# Patient Record
Sex: FEMALE | Race: WHITE | Employment: OTHER
[De-identification: names, ages, dates, MRNs, and addresses within clinical notes are randomized per-mention and may not be internally consistent; named-entity substitution may affect disease eponyms.]

---

## 2017-01-19 ENCOUNTER — OFFICE VISIT (OUTPATIENT)
Dept: FAMILY MEDICINE CLINIC | Facility: CLINIC | Age: 72
End: 2017-01-19

## 2017-01-19 DIAGNOSIS — E78.2 MIXED HYPERLIPIDEMIA: Primary | ICD-10-CM

## 2017-01-19 DIAGNOSIS — F32.A ANXIETY AND DEPRESSION: ICD-10-CM

## 2017-01-19 DIAGNOSIS — Z79.899 LONG TERM USE OF DRUG: ICD-10-CM

## 2017-01-19 DIAGNOSIS — F41.9 ANXIETY AND DEPRESSION: ICD-10-CM

## 2017-01-19 PROCEDURE — 99213 OFFICE O/P EST LOW 20 MIN: CPT | Performed by: NURSE PRACTITIONER

## 2017-01-19 RX ORDER — AMITRIPTYLINE HYDROCHLORIDE 25 MG/1
TABLET, FILM COATED ORAL
Qty: 30 TABLET | Refills: 0 | Status: CANCELLED | OUTPATIENT
Start: 2017-01-19

## 2017-01-19 RX ORDER — AMITRIPTYLINE HYDROCHLORIDE 25 MG/1
TABLET, FILM COATED ORAL
Qty: 30 TABLET | Refills: 2 | Status: SHIPPED | OUTPATIENT
Start: 2017-01-19 | End: 2017-04-28 | Stop reason: SDUPTHER

## 2017-01-19 RX ORDER — MONTELUKAST SODIUM 10 MG/1
10 TABLET ORAL NIGHTLY
Qty: 30 TABLET | Refills: 5 | Status: SHIPPED | OUTPATIENT
Start: 2017-01-19 | End: 2018-02-19 | Stop reason: ALTCHOICE

## 2017-01-19 RX ORDER — SIMVASTATIN 40 MG
TABLET ORAL
Qty: 30 TABLET | Refills: 5 | Status: SHIPPED | OUTPATIENT
Start: 2017-01-19 | End: 2017-09-19 | Stop reason: SDUPTHER

## 2017-01-22 VITALS
TEMPERATURE: 97.6 F | HEART RATE: 82 BPM | WEIGHT: 169 LBS | SYSTOLIC BLOOD PRESSURE: 146 MMHG | RESPIRATION RATE: 20 BRPM | OXYGEN SATURATION: 98 % | BODY MASS INDEX: 29.01 KG/M2 | DIASTOLIC BLOOD PRESSURE: 90 MMHG

## 2017-01-22 ASSESSMENT — ENCOUNTER SYMPTOMS
COLOR CHANGE: 0
EYE PAIN: 0
COUGH: 0
NAUSEA: 0
SHORTNESS OF BREATH: 0
DIARRHEA: 0
EYE ITCHING: 0
SORE THROAT: 0
CONSTIPATION: 0
ABDOMINAL DISTENTION: 0
VISUAL CHANGE: 0
BLOOD IN STOOL: 0
ABDOMINAL PAIN: 0
WHEEZING: 0
SINUS PRESSURE: 0
CHEST TIGHTNESS: 0

## 2017-02-02 ENCOUNTER — NURSE ONLY (OUTPATIENT)
Dept: FAMILY MEDICINE CLINIC | Facility: CLINIC | Age: 72
End: 2017-02-02

## 2017-02-02 DIAGNOSIS — Z23 NEED FOR PNEUMOCOCCAL VACCINE: Primary | ICD-10-CM

## 2017-02-02 PROCEDURE — 90670 PCV13 VACCINE IM: CPT | Performed by: NURSE PRACTITIONER

## 2017-02-02 PROCEDURE — G0009 ADMIN PNEUMOCOCCAL VACCINE: HCPCS | Performed by: NURSE PRACTITIONER

## 2017-04-03 ENCOUNTER — HOSPITAL ENCOUNTER (OUTPATIENT)
Age: 72
Setting detail: SPECIMEN
Discharge: HOME | End: 2017-04-03
Payer: MEDICARE

## 2017-04-03 ENCOUNTER — OFFICE VISIT (OUTPATIENT)
Dept: INTERNAL MEDICINE CLINIC | Age: 72
End: 2017-04-03
Payer: MEDICARE

## 2017-04-03 VITALS
SYSTOLIC BLOOD PRESSURE: 118 MMHG | DIASTOLIC BLOOD PRESSURE: 86 MMHG | WEIGHT: 170 LBS | HEIGHT: 64 IN | BODY MASS INDEX: 29.02 KG/M2

## 2017-04-03 DIAGNOSIS — R39.15 URINARY URGENCY: ICD-10-CM

## 2017-04-03 DIAGNOSIS — J06.9 ACUTE URI: ICD-10-CM

## 2017-04-03 PROCEDURE — 99213 OFFICE O/P EST LOW 20 MIN: CPT | Performed by: NURSE PRACTITIONER

## 2017-04-03 RX ORDER — LORATADINE AND PSEUDOEPHEDRINE 10; 240 MG/1; MG/1
1 TABLET, EXTENDED RELEASE ORAL DAILY
Qty: 14 TABLET | Refills: 0 | Status: SHIPPED | OUTPATIENT
Start: 2017-04-03 | End: 2018-02-19 | Stop reason: ALTCHOICE

## 2017-04-03 RX ORDER — CIPROFLOXACIN 500 MG/1
500 TABLET, FILM COATED ORAL 2 TIMES DAILY
Qty: 20 TABLET | Refills: 0 | Status: SHIPPED | OUTPATIENT
Start: 2017-04-03 | End: 2017-04-13

## 2017-04-03 ASSESSMENT — ENCOUNTER SYMPTOMS
SINUS PRESSURE: 1
SHORTNESS OF BREATH: 0
WHEEZING: 0
RHINORRHEA: 1
COUGH: 1
SORE THROAT: 1

## 2017-04-04 LAB
BILIRUBIN URINE: NEGATIVE
COLOR: YELLOW
COMMENT UA: ABNORMAL
GLUCOSE URINE: NEGATIVE
KETONES, URINE: NEGATIVE
LEUKOCYTE ESTERASE, URINE: NEGATIVE
NITRITE, URINE: NEGATIVE
PH UA: 7.5 (ref 5–8)
PROTEIN UA: NEGATIVE
SPECIFIC GRAVITY UA: 1 (ref 1–1.03)
TURBIDITY: CLEAR
URINE HGB: NEGATIVE
UROBILINOGEN, URINE: NORMAL

## 2017-04-28 RX ORDER — AMITRIPTYLINE HYDROCHLORIDE 25 MG/1
TABLET, FILM COATED ORAL
Qty: 30 TABLET | Refills: 2 | Status: SHIPPED | OUTPATIENT
Start: 2017-04-28 | End: 2017-08-09 | Stop reason: SDUPTHER

## 2017-05-15 ENCOUNTER — OFFICE VISIT (OUTPATIENT)
Dept: GASTROENTEROLOGY | Age: 72
End: 2017-05-15
Payer: MEDICARE

## 2017-05-15 VITALS
HEART RATE: 68 BPM | OXYGEN SATURATION: 99 % | DIASTOLIC BLOOD PRESSURE: 86 MMHG | BODY MASS INDEX: 25.71 KG/M2 | RESPIRATION RATE: 14 BRPM | WEIGHT: 160 LBS | TEMPERATURE: 97.3 F | HEIGHT: 66 IN | SYSTOLIC BLOOD PRESSURE: 154 MMHG

## 2017-05-15 DIAGNOSIS — K76.0 FATTY LIVER: Primary | ICD-10-CM

## 2017-05-15 DIAGNOSIS — K57.30 DIVERTICULOSIS OF LARGE INTESTINE WITHOUT HEMORRHAGE: ICD-10-CM

## 2017-05-15 PROCEDURE — 99213 OFFICE O/P EST LOW 20 MIN: CPT | Performed by: INTERNAL MEDICINE

## 2017-05-15 ASSESSMENT — ENCOUNTER SYMPTOMS
BLOOD IN STOOL: 0
CONSTIPATION: 1
ABDOMINAL DISTENTION: 0
DIARRHEA: 0
ABDOMINAL PAIN: 0
ANAL BLEEDING: 0
NAUSEA: 0
VOMITING: 0
COUGH: 1
RECTAL PAIN: 0

## 2017-08-09 ENCOUNTER — OFFICE VISIT (OUTPATIENT)
Dept: FAMILY MEDICINE CLINIC | Age: 72
End: 2017-08-09
Payer: MEDICARE

## 2017-08-09 VITALS
OXYGEN SATURATION: 96 % | WEIGHT: 171.8 LBS | BODY MASS INDEX: 27.61 KG/M2 | HEART RATE: 72 BPM | DIASTOLIC BLOOD PRESSURE: 80 MMHG | SYSTOLIC BLOOD PRESSURE: 138 MMHG | HEIGHT: 66 IN

## 2017-08-09 DIAGNOSIS — J32.8 OTHER CHRONIC SINUSITIS: ICD-10-CM

## 2017-08-09 DIAGNOSIS — F32.A ANXIETY AND DEPRESSION: ICD-10-CM

## 2017-08-09 DIAGNOSIS — F41.9 ANXIETY AND DEPRESSION: ICD-10-CM

## 2017-08-09 DIAGNOSIS — N39.41 URGE INCONTINENCE: Primary | ICD-10-CM

## 2017-08-09 LAB
BILIRUBIN, POC: NORMAL
BLOOD URINE, POC: NORMAL
CLARITY, POC: CLEAR
COLOR, POC: YELLOW
GLUCOSE URINE, POC: NORMAL
KETONES, POC: NORMAL
LEUKOCYTE EST, POC: NORMAL
NITRITE, POC: NORMAL
PH, POC: 6.5
PROTEIN, POC: NORMAL
SPECIFIC GRAVITY, POC: 1.01
UROBILINOGEN, POC: 0.2

## 2017-08-09 PROCEDURE — 99214 OFFICE O/P EST MOD 30 MIN: CPT | Performed by: NURSE PRACTITIONER

## 2017-08-09 PROCEDURE — 81003 URINALYSIS AUTO W/O SCOPE: CPT | Performed by: NURSE PRACTITIONER

## 2017-08-09 RX ORDER — FLUTICASONE PROPIONATE 50 MCG
1 SPRAY, SUSPENSION (ML) NASAL DAILY
Qty: 1 BOTTLE | Refills: 3 | Status: SHIPPED | OUTPATIENT
Start: 2017-08-09 | End: 2018-01-11 | Stop reason: SDUPTHER

## 2017-08-09 RX ORDER — AMITRIPTYLINE HYDROCHLORIDE 25 MG/1
TABLET, FILM COATED ORAL
Qty: 30 TABLET | Refills: 5 | Status: SHIPPED | OUTPATIENT
Start: 2017-08-09 | End: 2018-01-11 | Stop reason: SDUPTHER

## 2017-08-09 ASSESSMENT — ENCOUNTER SYMPTOMS
COUGH: 0
CHEST TIGHTNESS: 0
WHEEZING: 0
EYE PAIN: 0
CONSTIPATION: 0
DIARRHEA: 0
BLOOD IN STOOL: 0
NAUSEA: 0
ABDOMINAL PAIN: 0
COLOR CHANGE: 0
SORE THROAT: 0
EYE ITCHING: 0
SHORTNESS OF BREATH: 0
ABDOMINAL DISTENTION: 0

## 2017-08-09 ASSESSMENT — PATIENT HEALTH QUESTIONNAIRE - PHQ9
SUM OF ALL RESPONSES TO PHQ QUESTIONS 1-9: 0
2. FEELING DOWN, DEPRESSED OR HOPELESS: 0
1. LITTLE INTEREST OR PLEASURE IN DOING THINGS: 0
SUM OF ALL RESPONSES TO PHQ9 QUESTIONS 1 & 2: 0

## 2017-08-13 ASSESSMENT — ENCOUNTER SYMPTOMS: SINUS PRESSURE: 1

## 2017-09-20 RX ORDER — SIMVASTATIN 40 MG
TABLET ORAL
Qty: 30 TABLET | Refills: 5 | Status: SHIPPED | OUTPATIENT
Start: 2017-09-20 | End: 2017-11-17 | Stop reason: SDUPTHER

## 2017-11-07 ENCOUNTER — TELEPHONE (OUTPATIENT)
Dept: FAMILY MEDICINE CLINIC | Age: 72
End: 2017-11-07

## 2017-11-07 NOTE — TELEPHONE ENCOUNTER
Patient called requesting a referral for an allergist for food allergies. Please advise patient who the office would recommend.

## 2017-11-17 ENCOUNTER — TELEPHONE (OUTPATIENT)
Dept: PHARMACY | Facility: CLINIC | Age: 72
End: 2017-11-17

## 2017-11-17 RX ORDER — SIMVASTATIN 40 MG
TABLET ORAL
Qty: 30 TABLET | Refills: 5
Start: 2017-11-17 | End: 2018-01-11 | Stop reason: SDUPTHER

## 2017-11-17 NOTE — TELEPHONE ENCOUNTER
Waqar Li, CNP, FYI of below:  - Pt reports she's taking simvastatin 20mg every other day, using the 40mg tabs. States you are aware. - If correct and appropriate, please consider updating rx, or if simvastatin 10mg daily would be an option?     Thank you,  Omar Ellis, PharmD, [address] [facility]  Direct: [phone]  Department, toll free: [phone], option 7

## 2017-11-20 NOTE — TELEPHONE ENCOUNTER
My apologies, I misinterpreted below. Agree that simvastatin 10mg daily doesn't make sense if taking 40mg alternating with 20mg daily. Noted simvastatin rx instructions updated - thank you!

## 2018-01-11 ENCOUNTER — OFFICE VISIT (OUTPATIENT)
Dept: FAMILY MEDICINE CLINIC | Age: 73
End: 2018-01-11
Payer: MEDICARE

## 2018-01-11 VITALS
DIASTOLIC BLOOD PRESSURE: 78 MMHG | SYSTOLIC BLOOD PRESSURE: 124 MMHG | OXYGEN SATURATION: 96 % | WEIGHT: 178 LBS | BODY MASS INDEX: 28.73 KG/M2 | HEART RATE: 78 BPM

## 2018-01-11 DIAGNOSIS — F33.42 RECURRENT MAJOR DEPRESSIVE EPISODES, IN FULL REMISSION (HCC): ICD-10-CM

## 2018-01-11 DIAGNOSIS — F32.A ANXIETY AND DEPRESSION: Primary | ICD-10-CM

## 2018-01-11 DIAGNOSIS — Z79.899 LONG TERM USE OF DRUG: ICD-10-CM

## 2018-01-11 DIAGNOSIS — E78.2 MIXED HYPERLIPIDEMIA: ICD-10-CM

## 2018-01-11 DIAGNOSIS — Z12.31 ENCOUNTER FOR SCREENING MAMMOGRAM FOR MALIGNANT NEOPLASM OF BREAST: ICD-10-CM

## 2018-01-11 DIAGNOSIS — R10.11 RUQ PAIN: ICD-10-CM

## 2018-01-11 DIAGNOSIS — F41.9 ANXIETY AND DEPRESSION: Primary | ICD-10-CM

## 2018-01-11 PROCEDURE — 99214 OFFICE O/P EST MOD 30 MIN: CPT | Performed by: NURSE PRACTITIONER

## 2018-01-11 PROCEDURE — 3017F COLORECTAL CA SCREEN DOC REV: CPT | Performed by: NURSE PRACTITIONER

## 2018-01-11 PROCEDURE — 3014F SCREEN MAMMO DOC REV: CPT | Performed by: NURSE PRACTITIONER

## 2018-01-11 PROCEDURE — 1090F PRES/ABSN URINE INCON ASSESS: CPT | Performed by: NURSE PRACTITIONER

## 2018-01-11 PROCEDURE — 4040F PNEUMOC VAC/ADMIN/RCVD: CPT | Performed by: NURSE PRACTITIONER

## 2018-01-11 PROCEDURE — 1123F ACP DISCUSS/DSCN MKR DOCD: CPT | Performed by: NURSE PRACTITIONER

## 2018-01-11 PROCEDURE — G8484 FLU IMMUNIZE NO ADMIN: HCPCS | Performed by: NURSE PRACTITIONER

## 2018-01-11 PROCEDURE — G8399 PT W/DXA RESULTS DOCUMENT: HCPCS | Performed by: NURSE PRACTITIONER

## 2018-01-11 PROCEDURE — G8419 CALC BMI OUT NRM PARAM NOF/U: HCPCS | Performed by: NURSE PRACTITIONER

## 2018-01-11 PROCEDURE — G8427 DOCREV CUR MEDS BY ELIG CLIN: HCPCS | Performed by: NURSE PRACTITIONER

## 2018-01-11 PROCEDURE — 1036F TOBACCO NON-USER: CPT | Performed by: NURSE PRACTITIONER

## 2018-01-11 RX ORDER — SIMVASTATIN 40 MG
TABLET ORAL
Qty: 30 TABLET | Refills: 5 | Status: SHIPPED | OUTPATIENT
Start: 2018-01-11 | End: 2018-12-17 | Stop reason: DRUGHIGH

## 2018-01-11 RX ORDER — FLUTICASONE PROPIONATE 50 MCG
1 SPRAY, SUSPENSION (ML) NASAL DAILY
Qty: 1 BOTTLE | Refills: 5 | Status: SHIPPED | OUTPATIENT
Start: 2018-01-11 | End: 2018-07-21 | Stop reason: SDUPTHER

## 2018-01-11 RX ORDER — AMITRIPTYLINE HYDROCHLORIDE 25 MG/1
TABLET, FILM COATED ORAL
Qty: 30 TABLET | Refills: 5 | Status: SHIPPED | OUTPATIENT
Start: 2018-01-11 | End: 2018-04-23 | Stop reason: ALTCHOICE

## 2018-01-21 ASSESSMENT — ENCOUNTER SYMPTOMS
COLOR CHANGE: 0
SORE THROAT: 0
NAUSEA: 0
EYE PAIN: 0
SINUS PRESSURE: 0
BLOOD IN STOOL: 0
SHORTNESS OF BREATH: 0
COUGH: 0
WHEEZING: 0
CONSTIPATION: 0
CHEST TIGHTNESS: 0
ABDOMINAL DISTENTION: 0
BELCHING: 1
ABDOMINAL PAIN: 1
EYE ITCHING: 0
DIARRHEA: 0

## 2018-01-22 NOTE — PROGRESS NOTES
constipation, diarrhea, dysuria, frequency, headaches, myalgias or nausea. The pain is aggravated by eating. The pain is relieved by nothing (Will finally go away on its own. Has tried antacids but it doesn't seem to do anything. ). She has tried antacids for the symptoms. The treatment provided no relief. Hyperlipidemia   This is a chronic problem. The current episode started more than 1 year ago. The problem is controlled. Recent lipid tests were reviewed and are normal. She has no history of diabetes, hypothyroidism or liver disease. There are no known factors aggravating her hyperlipidemia. Pertinent negatives include no chest pain, leg pain, myalgias or shortness of breath. Current antihyperlipidemic treatment includes statins. The current treatment provides moderate improvement of lipids. There are no compliance problems.   Risk factors for coronary artery disease include dyslipidemia and post-menopausal.       No results found for: LABA1C          ( goal A1C is < 7)   No results found for: LABMICR  LDL Cholesterol (mg/dL)   Date Value   01/04/2016 98   10/08/2014 93   08/22/2012 92       (goal LDL is <100)   AST (U/L)   Date Value   01/04/2016 17     ALT (U/L)   Date Value   01/04/2016 18     BUN (mg/dL)   Date Value   01/04/2016 10     BP Readings from Last 3 Encounters:   01/11/18 124/78   08/09/17 138/80   05/15/17 (!) 154/86          (goal 120/80)    Past Medical History:   Diagnosis Date    Diverticulosis     Fatty liver     Hyperlipidemia       Past Surgical History:   Procedure Laterality Date    APPENDECTOMY      BLADDER SUSPENSION      COLONOSCOPY  05/23/13    DIVERTICULOSIS     HYSTERECTOMY      SPINE SURGERY      TONSILLECTOMY      TYMPANOMASTOIDECTOMY Left 2000    UPPER GASTROINTESTINAL ENDOSCOPY  05/23/13       Family History   Problem Relation Age of Onset    Cancer Mother     Cancer Maternal Aunt        Social History   Substance Use Topics    Smoking status: Never Smoker   

## 2018-01-29 ENCOUNTER — HOSPITAL ENCOUNTER (OUTPATIENT)
Dept: NUCLEAR MEDICINE | Age: 73
Discharge: HOME | End: 2018-01-29
Payer: MEDICARE

## 2018-01-29 DIAGNOSIS — R10.11 RUQ PAIN: ICD-10-CM

## 2018-01-29 DIAGNOSIS — N39.3 STRESS INCONTINENCE: Primary | ICD-10-CM

## 2018-01-29 PROCEDURE — A9537 TC99M MEBROFENIN: HCPCS | Performed by: NURSE PRACTITIONER

## 2018-01-29 PROCEDURE — 2580000003 HC RX 258: Performed by: NURSE PRACTITIONER

## 2018-01-29 PROCEDURE — 3430000000 HC RX DIAGNOSTIC RADIOPHARMACEUTICAL: Performed by: NURSE PRACTITIONER

## 2018-01-29 PROCEDURE — 78226 HEPATOBILIARY SYSTEM IMAGING: CPT

## 2018-01-29 RX ORDER — SODIUM CHLORIDE 0.9 % (FLUSH) 0.9 %
10 SYRINGE (ML) INJECTION PRN
Status: DISCONTINUED | OUTPATIENT
Start: 2018-01-29 | End: 2018-02-01 | Stop reason: HOSPADM

## 2018-01-29 RX ADMIN — Medication 3.47 MILLICURIE: at 10:00

## 2018-01-29 RX ADMIN — Medication 10 ML: at 10:00

## 2018-02-05 ENCOUNTER — HOSPITAL ENCOUNTER (OUTPATIENT)
Age: 73
Discharge: HOME | End: 2018-02-05
Payer: MEDICARE

## 2018-02-05 ENCOUNTER — HOSPITAL ENCOUNTER (OUTPATIENT)
Dept: WOMENS IMAGING | Age: 73
Discharge: HOME | End: 2018-02-07
Payer: MEDICARE

## 2018-02-05 DIAGNOSIS — Z12.31 ENCOUNTER FOR SCREENING MAMMOGRAM FOR MALIGNANT NEOPLASM OF BREAST: ICD-10-CM

## 2018-02-05 DIAGNOSIS — E78.2 MIXED HYPERLIPIDEMIA: ICD-10-CM

## 2018-02-05 DIAGNOSIS — Z79.899 LONG TERM USE OF DRUG: ICD-10-CM

## 2018-02-05 LAB
ALBUMIN SERPL-MCNC: 4.1 G/DL (ref 3.5–5.2)
ALBUMIN/GLOBULIN RATIO: ABNORMAL (ref 1–2.5)
ALP BLD-CCNC: 84 U/L (ref 35–104)
ALT SERPL-CCNC: 19 U/L (ref 5–33)
ANION GAP SERPL CALCULATED.3IONS-SCNC: 13 MMOL/L (ref 9–17)
AST SERPL-CCNC: 17 U/L
BILIRUB SERPL-MCNC: 0.59 MG/DL (ref 0.3–1.2)
BUN BLDV-MCNC: 13 MG/DL (ref 8–23)
BUN/CREAT BLD: ABNORMAL (ref 9–20)
CALCIUM SERPL-MCNC: 9.3 MG/DL (ref 8.6–10.4)
CHLORIDE BLD-SCNC: 104 MMOL/L (ref 98–107)
CHOLESTEROL/HDL RATIO: 3.9
CHOLESTEROL: 203 MG/DL
CO2: 25 MMOL/L (ref 20–31)
CREAT SERPL-MCNC: 0.8 MG/DL (ref 0.5–0.9)
GFR AFRICAN AMERICAN: >60 ML/MIN
GFR NON-AFRICAN AMERICAN: >60 ML/MIN
GFR SERPL CREATININE-BSD FRML MDRD: ABNORMAL ML/MIN/{1.73_M2}
GFR SERPL CREATININE-BSD FRML MDRD: ABNORMAL ML/MIN/{1.73_M2}
GLUCOSE BLD-MCNC: 102 MG/DL (ref 70–99)
HDLC SERPL-MCNC: 52 MG/DL
LDL CHOLESTEROL: 102 MG/DL (ref 0–130)
POTASSIUM SERPL-SCNC: 4.2 MMOL/L (ref 3.7–5.3)
SODIUM BLD-SCNC: 142 MMOL/L (ref 135–144)
TOTAL PROTEIN: 7 G/DL (ref 6.4–8.3)
TRIGL SERPL-MCNC: 246 MG/DL
VLDLC SERPL CALC-MCNC: ABNORMAL MG/DL (ref 1–30)

## 2018-02-05 PROCEDURE — 80061 LIPID PANEL: CPT

## 2018-02-05 PROCEDURE — 77063 BREAST TOMOSYNTHESIS BI: CPT

## 2018-02-05 PROCEDURE — 36415 COLL VENOUS BLD VENIPUNCTURE: CPT

## 2018-02-05 PROCEDURE — 80053 COMPREHEN METABOLIC PANEL: CPT

## 2018-02-08 ENCOUNTER — OFFICE VISIT (OUTPATIENT)
Dept: FAMILY MEDICINE CLINIC | Age: 73
End: 2018-02-08
Payer: MEDICARE

## 2018-02-08 VITALS
WEIGHT: 186.2 LBS | TEMPERATURE: 98 F | DIASTOLIC BLOOD PRESSURE: 84 MMHG | OXYGEN SATURATION: 92 % | RESPIRATION RATE: 18 BRPM | BODY MASS INDEX: 29.92 KG/M2 | HEIGHT: 66 IN | HEART RATE: 94 BPM | SYSTOLIC BLOOD PRESSURE: 128 MMHG

## 2018-02-08 DIAGNOSIS — Z23 NEED FOR PNEUMOCOCCAL VACCINE: ICD-10-CM

## 2018-02-08 DIAGNOSIS — R10.11 RUQ PAIN: Primary | ICD-10-CM

## 2018-02-08 DIAGNOSIS — N39.0 URINARY TRACT INFECTION WITHOUT HEMATURIA, SITE UNSPECIFIED: ICD-10-CM

## 2018-02-08 LAB
BILIRUBIN, POC: NORMAL
BLOOD URINE, POC: NORMAL
CLARITY, POC: NORMAL
COLOR, POC: YELLOW
GLUCOSE URINE, POC: NORMAL
KETONES, POC: NORMAL
LEUKOCYTE EST, POC: NORMAL
NITRITE, POC: NORMAL
PH, POC: 7.5
PROTEIN, POC: NORMAL
SPECIFIC GRAVITY, POC: 1.02
UROBILINOGEN, POC: 0.2

## 2018-02-08 PROCEDURE — 99213 OFFICE O/P EST LOW 20 MIN: CPT | Performed by: NURSE PRACTITIONER

## 2018-02-08 PROCEDURE — 1123F ACP DISCUSS/DSCN MKR DOCD: CPT | Performed by: NURSE PRACTITIONER

## 2018-02-08 PROCEDURE — 81003 URINALYSIS AUTO W/O SCOPE: CPT | Performed by: NURSE PRACTITIONER

## 2018-02-08 PROCEDURE — 1036F TOBACCO NON-USER: CPT | Performed by: NURSE PRACTITIONER

## 2018-02-08 PROCEDURE — G8399 PT W/DXA RESULTS DOCUMENT: HCPCS | Performed by: NURSE PRACTITIONER

## 2018-02-08 PROCEDURE — G8419 CALC BMI OUT NRM PARAM NOF/U: HCPCS | Performed by: NURSE PRACTITIONER

## 2018-02-08 PROCEDURE — 3014F SCREEN MAMMO DOC REV: CPT | Performed by: NURSE PRACTITIONER

## 2018-02-08 PROCEDURE — 1090F PRES/ABSN URINE INCON ASSESS: CPT | Performed by: NURSE PRACTITIONER

## 2018-02-08 PROCEDURE — G8484 FLU IMMUNIZE NO ADMIN: HCPCS | Performed by: NURSE PRACTITIONER

## 2018-02-08 PROCEDURE — 90732 PPSV23 VACC 2 YRS+ SUBQ/IM: CPT | Performed by: NURSE PRACTITIONER

## 2018-02-08 PROCEDURE — G0009 ADMIN PNEUMOCOCCAL VACCINE: HCPCS | Performed by: NURSE PRACTITIONER

## 2018-02-08 PROCEDURE — G8427 DOCREV CUR MEDS BY ELIG CLIN: HCPCS | Performed by: NURSE PRACTITIONER

## 2018-02-08 PROCEDURE — 3017F COLORECTAL CA SCREEN DOC REV: CPT | Performed by: NURSE PRACTITIONER

## 2018-02-08 PROCEDURE — 4040F PNEUMOC VAC/ADMIN/RCVD: CPT | Performed by: NURSE PRACTITIONER

## 2018-02-08 ASSESSMENT — PATIENT HEALTH QUESTIONNAIRE - PHQ9
SUM OF ALL RESPONSES TO PHQ9 QUESTIONS 1 & 2: 0
SUM OF ALL RESPONSES TO PHQ QUESTIONS 1-9: 0
2. FEELING DOWN, DEPRESSED OR HOPELESS: 0
1. LITTLE INTEREST OR PLEASURE IN DOING THINGS: 0

## 2018-02-11 ASSESSMENT — ENCOUNTER SYMPTOMS
SHORTNESS OF BREATH: 0
WHEEZING: 0
COUGH: 0
EYE PAIN: 0
NAUSEA: 0
CHEST TIGHTNESS: 0
SINUS PRESSURE: 0
BELCHING: 1
ABDOMINAL PAIN: 1
ABDOMINAL DISTENTION: 0
VOMITING: 0
EYE ITCHING: 0
BLOOD IN STOOL: 0
COLOR CHANGE: 0
DIARRHEA: 0
CONSTIPATION: 0
SORE THROAT: 0

## 2018-02-11 NOTE — PROGRESS NOTES
HYSTERECTOMY      SPINE SURGERY      TONSILLECTOMY      TYMPANOMASTOIDECTOMY Left 2000    UPPER GASTROINTESTINAL ENDOSCOPY  05/23/13       Family History   Problem Relation Age of Onset    Cancer Mother     Cancer Maternal Aunt        Social History   Substance Use Topics    Smoking status: Never Smoker    Smokeless tobacco: Never Used    Alcohol use No      Current Outpatient Prescriptions   Medication Sig Dispense Refill    amitriptyline (ELAVIL) 25 MG tablet take 1 tablet by mouth once daily 30 tablet 5    fluticasone (FLONASE) 50 MCG/ACT nasal spray 1 spray by Nasal route daily 1 Bottle 5    simvastatin (ZOCOR) 40 MG tablet Take 1 tablet by mouth every other night alternating with 0.5 tablet the next day. 30 tablet 5    Methylcellulose, Laxative, (CITRUCEL PO) Take by mouth      loratadine-pseudoephedrine (CLARITIN-D 24 HOUR)  MG per extended release tablet Take 1 tablet by mouth daily 14 tablet 0    montelukast (SINGULAIR) 10 MG tablet Take 1 tablet by mouth nightly 30 tablet 5    ciclopirox (PENLAC) 8 % solution Apply topically nightly. 1 Bottle 3    Biotin 5000 MCG CAPS Take 10,000 mcg by mouth      CRANBERRY PO Take 8,400 mg by mouth      Ascorbic Acid (VITAMIN C) 250 MG tablet Take 250 mg by mouth daily      Fexofenadine-Pseudoephedrine (ALLEGRA-D 12 HOUR PO) Take 1 tablet by mouth daily       zinc gluconate 50 MG tablet Take 50 mg by mouth daily.  calcium carbonate (OSCAL) 500 MG TABS tablet Take 500 mg by mouth daily.  aspirin 81 MG tablet Take 81 mg by mouth daily.  Multiple Vitamins-Minerals (THERAPEUTIC MULTIVITAMIN-MINERALS) tablet Take 1 tablet by mouth daily.  Omega-3 Fatty Acids (OMEGA 3 PO) Take  by mouth.  Vitamin D (CHOLECALCIFEROL) 1000 UNITS CAPS capsule Take 5,000 Units by mouth daily. No current facility-administered medications for this visit.       Allergies   Allergen Reactions    Adhesive Tape Itching       Health Maintenance

## 2018-02-19 ENCOUNTER — OFFICE VISIT (OUTPATIENT)
Dept: UROLOGY | Age: 73
End: 2018-02-19
Payer: MEDICARE

## 2018-02-19 VITALS
BODY MASS INDEX: 29.25 KG/M2 | WEIGHT: 182 LBS | SYSTOLIC BLOOD PRESSURE: 144 MMHG | TEMPERATURE: 98.1 F | HEART RATE: 81 BPM | HEIGHT: 66 IN | DIASTOLIC BLOOD PRESSURE: 90 MMHG

## 2018-02-19 DIAGNOSIS — R39.15 URGENCY OF URINATION: ICD-10-CM

## 2018-02-19 DIAGNOSIS — R35.1 NOCTURIA: ICD-10-CM

## 2018-02-19 DIAGNOSIS — N39.41 URGE INCONTINENCE: ICD-10-CM

## 2018-02-19 DIAGNOSIS — R35.0 URINARY FREQUENCY: Primary | ICD-10-CM

## 2018-02-19 PROCEDURE — 4040F PNEUMOC VAC/ADMIN/RCVD: CPT | Performed by: UROLOGY

## 2018-02-19 PROCEDURE — G8419 CALC BMI OUT NRM PARAM NOF/U: HCPCS | Performed by: UROLOGY

## 2018-02-19 PROCEDURE — G8427 DOCREV CUR MEDS BY ELIG CLIN: HCPCS | Performed by: UROLOGY

## 2018-02-19 PROCEDURE — 99204 OFFICE O/P NEW MOD 45 MIN: CPT | Performed by: UROLOGY

## 2018-02-19 PROCEDURE — 1036F TOBACCO NON-USER: CPT | Performed by: UROLOGY

## 2018-02-19 PROCEDURE — 0509F URINE INCON PLAN DOCD: CPT | Performed by: UROLOGY

## 2018-02-19 PROCEDURE — 3017F COLORECTAL CA SCREEN DOC REV: CPT | Performed by: UROLOGY

## 2018-02-19 PROCEDURE — 1123F ACP DISCUSS/DSCN MKR DOCD: CPT | Performed by: UROLOGY

## 2018-02-19 PROCEDURE — G8399 PT W/DXA RESULTS DOCUMENT: HCPCS | Performed by: UROLOGY

## 2018-02-19 PROCEDURE — G8484 FLU IMMUNIZE NO ADMIN: HCPCS | Performed by: UROLOGY

## 2018-02-19 PROCEDURE — 3014F SCREEN MAMMO DOC REV: CPT | Performed by: UROLOGY

## 2018-02-19 PROCEDURE — 1090F PRES/ABSN URINE INCON ASSESS: CPT | Performed by: UROLOGY

## 2018-02-19 PROCEDURE — 51798 US URINE CAPACITY MEASURE: CPT | Performed by: UROLOGY

## 2018-02-19 RX ORDER — MIRABEGRON 50 MG/1
50 TABLET, FILM COATED, EXTENDED RELEASE ORAL DAILY
Qty: 30 TABLET | Refills: 11 | Status: SHIPPED | OUTPATIENT
Start: 2018-02-19 | End: 2018-03-26 | Stop reason: SINTOL

## 2018-02-19 ASSESSMENT — ENCOUNTER SYMPTOMS
NAUSEA: 0
COLOR CHANGE: 0
EYE PAIN: 0
ABDOMINAL PAIN: 0
SHORTNESS OF BREATH: 0
WHEEZING: 0
VOMITING: 0
BACK PAIN: 0
EYE REDNESS: 0
COUGH: 0

## 2018-02-19 NOTE — PROGRESS NOTES
[facility]  [facility] [place]  [address]  [address]  [address] [facility] [address]  Dept: [address] [facility] Urology Office Note - New Patient    Patient:  Carlos Arellano  YOB: 1945  Date: 2/19/2018    The patient is a 67 y.o. female who presents today for evaluation of the following problems:   Chief Complaint   Patient presents with    Incontinence     NP - pt ref by PCP for stress incontinence; pt c/o frequency and urgency - pt states bladder sling was performed about 10 years ago by Dr Dea Mann    referred by Brittany Pascual CNP. HPI  Urge Urinary Incontinence:  Patient is here today for urge urinary incontinence which started a few year(s) ago. Recently the urinary incontinence symptoms: are worsening  Current medical Rx for urge urinary incontinence: none  Previous treatments tried for urge urinary Incontinence: none  Urge Incontinence:  Severity = moderate. Stress incontinence: Severity = not present. Number of pads per day: 1  Frequency: patient typically voids every 1 hour  Urgency: Always  Nocturia x 1  Other LUTS: urgency, frequency and nocturia, 1 times per night        (Patient's old records have been requested, reviewed and summarized in today's note.)    Summary of old records: N/A    Additional History: N/A    Procedures Today: N/A    Urinalysis today:  No results found for this visit on 02/19/18. AUA Symptom Score (2/19/2018):   INCOMPLETE EMPTYING: How often have you had the sensation of not emptying your bladder?: More than Half the time  FREQUENCY: How often do you have to urinate less than every two hours?: About Half the time  INTERMITTENCY: How often have you found you stopped and started again several times when you urinated?: Less than 1 to 5 times  URGENCY: How often have you found it difficult to postpone urination?: More than Half the time  WEAK STREAM: How often have you had a weak urinary stream?: About Half the time  STRAINING: tablet by mouth daily.  Vitamin D (CHOLECALCIFEROL) 1000 UNITS CAPS capsule Take 5,000 Units by mouth daily. Adhesive tape  History   Smoking Status    Never Smoker   Smokeless Tobacco    Never Used      (If patient a smoker, smoking cessation counseling offered)   History   Alcohol Use No       REVIEW OF SYSTEMS:  Review of Systems   Constitutional: Negative for activity change, chills and fever. Eyes: Negative for pain, redness and visual disturbance. Respiratory: Negative for cough, shortness of breath and wheezing. Cardiovascular: Negative for chest pain and leg swelling. Gastrointestinal: Negative for abdominal pain, nausea and vomiting. Genitourinary: Positive for frequency and vaginal discharge. Negative for difficulty urinating, dysuria, hematuria, pelvic pain and vaginal bleeding. Musculoskeletal: Negative for back pain, joint swelling and myalgias. Skin: Negative for color change and rash. Neurological: Negative for dizziness, tremors and numbness. Hematological: Negative for adenopathy. Does not bruise/bleed easily. Physical Exam:    This a 67 y.o. female      Vitals:    02/19/18 1332   BP: (!) 144/90   Pulse: 81   Temp: 98.1 °F (36.7 °C)     Body mass index is 29.39 kg/m². Physical Exam  Constitutional: Patient in no acute distress, ggod grooming, appropriately dressed  Neuro: Alert and oriented to person, place and time. Psych: Mood normal, affect normal  Skin: No rash noted  HEENT: Head: Normocephalic and atraumatic,Conjunctivae and EOM are normal,Nose- normal, Right/Left External Ear: normal, Mouth: Mucosa Moist  Neck: Supple  Lungs: Respiratory effort is normal  Cardiovascular: strong and regular, no lower leg edema  Abdomen: Soft, non-tender, non-distended with no CVA,    Lymphatics: No cervical palpable lymphadenopathy. Bladder non-tender and not distended. Musculoskeletal: Normal gait and station        Assessment and Plan      1. Urinary frequency    2. Urgency of urination    3. Urge incontinence    4.  Nocturia            Plan:   myrbetriq  F/u 2 mo; if no improvement, will do uds/cysto (pt has had sling by gyn about 10 years ago for ranjan and has done well with this)       Prescriptions Ordered:  Orders Placed This Encounter   Medications    MYRBETRIQ 50 MG TB24     Sig: Take 50 mg by mouth daily     Dispense:  30 tablet     Refill:  11      Orders Placed:  Orders Placed This Encounter   Procedures   Waylon Nageotte, MD

## 2018-02-22 ENCOUNTER — TELEPHONE (OUTPATIENT)
Dept: UROLOGY | Age: 73
End: 2018-02-22

## 2018-02-22 DIAGNOSIS — N32.81 OAB (OVERACTIVE BLADDER): Primary | ICD-10-CM

## 2018-02-28 RX ORDER — OXYBUTYNIN CHLORIDE 5 MG/1
5 TABLET, EXTENDED RELEASE ORAL DAILY
Qty: 30 TABLET | Refills: 2 | Status: SHIPPED | OUTPATIENT
Start: 2018-02-28 | End: 2018-03-26 | Stop reason: ALTCHOICE

## 2018-03-26 ENCOUNTER — OFFICE VISIT (OUTPATIENT)
Dept: FAMILY MEDICINE CLINIC | Age: 73
End: 2018-03-26
Payer: MEDICARE

## 2018-03-26 VITALS
RESPIRATION RATE: 20 BRPM | DIASTOLIC BLOOD PRESSURE: 82 MMHG | HEART RATE: 87 BPM | OXYGEN SATURATION: 97 % | TEMPERATURE: 98.2 F | WEIGHT: 182.2 LBS | HEIGHT: 66 IN | SYSTOLIC BLOOD PRESSURE: 128 MMHG | BODY MASS INDEX: 29.28 KG/M2

## 2018-03-26 DIAGNOSIS — Z00.00 ENCOUNTER FOR MEDICARE ANNUAL WELLNESS EXAM: Primary | ICD-10-CM

## 2018-03-26 PROCEDURE — G8484 FLU IMMUNIZE NO ADMIN: HCPCS | Performed by: NURSE PRACTITIONER

## 2018-03-26 PROCEDURE — 4040F PNEUMOC VAC/ADMIN/RCVD: CPT | Performed by: NURSE PRACTITIONER

## 2018-03-26 PROCEDURE — G0438 PPPS, INITIAL VISIT: HCPCS | Performed by: NURSE PRACTITIONER

## 2018-03-26 ASSESSMENT — LIFESTYLE VARIABLES
HOW OFTEN DO YOU HAVE SIX OR MORE DRINKS ON ONE OCCASION: 1
HOW OFTEN DURING THE LAST YEAR HAVE YOU NEEDED AN ALCOHOLIC DRINK FIRST THING IN THE MORNING TO GET YOURSELF GOING AFTER A NIGHT OF HEAVY DRINKING: 0
HOW MANY STANDARD DRINKS CONTAINING ALCOHOL DO YOU HAVE ON A TYPICAL DAY: 1
HOW OFTEN DURING THE LAST YEAR HAVE YOU FAILED TO DO WHAT WAS NORMALLY EXPECTED FROM YOU BECAUSE OF DRINKING: 0
HOW OFTEN DO YOU HAVE A DRINK CONTAINING ALCOHOL: 3
HAS A RELATIVE, FRIEND, DOCTOR, OR ANOTHER HEALTH PROFESSIONAL EXPRESSED CONCERN ABOUT YOUR DRINKING OR SUGGESTED YOU CUT DOWN: 0
AUDIT TOTAL SCORE: 5
HOW OFTEN DURING THE LAST YEAR HAVE YOU BEEN UNABLE TO REMEMBER WHAT HAPPENED THE NIGHT BEFORE BECAUSE YOU HAD BEEN DRINKING: 0
HOW OFTEN DURING THE LAST YEAR HAVE YOU HAD A FEELING OF GUILT OR REMORSE AFTER DRINKING: 0
HAVE YOU OR SOMEONE ELSE BEEN INJURED AS A RESULT OF YOUR DRINKING: 0
AUDIT-C TOTAL SCORE: 5
HOW OFTEN DURING THE LAST YEAR HAVE YOU FOUND THAT YOU WERE NOT ABLE TO STOP DRINKING ONCE YOU HAD STARTED: 0

## 2018-03-26 ASSESSMENT — PATIENT HEALTH QUESTIONNAIRE - PHQ9: SUM OF ALL RESPONSES TO PHQ QUESTIONS 1-9: 0

## 2018-03-26 ASSESSMENT — ANXIETY QUESTIONNAIRES: GAD7 TOTAL SCORE: 4

## 2018-04-11 ENCOUNTER — OFFICE VISIT (OUTPATIENT)
Dept: PODIATRY | Age: 73
End: 2018-04-11
Payer: MEDICARE

## 2018-04-11 VITALS
HEIGHT: 66 IN | SYSTOLIC BLOOD PRESSURE: 173 MMHG | BODY MASS INDEX: 29.25 KG/M2 | DIASTOLIC BLOOD PRESSURE: 98 MMHG | HEART RATE: 96 BPM | WEIGHT: 182 LBS

## 2018-04-11 DIAGNOSIS — M79.675 PAIN OF TOES OF BOTH FEET: ICD-10-CM

## 2018-04-11 DIAGNOSIS — M79.674 PAIN OF TOES OF BOTH FEET: ICD-10-CM

## 2018-04-11 DIAGNOSIS — B35.1 ONYCHOMYCOSIS OF TOENAIL: Primary | ICD-10-CM

## 2018-04-11 PROCEDURE — G8419 CALC BMI OUT NRM PARAM NOF/U: HCPCS | Performed by: PODIATRIST

## 2018-04-11 PROCEDURE — 1090F PRES/ABSN URINE INCON ASSESS: CPT | Performed by: PODIATRIST

## 2018-04-11 PROCEDURE — 3017F COLORECTAL CA SCREEN DOC REV: CPT | Performed by: PODIATRIST

## 2018-04-11 PROCEDURE — G8399 PT W/DXA RESULTS DOCUMENT: HCPCS | Performed by: PODIATRIST

## 2018-04-11 PROCEDURE — 1036F TOBACCO NON-USER: CPT | Performed by: PODIATRIST

## 2018-04-11 PROCEDURE — 1123F ACP DISCUSS/DSCN MKR DOCD: CPT | Performed by: PODIATRIST

## 2018-04-11 PROCEDURE — 3014F SCREEN MAMMO DOC REV: CPT | Performed by: PODIATRIST

## 2018-04-11 PROCEDURE — G8427 DOCREV CUR MEDS BY ELIG CLIN: HCPCS | Performed by: PODIATRIST

## 2018-04-11 PROCEDURE — 4040F PNEUMOC VAC/ADMIN/RCVD: CPT | Performed by: PODIATRIST

## 2018-04-11 PROCEDURE — 99203 OFFICE O/P NEW LOW 30 MIN: CPT | Performed by: PODIATRIST

## 2018-04-11 ASSESSMENT — ENCOUNTER SYMPTOMS
NAUSEA: 0
SHORTNESS OF BREATH: 0
BACK PAIN: 0
COLOR CHANGE: 0
DIARRHEA: 0

## 2018-04-23 ENCOUNTER — TELEPHONE (OUTPATIENT)
Dept: UROLOGY | Age: 73
End: 2018-04-23

## 2018-04-23 ENCOUNTER — OFFICE VISIT (OUTPATIENT)
Dept: UROLOGY | Age: 73
End: 2018-04-23
Payer: MEDICARE

## 2018-04-23 VITALS
HEIGHT: 66 IN | SYSTOLIC BLOOD PRESSURE: 141 MMHG | HEART RATE: 82 BPM | WEIGHT: 182.98 LBS | BODY MASS INDEX: 29.41 KG/M2 | DIASTOLIC BLOOD PRESSURE: 77 MMHG

## 2018-04-23 DIAGNOSIS — N32.81 OAB (OVERACTIVE BLADDER): Primary | ICD-10-CM

## 2018-04-23 DIAGNOSIS — R35.1 NOCTURIA: ICD-10-CM

## 2018-04-23 DIAGNOSIS — N39.41 URGE INCONTINENCE: ICD-10-CM

## 2018-04-23 DIAGNOSIS — R35.0 URINARY FREQUENCY: ICD-10-CM

## 2018-04-23 DIAGNOSIS — R39.15 URGENCY OF URINATION: ICD-10-CM

## 2018-04-23 PROCEDURE — G8427 DOCREV CUR MEDS BY ELIG CLIN: HCPCS | Performed by: UROLOGY

## 2018-04-23 PROCEDURE — G8399 PT W/DXA RESULTS DOCUMENT: HCPCS | Performed by: UROLOGY

## 2018-04-23 PROCEDURE — 1036F TOBACCO NON-USER: CPT | Performed by: UROLOGY

## 2018-04-23 PROCEDURE — 1123F ACP DISCUSS/DSCN MKR DOCD: CPT | Performed by: UROLOGY

## 2018-04-23 PROCEDURE — 4040F PNEUMOC VAC/ADMIN/RCVD: CPT | Performed by: UROLOGY

## 2018-04-23 PROCEDURE — 99214 OFFICE O/P EST MOD 30 MIN: CPT | Performed by: UROLOGY

## 2018-04-23 PROCEDURE — 0509F URINE INCON PLAN DOCD: CPT | Performed by: UROLOGY

## 2018-04-23 PROCEDURE — G8419 CALC BMI OUT NRM PARAM NOF/U: HCPCS | Performed by: UROLOGY

## 2018-04-23 PROCEDURE — 3017F COLORECTAL CA SCREEN DOC REV: CPT | Performed by: UROLOGY

## 2018-04-23 PROCEDURE — 1090F PRES/ABSN URINE INCON ASSESS: CPT | Performed by: UROLOGY

## 2018-04-23 ASSESSMENT — ENCOUNTER SYMPTOMS
WHEEZING: 0
BACK PAIN: 0
ABDOMINAL PAIN: 0
COUGH: 0
COLOR CHANGE: 0
NAUSEA: 0
SHORTNESS OF BREATH: 0
EYE PAIN: 0
EYE REDNESS: 0
VOMITING: 0

## 2018-04-30 ENCOUNTER — OFFICE VISIT (OUTPATIENT)
Dept: INTERNAL MEDICINE CLINIC | Age: 73
End: 2018-04-30
Payer: MEDICARE

## 2018-04-30 VITALS
SYSTOLIC BLOOD PRESSURE: 154 MMHG | HEART RATE: 77 BPM | HEIGHT: 66 IN | WEIGHT: 179 LBS | BODY MASS INDEX: 28.77 KG/M2 | DIASTOLIC BLOOD PRESSURE: 90 MMHG

## 2018-04-30 DIAGNOSIS — F32.A ANXIETY AND DEPRESSION: ICD-10-CM

## 2018-04-30 DIAGNOSIS — F41.9 ANXIETY AND DEPRESSION: ICD-10-CM

## 2018-04-30 DIAGNOSIS — E78.2 MIXED HYPERLIPIDEMIA: ICD-10-CM

## 2018-04-30 DIAGNOSIS — I10 ESSENTIAL HYPERTENSION: Primary | ICD-10-CM

## 2018-04-30 DIAGNOSIS — H92.02 LEFT EAR PAIN: ICD-10-CM

## 2018-04-30 PROCEDURE — G8427 DOCREV CUR MEDS BY ELIG CLIN: HCPCS | Performed by: INTERNAL MEDICINE

## 2018-04-30 PROCEDURE — 1036F TOBACCO NON-USER: CPT | Performed by: INTERNAL MEDICINE

## 2018-04-30 PROCEDURE — 1090F PRES/ABSN URINE INCON ASSESS: CPT | Performed by: INTERNAL MEDICINE

## 2018-04-30 PROCEDURE — 1123F ACP DISCUSS/DSCN MKR DOCD: CPT | Performed by: INTERNAL MEDICINE

## 2018-04-30 PROCEDURE — 99214 OFFICE O/P EST MOD 30 MIN: CPT | Performed by: INTERNAL MEDICINE

## 2018-04-30 PROCEDURE — 3017F COLORECTAL CA SCREEN DOC REV: CPT | Performed by: INTERNAL MEDICINE

## 2018-04-30 PROCEDURE — 4040F PNEUMOC VAC/ADMIN/RCVD: CPT | Performed by: INTERNAL MEDICINE

## 2018-04-30 PROCEDURE — G8419 CALC BMI OUT NRM PARAM NOF/U: HCPCS | Performed by: INTERNAL MEDICINE

## 2018-04-30 PROCEDURE — G8399 PT W/DXA RESULTS DOCUMENT: HCPCS | Performed by: INTERNAL MEDICINE

## 2018-04-30 RX ORDER — LISINOPRIL 20 MG/1
20 TABLET ORAL DAILY
Qty: 30 TABLET | Refills: 3 | Status: SHIPPED | OUTPATIENT
Start: 2018-04-30 | End: 2018-12-06

## 2018-04-30 RX ORDER — CITALOPRAM 20 MG/1
20 TABLET ORAL DAILY
Qty: 30 TABLET | Refills: 3 | Status: SHIPPED | OUTPATIENT
Start: 2018-04-30 | End: 2018-05-11

## 2018-04-30 ASSESSMENT — ENCOUNTER SYMPTOMS
CONSTIPATION: 0
COLOR CHANGE: 0
BACK PAIN: 0
SHORTNESS OF BREATH: 0
BLOOD IN STOOL: 0
APNEA: 0
CHOKING: 0
COUGH: 0
EYE ITCHING: 0
EYE DISCHARGE: 0
EYE PAIN: 0
CHEST TIGHTNESS: 0
ABDOMINAL DISTENTION: 0
DIARRHEA: 0
ABDOMINAL PAIN: 0
EYE REDNESS: 0

## 2018-05-10 ENCOUNTER — OFFICE VISIT (OUTPATIENT)
Dept: PODIATRY | Age: 73
End: 2018-05-10
Payer: MEDICARE

## 2018-05-10 VITALS
BODY MASS INDEX: 29.25 KG/M2 | DIASTOLIC BLOOD PRESSURE: 64 MMHG | HEIGHT: 66 IN | HEART RATE: 78 BPM | SYSTOLIC BLOOD PRESSURE: 107 MMHG | WEIGHT: 182 LBS

## 2018-05-10 DIAGNOSIS — B35.1 ONYCHOMYCOSIS OF TOENAIL: Primary | ICD-10-CM

## 2018-05-10 DIAGNOSIS — M79.674 PAIN OF TOES OF BOTH FEET: ICD-10-CM

## 2018-05-10 DIAGNOSIS — M79.675 PAIN OF TOES OF BOTH FEET: ICD-10-CM

## 2018-05-10 PROCEDURE — 11721 DEBRIDE NAIL 6 OR MORE: CPT | Performed by: PODIATRIST

## 2018-05-10 ASSESSMENT — ENCOUNTER SYMPTOMS
NAUSEA: 0
BACK PAIN: 0
SHORTNESS OF BREATH: 0
COLOR CHANGE: 0
DIARRHEA: 0

## 2018-05-11 ENCOUNTER — OFFICE VISIT (OUTPATIENT)
Dept: FAMILY MEDICINE CLINIC | Age: 73
End: 2018-05-11
Payer: MEDICARE

## 2018-05-11 VITALS
HEART RATE: 64 BPM | OXYGEN SATURATION: 98 % | SYSTOLIC BLOOD PRESSURE: 130 MMHG | TEMPERATURE: 98.1 F | HEIGHT: 66 IN | WEIGHT: 179.2 LBS | BODY MASS INDEX: 28.8 KG/M2 | DIASTOLIC BLOOD PRESSURE: 70 MMHG

## 2018-05-11 DIAGNOSIS — I10 ESSENTIAL HYPERTENSION: Primary | ICD-10-CM

## 2018-05-11 DIAGNOSIS — F32.A ANXIETY AND DEPRESSION: ICD-10-CM

## 2018-05-11 DIAGNOSIS — F41.9 ANXIETY AND DEPRESSION: ICD-10-CM

## 2018-05-11 PROCEDURE — G8419 CALC BMI OUT NRM PARAM NOF/U: HCPCS | Performed by: NURSE PRACTITIONER

## 2018-05-11 PROCEDURE — 1036F TOBACCO NON-USER: CPT | Performed by: NURSE PRACTITIONER

## 2018-05-11 PROCEDURE — G8427 DOCREV CUR MEDS BY ELIG CLIN: HCPCS | Performed by: NURSE PRACTITIONER

## 2018-05-11 PROCEDURE — 1123F ACP DISCUSS/DSCN MKR DOCD: CPT | Performed by: NURSE PRACTITIONER

## 2018-05-11 PROCEDURE — 4040F PNEUMOC VAC/ADMIN/RCVD: CPT | Performed by: NURSE PRACTITIONER

## 2018-05-11 PROCEDURE — 1090F PRES/ABSN URINE INCON ASSESS: CPT | Performed by: NURSE PRACTITIONER

## 2018-05-11 PROCEDURE — 3017F COLORECTAL CA SCREEN DOC REV: CPT | Performed by: NURSE PRACTITIONER

## 2018-05-11 PROCEDURE — G8399 PT W/DXA RESULTS DOCUMENT: HCPCS | Performed by: NURSE PRACTITIONER

## 2018-05-11 PROCEDURE — 99213 OFFICE O/P EST LOW 20 MIN: CPT | Performed by: NURSE PRACTITIONER

## 2018-05-11 RX ORDER — AMITRIPTYLINE HYDROCHLORIDE 25 MG/1
TABLET, FILM COATED ORAL
Qty: 30 TABLET | Refills: 5
Start: 2018-05-11 | End: 2018-07-06 | Stop reason: SDUPTHER

## 2018-05-14 ASSESSMENT — ENCOUNTER SYMPTOMS
ABDOMINAL DISTENTION: 0
CONSTIPATION: 0
SORE THROAT: 0
EYE ITCHING: 0
DIARRHEA: 0
SINUS PRESSURE: 0
ABDOMINAL PAIN: 0
EYE PAIN: 0
SHORTNESS OF BREATH: 0
BLOOD IN STOOL: 0
COUGH: 0
NAUSEA: 0
COLOR CHANGE: 0
CHEST TIGHTNESS: 0
WHEEZING: 0

## 2018-05-29 ENCOUNTER — TELEPHONE (OUTPATIENT)
Dept: UROLOGY | Age: 73
End: 2018-05-29

## 2018-05-30 ENCOUNTER — OFFICE VISIT (OUTPATIENT)
Dept: FAMILY MEDICINE CLINIC | Age: 73
End: 2018-05-30
Payer: MEDICARE

## 2018-05-30 VITALS
HEART RATE: 80 BPM | RESPIRATION RATE: 18 BRPM | HEIGHT: 66 IN | OXYGEN SATURATION: 96 % | TEMPERATURE: 98.4 F | DIASTOLIC BLOOD PRESSURE: 78 MMHG | WEIGHT: 178.8 LBS | BODY MASS INDEX: 28.73 KG/M2 | SYSTOLIC BLOOD PRESSURE: 126 MMHG

## 2018-05-30 DIAGNOSIS — J30.2 ACUTE SEASONAL ALLERGIC RHINITIS, UNSPECIFIED TRIGGER: ICD-10-CM

## 2018-05-30 DIAGNOSIS — M25.551 PAIN OF BOTH HIP JOINTS: ICD-10-CM

## 2018-05-30 DIAGNOSIS — R82.90 FOUL SMELLING URINE: Primary | ICD-10-CM

## 2018-05-30 DIAGNOSIS — M25.552 PAIN OF BOTH HIP JOINTS: ICD-10-CM

## 2018-05-30 LAB
BILIRUBIN, POC: NORMAL
BLOOD URINE, POC: NORMAL
CLARITY, POC: NORMAL
COLOR, POC: YELLOW
GLUCOSE URINE, POC: NORMAL
KETONES, POC: NORMAL
LEUKOCYTE EST, POC: NORMAL
NITRITE, POC: NORMAL
PH, POC: 8.5
PROTEIN, POC: NORMAL
SPECIFIC GRAVITY, POC: 1.01
UROBILINOGEN, POC: 0.2

## 2018-05-30 PROCEDURE — 4040F PNEUMOC VAC/ADMIN/RCVD: CPT | Performed by: NURSE PRACTITIONER

## 2018-05-30 PROCEDURE — G8427 DOCREV CUR MEDS BY ELIG CLIN: HCPCS | Performed by: NURSE PRACTITIONER

## 2018-05-30 PROCEDURE — 81003 URINALYSIS AUTO W/O SCOPE: CPT | Performed by: NURSE PRACTITIONER

## 2018-05-30 PROCEDURE — 99214 OFFICE O/P EST MOD 30 MIN: CPT | Performed by: NURSE PRACTITIONER

## 2018-05-30 PROCEDURE — 3017F COLORECTAL CA SCREEN DOC REV: CPT | Performed by: NURSE PRACTITIONER

## 2018-05-30 PROCEDURE — G8399 PT W/DXA RESULTS DOCUMENT: HCPCS | Performed by: NURSE PRACTITIONER

## 2018-05-30 PROCEDURE — 1090F PRES/ABSN URINE INCON ASSESS: CPT | Performed by: NURSE PRACTITIONER

## 2018-05-30 PROCEDURE — G8419 CALC BMI OUT NRM PARAM NOF/U: HCPCS | Performed by: NURSE PRACTITIONER

## 2018-05-30 PROCEDURE — 1036F TOBACCO NON-USER: CPT | Performed by: NURSE PRACTITIONER

## 2018-05-30 PROCEDURE — 1123F ACP DISCUSS/DSCN MKR DOCD: CPT | Performed by: NURSE PRACTITIONER

## 2018-05-30 RX ORDER — MONTELUKAST SODIUM 10 MG/1
10 TABLET ORAL NIGHTLY
Qty: 30 TABLET | Refills: 5 | Status: SHIPPED | OUTPATIENT
Start: 2018-05-30 | End: 2018-12-17 | Stop reason: ALTCHOICE

## 2018-05-30 RX ORDER — BUDESONIDE AND FORMOTEROL FUMARATE DIHYDRATE 160; 4.5 UG/1; UG/1
2 AEROSOL RESPIRATORY (INHALATION) 2 TIMES DAILY
Qty: 1 INHALER | Refills: 3 | Status: SHIPPED | OUTPATIENT
Start: 2018-05-30 | End: 2018-12-17 | Stop reason: ALTCHOICE

## 2018-05-30 ASSESSMENT — PATIENT HEALTH QUESTIONNAIRE - PHQ9
SUM OF ALL RESPONSES TO PHQ9 QUESTIONS 1 & 2: 0
2. FEELING DOWN, DEPRESSED OR HOPELESS: 0
SUM OF ALL RESPONSES TO PHQ QUESTIONS 1-9: 0
1. LITTLE INTEREST OR PLEASURE IN DOING THINGS: 0

## 2018-06-10 ASSESSMENT — ENCOUNTER SYMPTOMS
WHEEZING: 0
EYE PAIN: 0
DIARRHEA: 0
COUGH: 1
ABDOMINAL PAIN: 0
COLOR CHANGE: 0
RHINORRHEA: 1
SORE THROAT: 0
CHEST TIGHTNESS: 0
SHORTNESS OF BREATH: 0
BLOOD IN STOOL: 0
EYE DISCHARGE: 1
NAUSEA: 0
EYE ITCHING: 1
EYE REDNESS: 1
ABDOMINAL DISTENTION: 0
SINUS PRESSURE: 0
CONSTIPATION: 0

## 2018-07-06 DIAGNOSIS — F32.A ANXIETY AND DEPRESSION: ICD-10-CM

## 2018-07-06 DIAGNOSIS — F41.9 ANXIETY AND DEPRESSION: ICD-10-CM

## 2018-07-06 RX ORDER — AMITRIPTYLINE HYDROCHLORIDE 25 MG/1
TABLET, FILM COATED ORAL
Qty: 90 TABLET | Refills: 1 | Status: SHIPPED | OUTPATIENT
Start: 2018-07-06 | End: 2018-12-06 | Stop reason: SDUPTHER

## 2018-07-09 ENCOUNTER — OFFICE VISIT (OUTPATIENT)
Dept: FAMILY MEDICINE CLINIC | Age: 73
End: 2018-07-09
Payer: MEDICARE

## 2018-07-09 VITALS
TEMPERATURE: 98.5 F | WEIGHT: 174.2 LBS | DIASTOLIC BLOOD PRESSURE: 78 MMHG | SYSTOLIC BLOOD PRESSURE: 126 MMHG | BODY MASS INDEX: 28 KG/M2 | RESPIRATION RATE: 18 BRPM | HEART RATE: 79 BPM | HEIGHT: 66 IN | OXYGEN SATURATION: 83 %

## 2018-07-09 DIAGNOSIS — R05.8 ACE-INHIBITOR COUGH: Primary | ICD-10-CM

## 2018-07-09 DIAGNOSIS — Z88.9 MULTIPLE ALLERGIES: ICD-10-CM

## 2018-07-09 DIAGNOSIS — T46.4X5A ACE-INHIBITOR COUGH: Primary | ICD-10-CM

## 2018-07-09 PROCEDURE — G8399 PT W/DXA RESULTS DOCUMENT: HCPCS | Performed by: NURSE PRACTITIONER

## 2018-07-09 PROCEDURE — G8419 CALC BMI OUT NRM PARAM NOF/U: HCPCS | Performed by: NURSE PRACTITIONER

## 2018-07-09 PROCEDURE — G8427 DOCREV CUR MEDS BY ELIG CLIN: HCPCS | Performed by: NURSE PRACTITIONER

## 2018-07-09 PROCEDURE — 99213 OFFICE O/P EST LOW 20 MIN: CPT | Performed by: NURSE PRACTITIONER

## 2018-07-09 PROCEDURE — 1090F PRES/ABSN URINE INCON ASSESS: CPT | Performed by: NURSE PRACTITIONER

## 2018-07-09 PROCEDURE — 4040F PNEUMOC VAC/ADMIN/RCVD: CPT | Performed by: NURSE PRACTITIONER

## 2018-07-09 PROCEDURE — 1036F TOBACCO NON-USER: CPT | Performed by: NURSE PRACTITIONER

## 2018-07-09 PROCEDURE — 1123F ACP DISCUSS/DSCN MKR DOCD: CPT | Performed by: NURSE PRACTITIONER

## 2018-07-09 PROCEDURE — 3017F COLORECTAL CA SCREEN DOC REV: CPT | Performed by: NURSE PRACTITIONER

## 2018-07-09 RX ORDER — LOSARTAN POTASSIUM 50 MG/1
50 TABLET ORAL DAILY
Qty: 30 TABLET | Refills: 3 | Status: SHIPPED | OUTPATIENT
Start: 2018-07-09 | End: 2018-10-17 | Stop reason: SDUPTHER

## 2018-07-09 ASSESSMENT — ENCOUNTER SYMPTOMS
EYE ITCHING: 0
EYE PAIN: 0
RHINORRHEA: 1
CHEST TIGHTNESS: 0
ABDOMINAL PAIN: 0
CONSTIPATION: 0
COLOR CHANGE: 0
NAUSEA: 0
BLOOD IN STOOL: 0
ABDOMINAL DISTENTION: 0
WHEEZING: 0
COUGH: 1
DIARRHEA: 0

## 2018-07-09 ASSESSMENT — PATIENT HEALTH QUESTIONNAIRE - PHQ9
2. FEELING DOWN, DEPRESSED OR HOPELESS: 0
1. LITTLE INTEREST OR PLEASURE IN DOING THINGS: 0
SUM OF ALL RESPONSES TO PHQ QUESTIONS 1-9: 0
SUM OF ALL RESPONSES TO PHQ9 QUESTIONS 1 & 2: 0

## 2018-07-09 NOTE — PROGRESS NOTES
Visit Information    Have you changed or started any medications since your last visit including any over-the-counter medicines, vitamins, or herbal medicines? no   Have you stopped taking any of your medications? Is so, why? -  yes - vit c   Are you having any side effects from any of your medications? - yes - cough     Have you seen any other physician or provider since your last visit?  no   Have you had any other diagnostic tests since your last visit?  no   Have you been seen in the emergency room and/or had an admission in a hospital since we last saw you?  no   Have you had your routine dental cleaning in the past 6 months?  yes      Do you have an active MyChart account? If no, what is the barrier?   Yes    Patient Care Team:  MIGUEL Alexander CNP as PCP - [address] [place] [address] [address], APRN - CNP as PCP - [facility] Attributed Provider  Aron Escamilla MD as Consulting Physician (Gastroenterology)        Health Maintenance   Topic Date Due    Shingles Vaccine (1 of 2 - 2 Dose Series) 06/26/2019 (Originally 12/15/1995)    DTaP/Tdap/Td vaccine (1 - Tdap) 08/09/2022 (Originally 12/15/1964)    Hepatitis C screen  08/09/2022 (Originally 1945)    Flu vaccine (1) 09/01/2018    Potassium monitoring  02/05/2019    Creatinine monitoring  02/05/2019    Breast cancer screen  02/05/2020    Lipid screen  02/05/2023    Colon cancer screen colonoscopy  05/23/2023    DEXA (modify frequency per FRAX score)  Completed    Pneumococcal low/med risk  Completed

## 2018-07-09 NOTE — PROGRESS NOTES
Constitutional: Negative for activity change and appetite change. HENT: Positive for rhinorrhea. Negative for hearing loss and tinnitus. Eyes: Negative for pain, itching and visual disturbance. Respiratory: Positive for cough. Negative for chest tightness and wheezing. Cardiovascular: Negative for chest pain, palpitations and leg swelling. Gastrointestinal: Negative for abdominal distention, abdominal pain, blood in stool, constipation, diarrhea and nausea. Endocrine: Negative for cold intolerance, heat intolerance, polydipsia, polyphagia and polyuria. Genitourinary: Negative for dysuria, flank pain, frequency and urgency. Musculoskeletal: Negative for arthralgias, gait problem and myalgias. Skin: Negative for color change, rash and wound. Allergic/Immunologic: Negative for environmental allergies and food allergies. Neurological: Negative for speech difficulty, weakness and light-headedness. Hematological: Does not bruise/bleed easily. Psychiatric/Behavioral: Negative for decreased concentration and sleep disturbance. The patient is not nervous/anxious. Objective:     /78 (Site: Right Arm, Position: Sitting, Cuff Size: Medium Adult)   Pulse 79   Temp 98.5 °F (36.9 °C) (Temporal)   Resp 18   Ht 5' 5.98\" (1.676 m)   Wt 174 lb 3.2 oz (79 kg)   SpO2 (!) 83%   BMI 28.13 kg/m²   Physical Exam   Constitutional: She is oriented to person, place, and time. She appears well-developed and well-nourished. HENT:   Head: Normocephalic. Right Ear: External ear normal.   Left Ear: External ear normal.   Nose: Nose normal.   Mouth/Throat: Oropharynx is clear and moist.   Eyes: Conjunctivae and EOM are normal.   Neck: Normal range of motion. Neck supple. No thyromegaly present. Cardiovascular: Normal rate, regular rhythm and normal heart sounds. Exam reveals no gallop and no friction rub. No murmur heard.   Pulmonary/Chest: Effort normal and breath sounds normal. No respiratory distress. She has no wheezes. She has no rales. She exhibits no tenderness. Abdominal: Soft. Bowel sounds are normal. She exhibits no distension. There is no splenomegaly or hepatomegaly. There is no tenderness. No hernia. Musculoskeletal: Normal range of motion. She exhibits no edema or tenderness. Lymphadenopathy:     She has no cervical adenopathy. Neurological: She is alert and oriented to person, place, and time. Coordination and gait normal.   Skin: Skin is warm and dry. No rash noted. No erythema. Psychiatric: She has a normal mood and affect. Her speech is normal and behavior is normal. Judgment and thought content normal. Cognition and memory are normal.   Nursing note and vitals reviewed. Assessment:      1. ACE-inhibitor cough    2. Multiple allergies                     Plan:     ACE-inhibitor cough:  Changed lisinopril 20mg to losartan 50mg daily. Advised that cough will not go away immediately. If cough not gone in 8 weeks, will recheck  Multiple allergies: Take Allegra 12 hr tablet twice a day. If this does not help, call office. No orders of the defined types were placed in this encounter. Orders Placed This Encounter   Medications    losartan (COZAAR) 50 MG tablet     Sig: Take 1 tablet by mouth daily     Dispense:  30 tablet     Refill:  3        Return if symptoms worsen or fail to improve. Patient given educational materials - see patient instructions. Discussed use, benefit, and side effects of prescribed medications. All patient questions answered. Pt voiced understanding. Reviewed health maintenance. Instructed to continue current medications, diet and exercise. Patient agreed with treatment plan. Follow up as directed.      Electronically signed by MIGUEL Charles CNP on 7/9/2018

## 2018-07-16 ENCOUNTER — OFFICE VISIT (OUTPATIENT)
Dept: PODIATRY | Age: 73
End: 2018-07-16
Payer: MEDICARE

## 2018-07-16 VITALS
SYSTOLIC BLOOD PRESSURE: 123 MMHG | DIASTOLIC BLOOD PRESSURE: 69 MMHG | BODY MASS INDEX: 29.25 KG/M2 | WEIGHT: 182 LBS | HEIGHT: 66 IN | HEART RATE: 85 BPM

## 2018-07-16 DIAGNOSIS — B35.1 ONYCHOMYCOSIS OF TOENAIL: Primary | ICD-10-CM

## 2018-07-16 DIAGNOSIS — M79.675 PAIN OF TOES OF BOTH FEET: ICD-10-CM

## 2018-07-16 DIAGNOSIS — M79.674 PAIN OF TOES OF BOTH FEET: ICD-10-CM

## 2018-07-16 PROCEDURE — 11721 DEBRIDE NAIL 6 OR MORE: CPT | Performed by: PODIATRIST

## 2018-07-16 PROCEDURE — 99999 PR OFFICE/OUTPT VISIT,PROCEDURE ONLY: CPT | Performed by: PODIATRIST

## 2018-07-20 ASSESSMENT — ENCOUNTER SYMPTOMS
NAUSEA: 0
DIARRHEA: 0
BACK PAIN: 0
COLOR CHANGE: 0
SHORTNESS OF BREATH: 0

## 2018-07-20 NOTE — PROGRESS NOTES
SUBJECTIVE: Kriss Gray is a 67 y.o. female who returns to the office with chief complaint of painful fungal toenails. Patient relates toe nails are thickened/difficult to trim as well as painful with ambulation and with shoe gear. Review of Systems   Constitutional: Negative for activity change, appetite change, chills, diaphoresis, fatigue and fever. Respiratory: Negative for shortness of breath. Cardiovascular: Negative for leg swelling. Gastrointestinal: Negative for diarrhea and nausea. Endocrine: Negative for cold intolerance, heat intolerance and polyuria. Musculoskeletal: Positive for arthralgias. Negative for back pain, gait problem, joint swelling and myalgias. Skin: Negative for color change, pallor, rash and wound. Allergic/Immunologic: Negative for environmental allergies and food allergies. Neurological: Negative for dizziness, weakness, light-headedness and numbness. Hematological: Does not bruise/bleed easily. Psychiatric/Behavioral: Negative for behavioral problems, confusion and self-injury. The patient is not nervous/anxious. OBJECTIVE: Clinical evaluation of patient reveals nails 1,2,3,4,5 of the right foot and nails 1,2,3,4,5, of the left foot to present with thickness, elongation, discoloration, brittleness, and subungual debris. There was pain with palpation and debridement of the toenails of the bilateral feet. No open lesions noted to either foot today. ASSESSMENT:    Diagnosis Orders   1. Onychomycosis of toenail  FL DEBRIDEMENT OF NAILS, 6 OR MORE   2. Pain of toes of both feet  FL DEBRIDEMENT OF NAILS, 6 OR MORE     PLAN: Toenails 1,2,3,4,5 of the right foot and 1,2,3,4,5 of the left foot were debrided in length and thickness using a nail nipper and a . Return in about 9 weeks (around 9/17/2018) for Painful fungal nails.    7/16/2018      Derek Weston DPM

## 2018-07-23 RX ORDER — FLUTICASONE PROPIONATE 50 MCG
SPRAY, SUSPENSION (ML) NASAL
Qty: 16 G | Refills: 5 | Status: SHIPPED | OUTPATIENT
Start: 2018-07-23 | End: 2018-12-06 | Stop reason: SDUPTHER

## 2018-09-24 ENCOUNTER — OFFICE VISIT (OUTPATIENT)
Dept: FAMILY MEDICINE CLINIC | Age: 73
End: 2018-09-24
Payer: MEDICARE

## 2018-09-24 VITALS
SYSTOLIC BLOOD PRESSURE: 134 MMHG | TEMPERATURE: 98.2 F | RESPIRATION RATE: 18 BRPM | OXYGEN SATURATION: 96 % | HEIGHT: 66 IN | HEART RATE: 101 BPM | BODY MASS INDEX: 29.18 KG/M2 | DIASTOLIC BLOOD PRESSURE: 82 MMHG | WEIGHT: 181.6 LBS

## 2018-09-24 DIAGNOSIS — J02.9 ACUTE PHARYNGITIS, UNSPECIFIED ETIOLOGY: Primary | ICD-10-CM

## 2018-09-24 DIAGNOSIS — R05.9 COUGH: ICD-10-CM

## 2018-09-24 PROCEDURE — 1101F PT FALLS ASSESS-DOCD LE1/YR: CPT | Performed by: NURSE PRACTITIONER

## 2018-09-24 PROCEDURE — 4040F PNEUMOC VAC/ADMIN/RCVD: CPT | Performed by: NURSE PRACTITIONER

## 2018-09-24 PROCEDURE — 99213 OFFICE O/P EST LOW 20 MIN: CPT | Performed by: NURSE PRACTITIONER

## 2018-09-24 PROCEDURE — G8399 PT W/DXA RESULTS DOCUMENT: HCPCS | Performed by: NURSE PRACTITIONER

## 2018-09-24 PROCEDURE — 1090F PRES/ABSN URINE INCON ASSESS: CPT | Performed by: NURSE PRACTITIONER

## 2018-09-24 PROCEDURE — 1123F ACP DISCUSS/DSCN MKR DOCD: CPT | Performed by: NURSE PRACTITIONER

## 2018-09-24 PROCEDURE — G8427 DOCREV CUR MEDS BY ELIG CLIN: HCPCS | Performed by: NURSE PRACTITIONER

## 2018-09-24 PROCEDURE — 1036F TOBACCO NON-USER: CPT | Performed by: NURSE PRACTITIONER

## 2018-09-24 PROCEDURE — 3017F COLORECTAL CA SCREEN DOC REV: CPT | Performed by: NURSE PRACTITIONER

## 2018-09-24 PROCEDURE — G8419 CALC BMI OUT NRM PARAM NOF/U: HCPCS | Performed by: NURSE PRACTITIONER

## 2018-09-24 RX ORDER — GUAIFENESIN AND CODEINE PHOSPHATE 100; 10 MG/5ML; MG/5ML
10 SOLUTION ORAL 2 TIMES DAILY PRN
Qty: 180 ML | Refills: 0 | Status: SHIPPED | OUTPATIENT
Start: 2018-09-24 | End: 2018-10-04

## 2018-09-24 RX ORDER — AMOXICILLIN 500 MG/1
500 CAPSULE ORAL 2 TIMES DAILY
Qty: 20 CAPSULE | Refills: 0 | Status: SHIPPED | OUTPATIENT
Start: 2018-09-24 | End: 2018-10-04

## 2018-09-24 ASSESSMENT — PATIENT HEALTH QUESTIONNAIRE - PHQ9
SUM OF ALL RESPONSES TO PHQ QUESTIONS 1-9: 0
SUM OF ALL RESPONSES TO PHQ QUESTIONS 1-9: 0
1. LITTLE INTEREST OR PLEASURE IN DOING THINGS: 0

## 2018-10-04 ASSESSMENT — ENCOUNTER SYMPTOMS
SHORTNESS OF BREATH: 0
EYE ITCHING: 0
CONSTIPATION: 0
SINUS PRESSURE: 0
CHEST TIGHTNESS: 0
SORE THROAT: 1
COLOR CHANGE: 0
ABDOMINAL PAIN: 0
EYE PAIN: 0
DIARRHEA: 0
RHINORRHEA: 1
NAUSEA: 0
ABDOMINAL DISTENTION: 0
BLOOD IN STOOL: 0
COUGH: 1
WHEEZING: 0

## 2018-10-17 DIAGNOSIS — T46.4X5A ACE-INHIBITOR COUGH: ICD-10-CM

## 2018-10-17 DIAGNOSIS — R05.8 ACE-INHIBITOR COUGH: ICD-10-CM

## 2018-10-18 RX ORDER — LOSARTAN POTASSIUM 50 MG/1
50 TABLET ORAL DAILY
Qty: 90 TABLET | Refills: 0 | Status: SHIPPED | OUTPATIENT
Start: 2018-10-18 | End: 2018-12-06 | Stop reason: SDUPTHER

## 2018-12-06 ENCOUNTER — OFFICE VISIT (OUTPATIENT)
Dept: FAMILY MEDICINE CLINIC | Age: 73
End: 2018-12-06
Payer: MEDICARE

## 2018-12-06 VITALS
TEMPERATURE: 97.5 F | HEIGHT: 66 IN | SYSTOLIC BLOOD PRESSURE: 138 MMHG | WEIGHT: 181.6 LBS | BODY MASS INDEX: 29.18 KG/M2 | OXYGEN SATURATION: 98 % | DIASTOLIC BLOOD PRESSURE: 80 MMHG | HEART RATE: 84 BPM

## 2018-12-06 DIAGNOSIS — F41.9 ANXIETY AND DEPRESSION: ICD-10-CM

## 2018-12-06 DIAGNOSIS — E55.9 VITAMIN D DEFICIENCY: ICD-10-CM

## 2018-12-06 DIAGNOSIS — R39.15 URINARY URGENCY: ICD-10-CM

## 2018-12-06 DIAGNOSIS — Z23 NEED FOR PROPHYLACTIC VACCINATION AND INOCULATION AGAINST INFLUENZA: ICD-10-CM

## 2018-12-06 DIAGNOSIS — E78.5 HYPERLIPIDEMIA, UNSPECIFIED HYPERLIPIDEMIA TYPE: ICD-10-CM

## 2018-12-06 DIAGNOSIS — F32.A ANXIETY AND DEPRESSION: ICD-10-CM

## 2018-12-06 DIAGNOSIS — I10 ESSENTIAL HYPERTENSION: Primary | ICD-10-CM

## 2018-12-06 PROCEDURE — G0008 ADMIN INFLUENZA VIRUS VAC: HCPCS | Performed by: FAMILY MEDICINE

## 2018-12-06 PROCEDURE — G8482 FLU IMMUNIZE ORDER/ADMIN: HCPCS | Performed by: FAMILY MEDICINE

## 2018-12-06 PROCEDURE — 1090F PRES/ABSN URINE INCON ASSESS: CPT | Performed by: FAMILY MEDICINE

## 2018-12-06 PROCEDURE — G8399 PT W/DXA RESULTS DOCUMENT: HCPCS | Performed by: FAMILY MEDICINE

## 2018-12-06 PROCEDURE — 1101F PT FALLS ASSESS-DOCD LE1/YR: CPT | Performed by: FAMILY MEDICINE

## 2018-12-06 PROCEDURE — G8427 DOCREV CUR MEDS BY ELIG CLIN: HCPCS | Performed by: FAMILY MEDICINE

## 2018-12-06 PROCEDURE — 1123F ACP DISCUSS/DSCN MKR DOCD: CPT | Performed by: FAMILY MEDICINE

## 2018-12-06 PROCEDURE — 99214 OFFICE O/P EST MOD 30 MIN: CPT | Performed by: FAMILY MEDICINE

## 2018-12-06 PROCEDURE — G8419 CALC BMI OUT NRM PARAM NOF/U: HCPCS | Performed by: FAMILY MEDICINE

## 2018-12-06 PROCEDURE — 3017F COLORECTAL CA SCREEN DOC REV: CPT | Performed by: FAMILY MEDICINE

## 2018-12-06 PROCEDURE — 90662 IIV NO PRSV INCREASED AG IM: CPT | Performed by: FAMILY MEDICINE

## 2018-12-06 PROCEDURE — 1036F TOBACCO NON-USER: CPT | Performed by: FAMILY MEDICINE

## 2018-12-06 PROCEDURE — 4040F PNEUMOC VAC/ADMIN/RCVD: CPT | Performed by: FAMILY MEDICINE

## 2018-12-06 RX ORDER — FLUTICASONE PROPIONATE 50 MCG
SPRAY, SUSPENSION (ML) NASAL
Qty: 16 G | Refills: 5 | Status: SHIPPED | OUTPATIENT
Start: 2018-12-06 | End: 2019-05-07 | Stop reason: ALTCHOICE

## 2018-12-06 RX ORDER — SIMVASTATIN 40 MG
TABLET ORAL
Qty: 30 TABLET | Refills: 5 | Status: CANCELLED | OUTPATIENT
Start: 2018-12-06

## 2018-12-06 RX ORDER — LOSARTAN POTASSIUM 50 MG/1
50 TABLET ORAL DAILY
Qty: 90 TABLET | Refills: 0 | Status: SHIPPED | OUTPATIENT
Start: 2018-12-06 | End: 2019-01-14 | Stop reason: SDUPTHER

## 2018-12-06 RX ORDER — AMITRIPTYLINE HYDROCHLORIDE 25 MG/1
TABLET, FILM COATED ORAL
Qty: 90 TABLET | Refills: 1 | Status: SHIPPED | OUTPATIENT
Start: 2018-12-06 | End: 2019-07-04 | Stop reason: SDUPTHER

## 2018-12-06 ASSESSMENT — ENCOUNTER SYMPTOMS
NAUSEA: 0
SHORTNESS OF BREATH: 0
SORE THROAT: 0
WHEEZING: 0
ABDOMINAL PAIN: 0

## 2018-12-06 NOTE — PROGRESS NOTES
Subjective:      Patient ID: Jamie Gerard is a 67 y.o. female. Here today for HTN follow up. HYPERTENSION visit     BP Readings from Last 3 Encounters:   09/24/18 134/82   07/16/18 123/69   07/09/18 126/78       LDL Cholesterol (mg/dL)   Date Value   02/05/2018 102     HDL (mg/dL)   Date Value   02/05/2018 52     BUN (mg/dL)   Date Value   02/05/2018 13     CREATININE (mg/dL)   Date Value   02/05/2018 0.80     Glucose (mg/dL)   Date Value   02/05/2018 102 (H)   01/27/2012 96              Have you changed or started any medications since your last visit including any over-the-counter medicines, vitamins, or herbal medicines? no   Have you stopped taking any of your medications? Is so, why? -  no  Are you having any side effects from any of your medications? - no  How often do you miss doses of your medication? no      Have you seen any other physician or provider since your last visit?  no   Have you had any other diagnostic tests since your last visit?  no   Have you been seen in the emergency room and/or had an admission in a hospital since we last saw you?  no   Have you had your routine dental cleaning in the past 6 months?  yes -      Do you have an active MyChart account? If no, what is the barrier?   Yes    Patient Care Team:  Alexis Vega MD as PCP - General (Family Medicine)  John Langford as PCP - S Attributed Provider  Carolina Willoughby MD as Consulting Physician (Gastroenterology)        Health Maintenance   Topic Date Due    Flu vaccine (1) 09/01/2018    Shingles Vaccine (1 of 2 - 2 Dose Series) 06/26/2019 (Originally 12/15/1995)    DTaP/Tdap/Td vaccine (1 - Tdap) 08/09/2022 (Originally 12/15/1964)    Hepatitis C screen  08/09/2022 (Originally 1945)    Potassium monitoring  02/05/2019    Creatinine monitoring  02/05/2019    Breast cancer screen  02/05/2020    Lipid screen  02/05/2023    Colon cancer screen colonoscopy  05/23/2023    DEXA (modify frequency per FRAX score)  Completed

## 2018-12-10 ENCOUNTER — HOSPITAL ENCOUNTER (OUTPATIENT)
Age: 73
Discharge: HOME | End: 2018-12-10
Payer: MEDICARE

## 2018-12-10 DIAGNOSIS — E55.9 VITAMIN D DEFICIENCY: ICD-10-CM

## 2018-12-10 DIAGNOSIS — I10 ESSENTIAL HYPERTENSION: ICD-10-CM

## 2018-12-10 LAB
ALBUMIN SERPL-MCNC: 4.3 G/DL (ref 3.5–5.2)
ALBUMIN/GLOBULIN RATIO: ABNORMAL (ref 1–2.5)
ALP BLD-CCNC: 72 U/L (ref 35–104)
ALT SERPL-CCNC: 21 U/L (ref 5–33)
ANION GAP SERPL CALCULATED.3IONS-SCNC: 10 MMOL/L (ref 9–17)
AST SERPL-CCNC: 17 U/L
BILIRUB SERPL-MCNC: 0.35 MG/DL (ref 0.3–1.2)
BUN BLDV-MCNC: 12 MG/DL (ref 8–23)
BUN/CREAT BLD: ABNORMAL (ref 9–20)
CALCIUM SERPL-MCNC: 9.4 MG/DL (ref 8.6–10.4)
CHLORIDE BLD-SCNC: 107 MMOL/L (ref 98–107)
CHOLESTEROL/HDL RATIO: 4
CHOLESTEROL: 167 MG/DL
CO2: 26 MMOL/L (ref 20–31)
CREAT SERPL-MCNC: 0.83 MG/DL (ref 0.5–0.9)
EKG ATRIAL RATE: 76 BPM
EKG P AXIS: 72 DEGREES
EKG P-R INTERVAL: 148 MS
EKG Q-T INTERVAL: 358 MS
EKG QRS DURATION: 84 MS
EKG QTC CALCULATION (BAZETT): 402 MS
EKG R AXIS: 76 DEGREES
EKG T AXIS: 73 DEGREES
EKG VENTRICULAR RATE: 76 BPM
GFR AFRICAN AMERICAN: >60 ML/MIN
GFR NON-AFRICAN AMERICAN: >60 ML/MIN
GFR SERPL CREATININE-BSD FRML MDRD: ABNORMAL ML/MIN/{1.73_M2}
GFR SERPL CREATININE-BSD FRML MDRD: ABNORMAL ML/MIN/{1.73_M2}
GLUCOSE BLD-MCNC: 121 MG/DL (ref 70–99)
HDLC SERPL-MCNC: 42 MG/DL
LDL CHOLESTEROL: 100 MG/DL (ref 0–130)
POTASSIUM SERPL-SCNC: 4.5 MMOL/L (ref 3.7–5.3)
SODIUM BLD-SCNC: 143 MMOL/L (ref 135–144)
TOTAL PROTEIN: 7.1 G/DL (ref 6.4–8.3)
TRIGL SERPL-MCNC: 124 MG/DL
VITAMIN D 25-HYDROXY: 40.2 NG/ML (ref 30–100)
VLDLC SERPL CALC-MCNC: NORMAL MG/DL (ref 1–30)

## 2018-12-10 PROCEDURE — 93005 ELECTROCARDIOGRAM TRACING: CPT

## 2018-12-10 PROCEDURE — 80061 LIPID PANEL: CPT

## 2018-12-10 PROCEDURE — 36415 COLL VENOUS BLD VENIPUNCTURE: CPT

## 2018-12-10 PROCEDURE — 80053 COMPREHEN METABOLIC PANEL: CPT

## 2018-12-10 PROCEDURE — 82306 VITAMIN D 25 HYDROXY: CPT

## 2018-12-17 ENCOUNTER — TELEPHONE (OUTPATIENT)
Dept: UROLOGY | Age: 73
End: 2018-12-17

## 2018-12-17 ENCOUNTER — OFFICE VISIT (OUTPATIENT)
Dept: UROLOGY | Age: 73
End: 2018-12-17
Payer: MEDICARE

## 2018-12-17 ENCOUNTER — OFFICE VISIT (OUTPATIENT)
Dept: FAMILY MEDICINE CLINIC | Age: 73
End: 2018-12-17
Payer: MEDICARE

## 2018-12-17 VITALS
WEIGHT: 180 LBS | DIASTOLIC BLOOD PRESSURE: 76 MMHG | BODY MASS INDEX: 28.93 KG/M2 | OXYGEN SATURATION: 99 % | HEIGHT: 66 IN | TEMPERATURE: 97.4 F | SYSTOLIC BLOOD PRESSURE: 124 MMHG | HEART RATE: 85 BPM

## 2018-12-17 VITALS
SYSTOLIC BLOOD PRESSURE: 135 MMHG | DIASTOLIC BLOOD PRESSURE: 85 MMHG | TEMPERATURE: 98 F | BODY MASS INDEX: 29.02 KG/M2 | WEIGHT: 180.6 LBS | HEIGHT: 66 IN | HEART RATE: 86 BPM

## 2018-12-17 DIAGNOSIS — N39.41 URGE INCONTINENCE: ICD-10-CM

## 2018-12-17 DIAGNOSIS — N32.81 OAB (OVERACTIVE BLADDER): Primary | ICD-10-CM

## 2018-12-17 DIAGNOSIS — R39.15 URGENCY OF URINATION: ICD-10-CM

## 2018-12-17 DIAGNOSIS — F32.A ANXIETY AND DEPRESSION: ICD-10-CM

## 2018-12-17 DIAGNOSIS — F41.9 ANXIETY AND DEPRESSION: ICD-10-CM

## 2018-12-17 DIAGNOSIS — R35.0 URINARY FREQUENCY: ICD-10-CM

## 2018-12-17 DIAGNOSIS — E78.5 HYPERLIPIDEMIA, UNSPECIFIED HYPERLIPIDEMIA TYPE: ICD-10-CM

## 2018-12-17 DIAGNOSIS — R35.1 NOCTURIA: ICD-10-CM

## 2018-12-17 DIAGNOSIS — I10 ESSENTIAL HYPERTENSION: Primary | ICD-10-CM

## 2018-12-17 PROBLEM — H92.02 LEFT EAR PAIN: Status: RESOLVED | Noted: 2018-04-30 | Resolved: 2018-12-17

## 2018-12-17 PROCEDURE — 0509F URINE INCON PLAN DOCD: CPT | Performed by: UROLOGY

## 2018-12-17 PROCEDURE — 1090F PRES/ABSN URINE INCON ASSESS: CPT | Performed by: FAMILY MEDICINE

## 2018-12-17 PROCEDURE — 1123F ACP DISCUSS/DSCN MKR DOCD: CPT | Performed by: FAMILY MEDICINE

## 2018-12-17 PROCEDURE — G8399 PT W/DXA RESULTS DOCUMENT: HCPCS | Performed by: UROLOGY

## 2018-12-17 PROCEDURE — G8427 DOCREV CUR MEDS BY ELIG CLIN: HCPCS | Performed by: FAMILY MEDICINE

## 2018-12-17 PROCEDURE — 4040F PNEUMOC VAC/ADMIN/RCVD: CPT | Performed by: UROLOGY

## 2018-12-17 PROCEDURE — 99214 OFFICE O/P EST MOD 30 MIN: CPT | Performed by: FAMILY MEDICINE

## 2018-12-17 PROCEDURE — G8482 FLU IMMUNIZE ORDER/ADMIN: HCPCS | Performed by: UROLOGY

## 2018-12-17 PROCEDURE — G8399 PT W/DXA RESULTS DOCUMENT: HCPCS | Performed by: FAMILY MEDICINE

## 2018-12-17 PROCEDURE — 1101F PT FALLS ASSESS-DOCD LE1/YR: CPT | Performed by: FAMILY MEDICINE

## 2018-12-17 PROCEDURE — G8427 DOCREV CUR MEDS BY ELIG CLIN: HCPCS | Performed by: UROLOGY

## 2018-12-17 PROCEDURE — 1036F TOBACCO NON-USER: CPT | Performed by: FAMILY MEDICINE

## 2018-12-17 PROCEDURE — 99214 OFFICE O/P EST MOD 30 MIN: CPT | Performed by: UROLOGY

## 2018-12-17 PROCEDURE — 1101F PT FALLS ASSESS-DOCD LE1/YR: CPT | Performed by: UROLOGY

## 2018-12-17 PROCEDURE — G8419 CALC BMI OUT NRM PARAM NOF/U: HCPCS | Performed by: FAMILY MEDICINE

## 2018-12-17 PROCEDURE — 4040F PNEUMOC VAC/ADMIN/RCVD: CPT | Performed by: FAMILY MEDICINE

## 2018-12-17 PROCEDURE — G8419 CALC BMI OUT NRM PARAM NOF/U: HCPCS | Performed by: UROLOGY

## 2018-12-17 PROCEDURE — G8482 FLU IMMUNIZE ORDER/ADMIN: HCPCS | Performed by: FAMILY MEDICINE

## 2018-12-17 PROCEDURE — 3017F COLORECTAL CA SCREEN DOC REV: CPT | Performed by: FAMILY MEDICINE

## 2018-12-17 PROCEDURE — 1036F TOBACCO NON-USER: CPT | Performed by: UROLOGY

## 2018-12-17 PROCEDURE — 1090F PRES/ABSN URINE INCON ASSESS: CPT | Performed by: UROLOGY

## 2018-12-17 PROCEDURE — 1123F ACP DISCUSS/DSCN MKR DOCD: CPT | Performed by: UROLOGY

## 2018-12-17 PROCEDURE — 3017F COLORECTAL CA SCREEN DOC REV: CPT | Performed by: UROLOGY

## 2018-12-17 RX ORDER — SIMVASTATIN 20 MG
20 TABLET ORAL NIGHTLY
COMMUNITY
End: 2019-01-14 | Stop reason: SDUPTHER

## 2018-12-17 ASSESSMENT — ENCOUNTER SYMPTOMS
BACK PAIN: 0
VOMITING: 0
EYE REDNESS: 0
WHEEZING: 0
EYE PAIN: 0
SORE THROAT: 0
ABDOMINAL PAIN: 0
DIARRHEA: 0
ABDOMINAL PAIN: 0
NAUSEA: 0
COUGH: 0
SHORTNESS OF BREATH: 0
CONSTIPATION: 0
SHORTNESS OF BREATH: 0
NAUSEA: 0

## 2018-12-17 NOTE — PROGRESS NOTES
[facility]  [facility] [place]  [address]  [address]  [address] [facility] [address]  Dept: [address] [facility] Urology Office Note - Established    Patient:  Sia Ricks  YOB: 1945  Date: 12/17/2018    The patient is a 68 y.o. female whopresents today for evaluation of the following problems:   Chief Complaint   Patient presents with    Urinary Frequency     Pt states she is here today to discuus a procudure that will help with here frequency- pt states she feels pressure and then can't get to the restoom soon enough. HPI  Overactive Bladder:  Patient is here today for an overactive bladder which started a few year(s) ago. Recently the OAB symptoms: are worsening  Current medical Rx for OAB: none  Frequency: 30 minutes  Nocturia x 3   Consumption of bladder irritants? no  Incontinence? Stress incontinence: Severity = not present. Urge Incontinence:  Severity = moderate. Number of pads per day: 4                  Summary of old records: N/A    Additional History: N/A    Procedures Today: N/A    Urinalysis today:  No results found for this visit on 12/17/18.     Imaging Reviewed during this Office Visit: none  (results were independently reviewed by physician and radiology report verified)    AUA Symptom Score (12/17/2018):                    @(0125)@          Last BUN and creatinine:  Lab Results   Component Value Date    BUN 12 12/10/2018     Lab Results   Component Value Date    CREATININE 0.83 12/10/2018       Additional Lab/Culture results: none    PAST MEDICAL, FAMILY AND SOCIAL HISTORY UPDATE:  Past Medical History:   Diagnosis Date    Diverticulosis     Fatty liver     Hyperlipidemia      Past Surgical History:   Procedure Laterality Date    APPENDECTOMY      BLADDER SUSPENSION      COLONOSCOPY  05/23/13    DIVERTICULOSIS     HYSTERECTOMY      SPINE SURGERY      TONSILLECTOMY      TYMPANOMASTOIDECTOMY Left 2000    UPPER GASTROINTESTINAL

## 2018-12-24 ENCOUNTER — TELEPHONE (OUTPATIENT)
Dept: UROLOGY | Age: 73
End: 2018-12-24

## 2019-01-14 RX ORDER — SIMVASTATIN 20 MG
20 TABLET ORAL NIGHTLY
Qty: 90 TABLET | Refills: 1 | Status: SHIPPED | OUTPATIENT
Start: 2019-01-14 | End: 2019-07-10 | Stop reason: SDUPTHER

## 2019-01-14 RX ORDER — LOSARTAN POTASSIUM 50 MG/1
50 TABLET ORAL DAILY
Qty: 90 TABLET | Refills: 0 | Status: SHIPPED | OUTPATIENT
Start: 2019-01-14 | End: 2019-10-30 | Stop reason: SDUPTHER

## 2019-02-11 ENCOUNTER — PROCEDURE VISIT (OUTPATIENT)
Dept: UROLOGY | Age: 74
End: 2019-02-11
Payer: MEDICARE

## 2019-02-11 VITALS
DIASTOLIC BLOOD PRESSURE: 76 MMHG | HEART RATE: 77 BPM | WEIGHT: 182 LBS | SYSTOLIC BLOOD PRESSURE: 137 MMHG | HEIGHT: 66 IN | TEMPERATURE: 99 F | BODY MASS INDEX: 29.25 KG/M2

## 2019-02-11 DIAGNOSIS — N39.41 URGE INCONTINENCE: Primary | ICD-10-CM

## 2019-02-11 PROCEDURE — 64561 IMPLANT NEUROELECTRODES: CPT | Performed by: UROLOGY

## 2019-02-11 PROCEDURE — 99999 PR OFFICE/OUTPT VISIT,PROCEDURE ONLY: CPT | Performed by: UROLOGY

## 2019-02-15 ENCOUNTER — PROCEDURE VISIT (OUTPATIENT)
Dept: UROLOGY | Age: 74
End: 2019-02-15

## 2019-02-15 DIAGNOSIS — N32.81 OAB (OVERACTIVE BLADDER): Primary | ICD-10-CM

## 2019-02-15 PROCEDURE — 99999 PR OFFICE/OUTPT VISIT,PROCEDURE ONLY: CPT | Performed by: UROLOGY

## 2019-02-25 ENCOUNTER — OFFICE VISIT (OUTPATIENT)
Dept: UROLOGY | Age: 74
End: 2019-02-25
Payer: MEDICARE

## 2019-02-25 VITALS
TEMPERATURE: 97.4 F | WEIGHT: 184 LBS | DIASTOLIC BLOOD PRESSURE: 78 MMHG | BODY MASS INDEX: 29.57 KG/M2 | HEART RATE: 76 BPM | SYSTOLIC BLOOD PRESSURE: 132 MMHG | HEIGHT: 66 IN

## 2019-02-25 DIAGNOSIS — R35.0 URINARY FREQUENCY: ICD-10-CM

## 2019-02-25 DIAGNOSIS — R35.1 NOCTURIA: ICD-10-CM

## 2019-02-25 DIAGNOSIS — N39.41 URGE INCONTINENCE: Primary | ICD-10-CM

## 2019-02-25 PROCEDURE — 1101F PT FALLS ASSESS-DOCD LE1/YR: CPT | Performed by: UROLOGY

## 2019-02-25 PROCEDURE — 3017F COLORECTAL CA SCREEN DOC REV: CPT | Performed by: UROLOGY

## 2019-02-25 PROCEDURE — 1090F PRES/ABSN URINE INCON ASSESS: CPT | Performed by: UROLOGY

## 2019-02-25 PROCEDURE — 99214 OFFICE O/P EST MOD 30 MIN: CPT | Performed by: UROLOGY

## 2019-02-25 PROCEDURE — G8419 CALC BMI OUT NRM PARAM NOF/U: HCPCS | Performed by: UROLOGY

## 2019-02-25 PROCEDURE — 1123F ACP DISCUSS/DSCN MKR DOCD: CPT | Performed by: UROLOGY

## 2019-02-25 PROCEDURE — G8427 DOCREV CUR MEDS BY ELIG CLIN: HCPCS | Performed by: UROLOGY

## 2019-02-25 PROCEDURE — 4040F PNEUMOC VAC/ADMIN/RCVD: CPT | Performed by: UROLOGY

## 2019-02-25 PROCEDURE — 0509F URINE INCON PLAN DOCD: CPT | Performed by: UROLOGY

## 2019-02-25 PROCEDURE — G8482 FLU IMMUNIZE ORDER/ADMIN: HCPCS | Performed by: UROLOGY

## 2019-02-25 PROCEDURE — 1036F TOBACCO NON-USER: CPT | Performed by: UROLOGY

## 2019-02-25 PROCEDURE — G8399 PT W/DXA RESULTS DOCUMENT: HCPCS | Performed by: UROLOGY

## 2019-02-25 ASSESSMENT — ENCOUNTER SYMPTOMS
DIARRHEA: 0
EYE REDNESS: 0
VOMITING: 0
EYE PAIN: 0
SHORTNESS OF BREATH: 0
BACK PAIN: 1
ABDOMINAL PAIN: 0
CONSTIPATION: 1
NAUSEA: 0
WHEEZING: 0
COUGH: 0

## 2019-02-26 ENCOUNTER — TELEPHONE (OUTPATIENT)
Dept: UROLOGY | Age: 74
End: 2019-02-26

## 2019-04-18 ENCOUNTER — HOSPITAL ENCOUNTER (OUTPATIENT)
Dept: WOMENS IMAGING | Age: 74
Discharge: HOME | End: 2019-04-20
Payer: MEDICARE

## 2019-04-18 DIAGNOSIS — Z12.39 SCREENING BREAST EXAMINATION: ICD-10-CM

## 2019-04-18 PROCEDURE — 77063 BREAST TOMOSYNTHESIS BI: CPT

## 2019-05-07 ENCOUNTER — OFFICE VISIT (OUTPATIENT)
Dept: FAMILY MEDICINE CLINIC | Age: 74
End: 2019-05-07
Payer: MEDICARE

## 2019-05-07 VITALS
TEMPERATURE: 98.7 F | DIASTOLIC BLOOD PRESSURE: 84 MMHG | HEIGHT: 66 IN | WEIGHT: 183 LBS | OXYGEN SATURATION: 96 % | BODY MASS INDEX: 29.41 KG/M2 | HEART RATE: 78 BPM | SYSTOLIC BLOOD PRESSURE: 120 MMHG

## 2019-05-07 DIAGNOSIS — I10 ESSENTIAL HYPERTENSION: Primary | ICD-10-CM

## 2019-05-07 DIAGNOSIS — F41.9 ANXIETY: ICD-10-CM

## 2019-05-07 DIAGNOSIS — E78.5 HYPERLIPIDEMIA, UNSPECIFIED HYPERLIPIDEMIA TYPE: ICD-10-CM

## 2019-05-07 DIAGNOSIS — F32.4 MAJOR DEPRESSIVE DISORDER WITH SINGLE EPISODE, IN PARTIAL REMISSION (HCC): ICD-10-CM

## 2019-05-07 PROCEDURE — G8419 CALC BMI OUT NRM PARAM NOF/U: HCPCS | Performed by: FAMILY MEDICINE

## 2019-05-07 PROCEDURE — 1090F PRES/ABSN URINE INCON ASSESS: CPT | Performed by: FAMILY MEDICINE

## 2019-05-07 PROCEDURE — 99213 OFFICE O/P EST LOW 20 MIN: CPT | Performed by: FAMILY MEDICINE

## 2019-05-07 PROCEDURE — 4040F PNEUMOC VAC/ADMIN/RCVD: CPT | Performed by: FAMILY MEDICINE

## 2019-05-07 PROCEDURE — 1036F TOBACCO NON-USER: CPT | Performed by: FAMILY MEDICINE

## 2019-05-07 PROCEDURE — G8399 PT W/DXA RESULTS DOCUMENT: HCPCS | Performed by: FAMILY MEDICINE

## 2019-05-07 PROCEDURE — 3017F COLORECTAL CA SCREEN DOC REV: CPT | Performed by: FAMILY MEDICINE

## 2019-05-07 PROCEDURE — G8427 DOCREV CUR MEDS BY ELIG CLIN: HCPCS | Performed by: FAMILY MEDICINE

## 2019-05-07 PROCEDURE — G8510 SCR DEP NEG, NO PLAN REQD: HCPCS | Performed by: FAMILY MEDICINE

## 2019-05-07 PROCEDURE — 1123F ACP DISCUSS/DSCN MKR DOCD: CPT | Performed by: FAMILY MEDICINE

## 2019-05-07 ASSESSMENT — PATIENT HEALTH QUESTIONNAIRE - PHQ9
SUM OF ALL RESPONSES TO PHQ QUESTIONS 1-9: 0
SUM OF ALL RESPONSES TO PHQ9 QUESTIONS 1 & 2: 0
1. LITTLE INTEREST OR PLEASURE IN DOING THINGS: 0
2. FEELING DOWN, DEPRESSED OR HOPELESS: 0
SUM OF ALL RESPONSES TO PHQ QUESTIONS 1-9: 0

## 2019-05-07 ASSESSMENT — ENCOUNTER SYMPTOMS
NAUSEA: 0
SORE THROAT: 0
ABDOMINAL PAIN: 0
SHORTNESS OF BREATH: 0

## 2019-05-07 NOTE — PROGRESS NOTES
Subjective:      Patient ID: Nathan Bruno is a 68 y.o. female. HYPERTENSION visit     BP Readings from Last 3 Encounters:   05/07/19 120/84   02/25/19 132/78   02/11/19 137/76       LDL Cholesterol (mg/dL)   Date Value   12/10/2018 100     HDL (mg/dL)   Date Value   12/10/2018 42     BUN (mg/dL)   Date Value   12/10/2018 12     CREATININE (mg/dL)   Date Value   12/10/2018 0.83     Glucose (mg/dL)   Date Value   12/10/2018 121 (H)   01/27/2012 96              Have you changed or started any medications since your last visit including any over-the-counter medicines, vitamins, or herbal medicines? no   Have you stopped taking any of your medications? Is so, why? -  no  Are you having any side effects from any of your medications? - no  How often do you miss doses of your medication? no      Have you seen any other physician or provider since your last visit?  no   Have you had any other diagnostic tests since your last visit?  no   Have you been seen in the emergency room and/or had an admission in a hospital since we last saw you?  no   Have you had your routine dental cleaning in the past 6 months?  yes -      Do you have an active MyChart account? If no, what is the barrier?   Yes    Patient Care Team:  Eugene Park MD as PCP - General (Family Medicine)  Eugene Park MD as PCP - S Attributed Provider  Brandee Pelaez MD as Consulting Physician (Gastroenterology)        Health Maintenance   Topic Date Due    Shingles Vaccine (1 of 2) 06/26/2019 (Originally 12/15/1995)    DTaP/Tdap/Td vaccine (1 - Tdap) 08/09/2022 (Originally 12/15/1964)    Hepatitis C screen  08/09/2022 (Originally 1945)    Potassium monitoring  12/10/2019    Creatinine monitoring  12/10/2019    Breast cancer screen  04/18/2021    Colon cancer screen colonoscopy  05/23/2023    Lipid screen  12/10/2023    DEXA (modify frequency per FRAX score)  Completed    Flu vaccine  Completed    Pneumococcal 65+ years Vaccine  Completed HPI  69-year-old female is seen in the office today for follow-up for her hypertension and hyperlipidemia blood pressure is controlled she is on Cozaar denies of any chest pain or shortness of breath. Has hyperlipidemia on Zocor and is watching her diet, patient has history of depression and anxiety is on Elavil  seems to have helped her  she has got some triggering in her right middle finger at times  Review of Systems   Constitutional: Negative for appetite change and fatigue. HENT: Negative for ear pain and sore throat. Eyes: Positive for visual disturbance. Wears glasses   Respiratory: Negative for shortness of breath. Cardiovascular: Negative for chest pain. Gastrointestinal: Negative for abdominal pain and nausea. Genitourinary: Negative for frequency, pelvic pain and vaginal discharge. Musculoskeletal: Negative for arthralgias. Neurological: Negative for dizziness and headaches. Psychiatric/Behavioral: Positive for dysphoric mood. The patient is nervous/anxious. Objective:   Physical Exam   Constitutional: She is oriented to person, place, and time. She appears well-developed and well-nourished. /84 (Site: Left Upper Arm, Position: Sitting, Cuff Size: Large Adult)   Pulse 78   Temp 98.7 °F (37.1 °C) (Oral)   Ht 5' 6\" (1.676 m)   Wt 183 lb (83 kg)   SpO2 96%   BMI 29.54 kg/m²    HENT:   Head: Normocephalic. Mouth/Throat: Oropharynx is clear and moist.        Eyes: Conjunctivae are normal.   Cardiovascular: Normal rate and regular rhythm. Pulmonary/Chest: Breath sounds normal. She has no rales. Abdominal: Soft. Bowel sounds are normal. There is no tenderness. Musculoskeletal: She exhibits no edema or tenderness. Lymphadenopathy:     She has no cervical adenopathy. Neurological: She is alert and oriented to person, place, and time. Nursing note and vitals reviewed. Assessment:       Diagnosis Orders   1.  Essential hypertension  Controlled 2. Hyperlipidemia, unspecified hyperlipidemia type     3. Major depressive disorder with single episode, in partial remission (HCC)  Stable    4. Anxiety  Stable            Plan:        No orders of the defined types were placed in this encounter. No orders of the defined types were placed in this encounter. Return in about 4 months (around 9/7/2019) for HTN.     Continue current medications reviewed from the chart            Glenys Holter, MA

## 2019-07-11 RX ORDER — SIMVASTATIN 20 MG
20 TABLET ORAL NIGHTLY
Qty: 90 TABLET | Refills: 1 | Status: SHIPPED | OUTPATIENT
Start: 2019-07-11 | End: 2020-01-13

## 2019-07-31 NOTE — TELEPHONE ENCOUNTER
Please Approve or Refuse.   Send to Pharmacy per Pt's Request:      Next Visit Date:  9/12/2019   Last Visit Date: 5/7/2019    No results found for: LABA1C          ( goal A1C is < 7)   BP Readings from Last 3 Encounters:   05/07/19 120/84   02/25/19 132/78   02/11/19 137/76          (goal 120/80)  BUN   Date Value Ref Range Status   12/10/2018 12 8 - 23 mg/dL Final     CREATININE   Date Value Ref Range Status   12/10/2018 0.83 0.50 - 0.90 mg/dL Final     Potassium   Date Value Ref Range Status   12/10/2018 4.5 3.7 - 5.3 mmol/L Final

## 2019-08-01 RX ORDER — LOSARTAN POTASSIUM 50 MG/1
50 TABLET ORAL DAILY
Qty: 90 TABLET | Refills: 0 | Status: SHIPPED | OUTPATIENT
Start: 2019-08-01 | End: 2019-09-12 | Stop reason: SDUPTHER

## 2019-08-15 RX ORDER — FLUTICASONE PROPIONATE 50 MCG
1 SPRAY, SUSPENSION (ML) NASAL DAILY
Qty: 1 BOTTLE | Refills: 1 | Status: SHIPPED | OUTPATIENT
Start: 2019-08-15 | End: 2019-11-12 | Stop reason: ALTCHOICE

## 2019-09-12 ENCOUNTER — OFFICE VISIT (OUTPATIENT)
Dept: FAMILY MEDICINE CLINIC | Age: 74
End: 2019-09-12
Payer: MEDICARE

## 2019-09-12 VITALS
WEIGHT: 182.4 LBS | TEMPERATURE: 97.9 F | SYSTOLIC BLOOD PRESSURE: 138 MMHG | HEART RATE: 80 BPM | DIASTOLIC BLOOD PRESSURE: 74 MMHG | HEIGHT: 66 IN | BODY MASS INDEX: 29.32 KG/M2 | OXYGEN SATURATION: 97 %

## 2019-09-12 DIAGNOSIS — Z23 NEED FOR INFLUENZA VACCINATION: Primary | ICD-10-CM

## 2019-09-12 DIAGNOSIS — Z00.00 ROUTINE GENERAL MEDICAL EXAMINATION AT A HEALTH CARE FACILITY: ICD-10-CM

## 2019-09-12 PROCEDURE — G0008 ADMIN INFLUENZA VIRUS VAC: HCPCS | Performed by: FAMILY MEDICINE

## 2019-09-12 PROCEDURE — 90653 IIV ADJUVANT VACCINE IM: CPT | Performed by: FAMILY MEDICINE

## 2019-09-12 PROCEDURE — 1123F ACP DISCUSS/DSCN MKR DOCD: CPT | Performed by: FAMILY MEDICINE

## 2019-09-12 PROCEDURE — 3017F COLORECTAL CA SCREEN DOC REV: CPT | Performed by: FAMILY MEDICINE

## 2019-09-12 PROCEDURE — 4040F PNEUMOC VAC/ADMIN/RCVD: CPT | Performed by: FAMILY MEDICINE

## 2019-09-12 PROCEDURE — G0439 PPPS, SUBSEQ VISIT: HCPCS | Performed by: FAMILY MEDICINE

## 2019-09-12 ASSESSMENT — LIFESTYLE VARIABLES
AUDIT-C TOTAL SCORE: 3
HOW OFTEN DO YOU HAVE A DRINK CONTAINING ALCOHOL: 3
HAVE YOU OR SOMEONE ELSE BEEN INJURED AS A RESULT OF YOUR DRINKING: 0
HOW OFTEN DURING THE LAST YEAR HAVE YOU NEEDED AN ALCOHOLIC DRINK FIRST THING IN THE MORNING TO GET YOURSELF GOING AFTER A NIGHT OF HEAVY DRINKING: 0
HOW OFTEN DO YOU HAVE SIX OR MORE DRINKS ON ONE OCCASION: 0
HOW OFTEN DURING THE LAST YEAR HAVE YOU HAD A FEELING OF GUILT OR REMORSE AFTER DRINKING: 0
AUDIT TOTAL SCORE: 3
HOW MANY STANDARD DRINKS CONTAINING ALCOHOL DO YOU HAVE ON A TYPICAL DAY: 0
HAS A RELATIVE, FRIEND, DOCTOR, OR ANOTHER HEALTH PROFESSIONAL EXPRESSED CONCERN ABOUT YOUR DRINKING OR SUGGESTED YOU CUT DOWN: 0
HOW OFTEN DURING THE LAST YEAR HAVE YOU FOUND THAT YOU WERE NOT ABLE TO STOP DRINKING ONCE YOU HAD STARTED: 0
HOW OFTEN DURING THE LAST YEAR HAVE YOU BEEN UNABLE TO REMEMBER WHAT HAPPENED THE NIGHT BEFORE BECAUSE YOU HAD BEEN DRINKING: 0
HOW OFTEN DURING THE LAST YEAR HAVE YOU FAILED TO DO WHAT WAS NORMALLY EXPECTED FROM YOU BECAUSE OF DRINKING: 0

## 2019-09-12 ASSESSMENT — PATIENT HEALTH QUESTIONNAIRE - PHQ9
SUM OF ALL RESPONSES TO PHQ QUESTIONS 1-9: 1
SUM OF ALL RESPONSES TO PHQ QUESTIONS 1-9: 1

## 2019-11-01 RX ORDER — LOSARTAN POTASSIUM 50 MG/1
50 TABLET ORAL DAILY
Qty: 90 TABLET | Refills: 0 | OUTPATIENT
Start: 2019-11-01 | End: 2020-01-24

## 2019-11-06 DIAGNOSIS — F32.A ANXIETY AND DEPRESSION: ICD-10-CM

## 2019-11-06 DIAGNOSIS — F41.9 ANXIETY AND DEPRESSION: ICD-10-CM

## 2019-11-07 RX ORDER — AMITRIPTYLINE HYDROCHLORIDE 25 MG/1
TABLET, FILM COATED ORAL
Qty: 90 TABLET | Refills: 0 | Status: SHIPPED | OUTPATIENT
Start: 2019-11-07 | End: 2019-12-30 | Stop reason: ALTCHOICE

## 2019-11-12 ENCOUNTER — OFFICE VISIT (OUTPATIENT)
Dept: PODIATRY | Age: 74
End: 2019-11-12
Payer: MEDICARE

## 2019-11-12 VITALS — BODY MASS INDEX: 28.93 KG/M2 | WEIGHT: 180 LBS | HEIGHT: 66 IN

## 2019-11-12 DIAGNOSIS — M79.674 PAIN IN TOES OF BOTH FEET: ICD-10-CM

## 2019-11-12 DIAGNOSIS — B35.1 ONYCHOMYCOSIS: Primary | ICD-10-CM

## 2019-11-12 DIAGNOSIS — M72.2 PLANTAR FASCIITIS, BILATERAL: ICD-10-CM

## 2019-11-12 DIAGNOSIS — M79.675 PAIN IN TOES OF BOTH FEET: ICD-10-CM

## 2019-11-12 PROCEDURE — G8482 FLU IMMUNIZE ORDER/ADMIN: HCPCS | Performed by: PODIATRIST

## 2019-11-12 PROCEDURE — 4040F PNEUMOC VAC/ADMIN/RCVD: CPT | Performed by: PODIATRIST

## 2019-11-12 PROCEDURE — 99213 OFFICE O/P EST LOW 20 MIN: CPT | Performed by: PODIATRIST

## 2019-11-12 PROCEDURE — 11721 DEBRIDE NAIL 6 OR MORE: CPT | Performed by: PODIATRIST

## 2019-11-12 PROCEDURE — G8427 DOCREV CUR MEDS BY ELIG CLIN: HCPCS | Performed by: PODIATRIST

## 2019-11-12 PROCEDURE — 1036F TOBACCO NON-USER: CPT | Performed by: PODIATRIST

## 2019-11-12 PROCEDURE — 1090F PRES/ABSN URINE INCON ASSESS: CPT | Performed by: PODIATRIST

## 2019-11-12 PROCEDURE — G8399 PT W/DXA RESULTS DOCUMENT: HCPCS | Performed by: PODIATRIST

## 2019-11-12 PROCEDURE — G8417 CALC BMI ABV UP PARAM F/U: HCPCS | Performed by: PODIATRIST

## 2019-11-12 PROCEDURE — 3017F COLORECTAL CA SCREEN DOC REV: CPT | Performed by: PODIATRIST

## 2019-11-12 PROCEDURE — 1123F ACP DISCUSS/DSCN MKR DOCD: CPT | Performed by: PODIATRIST

## 2019-11-12 RX ORDER — TERBINAFINE HYDROCHLORIDE 250 MG/1
250 TABLET ORAL DAILY
Qty: 90 TABLET | Refills: 0 | Status: SHIPPED | OUTPATIENT
Start: 2019-11-12 | End: 2019-12-12 | Stop reason: ALTCHOICE

## 2019-11-13 RX ORDER — TERBINAFINE HYDROCHLORIDE 250 MG/1
250 TABLET ORAL DAILY
Qty: 90 TABLET | Refills: 0 | Status: SHIPPED | OUTPATIENT
Start: 2019-11-13 | End: 2019-12-12 | Stop reason: ALTCHOICE

## 2019-11-13 ASSESSMENT — ENCOUNTER SYMPTOMS
VOMITING: 0
COLOR CHANGE: 0
CONSTIPATION: 0
DIARRHEA: 0
NAUSEA: 0

## 2019-12-12 ENCOUNTER — OFFICE VISIT (OUTPATIENT)
Dept: FAMILY MEDICINE CLINIC | Age: 74
End: 2019-12-12
Payer: MEDICARE

## 2019-12-12 VITALS
HEIGHT: 66 IN | SYSTOLIC BLOOD PRESSURE: 132 MMHG | BODY MASS INDEX: 29.57 KG/M2 | OXYGEN SATURATION: 98 % | HEART RATE: 72 BPM | WEIGHT: 184 LBS | DIASTOLIC BLOOD PRESSURE: 78 MMHG

## 2019-12-12 DIAGNOSIS — E78.5 HYPERLIPIDEMIA, UNSPECIFIED HYPERLIPIDEMIA TYPE: ICD-10-CM

## 2019-12-12 DIAGNOSIS — F41.9 ANXIETY AND DEPRESSION: ICD-10-CM

## 2019-12-12 DIAGNOSIS — I10 ESSENTIAL HYPERTENSION: Primary | ICD-10-CM

## 2019-12-12 DIAGNOSIS — F32.A ANXIETY AND DEPRESSION: ICD-10-CM

## 2019-12-12 PROCEDURE — 1090F PRES/ABSN URINE INCON ASSESS: CPT | Performed by: FAMILY MEDICINE

## 2019-12-12 PROCEDURE — 3017F COLORECTAL CA SCREEN DOC REV: CPT | Performed by: FAMILY MEDICINE

## 2019-12-12 PROCEDURE — G8399 PT W/DXA RESULTS DOCUMENT: HCPCS | Performed by: FAMILY MEDICINE

## 2019-12-12 PROCEDURE — 1123F ACP DISCUSS/DSCN MKR DOCD: CPT | Performed by: FAMILY MEDICINE

## 2019-12-12 PROCEDURE — 99214 OFFICE O/P EST MOD 30 MIN: CPT | Performed by: FAMILY MEDICINE

## 2019-12-12 PROCEDURE — G8417 CALC BMI ABV UP PARAM F/U: HCPCS | Performed by: FAMILY MEDICINE

## 2019-12-12 PROCEDURE — 1036F TOBACCO NON-USER: CPT | Performed by: FAMILY MEDICINE

## 2019-12-12 PROCEDURE — G8482 FLU IMMUNIZE ORDER/ADMIN: HCPCS | Performed by: FAMILY MEDICINE

## 2019-12-12 PROCEDURE — 4040F PNEUMOC VAC/ADMIN/RCVD: CPT | Performed by: FAMILY MEDICINE

## 2019-12-12 PROCEDURE — G8427 DOCREV CUR MEDS BY ELIG CLIN: HCPCS | Performed by: FAMILY MEDICINE

## 2019-12-12 RX ORDER — ESCITALOPRAM OXALATE 10 MG/1
10 TABLET ORAL DAILY
Qty: 30 TABLET | Refills: 3 | Status: SHIPPED | OUTPATIENT
Start: 2019-12-12 | End: 2020-04-07 | Stop reason: SDUPTHER

## 2019-12-12 ASSESSMENT — ENCOUNTER SYMPTOMS
NAUSEA: 0
SORE THROAT: 0
ABDOMINAL PAIN: 0
SHORTNESS OF BREATH: 0

## 2019-12-16 ENCOUNTER — OFFICE VISIT (OUTPATIENT)
Dept: FAMILY MEDICINE CLINIC | Age: 74
End: 2019-12-16
Payer: MEDICARE

## 2019-12-16 ENCOUNTER — TELEPHONE (OUTPATIENT)
Dept: FAMILY MEDICINE CLINIC | Age: 74
End: 2019-12-16

## 2019-12-16 VITALS
WEIGHT: 182.2 LBS | HEIGHT: 66 IN | OXYGEN SATURATION: 99 % | BODY MASS INDEX: 29.28 KG/M2 | HEART RATE: 84 BPM | SYSTOLIC BLOOD PRESSURE: 153 MMHG | DIASTOLIC BLOOD PRESSURE: 79 MMHG

## 2019-12-16 DIAGNOSIS — L28.2 PRURITIC RASH: Primary | ICD-10-CM

## 2019-12-16 PROCEDURE — 99213 OFFICE O/P EST LOW 20 MIN: CPT | Performed by: FAMILY MEDICINE

## 2019-12-16 RX ORDER — PREDNISONE 20 MG/1
TABLET ORAL
Qty: 11 TABLET | Refills: 0 | Status: SHIPPED | OUTPATIENT
Start: 2019-12-16 | End: 2019-12-30 | Stop reason: ALTCHOICE

## 2019-12-16 RX ORDER — TRIAMCINOLONE ACETONIDE 1 MG/G
CREAM TOPICAL
Qty: 1 TUBE | Refills: 0 | Status: SHIPPED | OUTPATIENT
Start: 2019-12-16 | End: 2019-12-30 | Stop reason: ALTCHOICE

## 2019-12-17 ASSESSMENT — ENCOUNTER SYMPTOMS
COUGH: 0
DIARRHEA: 0
RHINORRHEA: 0
COLOR CHANGE: 0
SORE THROAT: 0

## 2019-12-27 ENCOUNTER — HOSPITAL ENCOUNTER (OUTPATIENT)
Age: 74
Discharge: HOME | End: 2019-12-27
Payer: MEDICARE

## 2019-12-27 DIAGNOSIS — I10 ESSENTIAL HYPERTENSION: ICD-10-CM

## 2019-12-27 DIAGNOSIS — E78.5 HYPERLIPIDEMIA, UNSPECIFIED HYPERLIPIDEMIA TYPE: ICD-10-CM

## 2019-12-27 LAB
ALBUMIN SERPL-MCNC: 4.2 G/DL (ref 3.5–5.2)
ALBUMIN/GLOBULIN RATIO: ABNORMAL (ref 1–2.5)
ALP BLD-CCNC: 57 U/L (ref 35–104)
ALT SERPL-CCNC: 21 U/L (ref 5–33)
ANION GAP SERPL CALCULATED.3IONS-SCNC: 12 MMOL/L (ref 9–17)
AST SERPL-CCNC: 15 U/L
BILIRUB SERPL-MCNC: 0.52 MG/DL (ref 0.3–1.2)
BUN BLDV-MCNC: 13 MG/DL (ref 8–23)
BUN/CREAT BLD: ABNORMAL (ref 9–20)
CALCIUM SERPL-MCNC: 9.4 MG/DL (ref 8.6–10.4)
CHLORIDE BLD-SCNC: 104 MMOL/L (ref 98–107)
CHOLESTEROL/HDL RATIO: 3.7
CHOLESTEROL: 201 MG/DL
CO2: 25 MMOL/L (ref 20–31)
CREAT SERPL-MCNC: 0.79 MG/DL (ref 0.5–0.9)
GFR AFRICAN AMERICAN: >60 ML/MIN
GFR NON-AFRICAN AMERICAN: >60 ML/MIN
GFR SERPL CREATININE-BSD FRML MDRD: ABNORMAL ML/MIN/{1.73_M2}
GFR SERPL CREATININE-BSD FRML MDRD: ABNORMAL ML/MIN/{1.73_M2}
GLUCOSE BLD-MCNC: 106 MG/DL (ref 70–99)
HDLC SERPL-MCNC: 54 MG/DL
LDL CHOLESTEROL: 88 MG/DL (ref 0–130)
POTASSIUM SERPL-SCNC: 4.4 MMOL/L (ref 3.7–5.3)
SODIUM BLD-SCNC: 141 MMOL/L (ref 135–144)
TOTAL PROTEIN: 6.9 G/DL (ref 6.4–8.3)
TRIGL SERPL-MCNC: 297 MG/DL
VLDLC SERPL CALC-MCNC: ABNORMAL MG/DL (ref 1–30)

## 2019-12-27 PROCEDURE — 36415 COLL VENOUS BLD VENIPUNCTURE: CPT

## 2019-12-27 PROCEDURE — 80053 COMPREHEN METABOLIC PANEL: CPT

## 2019-12-27 PROCEDURE — 80061 LIPID PANEL: CPT

## 2019-12-30 ENCOUNTER — OFFICE VISIT (OUTPATIENT)
Dept: FAMILY MEDICINE CLINIC | Age: 74
End: 2019-12-30
Payer: MEDICARE

## 2019-12-30 VITALS
BODY MASS INDEX: 29.25 KG/M2 | OXYGEN SATURATION: 96 % | HEIGHT: 66 IN | DIASTOLIC BLOOD PRESSURE: 84 MMHG | HEART RATE: 64 BPM | WEIGHT: 182 LBS | SYSTOLIC BLOOD PRESSURE: 120 MMHG

## 2019-12-30 DIAGNOSIS — F41.9 ANXIETY AND DEPRESSION: Primary | ICD-10-CM

## 2019-12-30 DIAGNOSIS — I10 ESSENTIAL HYPERTENSION: ICD-10-CM

## 2019-12-30 DIAGNOSIS — F32.A ANXIETY AND DEPRESSION: Primary | ICD-10-CM

## 2019-12-30 PROCEDURE — 4040F PNEUMOC VAC/ADMIN/RCVD: CPT | Performed by: FAMILY MEDICINE

## 2019-12-30 PROCEDURE — 1090F PRES/ABSN URINE INCON ASSESS: CPT | Performed by: FAMILY MEDICINE

## 2019-12-30 PROCEDURE — G8427 DOCREV CUR MEDS BY ELIG CLIN: HCPCS | Performed by: FAMILY MEDICINE

## 2019-12-30 PROCEDURE — 1123F ACP DISCUSS/DSCN MKR DOCD: CPT | Performed by: FAMILY MEDICINE

## 2019-12-30 PROCEDURE — G8417 CALC BMI ABV UP PARAM F/U: HCPCS | Performed by: FAMILY MEDICINE

## 2019-12-30 PROCEDURE — 1036F TOBACCO NON-USER: CPT | Performed by: FAMILY MEDICINE

## 2019-12-30 PROCEDURE — G8482 FLU IMMUNIZE ORDER/ADMIN: HCPCS | Performed by: FAMILY MEDICINE

## 2019-12-30 PROCEDURE — 3017F COLORECTAL CA SCREEN DOC REV: CPT | Performed by: FAMILY MEDICINE

## 2019-12-30 PROCEDURE — 99213 OFFICE O/P EST LOW 20 MIN: CPT | Performed by: FAMILY MEDICINE

## 2019-12-30 PROCEDURE — G8399 PT W/DXA RESULTS DOCUMENT: HCPCS | Performed by: FAMILY MEDICINE

## 2019-12-30 RX ORDER — FEXOFENADINE HYDROCHLORIDE 60 MG/1
60 TABLET, FILM COATED ORAL 2 TIMES DAILY
COMMUNITY
End: 2020-06-10 | Stop reason: ALTCHOICE

## 2019-12-30 ASSESSMENT — ENCOUNTER SYMPTOMS
SHORTNESS OF BREATH: 0
SORE THROAT: 0
NAUSEA: 0
ABDOMINAL PAIN: 0

## 2020-01-13 RX ORDER — SIMVASTATIN 20 MG
TABLET ORAL
Qty: 90 TABLET | Refills: 1 | Status: SHIPPED | OUTPATIENT
Start: 2020-01-13 | End: 2020-04-20 | Stop reason: SDUPTHER

## 2020-01-24 RX ORDER — LOSARTAN POTASSIUM 50 MG/1
TABLET ORAL
Qty: 90 TABLET | Refills: 0 | Status: SHIPPED | OUTPATIENT
Start: 2020-01-24 | End: 2020-04-20 | Stop reason: SDUPTHER

## 2020-03-12 ENCOUNTER — TELEPHONE (OUTPATIENT)
Dept: FAMILY MEDICINE CLINIC | Age: 75
End: 2020-03-12

## 2020-03-12 ENCOUNTER — NURSE TRIAGE (OUTPATIENT)
Dept: OTHER | Facility: CLINIC | Age: 75
End: 2020-03-12

## 2020-03-12 ENCOUNTER — NURSE TRIAGE (OUTPATIENT)
Dept: ADMISSION | Age: 75
End: 2020-03-12

## 2020-03-12 NOTE — TELEPHONE ENCOUNTER
Reason for Disposition   Unable to complete triage due to phone connection issues    Protocols used: NO CONTACT OR DUPLICATE CONTACT CALL-ADULT-[place]    [facility] unable to transfer call to RN to be triaged.

## 2020-03-13 ENCOUNTER — TELEPHONE (OUTPATIENT)
Dept: FAMILY MEDICINE CLINIC | Age: 75
End: 2020-03-13

## 2020-04-07 RX ORDER — ESCITALOPRAM OXALATE 10 MG/1
10 TABLET ORAL DAILY
Qty: 30 TABLET | Refills: 3 | Status: SHIPPED | OUTPATIENT
Start: 2020-04-07 | End: 2020-08-12 | Stop reason: SDUPTHER

## 2020-04-20 RX ORDER — SIMVASTATIN 20 MG
TABLET ORAL
Qty: 90 TABLET | Refills: 1 | Status: SHIPPED | OUTPATIENT
Start: 2020-04-20 | End: 2021-01-29 | Stop reason: SDUPTHER

## 2020-04-20 RX ORDER — LOSARTAN POTASSIUM 50 MG/1
TABLET ORAL
Qty: 90 TABLET | Refills: 1 | Status: SHIPPED | OUTPATIENT
Start: 2020-04-20 | End: 2020-10-19 | Stop reason: SDUPTHER

## 2020-04-23 ENCOUNTER — TELEPHONE (OUTPATIENT)
Dept: FAMILY MEDICINE CLINIC | Age: 75
End: 2020-04-23

## 2020-04-23 NOTE — TELEPHONE ENCOUNTER
Spoke with pt and informed her that  has retired and she will need to reschedule her 6/22/20 appointment.  The patient will call back to reschedule

## 2020-04-30 ENCOUNTER — TELEPHONE (OUTPATIENT)
Dept: PRIMARY CARE CLINIC | Age: 75
End: 2020-04-30

## 2020-06-09 NOTE — PROGRESS NOTES
tracheal tenderness. Cardiovascular:      Rate and Rhythm: Normal rate and regular rhythm. Pulmonary:      Effort: Pulmonary effort is normal.      Breath sounds: Normal breath sounds. No stridor. Abdominal:      General: Bowel sounds are normal.      Palpations: Abdomen is soft. Musculoskeletal: Normal range of motion. Lymphadenopathy:      Cervical: No cervical adenopathy. Skin:     General: Skin is dry. Findings: Erythema and rash present. Rash is macular. Comments: Under both breast. With raised borders, moist   Neurological:      Mental Status: She is alert and oriented to person, place, and time. Psychiatric:         Mood and Affect: Mood is anxious. Behavior: Behavior normal.       Most recent labs reviewed with patient, and all questions answered.   Lab Results   Component Value Date    WBC 9.6 04/10/2013    HGB 14.0 01/04/2016    HCT 42.3 01/04/2016    MCV 93.8 04/10/2013     04/10/2013     Lab Results   Component Value Date     12/27/2019    K 4.4 12/27/2019     12/27/2019    CO2 25 12/27/2019    BUN 13 12/27/2019    CREATININE 0.79 12/27/2019    GLUCOSE 106 12/27/2019    GLUCOSE 96 01/27/2012    CALCIUM 9.4 12/27/2019      Lab Results   Component Value Date    ALT 21 12/27/2019    AST 15 12/27/2019    ALKPHOS 57 12/27/2019    BILITOT 0.52 12/27/2019     Lab Results   Component Value Date    TSH 2.05 05/28/2013     Lab Results   Component Value Date    CHOL 201 (H) 12/27/2019    CHOL 167 12/10/2018    CHOL 203 (H) 02/05/2018     Lab Results   Component Value Date    TRIG 297 (H) 12/27/2019    TRIG 124 12/10/2018    TRIG 246 (H) 02/05/2018     Lab Results   Component Value Date    HDL 54 12/27/2019    HDL 42 12/10/2018    HDL 52 02/05/2018     Lab Results   Component Value Date    LDLCHOLESTEROL 88 12/27/2019    LDLCHOLESTEROL 100 12/10/2018    LDLCHOLESTEROL 102 02/05/2018     Lab Results   Component Value Date    CHOLHDLRATIO 3.7 12/27/2019 CHOLHDLRATIO 4.0 12/10/2018    CHOLHDLRATIO 3.9 02/05/2018       Lab Results   Component Value Date    VITD25 40.2 12/10/2018     ASSESSMENT/PLAN:  1. Acute vaginitis  Evaluate for vaginitis  Treat as needed    - VAGINITIS DNA PROBE; Future    2. Candidiasis of breast  Unchanged  Use cream as discussed  Keep dry    - Clotrimazole 1 % OINT; Apply to area twice a day. Dispense: 1 Tube; Refill: 1    3. Essential hypertension  Stable  COntinue Metoprolol      4. Hyperlipidemia, unspecified hyperlipidemia type  Stable  COntinue Zocor    5. Adjustment disorder with mixed anxiety and depressed mood  Stable  COntinue Lexapro    6. Non-seasonal allergic rhinitis due to other allergic trigger  Stable  Continue Zyrtec  - Cetirizine HCl (ZYRTEC PO); Take by mouth    7. Need for varicella vaccine  Recommended by CDC. No current infection. Denies previous adverse reaction to vaccination. - zoster recombinant adjuvanted vaccine [facility]) 50 MCG/0.5ML SUSR injection; Inject 0.5 mLs into the muscle See Admin Instructions 1 dose now and repeat in 2-6 months  Dispense: 0.5 mL; Refill: 0     Orders Placed This Encounter   Procedures    VAGINITIS DNA PROBE     Standing Status:   Future     Number of Occurrences:   1     Standing Expiration Date:   6/10/2021     Orders Placed This Encounter   Medications    zoster recombinant adjuvanted vaccine (SHINGRIX) 50 MCG/0.5ML SUSR injection     Sig: Inject 0.5 mLs into the muscle See Admin Instructions 1 dose now and repeat in 2-6 months     Dispense:  0.5 mL     Refill:  0    Clotrimazole 1 % OINT     Sig: Apply to area twice a day. Dispense:  1 Tube     Refill:  1      Health Maintenance Due   Topic Date Due    Shingles Vaccine (1 of 2) 12/15/1995      Return in about 3 months (around 9/10/2020) for Chronic conditions.   Chanell Nunez received counseling on the following healthy behaviors: nutrition, exercise and medication adherence  Reviewed prior labs and health maintenance  Continue guarantees can be provided that every mistake has been identified and corrected by editing   An electronic signature was used to authenticate this note.   --MIGUEL Jack - CNP on 6/10/2020 at 9:14 PM

## 2020-06-10 ENCOUNTER — HOSPITAL ENCOUNTER (OUTPATIENT)
Age: 75
Setting detail: SPECIMEN
Discharge: HOME | End: 2020-06-10
Payer: MEDICARE

## 2020-06-10 ENCOUNTER — OFFICE VISIT (OUTPATIENT)
Dept: FAMILY MEDICINE CLINIC | Age: 75
End: 2020-06-10
Payer: MEDICARE

## 2020-06-10 VITALS
OXYGEN SATURATION: 98 % | SYSTOLIC BLOOD PRESSURE: 120 MMHG | HEART RATE: 72 BPM | HEIGHT: 66 IN | BODY MASS INDEX: 29.89 KG/M2 | WEIGHT: 186 LBS | DIASTOLIC BLOOD PRESSURE: 80 MMHG

## 2020-06-10 PROBLEM — J30.9 ALLERGIC RHINITIS DUE TO ALLERGEN: Status: ACTIVE | Noted: 2020-06-10

## 2020-06-10 PROBLEM — B37.89 CANDIDIASIS OF BREAST: Status: ACTIVE | Noted: 2020-06-10

## 2020-06-10 LAB
DIRECT EXAM: ABNORMAL
Lab: ABNORMAL
SPECIMEN DESCRIPTION: ABNORMAL

## 2020-06-10 PROCEDURE — 99214 OFFICE O/P EST MOD 30 MIN: CPT | Performed by: FAMILY MEDICINE

## 2020-06-10 PROCEDURE — G8427 DOCREV CUR MEDS BY ELIG CLIN: HCPCS | Performed by: FAMILY MEDICINE

## 2020-06-10 PROCEDURE — 1090F PRES/ABSN URINE INCON ASSESS: CPT | Performed by: FAMILY MEDICINE

## 2020-06-10 PROCEDURE — G0444 DEPRESSION SCREEN ANNUAL: HCPCS | Performed by: FAMILY MEDICINE

## 2020-06-10 PROCEDURE — G8399 PT W/DXA RESULTS DOCUMENT: HCPCS | Performed by: FAMILY MEDICINE

## 2020-06-10 PROCEDURE — 1123F ACP DISCUSS/DSCN MKR DOCD: CPT | Performed by: FAMILY MEDICINE

## 2020-06-10 PROCEDURE — 1036F TOBACCO NON-USER: CPT | Performed by: FAMILY MEDICINE

## 2020-06-10 PROCEDURE — 4040F PNEUMOC VAC/ADMIN/RCVD: CPT | Performed by: FAMILY MEDICINE

## 2020-06-10 PROCEDURE — G8417 CALC BMI ABV UP PARAM F/U: HCPCS | Performed by: FAMILY MEDICINE

## 2020-06-10 PROCEDURE — 3017F COLORECTAL CA SCREEN DOC REV: CPT | Performed by: FAMILY MEDICINE

## 2020-06-10 ASSESSMENT — PATIENT HEALTH QUESTIONNAIRE - PHQ9
10. IF YOU CHECKED OFF ANY PROBLEMS, HOW DIFFICULT HAVE THESE PROBLEMS MADE IT FOR YOU TO DO YOUR WORK, TAKE CARE OF THINGS AT HOME, OR GET ALONG WITH OTHER PEOPLE: 1
1. LITTLE INTEREST OR PLEASURE IN DOING THINGS: 0
5. POOR APPETITE OR OVEREATING: 0
7. TROUBLE CONCENTRATING ON THINGS, SUCH AS READING THE NEWSPAPER OR WATCHING TELEVISION: 1
8. MOVING OR SPEAKING SO SLOWLY THAT OTHER PEOPLE COULD HAVE NOTICED. OR THE OPPOSITE, BEING SO FIGETY OR RESTLESS THAT YOU HAVE BEEN MOVING AROUND A LOT MORE THAN USUAL: 0
SUM OF ALL RESPONSES TO PHQ9 QUESTIONS 1 & 2: 3
3. TROUBLE FALLING OR STAYING ASLEEP: 0
2. FEELING DOWN, DEPRESSED OR HOPELESS: 3
SUM OF ALL RESPONSES TO PHQ QUESTIONS 1-9: 6
9. THOUGHTS THAT YOU WOULD BE BETTER OFF DEAD, OR OF HURTING YOURSELF: 0
SUM OF ALL RESPONSES TO PHQ QUESTIONS 1-9: 6
6. FEELING BAD ABOUT YOURSELF - OR THAT YOU ARE A FAILURE OR HAVE LET YOURSELF OR YOUR FAMILY DOWN: 1
4. FEELING TIRED OR HAVING LITTLE ENERGY: 1

## 2020-06-10 ASSESSMENT — ENCOUNTER SYMPTOMS
COLOR CHANGE: 1
NAUSEA: 0
SHORTNESS OF BREATH: 0
ABDOMINAL PAIN: 0
SORE THROAT: 0

## 2020-06-10 NOTE — PATIENT INSTRUCTIONS
Patient Education        Live Zoster (Shingles) Vaccine: What You Need to Know  Why get vaccinated? Live zoster (shingles) vaccine can prevent shingles. Shingles (also called herpes zoster, or just zoster) is a painful skin rash, usually with blisters. In addition to the rash, shingles can cause fever, headache, chills, or upset stomach. More rarely, shingles can lead to pneumonia, hearing problems, blindness, brain inflammation (encephalitis), or death. The most common complication of shingles is long-term nerve pain called postherpetic neuralgia (PHN). PHN occurs in the areas where the shingles rash was, even after the rash clears up. It can last for months or years after the rash goes away. The pain from PHN can be severe and debilitating. About 10 to 18% of people who get shingles will experience PHN. The risk of PHN increases with age. An older adult with shingles is more likely to develop PHN and have longer lasting and more severe pain than a younger person with shingles. Shingles is caused by the varicella zoster virus, the same virus that causes chickenpox. After you have chickenpox, the virus stays in your body and can cause shingles later in life. Shingles cannot be passed from one person to another, but the virus that causes shingles can spread and cause chickenpox in someone who had never had chickenpox or received chickenpox vaccine. Live shingles vaccine  Live shingles vaccine can provide protection against shingles and PHN. Another type of shingles vaccine, recombinant shingles vaccine, is the preferred vaccine for the prevention of shingles. However, live shingles vaccine may be used in some circumstances (for example if a person is allergic to recombinant shingles vaccine or prefers live shingles vaccine, or if recombinant shingles vaccine is not available). Adults 60 years and older who get live shingles vaccine should receive 1 dose, administered by injection.   Shingles vaccine may be given at the same time as other vaccines. Talk with your health care provider  Tell your vaccine provider if the person getting the vaccine:  · Has had an allergic reaction after a previous dose of live shingles vaccine or varicella vaccine, or has any severe, life-threatening allergies. · Has a weakened immune system. · Is pregnant or thinks she might be pregnant. · Is currently experiencing an episode of shingles. In some cases, your health care provider may decide to postpone shingles vaccination to a future visit. People with minor illnesses, such as a cold, may be vaccinated. People who are moderately or severely ill should usually wait until they recover before getting live shingles vaccine. Your health care provider can give you more information. Risks of a vaccine reaction  · Redness, soreness, swelling, or itching at the site of the injection and headache can happen after live shingles vaccine. Rarely, live shingles vaccine can cause rash or shingles. People sometimes faint after medical procedures, including vaccination. Tell your provider if you feel dizzy or have vision changes or ringing in the ears. As with any medicine, there is a very remote chance of a vaccine causing a severe allergic reaction, other serious injury, or death. What if there is a serious problem? An allergic reaction could occur after the vaccinated person leaves the clinic. If you see signs of a severe allergic reaction (hives, swelling of the face and throat, difficulty breathing, a fast heartbeat, dizziness, or weakness), call 9-1-1 and get the person to the nearest hospital.  For other signs that concern you, call your health care provider. Adverse reactions should be reported to the Vaccine Adverse Event Reporting System (VAERS). Your health care provider will usually file this report, or you can do it yourself. Visit the VAERS website at www.vaers. hhs.gov or call 8-316.551.8908. VAERS is only for reporting

## 2020-06-11 ENCOUNTER — TELEPHONE (OUTPATIENT)
Dept: FAMILY MEDICINE CLINIC | Age: 75
End: 2020-06-11

## 2020-06-11 RX ORDER — METRONIDAZOLE 500 MG/1
500 TABLET ORAL 2 TIMES DAILY
Qty: 14 TABLET | Refills: 0 | Status: SHIPPED | OUTPATIENT
Start: 2020-06-11 | End: 2020-06-18

## 2020-06-11 RX ORDER — CLOTRIMAZOLE 1 G/ML
SOLUTION TOPICAL
Qty: 1 BOTTLE | Refills: 1 | Status: SHIPPED | OUTPATIENT
Start: 2020-06-11 | End: 2020-12-22 | Stop reason: ALTCHOICE

## 2020-06-11 NOTE — TELEPHONE ENCOUNTER
Patient went to pharmacy to  medications, the cream for yeast infection was not there when she went to pick it up. Pt was seen on 6/10. Please advise.

## 2020-08-06 ENCOUNTER — TELEPHONE (OUTPATIENT)
Dept: FAMILY MEDICINE CLINIC | Age: 75
End: 2020-08-06

## 2020-08-07 RX ORDER — FLUCONAZOLE 150 MG/1
150 TABLET ORAL WEEKLY
Qty: 4 TABLET | Refills: 0 | Status: ON HOLD | OUTPATIENT
Start: 2020-08-07 | End: 2020-09-17 | Stop reason: HOSPADM

## 2020-08-07 NOTE — TELEPHONE ENCOUNTER
I sent diflucan for her to take once a week X 4 weeks. We can still send her to derm if she wants to. Her choice. Thanks.

## 2020-08-12 RX ORDER — ESCITALOPRAM OXALATE 10 MG/1
10 TABLET ORAL DAILY
Qty: 30 TABLET | Refills: 3 | Status: SHIPPED | OUTPATIENT
Start: 2020-08-12 | End: 2021-01-18

## 2020-09-09 ENCOUNTER — TELEPHONE (OUTPATIENT)
Dept: FAMILY MEDICINE CLINIC | Age: 75
End: 2020-09-09

## 2020-09-10 ENCOUNTER — HOSPITAL ENCOUNTER (OUTPATIENT)
Dept: CT IMAGING | Age: 75
Discharge: HOME | DRG: 482 | End: 2020-09-12
Payer: MEDICARE

## 2020-09-10 LAB
BUN BLDV-MCNC: 13 MG/DL (ref 8–23)
CREAT SERPL-MCNC: 0.74 MG/DL (ref 0.5–0.9)
GFR AFRICAN AMERICAN: >60 ML/MIN
GFR NON-AFRICAN AMERICAN: >60 ML/MIN
GFR SERPL CREATININE-BSD FRML MDRD: NORMAL ML/MIN/{1.73_M2}
GFR SERPL CREATININE-BSD FRML MDRD: NORMAL ML/MIN/{1.73_M2}

## 2020-09-10 PROCEDURE — 84520 ASSAY OF UREA NITROGEN: CPT

## 2020-09-10 PROCEDURE — 82565 ASSAY OF CREATININE: CPT

## 2020-09-10 PROCEDURE — 36415 COLL VENOUS BLD VENIPUNCTURE: CPT

## 2020-09-10 PROCEDURE — 70491 CT SOFT TISSUE NECK W/DYE: CPT

## 2020-09-10 PROCEDURE — 6360000004 HC RX CONTRAST MEDICATION: Performed by: OTOLARYNGOLOGY

## 2020-09-10 PROCEDURE — 2580000003 HC RX 258: Performed by: OTOLARYNGOLOGY

## 2020-09-10 RX ORDER — 0.9 % SODIUM CHLORIDE 0.9 %
80 INTRAVENOUS SOLUTION INTRAVENOUS ONCE
Status: COMPLETED | OUTPATIENT
Start: 2020-09-10 | End: 2020-09-10

## 2020-09-10 RX ORDER — SODIUM CHLORIDE 0.9 % (FLUSH) 0.9 %
10 SYRINGE (ML) INJECTION PRN
Status: DISCONTINUED | OUTPATIENT
Start: 2020-09-10 | End: 2020-09-13 | Stop reason: HOSPADM

## 2020-09-10 RX ADMIN — IOVERSOL 75 ML: 741 INJECTION INTRA-ARTERIAL; INTRAVENOUS at 16:30

## 2020-09-10 RX ADMIN — SODIUM CHLORIDE 80 ML: 9 INJECTION, SOLUTION INTRAVENOUS at 16:30

## 2020-09-10 RX ADMIN — Medication 10 ML: at 16:31

## 2020-09-13 ENCOUNTER — APPOINTMENT (OUTPATIENT)
Dept: GENERAL RADIOLOGY | Age: 75
DRG: 482 | End: 2020-09-13
Payer: MEDICARE

## 2020-09-13 ENCOUNTER — HOSPITAL ENCOUNTER (INPATIENT)
Age: 75
LOS: 4 days | Discharge: NURSING, REHAB OR LONG-TERM CARE | DRG: 482 | End: 2020-09-17
Attending: EMERGENCY MEDICINE | Admitting: ORTHOPAEDIC SURGERY
Payer: MEDICARE

## 2020-09-13 PROBLEM — S72.8X1A OTHER FRACTURE OF RIGHT FEMUR, INITIAL ENCOUNTER FOR CLOSED FRACTURE (HCC): Status: ACTIVE | Noted: 2020-09-13

## 2020-09-13 LAB
ABSOLUTE EOS #: 0.2 K/UL (ref 0–0.4)
ABSOLUTE IMMATURE GRANULOCYTE: ABNORMAL K/UL (ref 0–0.3)
ABSOLUTE LYMPH #: 2.3 K/UL (ref 1–4.8)
ABSOLUTE MONO #: 0.7 K/UL (ref 0.1–1.3)
ANION GAP SERPL CALCULATED.3IONS-SCNC: 12 MMOL/L (ref 9–17)
BASOPHILS # BLD: 1 % (ref 0–2)
BASOPHILS ABSOLUTE: 0.1 K/UL (ref 0–0.2)
BUN BLDV-MCNC: 9 MG/DL (ref 8–23)
BUN/CREAT BLD: ABNORMAL (ref 9–20)
CALCIUM SERPL-MCNC: 10.1 MG/DL (ref 8.6–10.4)
CHLORIDE BLD-SCNC: 105 MMOL/L (ref 98–107)
CO2: 23 MMOL/L (ref 20–31)
CREAT SERPL-MCNC: 0.72 MG/DL (ref 0.5–0.9)
DIFFERENTIAL TYPE: ABNORMAL
EOSINOPHILS RELATIVE PERCENT: 3 % (ref 0–4)
GFR AFRICAN AMERICAN: >60 ML/MIN
GFR NON-AFRICAN AMERICAN: >60 ML/MIN
GFR SERPL CREATININE-BSD FRML MDRD: ABNORMAL ML/MIN/{1.73_M2}
GFR SERPL CREATININE-BSD FRML MDRD: ABNORMAL ML/MIN/{1.73_M2}
GLUCOSE BLD-MCNC: 128 MG/DL (ref 70–99)
HCT VFR BLD CALC: 45.1 % (ref 36–46)
HEMOGLOBIN: 14.9 G/DL (ref 12–16)
IMMATURE GRANULOCYTES: ABNORMAL %
INR BLD: 0.9
LYMPHOCYTES # BLD: 27 % (ref 24–44)
MCH RBC QN AUTO: 31.7 PG (ref 26–34)
MCHC RBC AUTO-ENTMCNC: 33.1 G/DL (ref 31–37)
MCV RBC AUTO: 95.7 FL (ref 80–100)
MONOCYTES # BLD: 8 % (ref 1–7)
NRBC AUTOMATED: ABNORMAL PER 100 WBC
PARTIAL THROMBOPLASTIN TIME: 25.3 SEC (ref 24–36)
PDW BLD-RTO: 14.3 % (ref 11.5–14.9)
PLATELET # BLD: 228 K/UL (ref 150–450)
PLATELET ESTIMATE: ABNORMAL
PMV BLD AUTO: 9.4 FL (ref 6–12)
POTASSIUM SERPL-SCNC: 4 MMOL/L (ref 3.7–5.3)
PROTHROMBIN TIME: 12.5 SEC (ref 11.8–14.6)
RBC # BLD: 4.71 M/UL (ref 4–5.2)
RBC # BLD: ABNORMAL 10*6/UL
SEG NEUTROPHILS: 61 % (ref 36–66)
SEGMENTED NEUTROPHILS ABSOLUTE COUNT: 5.5 K/UL (ref 1.3–9.1)
SODIUM BLD-SCNC: 140 MMOL/L (ref 135–144)
WBC # BLD: 8.8 K/UL (ref 3.5–11)
WBC # BLD: ABNORMAL 10*3/UL

## 2020-09-13 PROCEDURE — 96374 THER/PROPH/DIAG INJ IV PUSH: CPT

## 2020-09-13 PROCEDURE — 85025 COMPLETE CBC W/AUTO DIFF WBC: CPT

## 2020-09-13 PROCEDURE — 96376 TX/PRO/DX INJ SAME DRUG ADON: CPT

## 2020-09-13 PROCEDURE — 73552 X-RAY EXAM OF FEMUR 2/>: CPT

## 2020-09-13 PROCEDURE — 1200000000 HC SEMI PRIVATE

## 2020-09-13 PROCEDURE — 85730 THROMBOPLASTIN TIME PARTIAL: CPT

## 2020-09-13 PROCEDURE — 80048 BASIC METABOLIC PNL TOTAL CA: CPT

## 2020-09-13 PROCEDURE — 36415 COLL VENOUS BLD VENIPUNCTURE: CPT

## 2020-09-13 PROCEDURE — 71045 X-RAY EXAM CHEST 1 VIEW: CPT

## 2020-09-13 PROCEDURE — 6360000002 HC RX W HCPCS: Performed by: EMERGENCY MEDICINE

## 2020-09-13 PROCEDURE — 2580000003 HC RX 258: Performed by: EMERGENCY MEDICINE

## 2020-09-13 PROCEDURE — 85610 PROTHROMBIN TIME: CPT

## 2020-09-13 PROCEDURE — 96375 TX/PRO/DX INJ NEW DRUG ADDON: CPT

## 2020-09-13 PROCEDURE — 73502 X-RAY EXAM HIP UNI 2-3 VIEWS: CPT

## 2020-09-13 PROCEDURE — U0002 COVID-19 LAB TEST NON-CDC: HCPCS

## 2020-09-13 PROCEDURE — 99284 EMERGENCY DEPT VISIT MOD MDM: CPT

## 2020-09-13 RX ORDER — MORPHINE SULFATE 4 MG/ML
4 INJECTION, SOLUTION INTRAMUSCULAR; INTRAVENOUS ONCE
Status: COMPLETED | OUTPATIENT
Start: 2020-09-13 | End: 2020-09-13

## 2020-09-13 RX ORDER — SODIUM CHLORIDE, SODIUM LACTATE, POTASSIUM CHLORIDE, CALCIUM CHLORIDE 600; 310; 30; 20 MG/100ML; MG/100ML; MG/100ML; MG/100ML
INJECTION, SOLUTION INTRAVENOUS CONTINUOUS
Status: DISCONTINUED | OUTPATIENT
Start: 2020-09-13 | End: 2020-09-15

## 2020-09-13 RX ADMIN — MORPHINE SULFATE 4 MG: 4 INJECTION, SOLUTION INTRAMUSCULAR; INTRAVENOUS at 22:33

## 2020-09-13 RX ADMIN — MORPHINE SULFATE 4 MG: 4 INJECTION, SOLUTION INTRAMUSCULAR; INTRAVENOUS at 21:55

## 2020-09-13 RX ADMIN — HYDROMORPHONE HYDROCHLORIDE 0.5 MG: 1 INJECTION, SOLUTION INTRAMUSCULAR; INTRAVENOUS; SUBCUTANEOUS at 23:15

## 2020-09-13 RX ADMIN — SODIUM CHLORIDE, POTASSIUM CHLORIDE, SODIUM LACTATE AND CALCIUM CHLORIDE: 600; 310; 30; 20 INJECTION, SOLUTION INTRAVENOUS at 22:41

## 2020-09-13 ASSESSMENT — PAIN DESCRIPTION - PAIN TYPE: TYPE: ACUTE PAIN

## 2020-09-13 ASSESSMENT — PAIN DESCRIPTION - ORIENTATION: ORIENTATION: RIGHT

## 2020-09-13 ASSESSMENT — ENCOUNTER SYMPTOMS
COUGH: 0
ABDOMINAL PAIN: 0
DIARRHEA: 0
BACK PAIN: 0
VOMITING: 0
SHORTNESS OF BREATH: 0

## 2020-09-13 ASSESSMENT — PAIN DESCRIPTION - LOCATION: LOCATION: HIP

## 2020-09-13 ASSESSMENT — PAIN SCALES - GENERAL
PAINLEVEL_OUTOF10: 7
PAINLEVEL_OUTOF10: 10

## 2020-09-13 ASSESSMENT — PAIN DESCRIPTION - FREQUENCY: FREQUENCY: CONTINUOUS

## 2020-09-13 ASSESSMENT — PAIN DESCRIPTION - DESCRIPTORS: DESCRIPTORS: SHARP

## 2020-09-14 ENCOUNTER — APPOINTMENT (OUTPATIENT)
Dept: GENERAL RADIOLOGY | Age: 75
DRG: 482 | End: 2020-09-14
Payer: MEDICARE

## 2020-09-14 ENCOUNTER — ANESTHESIA (OUTPATIENT)
Dept: OPERATING ROOM | Age: 75
DRG: 482 | End: 2020-09-14
Payer: MEDICARE

## 2020-09-14 ENCOUNTER — ANESTHESIA EVENT (OUTPATIENT)
Dept: OPERATING ROOM | Age: 75
DRG: 482 | End: 2020-09-14
Payer: MEDICARE

## 2020-09-14 VITALS
OXYGEN SATURATION: 96 % | RESPIRATION RATE: 9 BRPM | TEMPERATURE: 98.2 F | SYSTOLIC BLOOD PRESSURE: 120 MMHG | DIASTOLIC BLOOD PRESSURE: 55 MMHG

## 2020-09-14 PROBLEM — K11.8 MASS OF LEFT PAROTID GLAND: Status: ACTIVE | Noted: 2020-09-14

## 2020-09-14 PROBLEM — R33.8 ACUTE URINARY RETENTION: Status: ACTIVE | Noted: 2020-09-14

## 2020-09-14 LAB
BILIRUBIN URINE: NEGATIVE
COLOR: YELLOW
COMMENT UA: NORMAL
GLUCOSE URINE: NEGATIVE
KETONES, URINE: NEGATIVE
LEUKOCYTE ESTERASE, URINE: NEGATIVE
NITRITE, URINE: NEGATIVE
PH UA: 7 (ref 5–8)
PROTEIN UA: NEGATIVE
SARS-COV-2, RAPID: NOT DETECTED
SARS-COV-2: NORMAL
SARS-COV-2: NORMAL
SOURCE: NORMAL
SPECIFIC GRAVITY UA: 1.01 (ref 1–1.03)
TURBIDITY: CLEAR
URINE HGB: NEGATIVE
UROBILINOGEN, URINE: NORMAL

## 2020-09-14 PROCEDURE — 51702 INSERT TEMP BLADDER CATH: CPT

## 2020-09-14 PROCEDURE — 3600000003 HC SURGERY LEVEL 3 BASE: Performed by: ORTHOPAEDIC SURGERY

## 2020-09-14 PROCEDURE — 1200000000 HC SEMI PRIVATE

## 2020-09-14 PROCEDURE — 2580000003 HC RX 258: Performed by: EMERGENCY MEDICINE

## 2020-09-14 PROCEDURE — 76000 FLUOROSCOPY <1 HR PHYS/QHP: CPT

## 2020-09-14 PROCEDURE — 2500000003 HC RX 250 WO HCPCS: Performed by: ANESTHESIOLOGY

## 2020-09-14 PROCEDURE — 0QS606Z REPOSITION RIGHT UPPER FEMUR WITH INTRAMEDULLARY INTERNAL FIXATION DEVICE, OPEN APPROACH: ICD-10-PCS | Performed by: ORTHOPAEDIC SURGERY

## 2020-09-14 PROCEDURE — 6360000002 HC RX W HCPCS: Performed by: ORTHOPAEDIC SURGERY

## 2020-09-14 PROCEDURE — 2580000003 HC RX 258: Performed by: ANESTHESIOLOGY

## 2020-09-14 PROCEDURE — 27245 TREAT THIGH FRACTURE: CPT | Performed by: ORTHOPAEDIC SURGERY

## 2020-09-14 PROCEDURE — 99222 1ST HOSP IP/OBS MODERATE 55: CPT | Performed by: INTERNAL MEDICINE

## 2020-09-14 PROCEDURE — C1769 GUIDE WIRE: HCPCS | Performed by: ORTHOPAEDIC SURGERY

## 2020-09-14 PROCEDURE — 81003 URINALYSIS AUTO W/O SCOPE: CPT

## 2020-09-14 PROCEDURE — 3700000000 HC ANESTHESIA ATTENDED CARE: Performed by: ORTHOPAEDIC SURGERY

## 2020-09-14 PROCEDURE — 3700000001 HC ADD 15 MINUTES (ANESTHESIA): Performed by: ORTHOPAEDIC SURGERY

## 2020-09-14 PROCEDURE — 7100000000 HC PACU RECOVERY - FIRST 15 MIN: Performed by: ORTHOPAEDIC SURGERY

## 2020-09-14 PROCEDURE — C1713 ANCHOR/SCREW BN/BN,TIS/BN: HCPCS | Performed by: ORTHOPAEDIC SURGERY

## 2020-09-14 PROCEDURE — 2580000003 HC RX 258: Performed by: ORTHOPAEDIC SURGERY

## 2020-09-14 PROCEDURE — 2720000010 HC SURG SUPPLY STERILE: Performed by: ORTHOPAEDIC SURGERY

## 2020-09-14 PROCEDURE — 99221 1ST HOSP IP/OBS SF/LOW 40: CPT | Performed by: ORTHOPAEDIC SURGERY

## 2020-09-14 PROCEDURE — 2709999900 HC NON-CHARGEABLE SUPPLY: Performed by: ORTHOPAEDIC SURGERY

## 2020-09-14 PROCEDURE — 3600000013 HC SURGERY LEVEL 3 ADDTL 15MIN: Performed by: ORTHOPAEDIC SURGERY

## 2020-09-14 PROCEDURE — 7100000001 HC PACU RECOVERY - ADDTL 15 MIN: Performed by: ORTHOPAEDIC SURGERY

## 2020-09-14 PROCEDURE — 73552 X-RAY EXAM OF FEMUR 2/>: CPT

## 2020-09-14 PROCEDURE — 6360000002 HC RX W HCPCS: Performed by: ANESTHESIOLOGY

## 2020-09-14 PROCEDURE — 51798 US URINE CAPACITY MEASURE: CPT

## 2020-09-14 DEVICE — LOCKING SCREW, FULLY THREADED: Type: IMPLANTABLE DEVICE | Site: HIP | Status: FUNCTIONAL

## 2020-09-14 RX ORDER — ONDANSETRON 2 MG/ML
4 INJECTION INTRAMUSCULAR; INTRAVENOUS
Status: DISCONTINUED | OUTPATIENT
Start: 2020-09-14 | End: 2020-09-14 | Stop reason: HOSPADM

## 2020-09-14 RX ORDER — MEPERIDINE HYDROCHLORIDE 25 MG/ML
12.5 INJECTION INTRAMUSCULAR; INTRAVENOUS; SUBCUTANEOUS EVERY 5 MIN PRN
Status: DISCONTINUED | OUTPATIENT
Start: 2020-09-14 | End: 2020-09-14 | Stop reason: HOSPADM

## 2020-09-14 RX ORDER — OXYCODONE HYDROCHLORIDE 5 MG/1
5 TABLET ORAL EVERY 4 HOURS PRN
Status: DISCONTINUED | OUTPATIENT
Start: 2020-09-14 | End: 2020-09-17 | Stop reason: HOSPADM

## 2020-09-14 RX ORDER — MORPHINE SULFATE 2 MG/ML
2 INJECTION, SOLUTION INTRAMUSCULAR; INTRAVENOUS EVERY 5 MIN PRN
Status: DISCONTINUED | OUTPATIENT
Start: 2020-09-14 | End: 2020-09-14 | Stop reason: HOSPADM

## 2020-09-14 RX ORDER — DIPHENHYDRAMINE HYDROCHLORIDE 50 MG/ML
12.5 INJECTION INTRAMUSCULAR; INTRAVENOUS
Status: DISCONTINUED | OUTPATIENT
Start: 2020-09-14 | End: 2020-09-14 | Stop reason: HOSPADM

## 2020-09-14 RX ORDER — LABETALOL HYDROCHLORIDE 5 MG/ML
5 INJECTION, SOLUTION INTRAVENOUS EVERY 10 MIN PRN
Status: DISCONTINUED | OUTPATIENT
Start: 2020-09-14 | End: 2020-09-14 | Stop reason: HOSPADM

## 2020-09-14 RX ORDER — EPHEDRINE SULFATE/0.9% NACL/PF 50 MG/5 ML
SYRINGE (ML) INTRAVENOUS PRN
Status: DISCONTINUED | OUTPATIENT
Start: 2020-09-14 | End: 2020-09-14 | Stop reason: SDUPTHER

## 2020-09-14 RX ORDER — HYDRALAZINE HYDROCHLORIDE 25 MG/1
25 TABLET, FILM COATED ORAL EVERY 6 HOURS PRN
Status: DISCONTINUED | OUTPATIENT
Start: 2020-09-14 | End: 2020-09-17 | Stop reason: HOSPADM

## 2020-09-14 RX ORDER — LOSARTAN POTASSIUM 50 MG/1
50 TABLET ORAL DAILY
Status: DISCONTINUED | OUTPATIENT
Start: 2020-09-14 | End: 2020-09-17 | Stop reason: HOSPADM

## 2020-09-14 RX ORDER — CEFAZOLIN SODIUM 1 G/3ML
INJECTION, POWDER, FOR SOLUTION INTRAMUSCULAR; INTRAVENOUS PRN
Status: DISCONTINUED | OUTPATIENT
Start: 2020-09-14 | End: 2020-09-14 | Stop reason: SDUPTHER

## 2020-09-14 RX ORDER — ONDANSETRON 2 MG/ML
INJECTION INTRAMUSCULAR; INTRAVENOUS PRN
Status: DISCONTINUED | OUTPATIENT
Start: 2020-09-14 | End: 2020-09-14 | Stop reason: SDUPTHER

## 2020-09-14 RX ORDER — SODIUM CHLORIDE 0.9 % (FLUSH) 0.9 %
10 SYRINGE (ML) INJECTION PRN
Status: DISCONTINUED | OUTPATIENT
Start: 2020-09-14 | End: 2020-09-17 | Stop reason: HOSPADM

## 2020-09-14 RX ORDER — ASPIRIN 81 MG/1
81 TABLET ORAL DAILY
Status: DISCONTINUED | OUTPATIENT
Start: 2020-09-14 | End: 2020-09-17 | Stop reason: HOSPADM

## 2020-09-14 RX ORDER — DEXAMETHASONE SODIUM PHOSPHATE 4 MG/ML
INJECTION, SOLUTION INTRA-ARTICULAR; INTRALESIONAL; INTRAMUSCULAR; INTRAVENOUS; SOFT TISSUE PRN
Status: DISCONTINUED | OUTPATIENT
Start: 2020-09-14 | End: 2020-09-14 | Stop reason: SDUPTHER

## 2020-09-14 RX ORDER — CALCIUM CARBONATE 500(1250)
500 TABLET ORAL DAILY
Status: DISCONTINUED | OUTPATIENT
Start: 2020-09-14 | End: 2020-09-17 | Stop reason: HOSPADM

## 2020-09-14 RX ORDER — PROPOFOL 10 MG/ML
INJECTION, EMULSION INTRAVENOUS PRN
Status: DISCONTINUED | OUTPATIENT
Start: 2020-09-14 | End: 2020-09-14 | Stop reason: SDUPTHER

## 2020-09-14 RX ORDER — MORPHINE SULFATE 2 MG/ML
2 INJECTION, SOLUTION INTRAMUSCULAR; INTRAVENOUS
Status: DISCONTINUED | OUTPATIENT
Start: 2020-09-14 | End: 2020-09-17 | Stop reason: HOSPADM

## 2020-09-14 RX ORDER — M-VIT,TX,IRON,MINS/CALC/FOLIC 27MG-0.4MG
1 TABLET ORAL DAILY
Status: DISCONTINUED | OUTPATIENT
Start: 2020-09-14 | End: 2020-09-17 | Stop reason: HOSPADM

## 2020-09-14 RX ORDER — SODIUM CHLORIDE 0.9 % (FLUSH) 0.9 %
10 SYRINGE (ML) INJECTION EVERY 12 HOURS SCHEDULED
Status: DISCONTINUED | OUTPATIENT
Start: 2020-09-14 | End: 2020-09-17 | Stop reason: HOSPADM

## 2020-09-14 RX ORDER — ESCITALOPRAM OXALATE 10 MG/1
10 TABLET ORAL DAILY
Status: DISCONTINUED | OUTPATIENT
Start: 2020-09-14 | End: 2020-09-17 | Stop reason: HOSPADM

## 2020-09-14 RX ORDER — SODIUM CHLORIDE, SODIUM LACTATE, POTASSIUM CHLORIDE, CALCIUM CHLORIDE 600; 310; 30; 20 MG/100ML; MG/100ML; MG/100ML; MG/100ML
INJECTION, SOLUTION INTRAVENOUS CONTINUOUS PRN
Status: DISCONTINUED | OUTPATIENT
Start: 2020-09-14 | End: 2020-09-14 | Stop reason: SDUPTHER

## 2020-09-14 RX ORDER — ATORVASTATIN CALCIUM 10 MG/1
10 TABLET, FILM COATED ORAL EVERY EVENING
Status: DISCONTINUED | OUTPATIENT
Start: 2020-09-14 | End: 2020-09-17 | Stop reason: HOSPADM

## 2020-09-14 RX ORDER — OXYCODONE HYDROCHLORIDE 10 MG/1
10 TABLET ORAL EVERY 4 HOURS PRN
Status: DISCONTINUED | OUTPATIENT
Start: 2020-09-14 | End: 2020-09-17 | Stop reason: HOSPADM

## 2020-09-14 RX ORDER — LIDOCAINE HYDROCHLORIDE 10 MG/ML
INJECTION, SOLUTION EPIDURAL; INFILTRATION; INTRACAUDAL; PERINEURAL PRN
Status: DISCONTINUED | OUTPATIENT
Start: 2020-09-14 | End: 2020-09-14 | Stop reason: SDUPTHER

## 2020-09-14 RX ORDER — FENTANYL CITRATE 50 UG/ML
INJECTION, SOLUTION INTRAMUSCULAR; INTRAVENOUS PRN
Status: DISCONTINUED | OUTPATIENT
Start: 2020-09-14 | End: 2020-09-14 | Stop reason: SDUPTHER

## 2020-09-14 RX ADMIN — MORPHINE SULFATE 2 MG: 2 INJECTION, SOLUTION INTRAMUSCULAR; INTRAVENOUS at 13:50

## 2020-09-14 RX ADMIN — MORPHINE SULFATE 2 MG: 2 INJECTION, SOLUTION INTRAMUSCULAR; INTRAVENOUS at 17:08

## 2020-09-14 RX ADMIN — LIDOCAINE HYDROCHLORIDE 5 ML: 10 INJECTION, SOLUTION EPIDURAL; INFILTRATION; INTRACAUDAL; PERINEURAL at 18:48

## 2020-09-14 RX ADMIN — DEXAMETHASONE SODIUM PHOSPHATE 4 MG: 4 INJECTION, SOLUTION INTRA-ARTICULAR; INTRALESIONAL; INTRAMUSCULAR; INTRAVENOUS; SOFT TISSUE at 21:08

## 2020-09-14 RX ADMIN — Medication 20 MG: at 19:23

## 2020-09-14 RX ADMIN — MORPHINE SULFATE 2 MG: 2 INJECTION, SOLUTION INTRAMUSCULAR; INTRAVENOUS at 08:24

## 2020-09-14 RX ADMIN — SODIUM CHLORIDE, POTASSIUM CHLORIDE, SODIUM LACTATE AND CALCIUM CHLORIDE: 600; 310; 30; 20 INJECTION, SOLUTION INTRAVENOUS at 23:07

## 2020-09-14 RX ADMIN — FENTANYL CITRATE 100 MCG: 50 INJECTION, SOLUTION INTRAMUSCULAR; INTRAVENOUS at 21:14

## 2020-09-14 RX ADMIN — SODIUM CHLORIDE, POTASSIUM CHLORIDE, SODIUM LACTATE AND CALCIUM CHLORIDE: 600; 310; 30; 20 INJECTION, SOLUTION INTRAVENOUS at 08:24

## 2020-09-14 RX ADMIN — Medication 20 MG: at 20:04

## 2020-09-14 RX ADMIN — MORPHINE SULFATE 2 MG: 2 INJECTION, SOLUTION INTRAMUSCULAR; INTRAVENOUS at 02:14

## 2020-09-14 RX ADMIN — Medication 10 ML: at 10:14

## 2020-09-14 RX ADMIN — Medication 10 MG: at 20:37

## 2020-09-14 RX ADMIN — PROPOFOL 200 MG: 10 INJECTION, EMULSION INTRAVENOUS at 18:47

## 2020-09-14 RX ADMIN — MORPHINE SULFATE 2 MG: 2 INJECTION, SOLUTION INTRAMUSCULAR; INTRAVENOUS at 05:37

## 2020-09-14 RX ADMIN — SODIUM CHLORIDE, POTASSIUM CHLORIDE, SODIUM LACTATE AND CALCIUM CHLORIDE: 600; 310; 30; 20 INJECTION, SOLUTION INTRAVENOUS at 18:50

## 2020-09-14 RX ADMIN — MORPHINE SULFATE 2 MG: 2 INJECTION, SOLUTION INTRAMUSCULAR; INTRAVENOUS at 11:02

## 2020-09-14 RX ADMIN — CEFAZOLIN 2000 MG: 1 INJECTION, POWDER, FOR SOLUTION INTRAMUSCULAR; INTRAVENOUS at 19:23

## 2020-09-14 RX ADMIN — CEFAZOLIN 2 G: 1 INJECTION, POWDER, FOR SOLUTION INTRAMUSCULAR; INTRAVENOUS at 23:07

## 2020-09-14 RX ADMIN — FENTANYL CITRATE 100 MCG: 50 INJECTION, SOLUTION INTRAMUSCULAR; INTRAVENOUS at 18:48

## 2020-09-14 RX ADMIN — ONDANSETRON 4 MG: 2 INJECTION INTRAMUSCULAR; INTRAVENOUS at 21:08

## 2020-09-14 ASSESSMENT — PULMONARY FUNCTION TESTS
PIF_VALUE: 6
PIF_VALUE: 24
PIF_VALUE: 4
PIF_VALUE: 2
PIF_VALUE: 4
PIF_VALUE: 6
PIF_VALUE: 2
PIF_VALUE: 20
PIF_VALUE: 2
PIF_VALUE: 5
PIF_VALUE: 22
PIF_VALUE: 4
PIF_VALUE: 2
PIF_VALUE: 4
PIF_VALUE: 18
PIF_VALUE: 3
PIF_VALUE: 4
PIF_VALUE: 22
PIF_VALUE: 26
PIF_VALUE: 21
PIF_VALUE: 22
PIF_VALUE: 21
PIF_VALUE: 3
PIF_VALUE: 2
PIF_VALUE: 20
PIF_VALUE: 2
PIF_VALUE: 22
PIF_VALUE: 3
PIF_VALUE: 4
PIF_VALUE: 2
PIF_VALUE: 20
PIF_VALUE: 4
PIF_VALUE: 20
PIF_VALUE: 2
PIF_VALUE: 29
PIF_VALUE: 20
PIF_VALUE: 4
PIF_VALUE: 23
PIF_VALUE: 24
PIF_VALUE: 2
PIF_VALUE: 4
PIF_VALUE: 1
PIF_VALUE: 3
PIF_VALUE: 4
PIF_VALUE: 2
PIF_VALUE: 3
PIF_VALUE: 21
PIF_VALUE: 18
PIF_VALUE: 4
PIF_VALUE: 4
PIF_VALUE: 3
PIF_VALUE: 2
PIF_VALUE: 22
PIF_VALUE: 20
PIF_VALUE: 4
PIF_VALUE: 3
PIF_VALUE: 18
PIF_VALUE: 21
PIF_VALUE: 3
PIF_VALUE: 4
PIF_VALUE: 2
PIF_VALUE: 20
PIF_VALUE: 1
PIF_VALUE: 2
PIF_VALUE: 5
PIF_VALUE: 28
PIF_VALUE: 4
PIF_VALUE: 21
PIF_VALUE: 4
PIF_VALUE: 2
PIF_VALUE: 20
PIF_VALUE: 22
PIF_VALUE: 2
PIF_VALUE: 4
PIF_VALUE: 4
PIF_VALUE: 2
PIF_VALUE: 3
PIF_VALUE: 5
PIF_VALUE: 22
PIF_VALUE: 4
PIF_VALUE: 4
PIF_VALUE: 2
PIF_VALUE: 4
PIF_VALUE: 20
PIF_VALUE: 21
PIF_VALUE: 21
PIF_VALUE: 23
PIF_VALUE: 3
PIF_VALUE: 4
PIF_VALUE: 2
PIF_VALUE: 6
PIF_VALUE: 22
PIF_VALUE: 4
PIF_VALUE: 17
PIF_VALUE: 2
PIF_VALUE: 18
PIF_VALUE: 20
PIF_VALUE: 4
PIF_VALUE: 23
PIF_VALUE: 3
PIF_VALUE: 4
PIF_VALUE: 5
PIF_VALUE: 5
PIF_VALUE: 4
PIF_VALUE: 5
PIF_VALUE: 29
PIF_VALUE: 2
PIF_VALUE: 19
PIF_VALUE: 22
PIF_VALUE: 5
PIF_VALUE: 24
PIF_VALUE: 2
PIF_VALUE: 4
PIF_VALUE: 4
PIF_VALUE: 21
PIF_VALUE: 5
PIF_VALUE: 4
PIF_VALUE: 5
PIF_VALUE: 4
PIF_VALUE: 3
PIF_VALUE: 23
PIF_VALUE: 4
PIF_VALUE: 24
PIF_VALUE: 16
PIF_VALUE: 24
PIF_VALUE: 4
PIF_VALUE: 3
PIF_VALUE: 2
PIF_VALUE: 4
PIF_VALUE: 3
PIF_VALUE: 4
PIF_VALUE: 21
PIF_VALUE: 4
PIF_VALUE: 23
PIF_VALUE: 19
PIF_VALUE: 18
PIF_VALUE: 22
PIF_VALUE: 20
PIF_VALUE: 19
PIF_VALUE: 20
PIF_VALUE: 4
PIF_VALUE: 3
PIF_VALUE: 24
PIF_VALUE: 19
PIF_VALUE: 4

## 2020-09-14 ASSESSMENT — PAIN SCALES - GENERAL
PAINLEVEL_OUTOF10: 10
PAINLEVEL_OUTOF10: 7
PAINLEVEL_OUTOF10: 7
PAINLEVEL_OUTOF10: 0
PAINLEVEL_OUTOF10: 8
PAINLEVEL_OUTOF10: 0
PAINLEVEL_OUTOF10: 8
PAINLEVEL_OUTOF10: 0
PAINLEVEL_OUTOF10: 10
PAINLEVEL_OUTOF10: 6
PAINLEVEL_OUTOF10: 5
PAINLEVEL_OUTOF10: 7
PAINLEVEL_OUTOF10: 0
PAINLEVEL_OUTOF10: 8
PAINLEVEL_OUTOF10: 0

## 2020-09-14 ASSESSMENT — PAIN DESCRIPTION - ONSET: ONSET: ON-GOING

## 2020-09-14 ASSESSMENT — PAIN DESCRIPTION - DESCRIPTORS: DESCRIPTORS: ACHING

## 2020-09-14 ASSESSMENT — PAIN DESCRIPTION - PROGRESSION: CLINICAL_PROGRESSION: NOT CHANGED

## 2020-09-14 ASSESSMENT — PAIN DESCRIPTION - ORIENTATION
ORIENTATION: RIGHT
ORIENTATION: LOWER;MID

## 2020-09-14 ASSESSMENT — PAIN DESCRIPTION - PAIN TYPE
TYPE: SURGICAL PAIN
TYPE: ACUTE PAIN

## 2020-09-14 ASSESSMENT — PAIN DESCRIPTION - LOCATION
LOCATION: HIP;LEG
LOCATION: LEG

## 2020-09-14 ASSESSMENT — LIFESTYLE VARIABLES: SMOKING_STATUS: 0

## 2020-09-14 ASSESSMENT — ENCOUNTER SYMPTOMS
STRIDOR: 0
SHORTNESS OF BREATH: 0

## 2020-09-14 ASSESSMENT — PAIN DESCRIPTION - FREQUENCY: FREQUENCY: INTERMITTENT

## 2020-09-14 NOTE — ANESTHESIA PRE PROCEDURE
Intravenous 2 times per day Phong Santiago MD   10 mL at 09/14/20 1014    sodium chloride flush 0.9 % injection 10 mL  10 mL Intravenous PRN Phong Santiago MD        enoxaparin (LOVENOX) injection 40 mg  40 mg Subcutaneous Daily Phong Santiago MD   Stopped at 09/14/20 0831    oxyCODONE (ROXICODONE) immediate release tablet 5 mg  5 mg Oral Q4H PRN Phong Santiago MD        oxyCODONE HCl (OXY-IR) immediate release tablet 10 mg  10 mg Oral Q4H PRN Phong Santiago MD        morphine (PF) injection 2 mg  2 mg Intravenous Q3H PRN Phong Santiago MD   2 mg at 09/14/20 1708    aspirin EC tablet 81 mg  81 mg Oral Daily Liban Guerrero MD        calcium elemental (OSCAL) tablet 500 mg  500 mg Oral Daily Liban Guerrero MD        escitalopram (LEXAPRO) tablet 10 mg  10 mg Oral Daily Liban Guerrero MD        losartan (COZAAR) tablet 50 mg  50 mg Oral Daily Liban Guerrero MD        therapeutic multivitamin-minerals 1 tablet  1 tablet Oral Daily Liban Guerrero MD        atorvastatin (LIPITOR) tablet 10 mg  10 mg Oral QPM Liban Guerrero MD        hydrALAZINE (APRESOLINE) tablet 25 mg  25 mg Oral Q6H PRN Liban Guerrero MD        lactated ringers infusion   Intravenous Continuous Sylvester Long  mL/hr at 09/14/20 0824         Allergies:     Allergies   Allergen Reactions    Adhesive Tape Itching    Lisinopril Other (See Comments)     cough       Problem List:    Patient Active Problem List   Diagnosis Code    Fatty liver K76.0    Toenail fungus B35.1    Hyperlipidemia E78.5    Diverticulosis of large intestine without hemorrhage K57.30    Essential hypertension I10    Anxiety and depression F41.9, F32.9    Allergic rhinitis due to allergen J30.9    Candidiasis of breast B37.89    Other fracture of right femur, initial encounter for closed fracture ([facility] [address] S72.8X1A    Mass of left parotid gland 17 mm K11.8    Acute urinary retention R33.8       Past Medical History: Diagnosis Date    Diverticulosis     Fatty liver     Hyperlipidemia        Past Surgical History:        Procedure Laterality Date    APPENDECTOMY      BLADDER SUSPENSION      COLONOSCOPY  05/23/13    DIVERTICULOSIS     HYSTERECTOMY      SPINE SURGERY      TONSILLECTOMY      TYMPANOMASTOIDECTOMY Left 2000    UPPER GASTROINTESTINAL ENDOSCOPY  05/23/13       Social History:    Social History     Tobacco Use    Smoking status: Never Smoker    Smokeless tobacco: Never Used   Substance Use Topics    Alcohol use: Yes     Comment: social                                 Counseling given: Not Answered      Vital Signs (Current):   Vitals:    09/13/20 2131 09/14/20 0016 09/14/20 0722 09/14/20 1509   BP: (!) 148/71 (!) 150/66 (!) 134/51 (!) 145/66   Pulse: 67 69 84 72   Resp: 18 18 16 16   Temp: 97.9 °F (36.6 °C) 97.5 °F (36.4 °C) 98.2 °F (36.8 °C) 98.1 °F (36.7 °C)   TempSrc: Oral  Oral Oral   SpO2: 95% 95% 93% 91%   Weight: 180 lb (81.6 kg)      Height: 5' 6\" (1.676 m)                                                 BP Readings from Last 3 Encounters:   09/14/20 (!) 145/66   06/10/20 120/80   12/30/19 120/84       NPO Status:                                                                                 BMI:   Wt Readings from Last 3 Encounters:   09/13/20 180 lb (81.6 kg)   06/10/20 186 lb (84.4 kg)   12/30/19 182 lb (82.6 kg)     Body mass index is 29.05 kg/m².     CBC:   Lab Results   Component Value Date    WBC 8.8 09/13/2020    RBC 4.71 09/13/2020    RBC 4.76 01/27/2012    HGB 14.9 09/13/2020    HCT 45.1 09/13/2020    MCV 95.7 09/13/2020    RDW 14.3 09/13/2020     09/13/2020     01/27/2012       CMP:   Lab Results   Component Value Date     09/13/2020    K 4.0 09/13/2020     09/13/2020    CO2 23 09/13/2020    BUN 9 09/13/2020    CREATININE 0.72 09/13/2020    GFRAA >60 09/13/2020    LABGLOM >60 09/13/2020    GLUCOSE 128 09/13/2020    GLUCOSE 96 01/27/2012    PROT 6.9 12/27/2019    CALCIUM 10.1 09/13/2020    BILITOT 0.52 12/27/2019    ALKPHOS 57 12/27/2019    AST 15 12/27/2019    ALT 21 12/27/2019       POC Tests: No results for input(s): POCGLU, POCNA, POCK, POCCL, POCBUN, POCHEMO, POCHCT in the last 72 hours. Coags:   Lab Results   Component Value Date    PROTIME 12.5 09/13/2020    INR 0.9 09/13/2020    APTT 25.3 09/13/2020       HCG (If Applicable): No results found for: PREGTESTUR, PREGSERUM, HCG, HCGQUANT     ABGs: No results found for: PHART, PO2ART, IFY5DML, LCS0FGA, BEART, U8HZPWGD     Type & Screen (If Applicable):  No results found for: LABABO, LABRH    Drug/Infectious Status (If Applicable):  No results found for: HIV, HEPCAB    COVID-19 Screening (If Applicable):   Lab Results   Component Value Date    COVID19 Not Detected 09/13/2020         Anesthesia Evaluation  Patient summary reviewed and Nursing notes reviewed no history of anesthetic complications:   Airway: Mallampati: II  TM distance: >3 FB   Neck ROM: full  Mouth opening: > = 3 FB Dental:          Pulmonary:Negative Pulmonary ROS breath sounds clear to auscultation      (-) pneumonia, COPD, asthma, shortness of breath, recent URI, sleep apnea, rhonchi, wheezes, rales, stridor and not a current smoker          Patient did not smoke on day of surgery.                  Cardiovascular:  Exercise tolerance: good (>4 METS),   (+) hypertension:, hyperlipidemia    (-) pacemaker, valvular problems/murmurs, past MI, CAD, CABG/stent, dysrhythmias,  angina,  CHF, orthopnea, PND,  BLISS, murmur, weak pulses,  friction rub, systolic click, carotid bruit and  JVD    ECG reviewed  Rhythm: regular  Rate: normal           Beta Blocker:  Not on Beta Blocker         Neuro/Psych:   (+) psychiatric history:   (-) seizures, neuromuscular disease, TIA, CVA, headaches and depression/anxiety            GI/Hepatic/Renal:   (+) liver disease:,      (-) hiatal hernia, GERD, PUD, hepatitis, no renal disease, bowel prep and no morbid obesity       Endo/Other: Negative Endo/Other ROS   (+) no malignancy/cancer. (-) diabetes mellitus, hypothyroidism, hyperthyroidism, blood dyscrasia, arthritis, no electrolyte abnormalities, no malignancy/cancer               Abdominal:           Vascular: negative vascular ROS. - PVD, DVT and PE. Anesthesia Plan      general     ASA 3           MIPS: Postoperative opioids intended and Prophylactic antiemetics administered. Anesthetic plan and risks discussed with patient. Plan discussed with CRNA.                   Rafiq Dumont MD   9/14/2020

## 2020-09-14 NOTE — ED NOTES
Bed: 09  Expected date:   Expected time:   Means of arrival:   Comments:  Ashtyn Shepard RN  09/13/20 [id number]

## 2020-09-14 NOTE — CONSULTS
[facility] InternalMedicine            Date:   9/14/2020  Patient name:  Holger Cardona  Date of admission:  9/13/2020  9:31 PM  MRN:   [id number]  Account:  [de-identified]  [address] Birth:  1945  PCP:    MIGUEL Molina CNP  Room:   3548/2637-48  Code Status:    Full Code    PhysicianRequesting Consult: Dwayne Lui MD  History ofPresent Illness:     Chief Complaint   Patient presents with   [facility]    Hip Pain     and reason for consult; Medical comorbidity and medication management ;  S/pfall mechanical with rt femur fracture . 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  *                HPI  ;  Patient was walking yesterday evening  Her  was with her  She had a mechanical fall  No loss of consciousness  Was found to have a fracture of the femur    Since admission she has been having difficulty urinations  And a bladder scan shows a large amount of urine more than a liter  We will put in Delgado  Had no prior urinary symptoms  Has acute urinary retention following the fracture and fall    She had recently seen ENT for a left parotid area lump  And a recent CT showed 17 mm left parotid mass  Will consult ENT also    We will send urine for culture and microscopy              ON admission HX;          Significant last 24 hr data reviewed ;   Vitals:    09/13/20 2131 09/14/20 0016 09/14/20 0722   BP: (!) 148/71 (!) 150/66 (!) 134/51   Pulse: 67 69 84   Resp: 18 18 16   Temp: 97.9 °F (36.6 °C) 97.5 °F (36.4 °C) 98.2 °F (36.8 °C)   TempSrc: Oral  Oral   SpO2: 95% 95% 93%   Weight: 180 lb (81.6 kg)     Height: 5' 6\" (1.676 m)        Recent Results (from the past 24 hour(s))   CBC Auto Differential    Collection Time: 09/13/20  9:50 PM   Result Value Ref Range    WBC 8.8 3.5 - 11.0 k/uL    RBC 4.71 4.0 - 5.2 m/uL    Hemoglobin 14.9 12.0 - 16.0 g/dL    Hematocrit 45.1 36 - 46 %    MCV 95.7 80 - 100 fL    MCH 31.7 26 - 34 pg    MCHC 33.1 31 - 37 g/dL    RDW 14.3 11.5 - 14.9 %    Platelets 738 927 - 993 k/uL    MPV 9.4 6.0 - 12.0 fL    NRBC Automated NOT REPORTED per 100 WBC    Differential Type NOT REPORTED     Seg Neutrophils 61 36 - 66 %    Lymphocytes 27 24 - 44 %    Monocytes 8 (H) 1 - 7 %    Eosinophils % 3 0 - 4 %    Basophils 1 0 - 2 %    Immature Granulocytes NOT REPORTED 0 %    Segs Absolute 5.50 1.3 - 9.1 k/uL    Absolute Lymph # 2.30 1.0 - 4.8 k/uL    Absolute Mono # 0.70 0.1 - 1.3 k/uL    Absolute Eos # 0.20 0.0 - 0.4 k/uL    Basophils Absolute 0.10 0.0 - 0.2 k/uL    Absolute Immature Granulocyte NOT REPORTED 0.00 - 0.30 k/uL    WBC Morphology NOT REPORTED     RBC Morphology NOT REPORTED     Platelet Estimate NOT REPORTED    Basic Metabolic Panel    Collection Time: 09/13/20  9:50 PM   Result Value Ref Range    Glucose 128 (H) 70 - 99 mg/dL    BUN 9 8 - 23 mg/dL    CREATININE 0.72 0.50 - 0.90 mg/dL    Bun/Cre Ratio NOT REPORTED 9 - 20    Calcium 10.1 8.6 - 10.4 mg/dL    Sodium 140 135 - 144 mmol/L    Potassium 4.0 3.7 - 5.3 mmol/L    Chloride 105 98 - 107 mmol/L    CO2 23 20 - 31 mmol/L    Anion Gap 12 9 - 17 mmol/L    GFR Non-African American >60 >60 mL/min    GFR African American >60 >60 mL/min    GFR Comment          GFR Staging NOT REPORTED    APTT    Collection Time: 09/13/20  9:50 PM   Result Value Ref Range    PTT 25.3 24.0 - 36.0 sec   Protime-INR    Collection Time: 09/13/20  9:50 PM   Result Value Ref Range    Protime 12.5 11.8 - 14.6 sec    INR 0.9      No results for input(s): POCGLU in the last 72 hours.      Xr Femur Right (min 2 Views)    Result Date: 9/14/2020  EXAMINATION: 2 XRAY VIEWS OF THE RIGHT FEMUR 9/13/2020 11:54 pm COMPARISON: Pelvic/hip x-ray intact. Postsurgical changes at L5-S1. Soft tissues otherwise unremarkable     Right proximal hip fracture. Remainder of the pelvis otherwise intact. Past Medical History:   Diagnosis Date    Diverticulosis     Fatty liver     Hyperlipidemia        Family History   Problem Relation Age of Onset    Cancer Mother     Cancer Maternal Aunt        Social History:     Tobacco:    reports that she has never smoked. She has never used smokeless tobacco.  Alcohol:     reports current alcohol use. Drug Use:  reports no history of drug use. Review of Systems:     POSITIVE AND NEGATIVES AS DESCRIBEDIN HISTORY OF PRESENT ILLNESS ;  IN ADDITION ;   Review of Systems     Physical Exam:       Vitals:  BP (!) 134/51   Pulse 84   Temp 98.2 °F (36.8 °C) (Oral)   Resp 16   Ht 5' 6\" (1.676 m)   Wt 180 lb (81.6 kg)   SpO2 93%   BMI 29.05 kg/m²                 Body mass index is 29.05 kg/m². General Appearance:   Alert , CO-OPERATIVE ,                 Skin:                             No rash or erythema  Eye , ENT                      no abnormality    Neck:                            No mass , no thyroid enlargement                                           Pulmonary/Chest:        Clear to auscultation bilaterally . No wheezes, rales or rhonchi . No abnormality on percussion                                                        Cardiovascular:            Normal rate, regular rhythm,                                          No murmur or  Gallop .                                   Abdomen:                       Suprapubic bladder distention                                           Extremities:                  Patient not able to stand on her feet                                       Pain in the left hip area                                                                     Right proximal femur fracture left hip fracture, otherwise no additional femur fracture identified. On bladder scan more than later fluid                                                                  Data:     Labs:    URINE ANALYSIS: No results found for: LABURIN     CBC:  Lab Results   Component Value Date    WBC 8.8 09/13/2020    HGB 14.9 09/13/2020     09/13/2020     01/27/2012        BMP:    Lab Results   Component Value Date     09/13/2020    K 4.0 09/13/2020     09/13/2020    CO2 23 09/13/2020    BUN 9 09/13/2020    CREATININE 0.72 09/13/2020    GLUCOSE 128 09/13/2020    GLUCOSE 96 01/27/2012      LIVER PROFILE:  Lab Results   Component Value Date    ALT 21 12/27/2019    AST 15 12/27/2019    PROT 6.9 12/27/2019    BILITOT 0.52 12/27/2019    BILIDIR <0.08 01/04/2016    LABALBU 4.2 12/27/2019    LABALBU 4.3 01/27/2012                 Radiology:       Assessment :      Primary Problem  Principal Problem:    Other fracture of right femur, initial encounter for closed fracture [facility])  Active Problems:    Essential hypertension    Anxiety and depression    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 *                      Plan:       9/14/20    Has right femur fracture  Orthopedics to see and plan  Patient is n.p.o. Left parotid mass recent CT showed 17 mm mass left parotid  Will consult ENT    And has a urinary retention acute Delgado put in urine culture ordered      1. Medications: Allergies: Allergies   Allergen Reactions    Adhesive Tape Itching    Lisinopril Other (See Comments)     cough       Current Meds:   Scheduled Meds:    sodium chloride flush  10 mL Intravenous 2 times per day    enoxaparin  40 mg Subcutaneous Daily     Continuous Infusions:    lactated ringers 100 mL/hr at 09/14/20 0824     PRN Meds: sodium chloride flush, oxyCODONE, oxyCODONE, morphine    Thanks for consulting us .   Will monitor vitals and clinical

## 2020-09-14 NOTE — ED NOTES
Report given to BG FRANCO [facility] RN from [facility] RN  Report method by phone   The following was reviewed with receiving RN:   Current vital signs:  BP (!) 148/71   Pulse 67   Temp 97.9 °F (36.6 °C) (Oral)   Resp 18   Ht 5' 6\" (1.676 m)   Wt 180 lb (81.6 kg)   SpO2 95%   BMI 29.05 kg/m²                MEWS Score: 1     Any medication or safety alerts were reviewed. Any pending diagnostics and notifications were also reviewed, as well as any safety concerns or issues, abnormal labs, abnormal imaging, and abnormal assessment findings. Questions were answered.           Paxton Olivares RN  09/14/20 0000

## 2020-09-14 NOTE — FLOWSHEET NOTE
SC visit with pt and SO Reginald; they shared how patient fell back in woods and the subsequent ambulance \"adventure. \" She is waiting to find out about surgery, probably later today. Writer provided listening presence and prayer. 09/14/20 1318   Encounter Summary   Services provided to: Patient and family together   Referral/Consult From: [address] [place] [address] Significant other;Children   Continue Visiting   (9-14-20)   Complexity of Encounter Moderate   Length of Encounter 15 minutes   Spiritual Assessment Completed Yes   Routine   Type Initial   Spiritual/Nondenominational   Type Spiritual support   Assessment Calm; Approachable   Intervention Active listening;Explored feelings, thoughts, concerns;Explored coping resources;Prayer;Nurtured hope;Sustaining presence/ Ministry of presence; Discussed illness/injury and it's impact   Outcome Expressed gratitude;Comfort;Engaged in conversation;Expressed feelings/needs/concerns;Receptive

## 2020-09-14 NOTE — CARE COORDINATION
CASE MANAGEMENT NOTE:    Admission Date:  9/13/2020 Mile Brown is a 76 y.o.  female    Admitted for : Other fracture of right femur, initial encounter for closed fracture ([facility] [place] [address] [S72.8X1A]    Met with:  Patient    PCP:  Dr. Elliott Medicine:  [employer]      Current Residence/ Living Arrangements:  independently at home             Current Services PTA:  No    Is patient agreeable to VNS: No    Freedom of choice provided:  NA    List of [address] provided: NA    VNS chosen:  No    DME:  walker    Home Oxygen: No    Nebulizer: No    CPAP/BIPAP: No    Supplier: N/A    Potential Assistance Needed: No    SNF needed: No    Freedom of choice and list provided: NA    [facility]       Does Patient want to use MEDS to BEDS? No    Is patient currently receiving oral anticoagulation therapy? No    Is the Patient an [facility] with Readmission Risk Score greater than 14%? Yes  If yes, pt needs a follow up appointment made within 7 days. Family Members/Caregivers that pt would like involved in their care:    Yes    If yes, list name here:  Nia Gonzales    Transportation Provider:  Family             Is patient in Isolation/One on One/Altered Mental Status? No  If yes, skip next question. If no, would they like an I-Pad to  use? No  If yes, call [phone]. Discharge Plan:  9/14/20 [employer] Pt is from home with her spouse she has a walker pt denies need for vns and snf I offered possibility of [facility] Pt states that she does not want to make any decisions until her hip is fixed will follow up tomorrow on possible placement .//tv                 Electronically signed by:  Judith Vazquez RN on 9/14/2020 at 12:38 PM

## 2020-09-14 NOTE — ED PROVIDER NOTES
EMERGENCY DEPARTMENT ENCOUNTER    Pt Name: Boni Javed  MRN: [id number]  [place] 1945  Date of evaluation: 9/13/20  CHIEF COMPLAINT       Chief Complaint   Patient presents with    Fall    Hip Pain     HISTORY OF PRESENT ILLNESS   HPI     This is a 55-year-old female who comes in today. The patient was walking [place] with her  and had a trip and non-syncopal fall landing on her right hip. They called the ambulance to extricated her out of the woods. She is not ambulated since. She is complaining of right hip and buttock pain. She did not hit her head she did not lose consciousness she is not on blood thinners. She did not have any chest pain shortness of breath palpitations or lightheadedness prior to the fall. She has no numbness or tingling in her leg. She did not take anything prior to coming to the emergency department. No history of hip issues in the past.  She did have a laminectomy of her lumbar spine at [facility] in the past but she does not have lumbar spine pain. REVIEW OF SYSTEMS     Review of Systems   Constitutional: Negative for fever. HENT: Negative for congestion. Respiratory: Negative for cough and shortness of breath. Cardiovascular: Negative for chest pain. Gastrointestinal: Negative for abdominal pain, diarrhea and vomiting. Genitourinary: Negative for dysuria. Musculoskeletal: Negative for back pain. Right hip pain   Skin: Negative for rash. Neurological: Negative for headaches. All other systems reviewed and are negative.     PASTMEDICAL HISTORY     Past Medical History:   Diagnosis Date    Diverticulosis     Fatty liver     Hyperlipidemia      SURGICAL HISTORY       Past Surgical History:   Procedure Laterality Date    APPENDECTOMY      BLADDER SUSPENSION      COLONOSCOPY  05/23/13    DIVERTICULOSIS     HYSTERECTOMY      SPINE SURGERY      TONSILLECTOMY      TYMPANOMASTOIDECTOMY Left 2000    UPPER GASTROINTESTINAL ENDOSCOPY  13     CURRENT MEDICATIONS       Previous Medications    ASPIRIN 81 MG TABLET    Take 81 mg by mouth daily. CALCIUM CARBONATE (OSCAL) 500 MG TABS TABLET    Take 500 mg by mouth daily. CETIRIZINE HCL (ZYRTEC PO)    Take by mouth    CLOTRIMAZOLE (LOTRIMIN) 1 % EXTERNAL SOLUTION    Apply topically 2 times daily. DIPHENHYDRAMINE-APAP, SLEEP, (TYLENOL PM EXTRA STRENGTH PO)    Take 2 tablets by mouth nightly    ESCITALOPRAM (LEXAPRO) 10 MG TABLET    Take 1 tablet by mouth daily    FLUCONAZOLE (DIFLUCAN) 150 MG TABLET    Take 1 tablet by mouth once a week    LOSARTAN (COZAAR) 50 MG TABLET    take 1 tablet by mouth once daily    MULTIPLE VITAMINS-MINERALS (THERAPEUTIC MULTIVITAMIN-MINERALS) TABLET    Take 1 tablet by mouth daily. SIMVASTATIN (ZOCOR) 20 MG TABLET    take 1 tablet by mouth every evening    VITAMIN MIXTURE (VITAMIN E COMPLETE PO)    Take by mouth    ZOSTER RECOMBINANT ADJUVANTED VACCINE (SHINGRIX) 50 MCG/0.5ML SUSR INJECTION    Inject 0.5 mLs into the muscle See Admin Instructions 1 dose now and repeat in 2-6 months     ALLERGIES     is allergic to adhesive tape and lisinopril. FAMILY HISTORY     She indicated that her mother is . She indicated that the status of her maternal aunt is unknown. SOCIAL HISTORY       Social History     Tobacco Use    Smoking status: Never Smoker    Smokeless tobacco: Never Used   Substance Use Topics    Alcohol use: Yes     Comment: social     Drug use: No     PHYSICAL EXAM     INITIAL VITALS: BP (!) 148/71   Pulse 67   Temp 97.9 °F (36.6 °C) (Oral)   Resp 18   Ht 5' 6\" (1.676 m)   Wt 180 lb (81.6 kg)   SpO2 95%   BMI 29.05 kg/m²    Physical Exam  Vitals signs and nursing note reviewed. Constitutional:       Appearance: She is well-developed. Comments: Appears uncomfortable   HENT:      Head: Normocephalic and atraumatic. Eyes:      Conjunctiva/sclera: Conjunctivae normal.   Neck:      Musculoskeletal: Neck supple. Patient has DP pulses distally. I did speak to Dr. Radha Odell from orthopedic surgery who accepts admission. He is requesting an internal medicine consultation for preop clearance this is been ordered. I also ordered a COVID test.  He is also requesting an isolated femur x-ray which has been ordered as well. The patient will be admitted for further management. CONSULTS:  IP CONSULT TO ORTHOPEDIC SURGERY  IP CONSULT TO INTERNAL MEDICINE    Vitals:    Vitals:    09/13/20 2131   BP: (!) 148/71   Pulse: 67   Resp: 18   Temp: 97.9 °F (36.6 °C)   TempSrc: Oral   SpO2: 95%   Weight: 180 lb (81.6 kg)   Height: 5' 6\" (1.676 m)       The patient was given the following medications while in the emergency department:  Orders Placed This Encounter   Medications    morphine sulfate (PF) injection 4 mg    morphine sulfate (PF) injection 4 mg    lactated ringers infusion    HYDROmorphone (DILAUDID) injection 0.5 mg         FINAL IMPRESSION      1.  Closed fracture of right femur, unspecified fracture morphology, unspecified portion of femur, initial encounter [facility])         [address] Admitted 09/13/2020 11:19:37 PM      PATIENT REFERRED TO:  Lizzy Escudero, APRN - CNP  [address]  [address] [facility]  [address] [facility]        Chandrika Rosenbaum MD  Attending Emergency Physician    This charting supersedes any ED resident or staff charting and was written using speech recognition Liu Granados MD  09/13/20 [id number]

## 2020-09-14 NOTE — CONSULTS
[address]                 [address] [facility], [address]                                  CONSULTATION    PATIENT NAME: Xochitl Tejeda                      :        1945  MED REC NO:   [id number]                              ROOM:       2058  ACCOUNT NO:   [de-identified]                           ADMIT DATE: 2020  PROVIDER:     Raegan Amaro    CONSULT DATE:  2020    LOCATION:  Bed . HISTORY OF PRESENT ILLNESS:  This is a 79-year-old who presented one day  prior to this dictation after a fall while walking in the woods. The reviewer is referred to the chart for definitive details. The patient was ambulating with her spouse and suffered a trip and fall  striking her right hip. Workup has included right femur x-ray,  multiview right hip/pelvic x-ray, right proximal femur fracture was  noted. Orthopedic surgery consultation is pending. We are now asked to evaluate the patient in regards to a known left neck  mass. The patient admits to a several-year history. She describes  fluctuation in the size of the lesion. She does admit to associated  intermittent short-lasting sharp pain. More recently, however, the mass  lesion has persisted and unchanged in size. She was recently evaluated  by my partner Dr. Jacob Taylor in the office setting. CT neck with contrast  was recommended. CT neck with contrast was performed 09/10/2020. This  was reviewed by myself. This suggested a 17 x 14 mm homogeneously  enhancing circumferential mass in the superficial left parotid lobe. The patient was informed. PAST MEDICAL HISTORY:  As above. Diverticulosis, fatty liver,  hyperlipidemia, left chronic otitis media with cholesteatoma. PAST SURGICAL HISTORY:  Appendectomy, bladder suspension colonoscopy,  hysterectomy, unspecified spine surgery, tonsillectomy, left  tympanomastoidectomy, left ventilatory ear tube, EGD.     ALLERGIES:  ADHESIVE TAPE, LISINOPRIL. SOCIAL HISTORY:  Nonsmoker. Denies smokeless tobacco use. Admits to  social alcohol intake. Denies illicit drug use. VITAL SIGNS:  Afebrile, heart rate 84, respiratory rate 16, 134/51, O2  saturation 93% on room air. Weight 180 pounds. PRESENT MEDICATIONS:  Include but are not limited to Lovenox, baby  aspirin, Os-Eugene, Lexapro, Cozaar, multivitamin, Lipitor. PHYSICAL EXAMINATION:  Bedside exam revealed a pleasant 76year-old in  no acute distress despite her right lower extremity fracture. Her voice  is strong and intact and grossly unremarkable. There is no respiratory  distress or stridor. Facial exam was grossly unremarkable. Facial  nerve function was intact bilaterally. Anterior rhinoscopy revealed  mild mucosal edema only but without mass, polyp or mucopurulence. Oral  exam was unremarkable. The patient is status post tonsillectomy. Right  ear exam revealed mild sclerosis. Left ear exam revealed marked  tympanosclerosis. A standard Collar ventilatory ear tube remained in  place and patent in an inferior myringotomy site. There was no active  disease. Palpation of the face and neck revealed a nearly 2 cm somewhat  mobile mass about the left tail of parotid. The areas were nontender  with manipulation. Overlying skin was unremarkable. IMPRESSION:  Left tail of parotid mass. CT neck with contrast suggested  a 1.7 cm primary parotid lesion. Presently, the sign is associated with  moderate intermittent fleeting sharp pain only. Previously, the patient  described size fluctuation over the past several years. Now, however,  the mass is described as persistent and unchanged. Outpatient FNA is recommended. Such can be performed on an outpatient  basis. Recommend Dr. Dayo Lind through the [facility]. We will  arrange followup in our offices post FNA for testing review and further  recommendations. Definitive therapy may require superficial  parotidectomy.     No immediate intervention is required nor are immediate directed  therapies available. The patient appeared to understand these plans and considerations. She  understands that she will need to contact our office post-discharge when  able, so that we may assist with outpatient FNA scheduling. Please do not hesitate to contact myself with any specific questions  regarding the above.         Denise Padron    D: 09/14/2020 13:24:52       T: 09/14/2020 13:30:47     [email or web address]  Job#: [id number]     Doc#: [id number]    CC:

## 2020-09-14 NOTE — CARE COORDINATION
ROUNDED WITH DR. Mendoza[address] [place] [address] SURGERY    Electronically signed by Valarie Laws RN on 9/14/2020 at 11:14 AM

## 2020-09-14 NOTE — ED NOTES
Mode of arrival (squad #, walk in, police, etc) : [address]        Chief complaint(s): c/o fall, rt hip pain        Arrival Note (brief scenario, treatment PTA, etc). : Pt presents to ED from home via EMS. Pt stated she was walking in the woods tonight after drinking a ''few twisted teas' and tripped and fell onto her right hip. Pt was placed in a pelvic binder by EMS. EMS established an IV and gave pt 3mg of morphine IV and 4mg oral Zofran. Pt rating pain 10/10. Pt is A&Ox4, eupneic, PWD. GCS=15. Call light in reach. C= \"Have you ever felt that you should Cut down on your drinking? \"  No  A= \"Have people Annoyed you by criticizing your drinking? \"  No  G= \"Have you ever felt bad or Guilty about your drinking? \"  No  E= \"Have you ever had a drink as an Eye-opener first thing in the morning to steady your nerves or to help a hangover? \"  No      Deferred []      Reason for deferring: N/A    *If yes to two or more: probable alcohol abuse. Bernice Jaramillo RN  09/13/20 [address] Azalia Riggs RN  09/13/20 6034

## 2020-09-14 NOTE — H&P
[address]  [place] [address]  Dept: [phone]  Loc: [phone]    Inpatient Orthopedic Consult      CHIEF COMPLAINT:    right hip pain    HISTORY OF PRESENT ILLNESS:      The patient is a 76 y.o. female who is being seen as an inpatient for consultation and evaluation of the above complaint which occurred secondary to a mechanical fall from standing height. The patient experienced immediate pain at the hip and difficulty with ambulation/weight bearing. The patient was brought to the emergency department where imaging revealed a hip fracture, and Orthopaedics was consulted for evaluation and definitive treatment. The patient localizes the pain to the above hip. Prior to this injury, the patient used no assistive devices for ambulation.          REVIEW OF SYSTEMS:  Review of Systems  Musculoskeletal: See HPI for pertinent positives     Past Medical History:    Past Medical History:   Diagnosis Date    Diverticulosis     Fatty liver     Hyperlipidemia        Past Surgical History:    Past Surgical History:   Procedure Laterality Date    APPENDECTOMY      BLADDER SUSPENSION      COLONOSCOPY  05/23/13    DIVERTICULOSIS     HYSTERECTOMY      SPINE SURGERY      TONSILLECTOMY      TYMPANOMASTOIDECTOMY Left 2000    UPPER GASTROINTESTINAL ENDOSCOPY  05/23/13       Current Medications:   Current Facility-Administered Medications   Medication Dose Route Frequency Provider Last Rate Last Dose    sodium chloride flush 0.9 % injection 10 mL  10 mL Intravenous 2 times per day Jered Tamayo MD   10 mL at 09/14/20 1014    sodium chloride flush 0.9 % injection 10 mL  10 mL Intravenous PRN Jered Tamayo MD        enoxaparin (LOVENOX) injection 40 mg  40 mg Subcutaneous Daily Jered Tamayo MD   Stopped at 09/14/20 0831    oxyCODONE (ROXICODONE) immediate release tablet 5 mg  5 mg Oral Q4H PRN Jered Tamayo MD        oxyCODONE HCl (OXY-IR) immediate release tablet 10 mg  10 mg Oral Q4H PRN Susan Santoro MD        morphine (PF) injection 2 mg  2 mg Intravenous Q3H PRN Susan Santoro MD   2 mg at 09/14/20 1708    aspirin EC tablet 81 mg  81 mg Oral Daily Arshad MD Kalie        calcium elemental (OSCAL) tablet 500 mg  500 mg Oral Daily Arshad MD Kalie        escitalopram (LEXAPRO) tablet 10 mg  10 mg Oral Daily Arshad MD Kalie        losartan (COZAAR) tablet 50 mg  50 mg Oral Daily Arshad Human, MD        therapeutic multivitamin-minerals 1 tablet  1 tablet Oral Daily Arshad MD Kalie        atorvastatin (LIPITOR) tablet 10 mg  10 mg Oral QPM Jeancarlos Jade MD        hydrALAZINE (APRESOLINE) tablet 25 mg  25 mg Oral Q6H PRN Jeancarlos Jade MD        lactated ringers infusion   Intravenous Continuous Judy Alberts  mL/hr at 09/14/20 2480         Allergies:    Adhesive tape and Lisinopril    Family History:  Family History   Problem Relation Age of Onset    Cancer Mother     Cancer Maternal Aunt        Social History:   Social History     Socioeconomic History    Marital status:       Spouse name: Not on file    Number of children: Not on file    Years of education: Not on file    Highest education level: Not on file   Occupational History    Not on file   Social Needs    Financial resource strain: Not on file    Food insecurity     Worry: Not on file     Inability: Not on file    Transportation needs     Medical: Not on file     Non-medical: Not on file   Tobacco Use    Smoking status: Never Smoker    Smokeless tobacco: Never Used   Substance and Sexual Activity    Alcohol use: Yes     Comment: social     Drug use: No    Sexual activity: Yes     Partners: Male   Lifestyle    Physical activity     Days per week: Not on file     Minutes per session: Not on file    Stress: Not on file   Relationships    Social connections     Talks on phone: Not on file     Gets together: Not on file Attends Sabianism service: Not on file     Active member of club or organization: Not on file     Attends meetings of clubs or organizations: Not on file     Relationship status: Not on file    Intimate partner violence     Fear of current or ex partner: Not on file     Emotionally abused: Not on file     Physically abused: Not on file     Forced sexual activity: Not on file   Other Topics Concern    Not on file   Social History Narrative    Not on file          OBJECTIVE:  BP (!) 145/66   Pulse 72   Temp 98.1 °F (36.7 °C) (Oral)   Resp 16   Ht 5' 6\" (1.676 m)   Wt 180 lb (81.6 kg)   SpO2 91%   BMI 29.05 kg/m²    Psych: awake, alert, no acute distress and alert and oriented to person, time, and place. Cardio:  well perfused extremities  Resp:  normal respiratory effort  Skin:  no cyanosis  Hem/lymph:  no lymphedema  Neuro:  sensation to light touch intact throughout all nerve distributions in the foot   Musculoskeletal:    MUSCULOSKELETAL (affected right lower extremity):  Vascular: The foot is warm and well-perfused, good capillary refill. Skin: Intact over lateral aspect of hip, no erythema/ulcers. No significant swelling. Musculoskeletal: Able to plantarflex/dorsiflex ankle and toes   Range of Motion: Grossly normal and painless ankle range of motion  Tenderness to palpation: hip diffusely  No focal neurologic deficits otherwise  Limb shortened and externally rotated     MUSCULOSKELETAL (unaffected left lower extremity):  Vascular: The foot is warm and well-perfused, good capillary refill. Skin: Intact, no erythema/ulcers. No significant swelling.   Musculoskeletal: Able to fire IP/Quads/EHL/TA/GCS   Range of Motion: Grossly normal and painless knee hip and ankle range of motion  Stable hip/knee/ankle exam  Tenderness to palpation: none  No focal neurologic deficits     Secondary survey exam:  No gross deformity, erythema, or significant tenderness of other bony prominences/joints   No tenderness to palpation over other bony prominences/joints of the bilateral upper and lower extremities   Log roll test negative on contralateral hip  Grossly neurovascularly intact bilateral upper extremity without focal deficit      RADIOLOGY:   Radiographic images reviewed. Xr Femur Right (min 2 Views)    Result Date: 9/14/2020  EXAMINATION: 2 XRAY VIEWS OF THE RIGHT FEMUR 9/13/2020 11:54 pm COMPARISON: Pelvic/hip x-ray 09/13/2020. HISTORY: ORDERING SYSTEM PROVIDED HISTORY: femur fracture TECHNOLOGIST PROVIDED HISTORY: femur fracture Reason for Exam: femur fracture Acuity: Acute Type of Exam: Subsequent/Follow-up Mechanism of Injury: Fall, f/u rt proximal femur fracture. Relevant Medical/Surgical History: Fall, f/u rt proximal femur fracture. FINDINGS: Redemonstration of the right proximal femur/hip fracture. Fracture appears to be just along the superior margin of the lesser trochanter. Remainder of the femur is otherwise intact. Mild degenerate changes of the knee and hip. Soft tissues are unremarkable. Right proximal femur fracture left hip fracture, otherwise no additional femur fracture identified. Ct Soft Tissue Neck W Contrast    Result Date: 9/10/2020  EXAMINATION: CT OF THE NECK SOFT TISSUE WITH CONTRAST  9/10/2020 TECHNIQUE: CT of the neck was performed with the administration of intravenous contrast. Multiplanar reformatted images are provided for review. Dose modulation, iterative reconstruction, and/or weight based adjustment of the mA/kV was utilized to reduce the radiation dose to as low as reasonably achievable. COMPARISON: None. 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 The tongue is normal in appearance.   The valleculae, epiglottis, aryepiglottic folds and pyriform sinuses appear unremarkable. The true and false vocal cords are normal in appearance. No mass or abscess is seen. SALIVARY GLANDS/THYROID:  Within the superficial lobe of left parotid is a 15 x 7 x 14 mm circumscribed homogeneously enhancing round mass without a hilum. No other masses identified. The right parotid gland is severely atrophic. LYMPH NODES:  No cervical or supraclavicular lymphadenopathy is seen. SOFT TISSUES:  No appreciable soft tissue swelling or mass is seen. BRAIN/ORBITS/SINUSES:  The visualized portion of the intracranial contents appear unremarkable. The visualized portion of the orbits, paranasal sinuses and mastoid air cells demonstrate no acute abnormality. Status post left canal wall up mastoidectomy. The left ossicles are not identified. LUNG APICES/SUPERIOR MEDIASTINUM:  No focal consolidation is seen within the visualized lung apices. No superior mediastinal lymphadenopathy or mass. The visualized portion of the trachea appears unremarkable. BONES:  No aggressive appearing lytic or blastic bony lesion. 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 Xr Chest Portable    Result Date: 9/13/2020  EXAMINATION: ONE XRAY VIEW OF THE CHEST 9/13/2020 10:14 pm COMPARISON: None. HISTORY: ORDERING SYSTEM PROVIDED HISTORY: fall TECHNOLOGIST PROVIDED HISTORY: fall Reason for Exam: fall Acuity: Acute Type of Exam: Initial Mechanism of Injury: Fall. ..  preop CXR Relevant Medical/Surgical History: Fall. ..  preop CXR FINDINGS: The heart is normal in size and configuration. The mediastinal contours are within normal limits. The lungs are well aerated. The pleural surfaces are normal and no evidence of a pleural effusion is seen. Bones and soft tissues are unremarkable.      Unremarkable single upright portable AP view of the chest.     Xr Hip 2-3 Vw W Pelvis Right    Result Date: 9/13/2020  EXAMINATION: ONE XRAY VIEW OF THE PELVIS AND TWO XRAY VIEWS RIGHT HIP 9/13/2020 10:13 pm COMPARISON: CT and pelvis 05/28/2013 HISTORY: ORDERING SYSTEM PROVIDED HISTORY: pain TECHNOLOGIST PROVIDED HISTORY: pain Reason for Exam: pain Acuity: Acute Type of Exam: Initial Mechanism of Injury: Fall. Right hip pain Relevant Medical/Surgical History: Fall. Right hip pain FINDINGS: Right proximal hip fracture identified. Mild foreshortening. No dislocation. Remainder of the pelvis and left hip are intact. Postsurgical changes at L5-S1. Soft tissues otherwise unremarkable     Right proximal hip fracture. Remainder of the pelvis otherwise intact. LABS:   Lab Results   Component Value Date    WBC 8.8 09/13/2020    HGB 14.9 09/13/2020    HCT 45.1 09/13/2020    MCV 95.7 09/13/2020     09/13/2020     Lab Results   Component Value Date     09/13/2020    K 4.0 09/13/2020     09/13/2020    CO2 23 09/13/2020    BUN 9 09/13/2020    CREATININE 0.72 09/13/2020    GLUCOSE 128 09/13/2020    GLUCOSE 96 01/27/2012    CALCIUM 10.1 09/13/2020          ASSESSMENT AND PLAN:  General Costa was seen today for Fall and Hip Pain  The encounter diagnosis was Closed fracture of right femur, unspecified fracture morphology, unspecified portion of femur, initial encounter ([facility] [address] Body mass index is 29.05 kg/m². She has a right hip peritrochanteric fracture, sustained 9/13/20. Notably, she has no relevant past medical history. I had a long discussion today with the patient about the likely diagnosis and its natural history, physical exam and imaging findings, as well as treatment options. We discussed both surgical and nonsurgical treatments, as well as the risks and benefits of both.   We discussed that the risks of nonoperative treatment include pneumonia, urinary tract infection, skin breakdown/ulcer, and DVT/PE, and I did recommend surgical intervention in order to increase the patient's function, allow early mobilization, and decrease but not prevent these

## 2020-09-14 NOTE — PROGRESS NOTES
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 Sierra Hobson in our office post-FNA for testing review, further recommendations                      definitive therapy may require superficial parotidectomy            - No immediate intervention required, no immediate directed therapies available            - Discussed with .  Jennifer Nixon, all questions answered                      she understands she will need to contact our office when able to arrange outpatient FNA            - Please call with any questions

## 2020-09-15 LAB
ANION GAP SERPL CALCULATED.3IONS-SCNC: 9 MMOL/L (ref 9–17)
BUN BLDV-MCNC: 11 MG/DL (ref 8–23)
BUN/CREAT BLD: ABNORMAL (ref 9–20)
CALCIUM SERPL-MCNC: 8.6 MG/DL (ref 8.6–10.4)
CHLORIDE BLD-SCNC: 103 MMOL/L (ref 98–107)
CO2: 24 MMOL/L (ref 20–31)
CREAT SERPL-MCNC: 0.83 MG/DL (ref 0.5–0.9)
GFR AFRICAN AMERICAN: >60 ML/MIN
GFR NON-AFRICAN AMERICAN: >60 ML/MIN
GFR SERPL CREATININE-BSD FRML MDRD: ABNORMAL ML/MIN/{1.73_M2}
GFR SERPL CREATININE-BSD FRML MDRD: ABNORMAL ML/MIN/{1.73_M2}
GLUCOSE BLD-MCNC: 151 MG/DL (ref 70–99)
HCT VFR BLD CALC: 36.8 % (ref 36–46)
HEMOGLOBIN: 12.2 G/DL (ref 12–16)
MCH RBC QN AUTO: 32.1 PG (ref 26–34)
MCHC RBC AUTO-ENTMCNC: 33.2 G/DL (ref 31–37)
MCV RBC AUTO: 96.6 FL (ref 80–100)
NRBC AUTOMATED: ABNORMAL PER 100 WBC
PDW BLD-RTO: 14.1 % (ref 11.5–14.9)
PLATELET # BLD: 186 K/UL (ref 150–450)
PMV BLD AUTO: 9.5 FL (ref 6–12)
POTASSIUM SERPL-SCNC: 4.4 MMOL/L (ref 3.7–5.3)
RBC # BLD: 3.81 M/UL (ref 4–5.2)
SODIUM BLD-SCNC: 136 MMOL/L (ref 135–144)
WBC # BLD: 11.5 K/UL (ref 3.5–11)

## 2020-09-15 PROCEDURE — 97535 SELF CARE MNGMENT TRAINING: CPT

## 2020-09-15 PROCEDURE — 97162 PT EVAL MOD COMPLEX 30 MIN: CPT

## 2020-09-15 PROCEDURE — 99024 POSTOP FOLLOW-UP VISIT: CPT | Performed by: ORTHOPAEDIC SURGERY

## 2020-09-15 PROCEDURE — 99233 SBSQ HOSP IP/OBS HIGH 50: CPT | Performed by: INTERNAL MEDICINE

## 2020-09-15 PROCEDURE — 6370000000 HC RX 637 (ALT 250 FOR IP): Performed by: ORTHOPAEDIC SURGERY

## 2020-09-15 PROCEDURE — 6370000000 HC RX 637 (ALT 250 FOR IP): Performed by: NURSE PRACTITIONER

## 2020-09-15 PROCEDURE — 2580000003 HC RX 258: Performed by: ORTHOPAEDIC SURGERY

## 2020-09-15 PROCEDURE — 97530 THERAPEUTIC ACTIVITIES: CPT

## 2020-09-15 PROCEDURE — 97116 GAIT TRAINING THERAPY: CPT

## 2020-09-15 PROCEDURE — 1200000000 HC SEMI PRIVATE

## 2020-09-15 PROCEDURE — 6360000002 HC RX W HCPCS: Performed by: ORTHOPAEDIC SURGERY

## 2020-09-15 PROCEDURE — 85027 COMPLETE CBC AUTOMATED: CPT

## 2020-09-15 PROCEDURE — 80048 BASIC METABOLIC PNL TOTAL CA: CPT

## 2020-09-15 PROCEDURE — 97110 THERAPEUTIC EXERCISES: CPT

## 2020-09-15 PROCEDURE — 36415 COLL VENOUS BLD VENIPUNCTURE: CPT

## 2020-09-15 PROCEDURE — 97166 OT EVAL MOD COMPLEX 45 MIN: CPT

## 2020-09-15 RX ORDER — DOCUSATE SODIUM 100 MG/1
100 CAPSULE, LIQUID FILLED ORAL 2 TIMES DAILY PRN
Qty: 20 CAPSULE | Refills: 0 | Status: SHIPPED | OUTPATIENT
Start: 2020-09-15 | End: 2020-09-22

## 2020-09-15 RX ORDER — OXYCODONE HYDROCHLORIDE AND ACETAMINOPHEN 5; 325 MG/1; MG/1
1 TABLET ORAL EVERY 6 HOURS PRN
Qty: 20 TABLET | Refills: 0 | Status: SHIPPED | OUTPATIENT
Start: 2020-09-15 | End: 2020-10-21

## 2020-09-15 RX ADMIN — ENOXAPARIN SODIUM 40 MG: 40 INJECTION SUBCUTANEOUS at 11:33

## 2020-09-15 RX ADMIN — CALCIUM 500 MG: 500 TABLET ORAL at 07:41

## 2020-09-15 RX ADMIN — OXYCODONE HYDROCHLORIDE 10 MG: 10 TABLET ORAL at 01:52

## 2020-09-15 RX ADMIN — OXYCODONE HYDROCHLORIDE 10 MG: 10 TABLET ORAL at 20:00

## 2020-09-15 RX ADMIN — Medication 1 LOZENGE: at 05:46

## 2020-09-15 RX ADMIN — ESCITALOPRAM OXALATE 10 MG: 10 TABLET ORAL at 07:41

## 2020-09-15 RX ADMIN — LOSARTAN POTASSIUM 50 MG: 50 TABLET, FILM COATED ORAL at 07:41

## 2020-09-15 RX ADMIN — CEFAZOLIN 2 G: 1 INJECTION, POWDER, FOR SOLUTION INTRAMUSCULAR; INTRAVENOUS at 05:46

## 2020-09-15 RX ADMIN — SODIUM CHLORIDE, POTASSIUM CHLORIDE, SODIUM LACTATE AND CALCIUM CHLORIDE: 600; 310; 30; 20 INJECTION, SOLUTION INTRAVENOUS at 05:51

## 2020-09-15 RX ADMIN — Medication 1 LOZENGE: at 17:03

## 2020-09-15 RX ADMIN — ASPIRIN 81 MG: 81 TABLET, COATED ORAL at 07:41

## 2020-09-15 RX ADMIN — ATORVASTATIN CALCIUM 10 MG: 10 TABLET, FILM COATED ORAL at 17:02

## 2020-09-15 RX ADMIN — MORPHINE SULFATE 2 MG: 2 INJECTION, SOLUTION INTRAMUSCULAR; INTRAVENOUS at 04:05

## 2020-09-15 RX ADMIN — MULTIPLE VITAMINS W/ MINERALS TAB 1 TABLET: TAB at 07:41

## 2020-09-15 RX ADMIN — OXYCODONE HYDROCHLORIDE 10 MG: 10 TABLET ORAL at 15:44

## 2020-09-15 RX ADMIN — Medication 10 ML: at 20:00

## 2020-09-15 RX ADMIN — OXYCODONE HYDROCHLORIDE 10 MG: 10 TABLET ORAL at 07:42

## 2020-09-15 ASSESSMENT — PAIN DESCRIPTION - ONSET
ONSET: AWAKENED FROM SLEEP
ONSET: ON-GOING

## 2020-09-15 ASSESSMENT — PAIN SCALES - GENERAL
PAINLEVEL_OUTOF10: 3
PAINLEVEL_OUTOF10: 4
PAINLEVEL_OUTOF10: 10
PAINLEVEL_OUTOF10: 0
PAINLEVEL_OUTOF10: 3
PAINLEVEL_OUTOF10: 5
PAINLEVEL_OUTOF10: 10
PAINLEVEL_OUTOF10: 0
PAINLEVEL_OUTOF10: 0
PAINLEVEL_OUTOF10: 7
PAINLEVEL_OUTOF10: 10
PAINLEVEL_OUTOF10: 3
PAINLEVEL_OUTOF10: 8
PAINLEVEL_OUTOF10: 10
PAINLEVEL_OUTOF10: 7

## 2020-09-15 ASSESSMENT — PAIN DESCRIPTION - FREQUENCY
FREQUENCY: CONTINUOUS
FREQUENCY: INTERMITTENT
FREQUENCY: CONTINUOUS
FREQUENCY: CONTINUOUS
FREQUENCY: INTERMITTENT

## 2020-09-15 ASSESSMENT — PAIN DESCRIPTION - PAIN TYPE
TYPE: ACUTE PAIN
TYPE: SURGICAL PAIN
TYPE: SURGICAL PAIN
TYPE: ACUTE PAIN
TYPE: SURGICAL PAIN
TYPE: SURGICAL PAIN
TYPE: ACUTE PAIN

## 2020-09-15 ASSESSMENT — PAIN DESCRIPTION - ORIENTATION
ORIENTATION: RIGHT

## 2020-09-15 ASSESSMENT — PAIN DESCRIPTION - LOCATION
LOCATION: GENERALIZED
LOCATION: HIP;LEG;BUTTOCKS
LOCATION: HIP;LEG
LOCATION: BACK
LOCATION: HIP
LOCATION: HIP;INCISION;LEG
LOCATION: HIP
LOCATION: HIP;INCISION;LEG

## 2020-09-15 ASSESSMENT — PAIN DESCRIPTION - PROGRESSION
CLINICAL_PROGRESSION: GRADUALLY IMPROVING
CLINICAL_PROGRESSION: GRADUALLY WORSENING

## 2020-09-15 ASSESSMENT — PAIN DESCRIPTION - DESCRIPTORS
DESCRIPTORS: ACHING;DISCOMFORT;DULL
DESCRIPTORS: SORE
DESCRIPTORS: ACHING;DULL;DISCOMFORT
DESCRIPTORS: CONSTANT;ACHING

## 2020-09-15 NOTE — PROGRESS NOTES
Patient returned to room from PACU. Vitals and assessment completed. Will continue to monitor patient.

## 2020-09-15 NOTE — PROGRESS NOTES
Physical Therapy  Facility/Department: [facility] MED SURG  Daily Treatment Note  NAME: Gail Alcantara  : 1945  MRN: [id number]    Date of Service: 9/15/2020    Discharge Recommendations:  Patient would benefit from continued therapy after discharge   The patient would benefit from an intensive level of therapy after discharge from the facility. They should be able to tolerate 3-hours of Combined OT/PT/ST over 5 days/week or at least 900 minutes of  Combined Therapy over 7 days. PT Equipment Recommendations  Equipment Needed: Yes  Mobility Devices: Rosalina Morillo: Rolling    Assessment   Body structures, Functions, Activity limitations: Decreased functional mobility ; Increased pain;Decreased strength;Decreased ROM; Decreased safe awareness;Decreased endurance  Treatment Diagnosis: impaired mobility S/P fall and right femur fracture and repair  History: admitted following a fall and injury to the right femur  Barriers to Learning: none  REQUIRES PT FOLLOW UP: Yes  Activity Tolerance  Activity Tolerance: Patient limited by pain; Patient limited by endurance     Patient Diagnosis(es): The encounter diagnosis was Closed fracture of right femur, unspecified fracture morphology, unspecified portion of femur, [facility] has a past medical history of Diverticulosis, Fatty liver, and Hyperlipidemia. has a past surgical history that includes Spine surgery; Tonsillectomy; Appendectomy; Hysterectomy; bladder suspension; Colonoscopy (13); Upper gastrointestinal endoscopy (13); and tympanomastoidectomy (Left, ).     Restrictions  Restrictions/Precautions  Restrictions/Precautions: Fall Risk, Weight Bearing(O2 at 2 L, urethral catheter, peripheral IV right antecubital and left hand, right FWB)  Required Braces or Orthoses?: No  Implants present? : Metal implants(lumbar surgery, screws bilateral feet, right femur IM victorina)  Lower Extremity Weight Bearing Restrictions  Right Lower Extremity Weight Bearing: (right FWB)  Subjective   General  Chart Reviewed: Yes  Additional Pertinent Hx: Pt presents to ED from home via EMS. Pt stated she was walking in the woods after drinking a ''few twisted teas' and tripped and fell onto her right hip. Family / Caregiver Present: No  Referring Practitioner: Dr. Richard White: Patient requires minimal encouragement to participate. Patient educated that she will be expected to perform 3 hours of therapy/day in acute rehab if accepted. Patient pleasant and agreeable to therapy after education. Pain Screening  Patient Currently in Pain: Yes  Pain Assessment  Pain Assessment: 0-10  Pain Level: 10(with functional mobility)  Pain Type: Acute pain  Pain Location: Hip;Leg;Buttocks  Pain Orientation: Right  Vital Signs  Patient Currently in Pain: Yes       Orientation  Orientation  Overall Orientation Status: Within Functional Limits  Objective   Bed mobility  Supine to Sit: Moderate assistance(assist with R LE)  Sit to Supine: Unable to assess(Patient left in bedside chair with Verdie Loft at end of session.)  Comment: Use of bed rail, head of bed slightly elevated. Improved weight shift on to right hip during PM session while seated EOB. Transfers  Sit to Stand: Minimal Assistance;2 Person Assistance  Stand to sit: Minimal Assistance;2 Person Assistance  Bed to Chair: Minimal assistance;2 Person Assistance  Comment: Use of rolling walker, cues for hand placement and safety. Patient requiring assistance negotiating rolling walker, verbal cues with occasional tactile as needed.   Ambulation  Ambulation?: Yes  WB Status: right FWB  Ambulation 1  Surface: level tile  Device: Rolling Walker  Assistance: Minimal assistance;2 Person assistance  Quality of Gait: antalgic gait, heavy reliance on upper extremities, not placing right foot fully on floor (toe touch)  Gait Deviations: Slow Hyacinth;Decreased step length;Decreased step height;Deviated path  Distance: 10 ft  Comments: Verbal cues for rolling walker negotiation, requiring some tactile assistance. Chair pulled up behind patient at end of ambulation. Stairs/Curb  Stairs?: No     Other exercises  Other exercises?: Yes  Other exercises 1: Seated (B) LE Exercises x15 (AROM); RLE requires AAROM on seated marches only. Goals  Short term goals  Time Frame for Short term goals: BID x 3 days  Short term goal 1: pt to tolerate 1/2 hour of therapuetic exercise and activity  Short term goal 2: pt to demonstrate good technique for LE ROM and strengthening exercises  Short term goal 3: pt to demonstrate improved strength by 1/2 MMG bilateral LEs  Short term goal 4: pt to demonstrate rolling in bed and transfers supine <> sit w/ min x 1 for position change  Short term goal 5: pt to demonstrate transfers sit <> stand and bed <> chair using w walker right FWB w/ min x 2  Short term goal 6: pt to demonstrate gait 20-30' using w walker right FWB w/ min x 2 and W/C follow  Short term goal 7: pt to demonstrate good standing and dynamic balance  using w walker right FWB  Short term goal 8: pt to advance to ascend/ descend 4\" platform step using wheeled walker w/ mod x 2  Patient Goals   Patient goals : control pain    Plan    Plan  Times per week: BID x 3 days  Times per day: Daily  Specific instructions for Next Treatment: advance as tolerated w/ gait distance and instruct in exercise program for ROM and general strengthening  Current Treatment Recommendations: Strengthening, Safety Education & Training, ROM, Balance Training, Patient/Caregiver Education & Training, Equipment Evaluation, Education, & procurement, Functional Mobility Training, Transfer Training, Gait Training, Stair training  Safety Devices  Type of devices:  All fall risk precautions in place, Left in chair, Call light within reach, Chair alarm in place, Gait belt, Patient at risk for falls, Nurse notified(Patient left in chair with Lauren Ax at end of session,

## 2020-09-15 NOTE — CONSULTS
Physical Medicine & Rehabilitation  Consult Note - RECORD REVIEW ONLY      Admitting Physician:   Dwayne Lui MD    Primary Care Provider:   MIGUEL Molina CNP     Reason for Consult:  Acute Inpatient Rehabilitation    Chief Complaint: R hip pain    History of Present Illness:  Referring Provider is requesting an evaluation for appropriate placement upon discharge from acute care. History from chart review. Holger Cardona is a 76 y.o. female admitted to [facility] on 9/13/2020. Patient admitted after fall from mechanical height with c/o R hip pain. Diagnostic studies confirmed R hip fracture. Dr. Ofelia Turner performed R cephalomedullary nail on 9/14/2020 for closed peritrochanteric hip fracture. She is WBAT. Recommending DVT prophylaxis for 6 weeks. Follow-up Dr. Ofelia Turner 1-2 weeks. 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 Recommending outpatient fine needle aspiration with Dr. Racheal Medrano through [facility].      Review of Systems:  Record Review Only     Premorbid function:  Independent    Current function:    PT:  Restrictions/Precautions: Fall Risk, Weight Bearing(O2 at 2 L, urethral catheter, peripheral IV right antecubital and left hand, right FWB)  Implants present? : Metal implants(lumbar surgery, screws bilateral feet, right femur IM victorina)  Right Lower Extremity Weight Bearing: (right FWB)   Transfers  Sit to Stand: Maximum Assistance, 2 Person Assistance  Stand to sit: 2 Person Assistance, Maximum Assistance(lowering assist needed)  Bed to Chair: Moderate assistance, 2 Person Assistance(used w walker)  Comment: strong verbal cues for technique- needs assist steering w walker when turning, needs extra time to complete task  WB Status: right FWB  Ambulation 1  Surface: level tile  Device: Rolling Walker  Assistance: 2 Person assistance, Moderate assistance  Gait Deviations: Slow Hyacinth, Decreased step length, Decreased step height  Distance: 4 mini side steps towards the chair using w walker  Comments: strong verbal cues for technique    Transfers  Sit to Stand: Maximum Assistance, 2 Person Assistance  Stand to sit: 2 Person Assistance, Maximum Assistance(lowering assist needed)  Bed to Chair: Moderate assistance, 2 Person Assistance(used w walker)  Comment: strong verbal cues for technique- needs assist steering w walker when turning, needs extra time to complete task  Ambulation  Ambulation?: Yes  WB Status: right FWB  Ambulation 1  Surface: level tile  Device: Rolling Walker  Assistance: 2 Person assistance, Moderate assistance  Gait Deviations: Slow Hyacinth, Decreased step length, Decreased step height  Distance: 4 mini side steps towards the chair using w walker  Comments: strong verbal cues for technique    Surface: level tile  Ambulation 1  Surface: level tile  Device: Rolling Walker  Assistance: 2 Person assistance, Moderate assistance  Gait Deviations: Slow Hyacinth, Decreased step length, Decreased step height  Distance: 4 mini side steps towards the chair using w walker  Comments: strong verbal cues for technique      OT:   ADL  Feeding: Setup  Grooming: Supervision, Setup  UE Bathing: Stand by assistance, Setup  LE Bathing: Maximum assistance, Setup, Increased time to complete  UE Dressing: Stand by assistance, Setup  LE Dressing: Dependent/Total  Toileting: Dependent/Total  Additional Comments: ADL scores based on skilled observations and clinical reasoning unless otherwise noted. Pt requires significant assist with self-care tasks, particularly lower body self-care due to impaired activity tolerance secondary to pain from R hip fracture.          Balance  Sitting Balance: Minimal assistance(minimal weight bearing on R hip while seated)  Standing Balance: (Moderate assist x 2)   Standing Balance  Time: 2-3 minutes  Activity: static standing and pivot to chair  Comment: BUE support with rolling walker  Functional Mobility  Functional - Mobility Device: Rolling Walker  Activity: (~3ft to Daily  atorvastatin (LIPITOR) tablet 10 mg, 10 mg, Oral, QPM  hydrALAZINE (APRESOLINE) tablet 25 mg, 25 mg, Oral, Q6H PRN  ceFAZolin (ANCEF) 2 g in dextrose 5 % 50 mL IVPB, 2 g, Intravenous, Once  lactated ringers infusion, , Intravenous, Continuous    Social History:  Lives With: Significant other  Type of Home: House  Home Layout: One level(laundry main level)  Home Access: Stairs to enter with rails  Entrance Stairs - Number of Steps: 3  Entrance Stairs - Rails: Right(post on left)  Bathroom Shower/Tub: Tub/Shower unit, Curtain  Bathroom Toilet: Standard  Bathroom Equipment: Tub transfer bench, Grab bars in shower, Grab bars around toilet  Bathroom Accessibility: Walker accessible  Home Equipment: (No DME for ambulation)  ADL Assistance: Independent(stands in shower- does not use tub transfer bench; independent for toileting, bathing and dressing)  Homemaking Assistance: Independent(states she is primary, pushes a grocery cart when shopping)  Homemaking Responsibilities: Yes  Ambulation Assistance: Independent(no device)  Transfer Assistance: Independent  Active : Yes  Mode of Transportation: [employer]  Occupation: Retired  Additional Comments: significan other is retired and able to assist at D/C  Social History     Socioeconomic History    Marital status:       Spouse name: None    Number of children: None    Years of education: None    Highest education level: None   Occupational History    None   Social Needs    Financial resource strain: None    Food insecurity     Worry: None     Inability: None    Transportation needs     Medical: None     Non-medical: None   Tobacco Use    Smoking status: Never Smoker    Smokeless tobacco: Never Used   Substance and Sexual Activity    Alcohol use: Yes     Comment: social     Drug use: No    Sexual activity: Yes     Partners: Male   Lifestyle    Physical activity     Days per week: None     Minutes per session: None    Stress: None   Relationships    Social connections     Talks on phone: None     Gets together: None     Attends Orthodoxy service: None     Active member of club or organization: None     Attends meetings of clubs or organizations: None     Relationship status: None    Intimate partner violence     Fear of current or ex partner: None     Emotionally abused: None     Physically abused: None     Forced sexual activity: None   Other Topics Concern    None   Social History Narrative    None       Family History:       Problem Relation Age of Onset    Cancer Mother     Cancer Maternal Aunt        Radiology:  2 XRAY VIEWS OF THE RIGHT FEMUR         9/13/2020 11:54 pm         COMPARISON:    Pelvic/hip x-ray 09/13/2020.         HISTORY:    ORDERING SYSTEM PROVIDED HISTORY: femur fracture    TECHNOLOGIST PROVIDED HISTORY:    femur fracture    Reason for Exam: femur fracture    Acuity: Acute    Type of Exam: Subsequent/Follow-up    Mechanism of Injury: Fall, f/u rt proximal femur fracture. Relevant Medical/Surgical History: Fall, f/u rt proximal femur fracture.         FINDINGS:    Redemonstration of the right proximal femur/hip fracture.  Fracture appears    to be just along the superior margin of the lesser trochanter.  Remainder of    the femur is otherwise intact.  Mild degenerate changes of the knee and hip. Soft tissues are unremarkable.              Impression    Right proximal femur fracture, otherwise no additional    femur fracture identified. Diagnostics:    CBC:   Recent Labs     09/13/20  2150 09/15/20  0540   WBC 8.8 11.5*   RBC 4.71 3.81*   HGB 14.9 12.2   HCT 45.1 36.8   MCV 95.7 96.6   RDW 14.3 14.1    186     BMP:   Recent Labs     09/13/20 2150 09/15/20  0540    136   K 4.0 4.4    103   CO2 23 24   BUN 9 11   CREATININE 0.72 0.83   GLUCOSE 128* 151*      HbA1c: No results found for: LABA1C  BNP: No results for input(s): BNP in the last 72 hours.   PT/INR:   Recent Labs     09/13/20 2150   PROTIME

## 2020-09-15 NOTE — PROGRESS NOTES
Messaged Shahriar Jade CNP via [employer] to inform her of patient's complaint of throat pain. Patient is requesting Cepacol drops if possible. Awaiting response. Will continue to monitor patient.

## 2020-09-15 NOTE — PROGRESS NOTES
Notified Jovan Enriquez, via fax that per Dr Sherlene Lesch pt is approp for ARU. Precert initiated with the automated system @ [facility]. Zoe notified via fax. Spoke with [facility] - DOC who confirmed pt's benefits for ARU which are as follows: $390/day for days 1-5, then 100% coverage beginning day 6. No deductible.

## 2020-09-15 NOTE — PROGRESS NOTES
Progress Note    Patient:  Sheila Arevalo  YOB: 1945     76 y.o. female      Subjective:  Patient seen and examined in the hospital. No complaints or concerns. Pain controlled. Denies issue overnight.  OOB with PT.      Objective:   Vitals:    09/15/20 0700   BP: (!) 106/57   Pulse: 92   Resp: 10   Temp: 98.7 °F (37.1 °C)   SpO2: (!) 89%     Gen: NAD, awake, alert  MSK:  Dressing in place, clean/dry/intact  Toes warm/well perfused  Compartments of thigh/leg soft/compressible  Grossly NVI distally      Labs:    Recent Labs     09/13/20  2150 09/15/20  0540   WBC 8.8 11.5*   HGB 14.9 12.2   HCT 45.1 36.8    186   INR 0.9  --     136   K 4.0 4.4   BUN 9 11   CREATININE 0.72 0.83   GLUCOSE 128* 151*        Lab Results   Component Value Date    VITD25 40.2 12/10/2018       Impression/plan:   76 y.o. female 1 Day Post-Op s/p Right hip IMN, doing well overall    -Medical management   -WBAT   -Postop abx 2 doses  -Pain control  -DVT ppx:  Early mobilization + chem ppx for 6 weeks  -Ice PRN (15-20 mins on,  mins off)  -Incentive Spirometry use  -PT/OT  -none  -Ok to be discharged from an Ortho perspective when cleared by PT  -F/u in office in 2 weeks (call office in 1-3 days to confirm appt)

## 2020-09-15 NOTE — PROGRESS NOTES
[place] [id number] Internal Medicine    Progress Note  Chart Reviewed: Yes  Patient assessed for rehabilitation services?: Yes  Additional Pertinent Hx: Pt presents to ED from home via EMS. Pt stated she was walking in the woods after drinking a ''few twisted teas' and tripped and fell onto her right hip. Family / Caregiver Present: No  Referring Practitioner: Dr. Patrick Cody  Referral Date : 09/14/20  Diagnosis: closed fracture right femur  Other (Comment): OK per nurse Gonzalo Aponte to proceed w/ PT evaluation  General Comment  Comments: pt had a IM laurent placed on 9- by Dr. Patrick Cody. 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 *      Interval History Status: improved. Patient had surgery yesterday     Attending Surgeon: Taty Moses M.D.                           PCP: MIGUEL Martínez CNP           Preoperative Diagnosis:   1. Right closed peritrochanteric hip fracture  2. Body mass index is 29.05 kg/m². Postoperative Diagnosis:   1. same     Procedures Performed:  (9/14/2020)  1. Open treatment of Right hip intertrochanteric femur fracture with cephalomedullary nail (CPT code 15655)        Implants:    Tess Gamma Nail 11 mm 125-degree  Implant Name Type Inv.  Item Serial No.  Lot No. LRB No. Used Action   SCREW EZEKIEL FTGREYSOND ST T2 [id number] Screw/Plate/Nail/Laurent SCREW EZEKIEL JCHRD ST T2 [id number]   TESS: Recent Results (from the past 24 hour(s))   Urinalysis Reflex to Culture    Collection Time: 09/14/20 12:07 PM    Specimen: Urine, clean catch   Result Value Ref Range    Color, UA YELLOW YELLOW    Turbidity UA CLEAR CLEAR    Glucose, Ur NEGATIVE NEGATIVE    Bilirubin Urine NEGATIVE NEGATIVE    Ketones, Urine NEGATIVE NEGATIVE    Specific [place], UA 1.013 1.000 - 1.030    Urine Hgb NEGATIVE NEGATIVE    pH, UA 7.0 5.0 - 8.0    Protein, UA NEGATIVE NEGATIVE    Urobilinogen, Urine Normal Normal    Nitrite, Urine NEGATIVE NEGATIVE    Leukocyte Esterase, Urine NEGATIVE NEGATIVE    Urinalysis Comments       Microscopic exam not performed based on chemical results unless requested in original order. CBC    Collection Time: 09/15/20  5:40 AM   Result Value Ref Range    WBC 11.5 (H) 3.5 - 11.0 k/uL    RBC 3.81 (L) 4.0 - 5.2 m/uL    Hemoglobin 12.2 12.0 - 16.0 g/dL    Hematocrit 36.8 36 - 46 %    MCV 96.6 80 - 100 fL    MCH 32.1 26 - 34 pg    MCHC 33.2 31 - 37 g/dL    RDW 14.1 11.5 - 14.9 %    Platelets 293 859 - 881 k/uL    MPV 9.5 6.0 - 12.0 fL    NRBC Automated NOT REPORTED per 100 WBC   Basic Metabolic Panel w/ Reflex to MG    Collection Time: 09/15/20  5:40 AM   Result Value Ref Range    Glucose 151 (H) 70 - 99 mg/dL    BUN 11 8 - 23 mg/dL    CREATININE 0.83 0.50 - 0.90 mg/dL    Bun/Cre Ratio NOT REPORTED 9 - 20    Calcium 8.6 8.6 - 10.4 mg/dL    Sodium 136 135 - 144 mmol/L    Potassium 4.4 3.7 - 5.3 mmol/L    Chloride 103 98 - 107 mmol/L    CO2 24 20 - 31 mmol/L    Anion Gap 9 9 - 17 mmol/L    GFR Non-African American >60 >60 mL/min    GFR African American >60 >60 mL/min    GFR Comment          GFR Staging NOT REPORTED      No results for input(s): POCGLU in the last 72 hours. Xr Femur Right (min 2 Views)    Result Date: 9/14/2020  EXAMINATION: SPOT FLUOROSCOPIC IMAGES 9/14/2020 9:22 pm TECHNIQUE: Fluoroscopy was provided by the radiology department for procedure.  Radiologist was not present during examination. FLUOROSCOPY DOSE AND TYPE OR TIME AND EXPOSURES: 243.7 seconds. 31.51 mGy COMPARISON: 09/13/2020 HISTORY: ORDERING SYSTEM PROVIDED HISTORY: Femur victorina TECHNOLOGIST PROVIDED HISTORY: Femur victorina Reason for Exam: right femur victorina Acuity: Acute Type of Exam: Initial Intraprocedural imaging. FINDINGS: 6 spot images of the femur were obtained. Intramedullary victorina and screw fixation of the right femur. Intraprocedural fluoroscopic spot images as above. See separate procedure report for more information. Xr Femur Right (min 2 Views)    Result Date: 9/14/2020  EXAMINATION: 2 XRAY VIEWS OF THE RIGHT FEMUR 9/13/2020 11:54 pm COMPARISON: Pelvic/hip x-ray 09/13/2020. HISTORY: ORDERING SYSTEM PROVIDED HISTORY: femur fracture TECHNOLOGIST PROVIDED HISTORY: femur fracture Reason for Exam: femur fracture Acuity: Acute Type of Exam: Subsequent/Follow-up Mechanism of Injury: Fall, f/u rt proximal femur fracture. Relevant Medical/Surgical History: Fall, f/u rt proximal femur fracture. FINDINGS: Redemonstration of the right proximal femur/hip fracture. Fracture appears to be just along the superior margin of the lesser trochanter. Remainder of the femur is otherwise intact. Mild degenerate changes of the knee and hip. Soft tissues are unremarkable. Right proximal femur fracture left hip fracture, otherwise no additional femur fracture identified. Fl Less Than 1 Hour    Result Date: 9/14/2020  Radiology exam is complete. No Radiologist dictation. Please follow up with ordering provider. Xr Chest Portable    Result Date: 9/13/2020  EXAMINATION: ONE XRAY VIEW OF THE CHEST 9/13/2020 10:14 pm COMPARISON: None. HISTORY: ORDERING SYSTEM PROVIDED HISTORY: fall TECHNOLOGIST PROVIDED HISTORY: fall Reason for Exam: fall Acuity: Acute Type of Exam: Initial Mechanism of Injury: Fall. ..  preop CXR Relevant Medical/Surgical History: Fall. ..  preop CXR FINDINGS: The heart is normal in size and configuration.   The mediastinal contours are within normal limits. The lungs are well aerated. The pleural surfaces are normal and no evidence of a pleural effusion is seen. Bones and soft tissues are unremarkable. Unremarkable single upright portable AP view of the chest.     Xr Hip 2-3 Vw W Pelvis Right    Result Date: 9/13/2020  EXAMINATION: ONE XRAY VIEW OF THE PELVIS AND TWO XRAY VIEWS RIGHT HIP 9/13/2020 10:13 pm COMPARISON: CT and pelvis 05/28/2013 HISTORY: ORDERING SYSTEM PROVIDED HISTORY: pain TECHNOLOGIST PROVIDED HISTORY: pain Reason for Exam: pain Acuity: Acute Type of Exam: Initial Mechanism of Injury: Fall. Right hip pain Relevant Medical/Surgical History: Fall. Right hip pain FINDINGS: Right proximal hip fracture identified. Mild foreshortening. No dislocation. Remainder of the pelvis and left hip are intact. Postsurgical changes at L5-S1. Soft tissues otherwise unremarkable     Right proximal hip fracture. Remainder of the pelvis otherwise intact. HPI:   See history in H and P      Review of Systems:     Constitutional:  negative for chills, fevers, sweats  Respiratory:  negative for cough, dyspnea on exertion, hemoptysis, shortness of breath, wheezing  Cardiovascular:  negative for chest pain, chest pressure/discomfort, lower extremity edema, palpitations  Gastrointestinal:  negative for abdominal pain, constipation, diarrhea, nausea, vomiting  Neurological:  negative for dizziness, headache  Data:     Past Medical History:  no change     Social History:  no change    Family History: @no change    Vitals:      I/O (24Hr):     Intake/Output Summary (Last 24 hours) at 9/15/2020 1026  Last data filed at 9/15/2020 0558  Gross per 24 hour   Intake 1655 ml   Output 1175 ml   Net 480 ml       Labs:    URINE ANALYSIS: No results found for: LABURIN     CBC:  Lab Results   Component Value Date    WBC 11.5 09/15/2020    HGB 12.2 09/15/2020     09/15/2020     01/27/2012        BMP:    Lab Results Component Value Date     09/15/2020    K 4.4 09/15/2020     09/15/2020    CO2 24 09/15/2020    BUN 11 09/15/2020    CREATININE 0.83 09/15/2020    GLUCOSE 151 09/15/2020    GLUCOSE 96 01/27/2012      LIVER PROFILE:  Lab Results   Component Value Date    ALT 21 12/27/2019    AST 15 12/27/2019    PROT 6.9 12/27/2019    BILITOT 0.52 12/27/2019    BILIDIR <0.08 01/04/2016    LABALBU 4.2 12/27/2019    LABALBU 4.3 01/27/2012               Radiology:      Physical Examination:        General appearance:  alert, cooperative and no distress  Mental Status:  oriented to person, place and time and normal affect  Lungs:  clear to auscultation bilaterally, normal effort  Heart:  regular rate and rhythm, no murmur  Abdomen:  soft, nontender, nondistended, normal bowel sounds, no masses, hepatomegaly, splenomegaly  Extremities:  no edema, redness, tenderness in the calves  Skin:  no gross lesions, rashes, induration  Status post right hip surgery  Patient has indwelling Delgado at this time for urinary retention    Assessment:        Primary Problem  Other fracture of right femur, initial encounter for closed fracture [facility])    Active Hospital Problems    Diagnosis Date Noted    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 It is post right hip surgery as described above  2. Mild hypoxia encouraged to do deep breathing and incentive spirometry  3. Urinary retention prior to surgery has Delgado catheter  4. Past history of urge incontinence complicated  5. Hypertension control good    Medications: Allergies:     Allergies   Allergen Reactions    Adhesive Tape Itching    Lisinopril Other (See Comments)     cough       Current Meds:   Scheduled Meds:  sodium chloride flush  10 mL Intravenous 2 times per day    enoxaparin  40 mg Subcutaneous Daily    aspirin  81 mg Oral Daily    calcium elemental  500 mg Oral Daily    escitalopram  10 mg Oral Daily    losartan  50 mg Oral Daily    therapeutic multivitamin-minerals  1 tablet Oral Daily    atorvastatin  10 mg Oral QPM    ceFAZolin  2 g Intravenous Once     Continuous Infusions:    lactated ringers 100 mL/hr at 09/15/20 0551     PRN Meds: benzocaine-menthol, sodium chloride flush, oxyCODONE, oxyCODONE, morphine, hydrALAZINE       Gaurav Osborne MD  9/15/2020  10:26 AM

## 2020-09-15 NOTE — PROGRESS NOTES
SW left message for ARU with referral information. PM&R ordered. Addendum: SW spoke to pt and daughter. Pt is agreeable to ARU. Pt said she will consider a SNF in the event insurance denies ARU.

## 2020-09-15 NOTE — PROGRESS NOTES
surgery; Tonsillectomy; Appendectomy; Hysterectomy; bladder suspension; Colonoscopy (05/23/13); Upper gastrointestinal endoscopy (05/23/13); and tympanomastoidectomy (Left, 2000). Restrictions  Restrictions/Precautions  Restrictions/Precautions: Fall Risk, Weight Bearing(O2 at 2 L, urethral catheter, peripheral IV right antecubital and left hand, right FWB)  Required Braces or Orthoses?: No  Implants present? : Metal implants(lumbar surgery, crews bilateral feet, right femur IM victorina)  Lower Extremity Weight Bearing Restrictions  Right Lower Extremity Weight Bearing: (right FWB)  Vision/Hearing  Vision: Impaired  Vision Exceptions: Wears glasses at all times  Hearing: Exceptions to [facility]  Hearing Exceptions: Hard of hearing/hearing concerns;Bilateral hearing aid     Subjective  General  Chart Reviewed: Yes  Patient assessed for rehabilitation services?: Yes  Additional Pertinent Hx: Pt presents to ED from home via EMS. Pt stated she was walking in the woods after drinking a ''few twisted teas' and tripped and fell onto her right hip. Response To Previous Treatment: Not applicable  Family / Caregiver Present: No  Referring Practitioner: Dr. Je Whalen  Referral Date : 09/14/20  Diagnosis: closed fracture right femur  Follows Commands: Within Functional Limits  Other (Comment): OK per nurse Aura [place] to proceed w/ PT evaluation  General Comment  Comments: pt had a IM victorina placed on 9- by Dr. Je Whalen. X-rays right femur shows right proximal hip fracture. Subjective  Subjective: pt reports that she just had surgery yesterday. Pt amazed that she is getting up the very next day after surgery. Pt needing verbal encouragement to participate. Pain Screening  Patient Currently in Pain: Yes  Pain Assessment  Pain Assessment: 0-10  Pain Level: 3  Patient's Stated Pain Goal: No pain  Pain Type: Surgical pain  Pain Location: Generalized  Pain Descriptors: Constant; Aching  Pain Frequency: Continuous  Pain Onset: On-going  Clinical Progression: Gradually improving  Non-Pharmaceutical Pain Intervention(s): Ambulation/Increased Activity;Repositioned  Response to Pain Intervention: Patient Satisfied  Multiple Pain Sites: No  Vital Signs  Patient Currently in Pain: Yes       Orientation  Orientation  Overall Orientation Status: Within Functional Limits(answered all questions correctly)  Social/Functional History  Social/Functional History  Lives With: Significant other  Type of Home: House  Home Layout: One level(laundry main level)  Home Access: Stairs to enter with rails  Entrance Stairs - Number of Steps: 3  Entrance Stairs - Rails: Right(post on left)  Bathroom Shower/Tub: Tub/Shower unit, Curtain  Bathroom Toilet: Standard  Bathroom Equipment: Tub transfer bench, Grab bars in shower, Grab bars around toilet  Bathroom Accessibility: Walker accessible  Home Equipment: (No DME for ambulation)  ADL Assistance: Independent(stands in shower- does not use tub transfer bench; independent for toileting, bathing and dressing)  Homemaking Assistance: Independent(states she is primary, pushes a grocery cart when shopping)  Homemaking Responsibilities: Yes  Ambulation Assistance: Independent(no device)  Transfer Assistance: Independent  Active : Yes  Mode of Transportation: [employer]  Occupation: Retired  Additional Comments: significan other is retired and able to assist at [employer]        Objective     Observation/Palpation  Observation: O2 at L, urethral catheter, peripheral IV right antecubital and left hand, right LE FWB; 3  part surgical dressing right lateral hip    AROM RLE (degrees)  RLE AROM: Exceptions  R Hip Flexion 0-125: 0-5 AROM; 0-40 AAROM  R Hip ABduction 0-45: trace movement; 0-20 AAROM  R Hip ADduction 0-10: trace movement, AAROM to neutral position  R Knee Flexion 0-145: 0-20 AROM; 0-45 AAROM  R Knee Extension 0: 0  R Ankle Dorsiflexion 0-20: WFL  R Ankle Plantar Flexion 0-45: WFL  AROM LLE (degrees)  LLE AROM : WFL  AROM RUE (degrees)  RUE General AROM: see OT for UE assessment  AROM LUE (degrees)  LUE General AROM: see OT for UE assessment  Strength RLE  Strength RLE: Exception  R Hip Flexion: 2-/5  R Hip ABduction: 2/5  R Hip ADduction: 2/5  R Knee Flexion: 3-/5  R Knee Extension: 4-/5  R Ankle Dorsiflexion: 4/5  R Ankle Plantar flexion: 4/5  Strength LLE  Comment: grossly 4/5  Strength RUE  Comment: see OT for UE assessment  Strength LUE  Comment: see OT for UE assessment     Sensation  Overall Sensation Status: WFL(denies)  Bed mobility  Rolling to Left: Maximum assistance(semi rolled upper trunk towards the rail)  Supine to Sit: Maximum assistance  Scooting: Maximal assistance  Comment: dangled at the EOB w/ min x 1  Transfers  Sit to Stand: Maximum Assistance;2 Person Assistance  Stand to sit: 2 Person Assistance;Maximum Assistance(lowering assist needed)  Bed to Chair: Moderate assistance;2 Person Assistance(used w walker)  Comment: strong verbal cues for technique- needs assist steering w walker when turning, needs extra time to complete task  Ambulation  Ambulation?: Yes  WB Status: right FWB  Ambulation 1  Surface: level tile  Device: Rolling Walker  Assistance: 2 Person assistance; Moderate assistance  Gait Deviations: Slow Hyacinth;Decreased step length;Decreased step height  Distance: 4 mini side steps towards the chair using w walker  Comments: strong verbal cues for technique     Balance  Sitting - Static: Good;-  Sitting - Dynamic: Fair;+  Standing - Static: Fair(used w walker)  Standing - Dynamic: Fair;-(used w walker)        Plan   Plan  Times per week: BID x 3 days  Times per day: Daily  Specific instructions for Next Treatment: advance as tolerated w/ gait distance and instruct in exercise program for ROM and general strengthening  Current Treatment Recommendations: Strengthening, Safety Education & Training, ROM, Balance Training, Patient/Caregiver Education & Training, Equipment Evaluation, Education, & procurement, Functional Mobility Training, Transfer Training, Gait Training, Stair training  Safety Devices  Type of devices:  All fall risk precautions in place, Left in chair, Call light within reach, Chair alarm in place, Gait belt, Patient at risk for falls, Nurse notified(nurse Sal)    G-Code       OutComes Score                                                  AM-PAC Score  AM-PAC Inpatient Mobility Raw Score : 8 (09/15/20 1048)  AM-PAC Inpatient T-Scale Score : 28.52 (09/15/20 1048)  Mobility Inpatient CMS 0-100% Score: 86.62 (09/15/20 1048)  Mobility Inpatient CMS G-Code Modifier : CM (09/15/20 1048)          Goals  Short term goals  Time Frame for Short term goals: BID x 3 days  Short term goal 1: pt to tolerate 1/2 hour of therapuetic exercise and activity  Short term goal 2: pt to demonstrate good technique for LE ROM and strengthening exercises  Short term goal 3: pt to demonstrate improved strength by 1/2 MMG bilateral LEs  Short term goal 4: pt to demonstrate rolling in bed and transfers supine <> sit w/ min x 1 for position change  Short term goal 5: pt to demonstrate transfers sit <> stand and bed <> chair using w walker right FWB w/ min x 2  Short term goal 6: pt to demonstrate gait 20-30' using w walker right FWB w/ min x 2 and W/C follow  Short term goal 7: pt to demonstrate good standing and dynamic balance  using w walker right FWB  Short term goal 8: pt to advance to ascend/ descend 4\" platform step using wheeled walker w/ mod x 2  Patient Goals   Patient goals : control pain       Therapy Time   Individual Concurrent Group Co-treatment   Time In 0915         Time Out 0950         Minutes 35         Timed Code Treatment Minutes: [address] STU Galarza, PT

## 2020-09-15 NOTE — PLAN OF CARE
Problem: Skin Integrity:  Goal: Will show no infection signs and symptoms  Description: Will show no infection signs and symptoms  9/15/2020 1323 by Minh Irizarry RN  Outcome: Ongoing  Note: Skin assessment performed. See head to toe assessment. Will continue to monitor. 9/15/2020 0310 by Kim Chiang RN  Outcome: Ongoing  Goal: Absence of new skin breakdown  Description: Absence of new skin breakdown  9/15/2020 0310 by Kim Chiang RN  Outcome: Met This Shift     Problem: Falls - Risk of:  Goal: Will remain free from falls  Description: Will remain free from falls  9/15/2020 1323 by Minh Irizarry RN  Outcome: Met This Shift  Note: Pt assessed as a fall risk this shift. Remains free from falls and accidental injury at this time. Fall precautions in place, including falling star sign and fall risk band on pt. Floor free from obstacles, and bed is locked and in lowest position. Adequate lighting provided. Pt encouraged to call before getting OOB for any need. Bed alarm activated. Will continue to monitor needs during hourly rounding, and reinforce education on use of call light. Use of chari stedy to get from chair to bed.   9/15/2020 0310 by Kim Chiang RN  Outcome: Met This Shift  Goal: Absence of physical injury  Description: Absence of physical injury  9/15/2020 0310 by Kim Chiang RN  Outcome: Met This Shift     Problem: Pain:  Goal: Pain level will decrease  Description: Pain level will decrease  9/15/2020 1323 by Minh Irizarry RN  Note: Pt medicated with pain medication prn. Assessed all pain characteristics including level, type, location, frequency, and onset. Non-pharmacologic interventions offered to pt as well. Pt states pain is tolerable at this time. Will continue to monitor.     9/15/2020 0310 by Kim Chiang RN  Outcome: Ongoing  Goal: Control of acute pain  Description: Control of acute pain  9/15/2020 0310 by Kim Chiang RN  Outcome: Met This Shift

## 2020-09-15 NOTE — OP NOTE
[address] [facility] OR  [address] [place] [address]  [address]: [phone]      Orthopedic Surgery Operative Report      Patient: Alka Carrizales      MRN#: [id number]     YOB: 1945      Date of Admission: 9/13/2020     Attending Surgeon: Natasha Mcintosh M.D.   PCP: MIGUEL Aguilera - GISELL        Preoperative Diagnosis:   1. Right closed peritrochanteric hip fracture  2. Body mass index is 29.05 kg/m². Postoperative Diagnosis:   1. same    Procedures Performed:  (9/14/2020)  1. Open treatment of Right hip intertrochanteric femur fracture with cephalomedullary nail (CPT code 32840)      Implants:    Tess Gamma Nail 11 mm 125-degree  Implant Name Type Inv. Item Serial No.  Lot No. LRB No. Used Action   SCREW LK FTHRD ST T2 5X40MM Screw/Plate/Nail/Laurent SCREW LK FTHRD ST T2 5X40MM  STRYKERJordis Liborio Right 1 Implanted         Attending Surgeon:     Rosette Saini MD      Assistant Surgeon:    Emilie Herrera DO      Anesthesia:    General Endotracheal      Staff:  Surgeon(s):  William Arcso MD  Scrub Person First: Farida Callahan  Scrub Person Second: Janny Ulloa  Anesthesia: Awilda Jordan MD      Estimated Blood Loss:    100 (units unknown) mL      Complications:    None      Specimen:    None      History:    Ms. Alka Carrizales is a 76 y.o. female who was seen recently for the above problem. After a history and physical examination as well as review of imaging studies, I confirmed that the patient had evidence of a Right peritrochanteric femur fracture. We discussed the significance of the injury. We reviewed treatment options. Surgical intervention was recommended. The patient understands that without operative intervention, risks of pulmonary embolism, ulcers, UTI, pneumonia, and death are unacceptably high.  Operative intervention is usually needed for early mobilization and thus aids in the prevention, but not the elimination, of these complications. She is currently medically stable and appropriate for the planned procedure. We had a lengthy conversation about the risks/benefits/alternatives to a surgical intervention. The risks include but are not limited to pain, infection, delayed wound healing, bleeding, blood clot, scarring, cosmetic deformity, future surgery (revision/removal of hardware, hip replacement, etc), damage to soft tissue/vessel/nerve, neuroma/neuritis, nonunion, malunion, failure of hardware/fixation, loss of function, avascular necrosis, limb shortening and/or need for shoe lift, pneumonia, decubitus ulcers, loss of limb, stroke, heart attack, pulmonary embolus, and even death. The patient expressed verbal understanding of these risks and wishes to proceed with surgical intervention. All questions were answered. No guarantees were made or implied. Informed consent was obtained. Operative Note:    The patient was identified in the preoperative holding area by name, MRN, and . The surgical site was verified and marked according to AAOS guidelines. The patient was taken to the operating room on a stretcher. The patient underwent smooth induction of anesthesia. After achieving adequate sedation by the anesthesia team, the patient was then carefully transferred to the fracture table in a supine position and all bony prominences were then padded. A final timeout was held in which the patient's identity, surgical site, procedure, allergies, and antibiotics were verified, and all in the room were in agreement, and thus the procedure began. The Right lower extremity was secured in a fracture boot and the contralateral lower extremity was placed in a leg ramirez, and carefully abducted and flexed. Closed reduction was then performed and visualized under fluoroscopy in the AP and lateral views.  A combination of gentle longitudinal traction, slight adduction, and internal rotation were utilized to reduce the depth and anteversion were confirmed, the proximal jig and guide were used to drill a guidewire for the cephalomedullary fixation, which was measured and found to be 85 mm. The fixation was then carefully inserted. The appropriate position of the fixation was confirmed on orthogonal views of the femoral head. Compression was achieved through the nail, and confirmed using the image intensifier. The set screw was then tightened to the proximal end of the nail. The proximal jig and targeting arm were then removed, and final fluoroscopic images including orthogonal views of the hip and proximal femur were obtained. The distal interlocking screws were then placed via perfect [place] technique, by predrilling, measuring and placing the screws by hand. Copious sterile irrigation was used to rinse the operative sites, and a layered closure was performed using 0 vicryl to close the muscle fascia, 2-0 vicryl and staples, providing appropriate tension to the skin. The skin was cleaned and gently dried. The incisions were then covered with sterile dressings. The patient was aroused from anesthesia without complication, and gently transferred to the bed and then transported to the postoperative area in a stable condition. The patient was noted to have tolerated the procedure well without complication. Postoperative Plan:       WB status:  WBAT with PT  Postop abx:  2 doses IV abx as ordered  DVT ppx:  Early mobilization + medication as prescribed (begin anticoagulation in 12 hrs from end of surgery; SCDs + MASSIEL hose to begin immediately)  GI ppx:  in the hospital  Neurovascular checks:  q 4hrs  Pain control:  Medication as prescribed (dosing and quantity) indicated for acute postoperative pain control  Special concerns:  none. Disposition:  PACU, then admitted to the Internal Medicine service. I will follow the patient in the hospital and then patient will follow up in my office in 2 weeks.      The particulars of surgery as well as the condition of the patient postoperatively were discussed with the patients family thereafter per the patient's wishes.            Chata Carrington MD  Orthopedic Surgery

## 2020-09-15 NOTE — ANESTHESIA POSTPROCEDURE EVALUATION
POST- ANESTHESIA EVALUATION       Pt Name: Uriel Tellez  MRN: [id number]  [place]: 1945  Date of evaluation: 9/14/2020  Time:  10:41 PM      /64   Pulse 94   Temp 97.2 °F (36.2 °C) (Oral)   Resp 12   Ht 5' 6\" (1.676 m)   Wt 180 lb (81.6 kg)   SpO2 94%   BMI 29.05 kg/m²      Consciousness Level  Awake  Cardiopulmonary Status  Stable  Pain Adequately Treated YES  Nausea / Vomiting  NO  Adequate Hydration  YES  Anesthesia Related Complications NONE      Electronically signed by Taras De La Garza MD on 9/14/2020 at 10:41 PM       Department of Anesthesiology  Postprocedure Note    Patient: Uriel Tellez  MRN: [id number]  [place]: 1945  Date of evaluation: 9/14/2020  Time:  10:41 PM     Procedure Summary     Date:  09/14/20 Room / Location:  [address] [place] [address] Ofelia Crane 03 / [facility]: [facility]    Anesthesia Start:  [address] Anesthesia Stop:  2129    Procedure:  FEMUR IM NAIL YARITZA INSERTION (Right Hip) Diagnosis:  (RIGHT HIP FRACTURE)    Surgeon:  Gustavo Tracy MD Responsible Provider:  Taras De La Garza MD    Anesthesia Type:  general ASA Status:  3          Anesthesia Type: general    Shayan Phase I: Shayan Score: 8    Shayan Phase II:      Last vitals: Reviewed and per EMR flowsheets.        Anesthesia Post Evaluation

## 2020-09-15 NOTE — ANESTHESIA POSTPROCEDURE EVALUATION
Department of Anesthesiology  Postprocedure Note    Patient: Ofelia Current  MRN: [id number]  YOB: 1945  Date of evaluation: 9/15/2020  Time:  1:37 PM     Procedure Summary     Date:  09/14/20 Room / Location:  [address] / [address]     Anesthesia Start:  [address] Anesthesia Stop:  2129    Procedure:  FEMUR IM NAIL YARITZA INSERTION (Right Leg Upper) Diagnosis:  (RIGHT HIP FRACTURE)    Surgeon:  Drew Kenney MD Responsible Provider:  Bee Simental MD    Anesthesia Type:  general ASA Status:  3          Anesthesia Type: general    Shayan Phase I: Shayan Score: 8    Shayan Phase II:      Last vitals: Reviewed and per EMR flowsheets. Anesthesia Post Evaluation    Comments: POD #1. Patient seen at bedside. No anesthesia complications reported.

## 2020-09-15 NOTE — PLAN OF CARE
Problem: Skin Integrity:  Goal: Will show no infection signs and symptoms  Description: Will show no infection signs and symptoms  Outcome: Ongoing     Problem: Skin Integrity:  Goal: Absence of new skin breakdown  Description: Absence of new skin breakdown  Outcome: Met This Shift   Skin assessment as charted. No evidence of new skin breakdown. Problem: Falls - Risk of:  Goal: Will remain free from falls  Description: Will remain free from falls  Outcome: Met This Shift  Pt. Free of falls and injuries this shift. Problem: Falls - Risk of:  Goal: Absence of physical injury  Description: Absence of physical injury  Outcome: Met This Shift   No injuries this shift. Patient's safety maintained. Problem: Pain:  Goal: Pain level will decrease  Description: Pain level will decrease  Outcome: Ongoing     Problem: Pain:  Goal: Control of acute pain  Description: Control of acute pain  Outcome: Met This Shift   Adequate pain control achieved this shift. See MAR.

## 2020-09-15 NOTE — PROGRESS NOTES
[facility]: LONDON CARMONA   Occupational Therapy Evaluation  Date: 9/15/20  Patient Name: Holger Cardona       Room: 4159/2598-08  MRN: [id number]  Account: [de-identified]   : 1945  (71 y.o.) Gender: female     Discharge Recommendations: The patient would benefit from an intensive level of therapy after discharge from the facility. They should be able to tolerate 3-hours of Combined OT/PT/ST over 5 days/week or at least 900 minutes of  Combined Therapy over 7 days. Referring Practitioner: Dr. Ofelia Turner  Diagnosis: Open treatment of Right hip intertrochanteric femur fracture with cephalomedullary nail on 20    Treatment Diagnosis: Impaired self-care status. Past Medical History:  has a past medical history of Diverticulosis, Fatty liver, and Hyperlipidemia. Past Surgical History:   has a past surgical history that includes Spine surgery; Tonsillectomy; Appendectomy; Hysterectomy; bladder suspension; Colonoscopy (13); Upper gastrointestinal endoscopy (13); and tympanomastoidectomy (Left, ). Restrictions  Restrictions/Precautions: Fall Risk, Weight Bearing(O2 at 2 L, urethral catheter, peripheral IV right antecubital and left hand, right FWB)  Implants present? : Metal implants(lumbar surgery, screws bilateral feet, right femur IM victorina)  Right Lower Extremity Weight Bearing: (right FWB)  Required Braces or Orthoses?: No     Vitals  Temp: 98.7 °F (37.1 °C)  Pulse: 92  Resp: 10  BP: (!) 106/57  Height: 5' 6\" (167.6 cm)  Weight: 198 lb 6.6 oz (90 kg)  BMI (Calculated): 32.1  Oxygen Therapy  SpO2: (!) 89 %  Pulse Oximeter Device Mode: Continuous  Pulse Oximeter Device Location: Right, Hand  O2 Device: None (Room air)  O2 Flow Rate (L/min): 3 L/min  Level of Consciousness: Alert    Subjective  Subjective: \"We were in the woods chopping trees\" Pt recalls how her fall occurred. Per ED note - This is a 70-year-old female who comes in today.   The patient was walking in the woods with her  and had a trip and non-syncopal fall landing on her right hip. They called the ambulance to extricated her out of the woods. She is not ambulated since. She is complaining of right hip and buttock pain. She did not hit her head she did not lose consciousness she is not on blood thinners. She did not have any chest pain shortness of breath palpitations or lightheadedness prior to the fall. She has no numbness or tingling in her leg. She did not take anything prior to coming to the emergency department.   No history of hip issues in the past.  Overall Orientation Status: Within Normal Limits  Vision  Vision: Impaired  Vision Exceptions: Wears glasses at all times  Hearing  Hearing: Exceptions to [facility]  Hearing Exceptions: Hard of hearing/hearing concerns, Bilateral hearing aid  Social/Functional History  Lives With: Significant other  Type of Home: [facility] [place],[address]: One level(laundry main level)  Home Access: Stairs to enter with rails  Entrance Stairs - Number of Steps: 3  Entrance Stairs - Rails: Right(post on left)  Bathroom Shower/Tub: Tub/Shower unit, Curtain  Bathroom Toilet: Standard  Bathroom Equipment: Tub transfer bench, Grab bars in shower, Grab bars around toilet  Bathroom Accessibility: Walker accessible  Home Equipment: (No DME for ambulation)  ADL Assistance: Independent(stands in shower- does not use tub transfer bench; independent for toileting, bathing and dressing)  Homemaking Assistance: Independent(states she is primary, pushes a grocery cart when shopping)  Homemaking Responsibilities: Yes  Ambulation Assistance: Independent(no device)  Transfer Assistance: Independent  Active : Yes  Mode of Transportation: Impinj  Occupation: Retired  Additional Comments: significan other is retired and able to assist at D/C  Pain Assessment  Pain Assessment: 0-10  Pain Level: 10(3/10 generalized at rest; 10/10 R hip pain with movement)  Patient's Stated Pain Goal: No pain  Pain Type: Acute pain  Pain Location: Hip  Pain Orientation: Right  Pain Descriptors: (\"it's just pain\")  Pain Frequency: Continuous  Clinical Progression: Gradually improving  Response to Pain Intervention: Patient Satisfied    Objective  Vision - Basic Assessment  Prior Vision: Wears glasses all the time   Cognition  Overall Cognitive Status: [facility]   Perception  Overall Perceptual Status: [facility]  Sensation  Overall Sensation Status: [facility](denies)   ADL  Feeding: Setup  Grooming: Supervision, Setup  UE Bathing: Stand by assistance, Setup  LE Bathing: Maximum assistance, Setup, Increased time to complete  UE Dressing: Stand by assistance, Setup  LE Dressing: Dependent/Total  Toileting: Dependent/Total  Additional Comments: ADL scores based on skilled observations and clinical reasoning unless otherwise noted. Pt requires significant assist with self-care tasks, particularly lower body self-care due to impaired activity tolerance secondary to pain from R hip fracture.     UE Function  LUE Strength  Gross LUE Strength: WFL  L Hand General: 4/5  LUE Strength Comment: Shoulder 4/5, elbow 4/5     LUE Tone: Normotonic     LUE AROM (degrees)  LUE AROM : WFL     Left Hand AROM (degrees)  Left Hand AROM: [facility]  RUE Strength  Gross RUE Strength: WFL  R Hand General: 4/5  RUE Strength Comment: Shoulder 4/5, elbow 4/5      RUE Tone: Normotonic     RUE AROM (degrees)  RUE AROM : [facility]     Right Hand AROM (degrees)  Right Hand AROM: [facility]    Fine Motor Skills  Coordination  Movements Are Fluid And Coordinated: Yes     Mobility  Supine to Sit: Maximum assistance       Balance  Sitting Balance: Minimal assistance(minimal weight bearing on R hip while seated)  Standing Balance: (Moderate assist x 2)  Standing Balance  Time: 2-3 minutes  Activity: static standing and pivot to chair  Comment: BUE support with rolling walker  Functional Mobility  Functional - Mobility Device: Rolling Walker  Activity: (~3ft to chair)  Assist Level: (Moderate assist x 2)  Functional Mobility Comments: with BUE support on rolling walker  Bed mobility  Rolling to Left: Maximum assistance(semi rolled upper trunk towards the rail)  Supine to Sit: Maximum assistance  Scooting: Maximal assistance  Comment: dangled at the EOB w/ min x 1     Transfers  Sit to stand: 2 Person assistance, Maximum assistance  Stand to sit: 2 Person assistance, Maximum assistance  Transfer Comments: Moderate verbal cues for hand placement and safety during functional transfer  Functional Activity Tolerance  Functional Activity Tolerance: Tolerates 30 min exercise with multiple rests   Assessment  Performance deficits / Impairments: Decreased functional mobility , Decreased ADL status, Decreased strength, Decreased safe awareness, Decreased endurance, Decreased balance, Decreased high-level IADLs, Decreased posture  Treatment Diagnosis: Impaired self-care status. Prognosis: Good  Decision Making: Medium Complexity  REQUIRES OT FOLLOW UP: Yes  Discharge Recommendations: Patient would benefit from continued therapy after discharge  Activity Tolerance: Patient limited by pain, Patient Tolerated treatment well    Functional Outcome Measures  AM-PAC Daily Activity Inpatient   How much help for putting on and taking off regular lower body clothing?: Total  How much help for Bathing?: A Lot  How much help for Toileting?: Total  How much help for putting on and taking off regular upper body clothing?: A Little  How much help for taking care of personal grooming?: A Little  How much help for eating meals?: A Little  AM-[facility] Inpatient Daily Activity Raw Score: 13  AM-PAC Inpatient ADL T-Scale Score : 32.03  ADL Inpatient CMS 0-100% Score: 63.03  ADL Inpatient CMS G-Code Modifier : CL       Goals  Patient Goals   Patient goals :  To feel better  Short term goals  Time Frame for Short term goals: By discharge  Short term goal 1: Pt will verbalize/demonstrate Good understanding of assistive equipment/durable medical equipment/modified

## 2020-09-15 NOTE — PROGRESS NOTES
[address]   INPATIENT OCCUPATIONAL THERAPY  PROGRESS NOTE  Date: 9/15/2020  Patient Name: Monica Walden      Room: 8788/6213-77  MRN: [id number]    : 1945  (76 y.o.) Gender: female     Discharge Recommendations: The patient would benefit from an intensive level of therapy after discharge from the facility. They should be able to tolerate 3-hours of Combined OT/PT/ST over 5 days/week or at least 900 minutes of  Combined Therapy over 7 days. Referring Practitioner: Dr. Olga Seth  Diagnosis: Open treatment of Right hip intertrochanteric femur fracture with cephalomedullary nail on 20  General  Chart Reviewed: Yes, Orders, Progress Notes, History and Physical, Operative Notes  Patient assessed for rehabilitation services?: Yes  Family / Caregiver Present: No  Referring Practitioner: Dr. Olga Seth  Diagnosis: Open treatment of Right hip intertrochanteric femur fracture with cephalomedullary nail on 20    Restrictions  Restrictions/Precautions: Fall Risk, Weight Bearing(O2 at 2 L, urethral catheter, peripheral IV right antecubital and left hand, right FWB)  Implants present? : Metal implants(lumbar surgery, screws bilateral feet, right femur IM victorina)  Right Lower Extremity Weight Bearing: (right FWB)  Required Braces or Orthoses?: No      Subjective  Subjective: Rox Drew said I did better, but I don't know about that\" Pt states to her visitor at the end of the session. OT provided education regarding Pt's progress from AM treatment to PM treatment.   Patient Currently in Pain: Yes  Pain Level: 10(with movement)  Pain Location: Hip  Pain Orientation: Right  Overall Orientation Status: Within Normal Limits     Pain Assessment  Pain Assessment: 0-10  Pain Level: 10(with movement)  Patient's Stated Pain Goal: No pain  Pain Type: Acute pain  Pain Location: Hip  Pain Orientation: Right  Pain Descriptors: (\"it's just pain\")  Pain Frequency: Intermittent  Clinical Progression: Gradually improving  Response to Pain Intervention: Patient Satisfied    Objective  Cognition  Overall Cognitive Status: WFL  Bed mobility  Rolling to Left: Maximum assistance(semi rolled upper trunk towards the rail)  Supine to Sit: Maximum assistance  Scooting: Maximal assistance  Comment: Pt seated at edge of bed with PTA Chris upon OT entry to room. Per PTA, Pt progressed to Moderate assist x 1 for supine to sit transfer. Pt seated in chair at end of session. Balance  Sitting Balance: Stand by assistance(improved weight bearing on R hip while seated compared to AM)  Standing Balance: (Minimal assist x 2)  Standing Balance  Time: 3-4 minutes  Activity: static standing and mobility  Comment: BUE support with rolling walker  Functional Mobility  Functional - Mobility Device: Rolling Walker  Activity: (~5-7 ft)  Assist Level: (Minimal assist x 2)  Functional Mobility Comments: with BUE support on rolling walker and assist for rolling walker management PRN   ADL  Feeding: Setup  Grooming: Supervision;Setup(to comb hair & brush teeth while seated in chair)  UE Bathing: Stand by assistance;Setup  LE Bathing: Maximum assistance;Setup; Increased time to complete  UE Dressing: Stand by assistance;Setup  LE Dressing: Dependent/Total  Toileting: Dependent/Total  Additional Comments: ADL scores based on skilled observations and clinical reasoning unless otherwise noted. Pt requires significant assist with self-care tasks, particularly lower body self-care due to impaired activity tolerance secondary to pain from R hip fracture.      Transfers  Sit to stand: 2 Person assistance;Minimal assistance  Stand to sit: 2 Person assistance;Minimal assistance  Transfer Comments: Minimal verbal cues for hand placement and safety during functional transfer  Toilet Transfers  Toilet Transfers Comments: Bedside commode present in Pt's room via nursing student at end of session as Pt's pendleton catheter has been removed     Assessment  Performance deficits / Impairments: Decreased functional mobility ; Decreased ADL status; Decreased strength;Decreased safe awareness;Decreased endurance;Decreased balance;Decreased high-level IADLs;Decreased posture  Prognosis: Good  Discharge Recommendations: Patient would benefit from continued therapy after discharge  Activity Tolerance: Patient limited by pain; Patient Tolerated treatment well  Safety Devices in place: Yes  Type of devices: All fall risk precautions in place;Call light within reach; Chair alarm in place;Gait belt;Patient at risk for falls; Left in chair;Nurse notified    Patient Education: OT POC, ARU expectations, progress from AM therapy session to PM therapy session     Learner:patient  Method: demonstration and explanation       Outcome: needs reinforcement and asked questions     Plan  Safety Devices  Safety Devices in place: Yes  Type of devices: All fall risk precautions in place, Call light within reach, Chair alarm in place, Gait belt, Patient at risk for falls, Left in chair, Nurse notified  Plan  Times per week: 5-7 (BID)  Times per day: Twice a day  Current Treatment Recommendations: Self-Care / ADL, Home Management Training, Strengthening, Balance Training, Functional Mobility Training, Endurance Training, Pain Management, Safety Education & Training, Patient/Caregiver Education & Training, Equipment Evaluation, Education, & procurement      Goals  Short term goals  Time Frame for Short term goals: By discharge  Short term goal 1: Pt will verbalize/demonstrate Good understanding of assistive equipment/durable medical equipment/modified techniques for increased independence with self-care and mobility. Short term goal 2: Pt will perform bed mobility with Moderate assist to sit edge of bed for 10+ minutes unsupported while engaging in functional activity of choice. Short term goal 3: Pt will perform stand pivot transfer to bedside commode/chair with Moderate assist, rolling walker, and Good safety.   Short term goal 4: Pt will perform upper body bathing/dressing with setup and lower body bathing/dressing with Moderate assist.  Short term goal 5: Pt will actively participate in 20+ minutes of therapeutic exercise/functional activities to promote increased independence with self-care and mobility.     OT Individual Minutes  Time In: 3441  Time Out: 2[employer]  Minutes: 21      Electronically signed by Anam De La Curz OT on 9/15/20 at 3:43 PM EDT

## 2020-09-16 PROCEDURE — 1200000000 HC SEMI PRIVATE

## 2020-09-16 PROCEDURE — 99024 POSTOP FOLLOW-UP VISIT: CPT | Performed by: ORTHOPAEDIC SURGERY

## 2020-09-16 PROCEDURE — 99232 SBSQ HOSP IP/OBS MODERATE 35: CPT | Performed by: INTERNAL MEDICINE

## 2020-09-16 PROCEDURE — 97116 GAIT TRAINING THERAPY: CPT

## 2020-09-16 PROCEDURE — 97110 THERAPEUTIC EXERCISES: CPT

## 2020-09-16 PROCEDURE — 97535 SELF CARE MNGMENT TRAINING: CPT

## 2020-09-16 PROCEDURE — 6370000000 HC RX 637 (ALT 250 FOR IP): Performed by: ORTHOPAEDIC SURGERY

## 2020-09-16 PROCEDURE — 6360000002 HC RX W HCPCS: Performed by: ORTHOPAEDIC SURGERY

## 2020-09-16 PROCEDURE — 2580000003 HC RX 258: Performed by: ORTHOPAEDIC SURGERY

## 2020-09-16 RX ADMIN — OXYCODONE HYDROCHLORIDE 10 MG: 10 TABLET ORAL at 01:57

## 2020-09-16 RX ADMIN — Medication 10 ML: at 07:37

## 2020-09-16 RX ADMIN — ATORVASTATIN CALCIUM 10 MG: 10 TABLET, FILM COATED ORAL at 17:28

## 2020-09-16 RX ADMIN — Medication 1 LOZENGE: at 20:06

## 2020-09-16 RX ADMIN — OXYCODONE HYDROCHLORIDE 10 MG: 10 TABLET ORAL at 11:42

## 2020-09-16 RX ADMIN — ENOXAPARIN SODIUM 40 MG: 40 INJECTION SUBCUTANEOUS at 07:34

## 2020-09-16 RX ADMIN — OXYCODONE HYDROCHLORIDE 10 MG: 10 TABLET ORAL at 19:59

## 2020-09-16 RX ADMIN — CALCIUM 500 MG: 500 TABLET ORAL at 07:34

## 2020-09-16 RX ADMIN — OXYCODONE HYDROCHLORIDE 10 MG: 10 TABLET ORAL at 06:30

## 2020-09-16 RX ADMIN — ESCITALOPRAM OXALATE 10 MG: 10 TABLET ORAL at 07:34

## 2020-09-16 RX ADMIN — LOSARTAN POTASSIUM 50 MG: 50 TABLET, FILM COATED ORAL at 07:34

## 2020-09-16 RX ADMIN — Medication 10 ML: at 19:59

## 2020-09-16 RX ADMIN — ASPIRIN 81 MG: 81 TABLET, COATED ORAL at 07:34

## 2020-09-16 RX ADMIN — MULTIPLE VITAMINS W/ MINERALS TAB 1 TABLET: TAB at 07:34

## 2020-09-16 ASSESSMENT — PAIN DESCRIPTION - FREQUENCY
FREQUENCY: INTERMITTENT
FREQUENCY: CONTINUOUS

## 2020-09-16 ASSESSMENT — PAIN DESCRIPTION - PAIN TYPE
TYPE: SURGICAL PAIN
TYPE: SURGICAL PAIN;ACUTE PAIN
TYPE: SURGICAL PAIN
TYPE: SURGICAL PAIN;ACUTE PAIN
TYPE: SURGICAL PAIN
TYPE: SURGICAL PAIN

## 2020-09-16 ASSESSMENT — PAIN SCALES - GENERAL
PAINLEVEL_OUTOF10: 10
PAINLEVEL_OUTOF10: 0
PAINLEVEL_OUTOF10: 3
PAINLEVEL_OUTOF10: 0
PAINLEVEL_OUTOF10: 0
PAINLEVEL_OUTOF10: 8
PAINLEVEL_OUTOF10: 5
PAINLEVEL_OUTOF10: 0
PAINLEVEL_OUTOF10: 7
PAINLEVEL_OUTOF10: 10
PAINLEVEL_OUTOF10: 9
PAINLEVEL_OUTOF10: 0
PAINLEVEL_OUTOF10: 7

## 2020-09-16 ASSESSMENT — PAIN DESCRIPTION - LOCATION
LOCATION: HIP;LEG
LOCATION: HIP;LEG
LOCATION: HIP
LOCATION: HIP;LEG
LOCATION: HIP;LEG
LOCATION: HIP
LOCATION: HIP;LEG
LOCATION: HIP;LEG

## 2020-09-16 ASSESSMENT — PAIN DESCRIPTION - ORIENTATION
ORIENTATION: RIGHT

## 2020-09-16 ASSESSMENT — PAIN DESCRIPTION - PROGRESSION: CLINICAL_PROGRESSION: GRADUALLY IMPROVING

## 2020-09-16 ASSESSMENT — PAIN DESCRIPTION - DESCRIPTORS
DESCRIPTORS: SORE
DESCRIPTORS: SORE
DESCRIPTORS: ACHING
DESCRIPTORS: ACHING

## 2020-09-16 NOTE — PLAN OF CARE
Problem: Skin Integrity:  Goal: Will show no infection signs and symptoms  Description: Will show no infection signs and symptoms  9/16/2020 1403 by Uli Arce RN  Outcome: Ongoing  Note: Skin assessment complete. Waffle mattress in place. Coccyx reddened. Sensicare applied PRN. Turned and repositioned every two hours. Area kept free from moisture. Proper nourishment and fluids encouraged, as appropriate. Will continue to monitor for additional needs and changes in skin breakdown. 9/16/2020 0609 by Catrachita Roberts RN  Outcome: Ongoing  Note: Monitor incision for wound healing  Goal: Absence of new skin breakdown  Description: Absence of new skin breakdown  9/16/2020 0609 by Catrachita Roberts RN  Outcome: Ongoing     Problem: Falls - Risk of:  Goal: Will remain free from falls  Description: Will remain free from falls  9/16/2020 1403 by Uli Arce RN  Outcome: Met This Shift  Note: Pt assessed as a fall risk this shift. Remains free from falls and accidental injury at this time. Fall precautions in place, including falling star sign and fall risk band on pt. Floor free from obstacles, and bed is locked and in lowest position. Adequate lighting provided. Pt encouraged to call before getting OOB for any need. Bed alarm activated. Will continue to monitor needs during hourly rounding, and reinforce education on use of call light. 9/16/2020 0609 by Catrachita Roberts RN  Outcome: Ongoing  Note: Patient makes no attempts to get out of bed per self, alert and oriented and calls for assistance   Goal: Absence of physical injury  Description: Absence of physical injury  9/16/2020 0609 by Catrachita Roberts RN  Outcome: Ongoing     Problem: Pain:  Goal: Pain level will decrease  Description: Pain level will decrease  9/16/2020 1403 by Uli Arce RN  Outcome: Ongoing  Note: Pt medicated with pain medication prn. Assessed all pain characteristics including level, type, location, frequency, and onset. Non-pharmacologic interventions offered to pt as well. Pt states pain is tolerable at this time. Will continue to monitor.     9/16/2020 0609 by Chichi Rey RN  Outcome: Ongoing  Note: Patient medicated for pain as ordered with effectivenss   Goal: Control of acute pain  Description: Control of acute pain  9/16/2020 0609 by Chichi Rey RN  Outcome: Ongoing  Goal: Control of chronic pain  Description: Control of chronic pain  9/16/2020 0609 by Chichi Rey RN  Outcome: Ongoing     Problem: Musculor/Skeletal Functional Status  Goal: Highest potential functional level  9/16/2020 1403 by Tasha Richmond RN  Outcome: Ongoing  Note: PT/OT working with patient BID.   9/16/2020 0609 by Chichi Rey RN  Outcome: Ongoing  Goal: Absence of falls  9/16/2020 0609 by Chichi Rey RN  Outcome: Ongoing     Problem: Musculor/Skeletal Functional Status  Goal: Highest potential functional level  9/16/2020 1403 by Tasha Richmond RN  Outcome: Ongoing  9/16/2020 0609 by Chichi Rey RN  Outcome: Ongoing  Goal: Absence of falls  9/16/2020 0609 by Chichi Rey RN  Outcome: Ongoing

## 2020-09-16 NOTE — PROGRESS NOTES
[address]    INPATIENT OCCUPATIONAL THERAPY  PROGRESS NOTE  Date: 2020  Patient Name: Vargas Mckeon      Room: 6715/6524-81  MRN: [id number]    : 1945  (76 y.o.) Gender: female     Discharge Recommendations: The patient would benefit from an intensive level of therapy after discharge from the facility. They should be able to tolerate 3-hours of Combined OT/PT/ST over 5 days/week or at least 900 minutes of  Combined Therapy over 7 days. Referring Practitioner: Dr. Al Yan  Diagnosis: Open treatment of Right hip intertrochanteric femur fracture with cephalomedullary nail on 20  General  Chart Reviewed: Yes, Orders, Progress Notes, History and Physical, Operative Notes  Patient assessed for rehabilitation services?: Yes  Response to previous treatment: Patient with no complaints from previous session  Family / Caregiver Present: No  Referring Practitioner: Dr. Al Yan  Diagnosis: Open treatment of Right hip intertrochanteric femur fracture with cephalomedullary nail on 20    Restrictions  Restrictions/Precautions: Fall Risk, Weight Bearing(Right FWB)  Implants present? : Metal implants(lumbar surgery, screws bilateral feet, right febur IM victorina)  Right Lower Extremity Weight Bearing: (right Full Weight Bearing)  Required Braces or Orthoses?: No      Subjective  Subjective: \"I don't do anything half-assed\" Pt is motivated to engage in therapy this date. Comments: Co-treat with NEREYDA Choe to address Pt's self-care and functional mobility this date.   Patient Currently in Pain: Yes  Pain Level: 3  Pain Location: Hip;Leg  Pain Orientation: Right        Pain Assessment  Pain Assessment: 0-10  Pain Level: 3  Patient's Stated Pain Goal: No pain  Pain Type: Surgical pain  Pain Location: Hip, Leg  Pain Orientation: Right  Pain Descriptors: Aching  Pain Frequency: Continuous  Clinical Progression: Gradually improving  Response to Pain Intervention: Patient Satisfied    Objective  Cognition  Overall Cognitive Status: WFL  Bed mobility  Supine to Sit: 2 Person assistance; Moderate assistance  Scooting: Moderate assistance(to Edge of Bed)  Comment: Pt seated in chair at end of PM session  Balance  Sitting Balance: Stand by assistance(Moderate verbal cues for weight shifting onto R hip)  Standing Balance: Minimal assistance  Standing Balance  Time: AM: 15 minutes, 2-3 minutes x 1; PM: 12 minutes, 7 minutes  Activity: AM: mobility, toileting tasks; PM: mobility, toileting tasks  Comment: 0-2 UE support via rolling walker  Functional Mobility  Functional - Mobility Device: Rolling Walker  Activity: To/from bathroom(4.5 ft AM; 6 ft x 2 PM)  Assist Level: (AM: Moderate assist x 2; PM: Minimal assist x 2 (progress to Minimal assist x 1 at end of PM session)  Functional Mobility Comments: Moderate verbal encouragement   ADL  Feeding: Setup  Grooming: Stand by assistance;Setup;Verbal cueing(while seated edge of bed with cues for midline sitting)  UE Bathing: Stand by assistance;Setup  LE Bathing: Maximum assistance;Setup; Increased time to complete  UE Dressing: Stand by assistance;Setup  LE Dressing: Maximum assistance;Setup; Increased time to complete(clothing management with CGA; Assist all other tasks)  Toileting: Minimal assistance(Minimal assist with clothing management; CGA with personal h)  Additional Comments: In AM, OT facilited Pt engagement in grooming tasks while seated edge of bed with stand by assist and Moderate verbal cues for sitting in midline and weight shifting onto R hip. OT facilitated Pt engagement in toileting tasks this date. Pt required significant increased time to complete functional mobility to/from bedside commode in AM and toilet in PM. In AM, Pt attempted to ambulate to toilet with Maximum verbal encouragement, however ambulated with Moderate Assist x 2 and rolling walker 4.5 ft in 15 minutes and unable to ambulate remaining 1.5 ft due to pain. Bedside commode brought behind Pt for toileting. OT faciltiated Pt donned pull-up brief with education regarding threading LLE first. Pt required assist to thread BLE this date. Pt engaged in clothing management with Minimal assist to pull pull-up over hips. In PM, OT utilized music during ambulation to provide distraction and opportunity for Pt to practice mindfulness with ambulation. Pt reports feeling anxiety and fear regarding pain which hinders her ability to ambulate and participate. Rebel Vázquez played per Pt's request. Pt engaged in functional mobility to toilet with Minimal Assist x 2 with rolling walker to ambulate 6 ft in 12 minutes to toilet. After voiding, Pt engaged in functional mobility with Minimal Assist x 1 and rolling walker to ambulate to chair in 7 minutes. Pt demonstrates improved tolerance to self-care, mobility and increased independence with tasks. Continue OT POC. Transfers  Sit to stand: 2 Person assistance; Moderate assistance(progressing to Minimal assist x 2 in PM)  Stand to sit: 2 Person assistance; Moderate assistance(progressing to Minimal assist x 2 in PM)  Transfer Comments: Minimal verbal cues for hand placement and safety during functional transfer  Toilet Transfers  Toilet - Technique: Ambulating  Equipment Used: Grab bars  Toilet Transfer: 2 Person assistance;Minimal assistance  Toilet Transfers Comments: in PM; bedside commode in AM    Assessment  Performance deficits / Impairments: Decreased functional mobility ; Decreased ADL status; Decreased strength;Decreased safe awareness;Decreased endurance;Decreased balance;Decreased high-level IADLs;Decreased posture  Prognosis: Good  Discharge Recommendations: Patient would benefit from continued therapy after discharge  Activity Tolerance: Patient limited by pain; Patient Tolerated treatment well  Safety Devices in place: Yes  Type of devices: All fall risk precautions in place;Call light within reach; Chair alarm in place;Gait belt;Patient at risk for falls; Left in chair;Nurse notified    Patient Education: OT POC, safety with self-care tasks, threading RLE into pull-up first, toileting tasks, purpose of OT, safety with functional mobility     Learner:patient  Method: demonstration and explanation       Outcome: needs reinforcement and asked questions     Plan  Safety Devices  Safety Devices in place: Yes  Type of devices: All fall risk precautions in place, Call light within reach, Chair alarm in place, Gait belt, Patient at risk for falls, Left in chair, Nurse notified  Plan  Times per week: 5-7 (BID)  Times per day: Twice a day  Current Treatment Recommendations: Self-Care / ADL, Home Management Training, Strengthening, Balance Training, Functional Mobility Training, Endurance Training, Pain Management, Safety Education & Training, Patient/Caregiver Education & Training, Equipment Evaluation, Education, & procurement      Goals  Short term goals  Time Frame for Short term goals: By discharge  Short term goal 1: Pt will verbalize/demonstrate Good understanding of assistive equipment/durable medical equipment/modified techniques for increased independence with self-care and mobility. Short term goal 2: Pt will perform bed mobility with Moderate assist to sit edge of bed for 10+ minutes unsupported while engaging in functional activity of choice. Short term goal 3: Pt will perform stand pivot transfer to bedside commode/chair with Moderate assist, rolling walker, and Good safety. Short term goal 4: Pt will perform upper body bathing/dressing with setup and lower body bathing/dressing with Moderate assist.  Short term goal 5: Pt will actively participate in 20+ minutes of therapeutic exercise/functional activities to promote increased independence with self-care and mobility.      09/16/20 1023 09/16/20 1429   OT Individual Minutes   Time In 1023 1429   Time Out 1110 1502   Minutes 47 33   Time Code Minutes    Timed Code Treatment

## 2020-09-16 NOTE — PROGRESS NOTES
[address]   Physical Therapy Progress Note    Date: 20  Patient Name: Monica Walden       Room: 8486/4425-05  MRN: [id number]   Account: [de-identified]   : 1945  (71 y.o.)   Gender: female     Discharge Recommendations   Patient would benefit from continued therapy after discharge  Equipment Needed: Yes  Mobility Devices: Derral Balk: Rolling    Referring Practitioner: Dr. Olga Seth  Diagnosis: Open treatment of Right hip intertrochanteric femur fracture with cephalomedullary nail on 20  Restrictions/Precautions: Fall Risk, Weight Bearing(Right FWB)  Implants present? : Metal implants(lumbar surgery, screws bilateral feet, right febur IM victorina)  Right Lower Extremity Weight Bearing: (right Full Weight Bearing)   Past Medical History:  has a past medical history of Diverticulosis, Fatty liver, and Hyperlipidemia. Past Surgical History:   has a past surgical history that includes Spine surgery; Tonsillectomy; Appendectomy; Hysterectomy; bladder suspension; Colonoscopy (13); Upper gastrointestinal endoscopy (13); tympanomastoidectomy (Left, ); and Femur fracture surgery (Right, 2020). Additional Pertinent Hx: Pt presents to ED from home via EMS. Pt stated she was walking in the woods after drinking a ''few twisted teas' and tripped and fell onto her right hip. Restrictions/Precautions  Restrictions/Precautions: Fall Risk;Weight Bearing(Right FWB)  Required Braces or Orthoses?: No  Implants present? : Metal implants(lumbar surgery, screws bilateral feet, right febur IM victorina)  Lower Extremity Weight Bearing Restrictions  Right Lower Extremity Weight Bearing: (right Full Weight Bearing)    Subjective: Pt reports having increased difficulty this morning when getting up to the bedside commode  Comments: ADRY Grewal reports pt had to get up 3 times to use the bedside commode. By the time of the 3rd trip to the [facility] pt had so much difficulty getting up.  Pt very discouraged by this. Pt is pleasant and very cooperative. Pain Assessment: 0-10  Pain Level: 10(with change in position & ambulating with PT)  Pain Type: Surgical pain;Acute pain  Pain Location: Hip  Pain Orientation: Right  Non-Pharmaceutical Pain Intervention(s): Ambulation/Increased Activity; Distraction;Repositioned  Response to Pain Intervention: Patient Satisfied                Bed Mobility:   Bed Mobility  Rolling: Moderate assistance;Rolling Left(assisting right arm over to rail, and rolling hip)  Supine to Sit: Moderate assistance(x2, pt needing assist with torso and bilat LE)  Sit to Supine: Unable to assess(pt left sitting in bedside chair during AM tx)  Scooting: Moderate assistance(to Edge of Bed)  Bed mobility  Scooting: Moderate assistance(to Edge of Bed)    Transfers:  Sit to Stand: Moderate Assistance;2 Person Assistance  Stand to sit: Moderate Assistance;2 Person Assistance  Bed to Chair: Moderate assistance;2 Person Assistance           WB Status: right FWB    Ambulation 1  Surface: level tile  Device: Rolling Walker  Assistance:  Moderate assistance;2 Person assistance  Quality of Gait: antalgic gait, assisting with pt weight shifting and advancing bilat LE, pt taking almost 15min to complete 4.5ft, pt high anxiety about pain  Gait Deviations: Slow Hyacinth;Decreased step length;Decreased step height;Shuffles  Distance: 4.5ft  Comments: 15 minutes to complete 4.5ft, max cuing for weight shifting and advancing bilat LE    Ambulation 2  Surface - 2: level tile  Device 2: Rolling Walker  Assistance 2: Minimal assistance;2 Person assistance  Quality of Gait 2: improved weight shifting and advancing bilat LE, still very anxious and fearful of pain however pt reports pt is better this during PM tx  Gait Deviations: Slow Hyacinth;Decreased step length;Decreased step height  Distance: 6ft  Comments: 12.5 minutes to complete 6ft, max cues for keeping feet moving and advancing Rolling Walker    Ambulation 3  Surface - 3: level tile  Device 3: [address] [facility] 3: Minimal assistance(x1 + 1 SBA for safety)  Quality of Gait 3: improve pace and improved confidence in movements, pt able to improve time and movement  Gait Deviations: Slow Hyacinth;Decreased step length;Decreased step height  Distance: 6ft  Comments: 7 minutes to complete 6ft, cues for sequencing and moving Rolling Walker and to avoid running Rolling Walker into objects        Stairs/Curb  Stairs?: No(not ready at this time)          Posture: Good  Sitting - Static: Fair;+  Sitting - Dynamic: Fair  Standing - Static: Fair  Standing - Dynamic: Fair;-  Comments: Seated Edge of Bed, Standing with Rolling Walker     Other exercises?: Yes  Other exercises 1: Bed Mobility using gait belt as leg  also showing pt how to use good leg to lift surgical leg, pt having increased pain therefore pt is still a Mod Ax2 to move to [employer] of Bed  Other exercises 2: Dangle Edge of Bed with weight shifting onto left hip to improve tolerance, pt educated to put right foot flat on the floor, pt having increased pain this date. Other exercises 3: Sit to Stand and Stand to Sit x2, from Edge of Bed and from Bedside commode  Other exercises 4: Sit to Stand x2 from low toilet and low chair during PM tx  Other exercises 5: Standing to perform pericare AM & PM, wide JOHNATHON and able to maintain standing without UE support           Activity Tolerance: Patient limited by pain  PT Equipment Recommendations  Equipment Needed: Yes  Mobility Devices: [employer]: Rolling       Assessment  Activity Tolerance: Patient limited by pain   Body structures, Functions, Activity limitations: Decreased functional mobility ; Increased pain;Decreased strength;Decreased ROM; Decreased safe awareness;Decreased endurance  Prognosis: Excellent  Discharge Recommendations: Patient would benefit from continued therapy after discharge     Type of devices: Call light within reach;Gait belt;Left in chair;Chair alarm in place;Nurse notified(ADRY Sal informed)     Plan  Times per week: BID x 3 days  Times per day: Daily  Current Treatment Recommendations: Strengthening, Safety Education & Training, ROM, Balance Training, Patient/Caregiver Education & Training, Equipment Evaluation, Education, & procurement, Functional Mobility Training, Transfer Training, Gait Training, Stair training    Patient Education  New Education Provided:  Plan of Care  Learner:patient  Method: demonstration and explanation       Outcome: needs reinforcement     Goals  Short term goals  Time Frame for Short term goals: BID x 3 days  Short term goal 1: pt to tolerate 1/2 hour of therapuetic exercise and activity  Short term goal 2: pt to demonstrate good technique for LE ROM and strengthening exercises  Short term goal 3: pt to demonstrate improved strength by 1/2 MMG bilateral LEs  Short term goal 4: pt to demonstrate rolling in bed and transfers supine <> sit w/ min x 1 for position change  Short term goal 5: pt to demonstrate transfers sit <> stand and bed <> chair using w walker right FWB w/ min x 2  Short term goal 6: pt to demonstrate gait 20-30' using w walker right FWB w/ min x 2 and W/C follow  Short term goal 7: pt to demonstrate good standing and dynamic balance  using w walker right FWB  Short term goal 8: pt to advance to ascend/ descend 4\" platform step using wheeled walker w/ mod x 2    PT Individual Minutes  Time In: 3168  Time Out: 1502  Minutes: 33    Electronically signed by Harleen Portillo PTA on 9/16/20 at 4:16 PM EDT

## 2020-09-16 NOTE — PROGRESS NOTES
Progress Note    Patient:  Mark Raymundo  YOB: 1945     76 y.o. female      Subjective:  Patient seen and examined in the hospital. No complaints or concerns. Pain controlled. Denies issue overnight. Objective:   Vitals:    09/15/20 2000   BP: (!) 110/47   Pulse: 80   Resp: 16   Temp: 97.8 °F (36.6 °C)   SpO2: 92%     Gen: NAD, awake, alert  MSK:  Dressing changed for exam, incision clean/dry/intact  Toes warm/well perfused  Compartments of thigh/leg soft/compressible  Grossly NVI distally      Labs:    Recent Labs     09/13/20  2150 09/15/20  0540   WBC 8.8 11.5*   HGB 14.9 12.2   HCT 45.1 36.8    186   INR 0.9  --     136   K 4.0 4.4   BUN 9 11   CREATININE 0.72 0.83   GLUCOSE 128* 151*        Lab Results   Component Value Date    VITD25 40.2 12/10/2018       Impression/plan:   76 y.o. female 2 Days Post-Op s/p Right hip IMN, doing well overall    -Medical management   -WBAT   -Postop abx 2 doses --completed  -Pain control  -DVT ppx:  Early mobilization + chem ppx for 6 weeks (signed scrip in chart)  -Ice PRN (15-20 mins on,  mins off)  -Incentive Spirometry use  -Dry dressing changes every day or every other day PRN, ok to reinforce dressings PRN   -PT/OT  -Dispo:  Acute rehab; [id number] to be discharged from an Ortho perspective, pending insurance  -Signed prescriptions in chart for discharge:  pain meds + anticoagulation (Lovenox)  -F/u in office in 2 weeks (call office in 1-3 days to confirm appt);  Ortho to sign off, please page with any questions

## 2020-09-16 NOTE — PROGRESS NOTES
[facility] 52 Internal Medicine    Progress Note  Chart Reviewed: Yes  Additional Pertinent Hx: Pt presents to ED from home via EMS. Pt stated she was walking in the woods after drinking a ''few twisted teas' and tripped and fell onto her right hip. Family / Caregiver Present: Yes(SO arrived during middle of tx.)  Referring Practitioner: Dr. Cathie Chand  Comments: ADRY Alba reports pt had to get up 3 times to use the bedside commode. By the time of the 3rd trip to the [facility] pt had so much difficulty getting up. Pt very discouraged by this. Pt is pleasant and very cooperative. 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 *      Interval History Status: improved. 9/16/20    · Postop progressing well  · Pre-CERT in progress for possible transfer to acute rehab  · Blood pressure is satisfactory labs are good   Vitals:    09/16/20 0430 09/16/20 0745 09/16/20 0801 09/16/20 1130   BP:  (!) 118/41  (!) 121/48   Pulse:  79 79 82   Resp:  16  16   Temp:  98.3 °F (36.8 °C)  99.7 °F (37.6 °C)   TempSrc:  Oral  Oral   SpO2:  91%     Weight: 179 lb 14.3 oz (81.6 kg)      Height:     1.        URINE ANALYSIS: No results found for: LABURIN     CBC:  Lab Results   Component Value Date    WBC 11.5 09/15/2020    HGB 12.2 09/15/2020     09/15/2020     01/27/2012        BMP:    Lab Results   Component Value Date    NA 136 09/15/2020    K 4.4 09/15/2020     09/15/2020    CO2 24 09/15/2020    BUN 11 09/15/2020    CREATININE 0.83 09/15/2020    GLUCOSE 151 09/15/2020    GLUCOSE 96 01/27/2012      LIVER PROFILE:  Lab Results   Component Value Date    ALT 21 12/27/2019    AST 15 12/27/2019    PROT 6.9 12/27/2019    BILITOT 0.52 12/27/2019    BILIDIR <0.08 01/04/2016    LABALBU 4.2 12/27/2019    LABALBU 4.3 01/27/2012      2.     3.           Patient had surgery      Attending Surgeon: Gamaliel Carver M.D.                           PCP: MIGUEL Grider CNP           Preoperative Diagnosis:   4. Right closed peritrochanteric hip fracture  5. Body mass index is 29.05 kg/m². Postoperative Diagnosis:   4. same     Procedures Performed:  (9/14/2020)  1. Open treatment of Right hip intertrochanteric femur fracture with cephalomedullary nail (CPT code 61359)        Implants:    Tess Gamma Nail 11 mm 125-degree  Implant Name Type Inv.  Item Serial No.  Lot No. LRB No. Used Action   SCREW LK FTHRD ST T2 [id number] Screw/Plate/Nail/Laurent SCREW LK FTHRD ST T2 5X40MM   TESS: ORTHOPA[employer] [id number] Right 1 Implanted                   Postop stable  Started on oxygen for now O2 sat of 89%  He has been given advice as to deep breathing exercises 5 breaths  Started on incentive spirometry    Patient had a urinary retention yesterday prior to the surgery Delgado catheter was put in for more than a liter residual urine    She does have a history of urge incontinence for which multiple different treatments were used without benefit including nerve stimulator  However no history of urinary retention in the past    We will give her a voiding trial today  If he feels a voiding trial then urology will be called and    She was noted to have a parotid mass   1.7 cm mass  Dr. Saeed Wright saw the patient yesterday and recommended fine-needle aspiration biopsy  Left parotid mass            - Associated moderate intermittent sharp pain only - Size fluctuation described over the past several years, now persistent unchanged mass            - CT neck with contrast suggested a 1.7 cm primary parotid mass            - Outpatient FNA recommended                      recommend Dr. Daniel Javier in our office post-FNA for testing review, further recommendations                      definitive therapy may require superficial parotidectomy            - No immediate intervention required, no immediate directed therapies available            - Discussed with Mrs. Rowena Kent, all questions answered                      she understands she will need to contact our office when able to arrange outpatient FNA            - Please call with any questions          Significant last 24 hr data reviewed ;   Vitals:    09/16/20 0430 09/16/20 0745 09/16/20 0801 09/16/20 1130   BP:  (!) 118/41  (!) 121/48   Pulse:  79 79 82   Resp:  16  16   Temp:  98.3 °F (36.8 °C)  99.7 °F (37.6 °C)   TempSrc:  Oral  Oral   SpO2:  91%     Weight: 179 lb 14.3 oz (81.6 kg)      Height:          No results found for this or any previous visit (from the past 24 hour(s)). No results for input(s): POCGLU in the last 72 hours. Xr Femur Right (min 2 Views)    Result Date: 9/14/2020  EXAMINATION: SPOT FLUOROSCOPIC IMAGES 9/14/2020 9:22 pm TECHNIQUE: Fluoroscopy was provided by the radiology department for procedure. Radiologist was not present during examination. FLUOROSCOPY DOSE AND TYPE OR TIME AND EXPOSURES: 243.7 seconds. 31.51 mGy COMPARISON: 09/13/2020 HISTORY: ORDERING SYSTEM PROVIDED HISTORY: Femur victorina TECHNOLOGIST PROVIDED HISTORY: Femur victorina Reason for Exam: right femur victorina Acuity: Acute Type of Exam: Initial Intraprocedural imaging. FINDINGS: 6 spot images of the femur were obtained. Intramedullary victorina and screw fixation of the right femur. Intraprocedural fluoroscopic spot images as above. See separate procedure report for more information. Xr Femur Right (min 2 Views)    Result Date: 9/14/2020  EXAMINATION: 2 XRAY VIEWS OF THE RIGHT FEMUR 9/13/2020 11:54 pm COMPARISON: Pelvic/hip x-ray 09/13/2020. HISTORY: ORDERING SYSTEM PROVIDED HISTORY: femur fracture TECHNOLOGIST PROVIDED HISTORY: femur fracture Reason for Exam: femur fracture Acuity: Acute Type of Exam: Subsequent/Follow-up Mechanism of Injury: Fall, f/u rt proximal femur fracture. Relevant Medical/Surgical History: Fall, f/u rt proximal femur fracture. FINDINGS: Redemonstration of the right proximal femur/hip fracture. Fracture appears to be just along the superior margin of the lesser trochanter. Remainder of the femur is otherwise intact. Mild degenerate changes of the knee and hip. Soft tissues are unremarkable. Right proximal femur fracture left hip fracture, otherwise no additional femur fracture identified. Fl Less Than 1 Hour    Result Date: 9/14/2020  Radiology exam is complete. No Radiologist dictation. Please follow up with ordering provider. Xr Chest Portable    Result Date: 9/13/2020  EXAMINATION: ONE XRAY VIEW OF THE CHEST 9/13/2020 10:14 pm COMPARISON: None. HISTORY: ORDERING SYSTEM PROVIDED HISTORY: fall TECHNOLOGIST PROVIDED HISTORY: fall Reason for Exam: fall Acuity: Acute Type of Exam: Initial Mechanism of Injury: Fall. ..  preop CXR Relevant Medical/Surgical History: Fall. ..  preop CXR FINDINGS: The heart is normal in size and configuration. The mediastinal contours are within normal limits. The lungs are well aerated. The pleural surfaces are normal and no evidence of a pleural effusion is seen. Bones and soft tissues are unremarkable.      Unremarkable single upright portable AP view of the chest.     Xr Hip 2-3 Vw W Pelvis Right    Result Date: 9/13/2020  EXAMINATION: ONE XRAY VIEW OF THE PELVIS AND TWO XRAY VIEWS RIGHT HIP 9/13/2020 10:13 pm COMPARISON: CT and pelvis 05/28/2013 HISTORY: ORDERING SYSTEM PROVIDED HISTORY: pain TECHNOLOGIST PROVIDED alert, cooperative and no distress  Mental Status:  oriented to person, place and time and normal affect  Lungs:  clear to auscultation bilaterally, normal effort  Heart:  regular rate and rhythm, no murmur  Abdomen:  soft, nontender, nondistended, normal bowel sounds, no masses, hepatomegaly, splenomegaly  Extremities:  no edema, redness, tenderness in the calves  Skin:  no gross lesions, rashes, induration  Status post right hip surgery  Patient has indwelling Delgado at this time for urinary retention    Assessment:        Primary Problem  Other fracture of right femur, initial encounter for closed fracture [facility])    Active Hospital Problems    Diagnosis Date Noted    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 It is post right hip surgery as described above  2. Mild hypoxia encouraged to do deep breathing and incentive spirometry  3. Urinary retention prior to surgery has Delgado catheter  4. Past history of urge incontinence complicated  5. Hypertension control good    Medications: Allergies:     Allergies   Allergen Reactions    Adhesive Tape Itching    Lisinopril Other (See Comments)     cough       Current Meds:   Scheduled Meds:    sodium chloride flush  10 mL Intravenous 2 times per day    enoxaparin  40 mg Subcutaneous Daily    aspirin  81 mg Oral Daily    calcium elemental  500 mg Oral Daily    escitalopram  10 mg Oral Daily    losartan  50 mg Oral Daily    therapeutic multivitamin-minerals  1 tablet Oral Daily    atorvastatin  10 mg Oral QPM    ceFAZolin  2 g Intravenous Once     Continuous Infusions:     PRN Meds: benzocaine-menthol, sodium chloride flush, oxyCODONE, oxyCODONE, morphine, hydrALAZINE       Francisco Banks,

## 2020-09-16 NOTE — PROGRESS NOTES
SW spoke to the [facility] [place]. Pt was denied for ARU. SW sent referral to [facility] per Pt's request. Addendum: JOSESITOΤSARAHYΟJOSESITO started pre-cert.

## 2020-09-16 NOTE — PLAN OF CARE
Problem: Skin Integrity:  Goal: Will show no infection signs and symptoms  Description: Will show no infection signs and symptoms  Outcome: Ongoing  Note: Monitor incision for wound healing  Goal: Absence of new skin breakdown  Description: Absence of new skin breakdown  Outcome: Ongoing     Problem: Falls - Risk of:  Goal: Will remain free from falls  Description: Will remain free from falls  Outcome: Ongoing  Note: Patient makes no attempts to get out of bed per self, alert and oriented and calls for assistance   Goal: Absence of physical injury  Description: Absence of physical injury  Outcome: Ongoing     Problem: Pain:  Goal: Pain level will decrease  Description: Pain level will decrease  Outcome: Ongoing  Note: Patient medicated for pain as ordered with effectivenss   Goal: Control of acute pain  Description: Control of acute pain  Outcome: Ongoing  Goal: Control of chronic pain  Description: Control of chronic pain  Outcome: Ongoing

## 2020-09-16 NOTE — PROGRESS NOTES
Rcv'd vm from [facility] with denial for ARU. Per Dr Rios Handsome will not be completed at this time d/t lack of medical necessity. Notified SABINO Enriquez, via vm.

## 2020-09-16 NOTE — DISCHARGE INSTR - COC
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 K11.8    Acute urinary retention R33.8       Isolation/Infection:   Isolation            No Isolation          Patient Infection Status       Infection Onset Added Last Indicated Last Indicated By Review Planned Expiration Resolved Resolved By    None active    Resolved    COVID-19 Rule Out 09/13/20 09/13/20 09/13/20 COVID-19 (Ordered)   09/14/20 Rule-Out Test Resulted            Nurse Assessment:  Last Vital Signs: BP (!) 110/47   Pulse 80   Temp 97.8 °F (36.6 °C) (Oral)   Resp 16   Ht 5' 6\" (1.676 m)   Wt 179 lb 14.3 oz (81.6 kg)   SpO2 92%   BMI 29.04 kg/m²     Last documented pain score (0-10 scale): Pain Level: 9  Last Weight:   Wt Readings from Last 1 Encounters:   09/16/20 179 lb 14.3 oz (81.6 kg)     Mental Status:  oriented and alert    IV Access:  - None    Nursing Mobility/ADLs:  Walking   Independent  Transfer  Independent  Bathing  Assisted  Dressing  Assisted  Toileting  Assisted  Feeding  Independent  Med Admin  Assisted  Med Delivery   whole    Wound Care Documentation and Therapy:        Elimination:  Continence:   · Bowel: No  · Bladder: No  Urinary Catheter: None   Colostomy/Ileostomy/Ileal Conduit: No       Date of Last BM: 9/15/2020    Intake/Output Summary (Last 24 hours) at 9/16/2020 0732  Last data filed at 9/16/2020 0626  Gross per 24 hour   Intake --   Output 2450 ml   Net -2450 ml     I/O last 3 completed shifts:  In: -   Out: 2450 [Urine:2450]    Safety Concerns:     None    Impairments/Disabilities:      None    Nutrition Therapy:  Current Nutrition Therapy:   - Oral Diet:  General    Routes of Feeding: Oral  Liquids: Thin Liquids  Daily Fluid Restriction: no  Last Modified Barium Swallow with Video (Video Swallowing Test): not done    Treatments at the Time of Hospital Discharge:   Respiratory Treatments: N/A  Oxygen Therapy:  is not on home oxygen therapy. Ventilator:    - No ventilator support    Rehab Therapies: Physical Therapy  Weight Bearing Status/Restrictions: No weight bearing restirctions  Other Medical Equipment (for information only, NOT a DME order):  walker  Other Treatments: N/A    Patient's personal belongings (please select all that are sent with patient):  None    RN SIGNATURE:  Electronically signed by Nelsy Cueva RN on 9/17/20 at 4:24 PM EDT    CASE MANAGEMENT/SOCIAL WORK SECTION    Inpatient Status Date: ***    Readmission Risk Assessment Score:  Readmission Risk              Risk of Unplanned Readmission:        9           Discharging to Facility/ Agency   · Name: [facility]  · Phone: [phone]  · Fax: [phone]    Dialysis Facility (if applicable)   · Name:  · Address:  · Dialysis Schedule:  · Phone:  · Fax:    / signature: Electronically signed by WANG Arias on 9/17/20 at 11:34 AM EDT    PHYSICIAN SECTION    Prognosis: Good    Condition at Discharge: Stable    Rehab Potential (if transferring to Rehab): Good    Recommended Labs or Other Treatments After Discharge: daily dry dressing changes    Physician Certification: I certify the above information and transfer of Myesha Gupta  is necessary for the continuing treatment of the diagnosis listed and that she requires Acute Rehab for less 30 days.      Update Admission H&P: No change in H&P    PHYSICIAN SIGNATURE:  Electronically signed by Salomon Arriaza MD on 9/16/20 at 7:33 AM EDT

## 2020-09-17 VITALS
WEIGHT: 179.9 LBS | DIASTOLIC BLOOD PRESSURE: 52 MMHG | BODY MASS INDEX: 28.91 KG/M2 | SYSTOLIC BLOOD PRESSURE: 113 MMHG | HEIGHT: 66 IN | OXYGEN SATURATION: 93 % | TEMPERATURE: 97.7 F | RESPIRATION RATE: 16 BRPM | HEART RATE: 77 BPM

## 2020-09-17 PROCEDURE — 97530 THERAPEUTIC ACTIVITIES: CPT

## 2020-09-17 PROCEDURE — 97110 THERAPEUTIC EXERCISES: CPT

## 2020-09-17 PROCEDURE — 6370000000 HC RX 637 (ALT 250 FOR IP): Performed by: INTERNAL MEDICINE

## 2020-09-17 PROCEDURE — 97535 SELF CARE MNGMENT TRAINING: CPT

## 2020-09-17 PROCEDURE — 6370000000 HC RX 637 (ALT 250 FOR IP): Performed by: ORTHOPAEDIC SURGERY

## 2020-09-17 PROCEDURE — 99024 POSTOP FOLLOW-UP VISIT: CPT | Performed by: ORTHOPAEDIC SURGERY

## 2020-09-17 PROCEDURE — 2580000003 HC RX 258: Performed by: ORTHOPAEDIC SURGERY

## 2020-09-17 PROCEDURE — 6360000002 HC RX W HCPCS: Performed by: ORTHOPAEDIC SURGERY

## 2020-09-17 PROCEDURE — 97116 GAIT TRAINING THERAPY: CPT

## 2020-09-17 RX ORDER — SENNA PLUS 8.6 MG/1
2 TABLET ORAL NIGHTLY
Status: DISCONTINUED | OUTPATIENT
Start: 2020-09-17 | End: 2020-09-17 | Stop reason: HOSPADM

## 2020-09-17 RX ORDER — SENNA PLUS 8.6 MG/1
2 TABLET ORAL NIGHTLY
Qty: 60 TABLET | Refills: 0 | DISCHARGE
Start: 2020-09-17 | End: 2020-10-17

## 2020-09-17 RX ORDER — BISACODYL 10 MG
10 SUPPOSITORY, RECTAL RECTAL DAILY PRN
Status: DISCONTINUED | OUTPATIENT
Start: 2020-09-17 | End: 2020-09-17 | Stop reason: HOSPADM

## 2020-09-17 RX ADMIN — MULTIPLE VITAMINS W/ MINERALS TAB 1 TABLET: TAB at 08:24

## 2020-09-17 RX ADMIN — OXYCODONE HYDROCHLORIDE 5 MG: 5 TABLET ORAL at 15:34

## 2020-09-17 RX ADMIN — OXYCODONE HYDROCHLORIDE 10 MG: 10 TABLET ORAL at 00:20

## 2020-09-17 RX ADMIN — LOSARTAN POTASSIUM 50 MG: 50 TABLET, FILM COATED ORAL at 08:24

## 2020-09-17 RX ADMIN — OXYCODONE HYDROCHLORIDE 5 MG: 5 TABLET ORAL at 06:25

## 2020-09-17 RX ADMIN — ATORVASTATIN CALCIUM 10 MG: 10 TABLET, FILM COATED ORAL at 17:45

## 2020-09-17 RX ADMIN — OXYCODONE HYDROCHLORIDE 5 MG: 5 TABLET ORAL at 10:51

## 2020-09-17 RX ADMIN — Medication 10 ML: at 08:25

## 2020-09-17 RX ADMIN — CALCIUM 500 MG: 500 TABLET ORAL at 08:24

## 2020-09-17 RX ADMIN — ESCITALOPRAM OXALATE 10 MG: 10 TABLET ORAL at 08:24

## 2020-09-17 RX ADMIN — ENOXAPARIN SODIUM 40 MG: 40 INJECTION SUBCUTANEOUS at 08:24

## 2020-09-17 RX ADMIN — ASPIRIN 81 MG: 81 TABLET, COATED ORAL at 08:24

## 2020-09-17 RX ADMIN — BISACODYL 10 MG: 10 SUPPOSITORY RECTAL at 15:34

## 2020-09-17 ASSESSMENT — PAIN DESCRIPTION - FREQUENCY
FREQUENCY: INTERMITTENT
FREQUENCY: CONTINUOUS
FREQUENCY: INTERMITTENT

## 2020-09-17 ASSESSMENT — PAIN DESCRIPTION - DESCRIPTORS
DESCRIPTORS: SORE

## 2020-09-17 ASSESSMENT — PAIN DESCRIPTION - PROGRESSION
CLINICAL_PROGRESSION: GRADUALLY IMPROVING

## 2020-09-17 ASSESSMENT — PAIN SCALES - GENERAL
PAINLEVEL_OUTOF10: 5
PAINLEVEL_OUTOF10: 2
PAINLEVEL_OUTOF10: 2
PAINLEVEL_OUTOF10: 0
PAINLEVEL_OUTOF10: 3
PAINLEVEL_OUTOF10: 7
PAINLEVEL_OUTOF10: 3
PAINLEVEL_OUTOF10: 4

## 2020-09-17 ASSESSMENT — PAIN DESCRIPTION - ORIENTATION
ORIENTATION: RIGHT

## 2020-09-17 ASSESSMENT — PAIN DESCRIPTION - LOCATION
LOCATION: HIP

## 2020-09-17 ASSESSMENT — PAIN DESCRIPTION - PAIN TYPE
TYPE: SURGICAL PAIN

## 2020-09-17 ASSESSMENT — PAIN - FUNCTIONAL ASSESSMENT
PAIN_FUNCTIONAL_ASSESSMENT: PREVENTS OR INTERFERES SOME ACTIVE ACTIVITIES AND ADLS
PAIN_FUNCTIONAL_ASSESSMENT: PREVENTS OR INTERFERES SOME ACTIVE ACTIVITIES AND ADLS

## 2020-09-17 ASSESSMENT — PAIN DESCRIPTION - ONSET: ONSET: ON-GOING

## 2020-09-17 NOTE — PROGRESS NOTES
0-10  Pain Level: 5(with ambulating)  Pain Type: Surgical pain  Pain Location: Hip  Pain Orientation: Right  Non-Pharmaceutical Pain Intervention(s): Ambulation/Increased Activity;Cold applied;Distraction;Repositioned  Response to Pain Intervention: Patient Satisfied                Bed Mobility  Rolling: Rolling Left;Stand by assistance(cues and encouragement, very slow pace)  Supine to Sit: Contact guard assistance(extra time to complete frequently stopping)  Sit to Supine: Unable to assess(pt left sitting in bedside chair during AM tx)  Scooting:  Moderate assistance(to Edge of Bed)      Transfers:  Sit to Stand: 2 Person Assistance;Minimal Assistance  Stand to sit: 2 Person Assistance;Minimal Assistance  Bed to Chair: 2 Person Assistance;Contact guard assistance           WB Status: right FWB  Ambulation 1  Surface: level tile  Device: Rolling Walker  Assistance: 2 Person assistance;Contact guard assistance  Quality of Gait: antalgic gait, slow pace, minimal dorsiflexion and hip flexion, pt barely lifting foot off floor, steady with no Loss of Balance  Gait Deviations: Slow Hyacinth;Decreased step length;Decreased step height;Shuffles  Distance: 30ft  Comments: 23 minutes to walk 30ft with 2 turns, extra encouragement to move faster  Ambulation 2  Surface - 2: level tile  Device 2: Rolling Walker  Assistance 2: Minimal assistance(x1 assist)  Quality of Gait 2: improved pace, still slow and guarded movements  Gait Deviations: Slow Hyacinth;Decreased step length;Decreased step height  Distance: 30ft  Comments: 14 minutes to complete 30ft during PM tx. cues for pace and not twisting at hip     Stairs/Curb  Stairs?: No(not ready at this time)               Posture: Good  Sitting - Static: Good;-  Sitting - Dynamic: Fair  Standing - Static: Fair;+  Standing - Dynamic: Fair;-  Comments: Seated Edge of Bed, Standing with Rolling Walker     Other exercises?: Yes  Other exercises 1: Bed Mobility using gait belt as leg  also showing pt how to use good leg to lift surgical leg  Other exercises 2: Dangle Edge of Bed with weight shifting onto left hip to improve tolerance, pt educated to put right foot flat on the floor, pt having increased pain this date. Other exercises 3: Sit to Stand and Stand to Sit x2, from bedside chair  Other exercises 4: Static Standing >3min. Other exercises 5: Seated bilat LE exercises, LAQ, marching AAROM x10           Activity Tolerance: Patient limited by pain  Activity Tolerance: Pt limited by anxiety and fear of pain. 23 minutes to walk 30ft, 18 min for Supine to Sit Edge of Bed during AM tx. Improved during PM tx. PT Equipment Recommendations  Equipment Needed: Yes  Mobility Devices: Destinee Simmer: Rolling       Assessment  Activity Tolerance: Patient limited by pain   Body structures, Functions, Activity limitations: Decreased functional mobility ; Increased pain;Decreased strength;Decreased ROM; Decreased safe awareness;Decreased endurance  Prognosis: Excellent  Discharge Recommendations: Patient would benefit from continued therapy after discharge     Type of devices: Call light within reach;Gait belt;Left in chair;Chair alarm in place;Nurse notified(ADRY kilgore, Student Nurse in pt's room)     Plan  Times per week: BID x 3 days  Times per day: Daily  Current Treatment Recommendations: Strengthening, Safety Education & Training, ROM, Balance Training, Patient/Caregiver Education & Training, Equipment Evaluation, Education, & procurement, Functional Mobility Training, Transfer Training, Gait Training, Stair training    Patient Education  New Education Provided:  Importance of frequent mobility  Learner:patient  Method: demonstration and explanation       Outcome: needs reinforcement     Goals  Short term goals  Time Frame for Short term goals: BID x 3 days  Short term goal 1: pt to tolerate 1/2 hour of therapuetic exercise and activity  Short term goal 2: pt to demonstrate good technique for LE ROM and strengthening exercises  Short term goal 3: pt to demonstrate improved strength by 1/2 MMG bilateral LEs  Short term goal 4: pt to demonstrate rolling in bed and transfers supine <> sit w/ min x 1 for position change  Short term goal 5: pt to demonstrate transfers sit <> stand and bed <> chair using w walker right FWB w/ min x 2  Short term goal 6: pt to demonstrate gait 20-30' using w walker right FWB w/ min x 2 and W/C follow  Short term goal 7: pt to demonstrate good standing and dynamic balance  using w walker right FWB  Short term goal 8: pt to advance to ascend/ descend 4\" platform step using wheeled walker w/ mod x 2       09/17/20 0849 09/17/20 1501   PT Individual Minutes   Time In 0848 1501   Time Out 0941 1531   Minutes 53 30       Electronically signed by Clara Lenz PTA on 9/17/20 at 3:54 PM EDT

## 2020-09-17 NOTE — PROGRESS NOTES
Pt was approved fo [facility]. Orders faxed . Transport set for [employer]. Pt  Informed . 7000 completed.

## 2020-09-17 NOTE — PLAN OF CARE
Problem: Skin Integrity:  Goal: Will show no infection signs and symptoms  Description: Will show no infection signs and symptoms  9/17/2020 1545 by Deana Burrows RN  Outcome: Completed  9/17/2020 0619 by Nathaly Valente RN  Outcome: Ongoing  Note: Continue to monitor incisional wound healing.  Dressing remains dry and intact  Goal: Absence of new skin breakdown  Description: Absence of new skin breakdown  9/17/2020 1545 by Deana Burrows RN  Outcome: Completed  9/17/2020 0619 by Nathaly Valente RN  Outcome: Ongoing     Problem: Falls - Risk of:  Goal: Will remain free from falls  Description: Will remain free from falls  9/17/2020 1545 by Deana Burrows RN  Outcome: Completed  9/17/2020 0619 by Nathaly Valente RN  Outcome: Ongoing  Note: Patient makes no attempts to get out of bed per self, alert and oriented and calls for assistance   Goal: Absence of physical injury  Description: Absence of physical injury  9/17/2020 1545 by Deana Burrows RN  Outcome: Completed  9/17/2020 0619 by Nathaly Valente RN  Outcome: Ongoing     Problem: Pain:  Goal: Pain level will decrease  Description: Pain level will decrease  9/17/2020 1545 by Deana Burrows RN  Outcome: Completed  9/17/2020 0619 by Nathaly Valente RN  Outcome: Ongoing  Note: Patient medicated for pain as ordered with effectivenss   Goal: Control of acute pain  Description: Control of acute pain  9/17/2020 1545 by Deana Burrows RN  Outcome: Completed  9/17/2020 0619 by Nathaly Valente RN  Outcome: Ongoing  Goal: Control of chronic pain  Description: Control of chronic pain  9/17/2020 1545 by Deana Burrows RN  Outcome: Completed  9/17/2020 0619 by Nathaly Valente RN  Outcome: Ongoing     Problem: Musculor/Skeletal Functional Status  Goal: Highest potential functional level  9/17/2020 1545 by Deana Burrows RN  Outcome: Completed  9/17/2020 0619 by Nathaly Valente RN  Outcome: Ongoing  Goal: Absence of falls  9/17/2020 1545 by Deana Burrows RN  Outcome: Completed  9/17/2020 0619 by Candace Silverman RN  Outcome: Ongoing     Problem: Musculor/Skeletal Functional Status  Goal: Highest potential functional level  9/17/2020 1545 by Gwen Jose RN  Outcome: Completed  9/17/2020 0619 by Candace Silverman RN  Outcome: Ongoing  Goal: Absence of falls  9/17/2020 1545 by Gwen Jose RN  Outcome: Completed  9/17/2020 0619 by Candace Silverman RN  Outcome: Ongoing

## 2020-09-17 NOTE — PROGRESS NOTES
[facility] Internal Medicine    Progress Note  Chart Reviewed: Yes  Patient assessed for rehabilitation services?: Yes  Additional Pertinent Hx: Pt presents to ED from home via EMS. Pt stated she was walking in the woods after drinking a ''few twisted teas' and tripped and fell onto her right hip. Family / Caregiver Present: No  Referring Practitioner: Dr. Shailesh Pimentel  Comments: ADRY klein with PT/OT Tx. Co-treat with Claretha Teresa. 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 *      Interval History Status: improved.        9/16/20    · Postop progressing well  · Pre-CERT in progress for possible transfer to acute rehab  · Blood pressure is satisfactory labs are good   Vitals:    09/16/20 1412 09/16/20 1824 09/17/20 0715 09/17/20 1302   BP: (!) 110/45 (!) 124/51 (!) 116/48 (!) 113/52   Pulse: 80 88 79 77   Resp: 20 16 16 16   Temp: 97.5 °F (36.4 °C) 99.1 °F (37.3 °C) 98.6 °F (37 °C) 97.7 °F (36.5 °C)   TempSrc:  Oral Oral Oral   SpO2: 91% 92% 92% 93%   Weight:       Height:           URINE ANALYSIS: No results found for: LABURIN     CBC:  Lab Results   Component Value Date    WBC 11.5 09/15/2020    HGB 12.2 09/15/2020     09/15/2020     01/27/2012        BMP:    Lab Results   Component Value Date     09/15/2020    K 4.4 09/15/2020     09/15/2020    CO2 24 09/15/2020    BUN 11 09/15/2020 - CT neck with contrast suggested a 1.7 cm primary parotid mass            - Outpatient FNA recommended                      recommend Dr. Melba Bentley in our office post-FNA for testing review, further recommendations                      definitive therapy may require superficial parotidectomy            - No immediate intervention required, no immediate directed therapies available            - Discussed with Mrs. Gilford Seltzer, all questions answered                      she understands she will need to contact our office when able to arrange outpatient FNA            - Please call with any questions          Significant last 24 hr data reviewed ;   Vitals:    09/16/20 1412 09/16/20 1824 09/17/20 0715 09/17/20 1302   BP: (!) 110/45 (!) 124/51 (!) 116/48 (!) 113/52   Pulse: 80 88 79 77   Resp: 20 16 16 16   Temp: 97.5 °F (36.4 °C) 99.1 °F (37.3 °C) 98.6 °F (37 °C) 97.7 °F (36.5 °C)   TempSrc:  Oral Oral Oral   SpO2: 91% 92% 92% 93%   Weight:       Height:          No results found for this or any previous visit (from the past 24 hour(s)). No results for input(s): POCGLU in the last 72 hours. Xr Femur Right (min 2 Views)    Result Date: 9/14/2020  EXAMINATION: SPOT FLUOROSCOPIC IMAGES 9/14/2020 9:22 pm TECHNIQUE: Fluoroscopy was provided by the radiology department for procedure. Radiologist was not present during examination. FLUOROSCOPY DOSE AND TYPE OR TIME AND EXPOSURES: 243.7 seconds. 31.51 mGy COMPARISON: 09/13/2020 HISTORY: ORDERING SYSTEM PROVIDED HISTORY: Femur victorina TECHNOLOGIST PROVIDED HISTORY: Femur victorina Reason for Exam: right femur victorina Acuity: Acute Type of Exam: Initial Intraprocedural imaging. FINDINGS: 6 spot images of the femur were obtained. Intramedullary victorina and screw fixation of the right femur. Intraprocedural fluoroscopic spot images as above. See separate procedure report for more information.      Xr Femur Right (min 2 Views)    Result Date: 9/14/2020  EXAMINATION: 2 XRAY VIEWS OF THE RIGHT FEMUR 9/13/2020 11:54 pm COMPARISON: Pelvic/hip x-ray 09/13/2020. HISTORY: ORDERING SYSTEM PROVIDED HISTORY: femur fracture TECHNOLOGIST PROVIDED HISTORY: femur fracture Reason for Exam: femur fracture Acuity: Acute Type of Exam: Subsequent/Follow-up Mechanism of Injury: Fall, f/u rt proximal femur fracture. Relevant Medical/Surgical History: Fall, f/u rt proximal femur fracture. FINDINGS: Redemonstration of the right proximal femur/hip fracture. Fracture appears to be just along the superior margin of the lesser trochanter. Remainder of the femur is otherwise intact. Mild degenerate changes of the knee and hip. Soft tissues are unremarkable. Right proximal femur fracture left hip fracture, otherwise no additional femur fracture identified. Fl Less Than 1 Hour    Result Date: 9/14/2020  Radiology exam is complete. No Radiologist dictation. Please follow up with ordering provider. Xr Chest Portable    Result Date: 9/13/2020  EXAMINATION: ONE XRAY VIEW OF THE CHEST 9/13/2020 10:14 pm COMPARISON: None. HISTORY: ORDERING SYSTEM PROVIDED HISTORY: fall TECHNOLOGIST PROVIDED HISTORY: fall Reason for Exam: fall Acuity: Acute Type of Exam: Initial Mechanism of Injury: Fall. ..  preop CXR Relevant Medical/Surgical History: Fall. ..  preop CXR FINDINGS: The heart is normal in size and configuration. The mediastinal contours are within normal limits. The lungs are well aerated. The pleural surfaces are normal and no evidence of a pleural effusion is seen. Bones and soft tissues are unremarkable.      Unremarkable single upright portable AP view of the chest.     Xr Hip 2-3 Vw W Pelvis Right    Result Date: 9/13/2020  EXAMINATION: ONE XRAY VIEW OF THE PELVIS AND TWO XRAY VIEWS RIGHT HIP 9/13/2020 10:13 pm COMPARISON: CT and pelvis 05/28/2013 HISTORY: ORDERING SYSTEM PROVIDED HISTORY: pain TECHNOLOGIST PROVIDED HISTORY: pain Reason for Exam: pain Acuity: Acute Type of Exam: Initial Mechanism of Injury: Fall. Right hip pain Relevant Medical/Surgical History: Fall. Right hip pain FINDINGS: Right proximal hip fracture identified. Mild foreshortening. No dislocation. Remainder of the pelvis and left hip are intact. Postsurgical changes at L5-S1. Soft tissues otherwise unremarkable     Right proximal hip fracture. Remainder of the pelvis otherwise intact. HPI:   See history in H and P      Review of Systems:     Constitutional:  negative for chills, fevers, sweats  Respiratory:  negative for cough, dyspnea on exertion, hemoptysis, shortness of breath, wheezing  Cardiovascular:  negative for chest pain, chest pressure/discomfort, lower extremity edema, palpitations  Gastrointestinal:  negative for abdominal pain, constipation, diarrhea, nausea, vomiting  Neurological:  negative for dizziness, headache  Data:     Past Medical History:  no change     Social History:  no change    Family History: @no change    Vitals:      I/O (24Hr):     Intake/Output Summary (Last 24 hours) at 9/17/2020 1338  Last data filed at 9/17/2020 0019  Gross per 24 hour   Intake --   Output 525 ml   Net -525 ml       Labs:    URINE ANALYSIS: No results found for: LABURIN     CBC:  Lab Results   Component Value Date    WBC 11.5 09/15/2020    HGB 12.2 09/15/2020     09/15/2020     01/27/2012        BMP:    Lab Results   Component Value Date     09/15/2020    K 4.4 09/15/2020     09/15/2020    CO2 24 09/15/2020    BUN 11 09/15/2020    CREATININE 0.83 09/15/2020    GLUCOSE 151 09/15/2020    GLUCOSE 96 01/27/2012      LIVER PROFILE:  Lab Results   Component Value Date    ALT 21 12/27/2019    AST 15 12/27/2019    PROT 6.9 12/27/2019    BILITOT 0.52 12/27/2019    BILIDIR <0.08 01/04/2016    LABALBU 4.2 12/27/2019    LABALBU 4.3 01/27/2012               Radiology:      Physical Examination:        General appearance:  alert, cooperative and no distress  Mental Status: oriented to person, place and time and normal affect  Lungs:  clear to auscultation bilaterally, normal effort  Heart:  regular rate and rhythm, no murmur  Abdomen:  soft, nontender, nondistended, normal bowel sounds, no masses, hepatomegaly, splenomegaly  Extremities:  no edema, redness, tenderness in the calves  Skin:  no gross lesions, rashes, induration  Status post right hip surgery  Patient has indwelling Delgado at this time for urinary retention    Assessment:        Primary Problem  Other fracture of right femur, initial encounter for closed fracture [facility])    Active Hospital Problems    Diagnosis Date Noted    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 It is post right hip surgery as described above  2. Mild hypoxia encouraged to do deep breathing and incentive spirometry  3. Urinary retention prior to surgery has Delgado catheter  4. Past history of urge incontinence complicated  5. Hypertension control good    Discharged to general care if pre-CERT approved    Medications: Allergies:     Allergies   Allergen Reactions    Adhesive Tape Itching    Lisinopril Other (See Comments)     cough       Current Meds:   Scheduled Meds:    sodium chloride flush  10 mL Intravenous 2 times per day    enoxaparin  40 mg Subcutaneous Daily    aspirin  81 mg Oral Daily    calcium elemental  500 mg Oral Daily    escitalopram  10 mg Oral Daily    losartan  50 mg Oral Daily    therapeutic multivitamin-minerals  1 tablet Oral Daily    atorvastatin  10 mg Oral QPM    ceFAZolin  2 g Intravenous Once     Continuous Infusions:     PRN Meds: benzocaine-menthol, sodium chloride flush, oxyCODONE, oxyCODONE, morphine, hydrALAZINE       Francisco Fairbanks,

## 2020-09-17 NOTE — PLAN OF CARE
Problem: Skin Integrity:  Goal: Will show no infection signs and symptoms  Description: Will show no infection signs and symptoms  Outcome: Ongoing  Note: Continue to monitor incisional wound healing.  Dressing remains dry and intact  Goal: Absence of new skin breakdown  Description: Absence of new skin breakdown  Outcome: Ongoing     Problem: Falls - Risk of:  Goal: Will remain free from falls  Description: Will remain free from falls  Outcome: Ongoing  Note: Patient makes no attempts to get out of bed per self, alert and oriented and calls for assistance   Goal: Absence of physical injury  Description: Absence of physical injury  Outcome: Ongoing     Problem: Pain:  Goal: Pain level will decrease  Description: Pain level will decrease  Outcome: Ongoing  Note: Patient medicated for pain as ordered with effectivenss   Goal: Control of acute pain  Description: Control of acute pain  Outcome: Ongoing  Goal: Control of chronic pain  Description: Control of chronic pain  Outcome: Ongoing     Problem: Musculor/Skeletal Functional Status  Goal: Highest potential functional level  Outcome: Ongoing  Goal: Absence of falls  Outcome: Ongoing

## 2020-09-17 NOTE — DISCHARGE SUMMARY
[address] [address]  Dept: [address]: [phone]      Discharge Summary      Patient: Suraj Vega     MRN#: [id number]     YOB: 1945     Date of Surgery: 9/13/2020    Attending Surgeon: Robin Qureshi M.D.  PCP: MIGUEL Mora CNP      Date of Admission: 9/13/2020    Date of Discharge: 9/17/20    Admitting Physician: Robin Qureshi M.D. Disposition: rehab    Condition on Discharge:  fair     Admission Diagnosis:     1. Closed fracture of right femur, unspecified fracture morphology, unspecified portion of femur, initial encounter [facility])          Discharge Diagnosis:     1. Closed fracture of right femur, unspecified fracture morphology, unspecified portion of femur, initial encounter ([place] [address].)        Procedures Performed:    R femur CMN    Indication for Admission: Pain control, DVT prophylaxis, physical therapy, parenteral antibiotics, med management      Consults: Internal Medicine     Meds on discharge:   Current Discharge Medication List           Details   oxyCODONE-acetaminophen (PERCOCET) 5-325 MG per tablet Take 1 tablet by mouth every 6 hours as needed for Pain for up to 7 days. Do not exceed 3g of Acetaminophen in 24 hrs. Qty: 20 tablet, Refills: 0    Comments: Reduce doses taken as pain becomes manageable  Associated Diagnoses: Closed fracture of right femur, unspecified fracture morphology, unspecified portion of femur, initial encounter ([facility])      enoxaparin (LOVENOX) 40 MG/0.4ML injection Inject 0.4 mLs into the skin daily for 42 doses  Qty: 42 Syringe, Refills: 0      docusate sodium (COLACE) 100 MG capsule Take 1 capsule by mouth 2 times daily as needed for Constipation  Qty: 20 capsule, Refills: 0              Details   escitalopram (LEXAPRO) 10 MG tablet Take 1 tablet by mouth daily  Qty: 30 tablet, Refills: 3      clotrimazole (LOTRIMIN) 1 % external solution Apply topically 2 times daily.   Qty: 1 Bottle, Refills: 1    Associated Diagnoses: Candidiasis of breast      zoster recombinant adjuvanted vaccine [facility]) 50 MCG/0.5ML SUSR injection Inject 0.5 mLs into the muscle See Admin Instructions 1 dose now and repeat in 2-6 months  Qty: 0.5 mL, Refills: 0    Associated Diagnoses: Need for varicella vaccine      Vitamin Mixture (VITAMIN E COMPLETE PO) Take by mouth    Associated Diagnoses: Essential hypertension      Cetirizine HCl (ZYRTEC PO) Take by mouth    Associated Diagnoses: Non-seasonal allergic rhinitis due to other allergic trigger      losartan (COZAAR) 50 MG tablet take 1 tablet by mouth once daily  Qty: 90 tablet, Refills: 1      simvastatin (ZOCOR) 20 MG tablet take 1 tablet by mouth every evening  Qty: 90 tablet, Refills: 1      diphenhydrAMINE-APAP, sleep, (TYLENOL PM EXTRA STRENGTH PO) Take 2 tablets by mouth nightly      calcium carbonate (OSCAL) 500 MG TABS tablet Take 500 mg by mouth daily. aspirin 81 MG tablet Take 81 mg by mouth daily. Multiple Vitamins-Minerals (THERAPEUTIC MULTIVITAMIN-MINERALS) tablet Take 1 tablet by mouth daily. Hospital Course:   She had an uncomplicated surgery, and was admitted to the hospital postoperatively for the above reasons. The patient's hospital course was uncomplicated. At the time of discharge, the patient's pain was controlled with oral medications, she was tolerating an oral diet, and she had been cleared for discharge to the above location by physical therapy. Patient Instructions:   WB status:  WBAT  DVT ppx:  Early mobilization + medication as prescribed  Pain control:  Medication as prescribed (dosing and quantity) indicated for acute postoperative pain control  Special concerns:  none  Follow-up:  Dr. Jadiel Almeida office in about 2 weeks. The patient has been instructed to call within 1-3 days to confirm appointment.      Electronically signed by Jered Tamayo MD on 9/17/2020 at 3:50 PM

## 2020-09-17 NOTE — PROGRESS NOTES
[address] [facility]    INPATIENT OCCUPATIONAL THERAPY  PROGRESS NOTE  Date: 2020  Patient Name: Shea Schwartz      Room: 7962/3066-24  MRN: [id number]    : 1945  (71 y.o.) Gender: female     Discharge Recommendations:  Further Occupational Therapy is recommended upon facility discharge. Referring Practitioner: Dr. Natanael Whitfield  Diagnosis: Open treatment of Right hip intertrochanteric femur fracture with cephalomedullary nail on 20  General  Chart Reviewed: Yes, Orders, Progress Notes, History and Physical, Operative Notes  Patient assessed for rehabilitation services?: Yes  Response to previous treatment: Patient with no complaints from previous session  Family / Caregiver Present: No  Referring Practitioner: Dr. Natanael Whitfield  Diagnosis: Open treatment of Right hip intertrochanteric femur fracture with cephalomedullary nail on 20    Restrictions  Restrictions/Precautions: Fall Risk, Weight Bearing(Right FWB)  Implants present? : Metal implants(lumbar surgery, screws bilateral feet, right febur IM victorina)  Right Lower Extremity Weight Bearing: (right Full Weight Bearing)  Required Braces or Orthoses?: No      Subjective  Subjective: pt states that she was walking in the woods when she lost her balance and fell.   Comments: pt very slow during all transfers and functional mobility, Co-treat with NEREYDA Choe in AM.   Patient Currently in Pain: Yes  Pain Level: 3(increases to 5/10 during transfers)  Pain Location: Hip  Pain Orientation: Right  Overall Orientation Status: Within Normal Limits     Pain Assessment  Pain Assessment: 0-10  Pain Level: 3(increases to 5/10 during transfers)  Pain Type: Surgical pain  Pain Location: Hip  Pain Orientation: Right  Pain Descriptors: Sore  Clinical Progression: Gradually improving    Objective  Cognition  Overall Cognitive Status: WFL  Bed mobility  Rolling to Left: Maximum assistance  Supine to Sit: 2 Person assistance;Contact guard assistance  Sit to Supine: Unable to assess(Patient left in bedside chair )  Scooting: Moderate assistance(to Edge of Bed)  Comment: extended time require for all parts due to slow pace   Balance  Sitting Balance: Stand by assistance  Standing Balance: Contact guard assistance(x 2, no LOB noted)  Standing Balance  Time: AM: 23 minutes; PM: 7 minutes x 2  Activity: AM: functional mobility, PM: mobility, toileting tasks  Comment: mod VCs for encouragement, VCs for sequencing and  RW positioning, pt completes func with slow pace ie 23 minutes to ambulate 30', PM: pt improves with sequencing and pace  Functional Mobility  Functional - Mobility Device: Rolling Walker  Activity: To/from bathroom(bed>doorway>chair)  Assist Level: Contact guard assistance(AM: x 1-2; PM: CGA x 1)  Functional Mobility Comments: mod VCs for encouragement, VCs for sequencing and  RW positioning, pt completes func with slow pace ie 23 minutes to ambulate 30', PM: pt improves with sequencing and pace   ADL  Feeding: Setup  Grooming: Stand by assistance;Setup;Verbal cueing(while seated edge of bed with cues for midline sitting)  UE Bathing: Stand by assistance;Setup  LE Bathing: Maximum assistance;Setup; Increased time to complete  UE Dressing: Stand by assistance;Setup  LE Dressing: Maximum assistance;Setup; Increased time to complete  Toileting: Contact guard assistance(pt able to completepericare and push down and pull up brief )  Additional Comments: answers based on clinical reasoning/skilled observation unless otherwise noted, pt educated on AE/DME to increase safety and independence with ADLs and shower transfers.  ie reacher, tub transfer bench, sock aid, LH shoehorn     Transfers  Sit to stand: 2 Person assistance;Minimal assistance  Stand to sit: 2 Person assistance;Minimal assistance  Transfer Comments: Minimal verbal cues for hand placement and safety during functional transfer  Toilet Transfers  Toilet - Technique: Ambulating  Equipment Used: Shady Carlson bars  Toilet Transfer: Minimal assistance  Toilet Transfers Comments: VCs for hand placement with G return                             Assessment  Performance deficits / Impairments: Decreased functional mobility ; Decreased ADL status; Decreased strength;Decreased safe awareness;Decreased endurance;Decreased balance;Decreased high-level IADLs;Decreased posture  Prognosis: Good  Discharge Recommendations: Patient would benefit from continued therapy after discharge  Activity Tolerance: Patient Tolerated treatment well  Safety Devices in place: Yes  Type of devices: All fall risk precautions in place;Call light within reach; Chair alarm in place;Gait belt;Patient at risk for falls; Left in chair;Nurse notified             Patient Education:  Patient Education: OT POC, RW safety and tech during transfers, bed mobility, home safety/fall prevention, AE/DME, breathing tech for pain mgt  Learner:patient  Method: demonstration, explanation and handout       Outcome: acknowledged understanding, demonstrated understanding and asked questions     Plan  Safety Devices  Safety Devices in place: Yes  Type of devices: All fall risk precautions in place, Call light within reach, Chair alarm in place, Gait belt, Patient at risk for falls, Left in chair, Nurse notified  Plan  Times per week: 5-7 (BID)  Times per day: Twice a day  Current Treatment Recommendations: Self-Care / ADL, Home Management Training, Strengthening, Balance Training, Functional Mobility Training, Endurance Training, Pain Management, Safety Education & Training, Patient/Caregiver Education & Training, Equipment Evaluation, Education, & procurement      Goals  Short term goals  Time Frame for Short term goals: By discharge  Short term goal 1: Pt will verbalize/demonstrate Good understanding of assistive equipment/durable medical equipment/modified techniques for increased independence with self-care and mobility.   Short term goal 2: Pt will perform bed mobility with Moderate assist to sit edge of bed for 10+ minutes unsupported while engaging in functional activity of choice. Short term goal 3: Pt will perform stand pivot transfer to bedside commode/chair with Moderate assist, rolling walker, and Good safety. Short term goal 4: Pt will perform upper body bathing/dressing with setup and lower body bathing/dressing with Moderate assist.  Short term goal 5: Pt will actively participate in 20+ minutes of therapeutic exercise/functional activities to promote increased independence with self-care and mobility.       Electronically signed by FABIANO Mott on 9/17/20 at 5:22 PM EDT          09/17/20 1115 09/17/20 1706   OT Individual Minutes   Time In [id number]   Time Out [id number]   [id number] 04 [id number]

## 2020-09-21 PROBLEM — F43.23 ADJUSTMENT DISORDER WITH MIXED ANXIETY AND DEPRESSED MOOD: Status: ACTIVE | Noted: 2020-09-21

## 2020-09-22 ENCOUNTER — OUTSIDE SERVICES (OUTPATIENT)
Dept: PRIMARY CARE CLINIC | Age: 75
End: 2020-09-22

## 2020-09-22 ASSESSMENT — ENCOUNTER SYMPTOMS
SHORTNESS OF BREATH: 0
NAUSEA: 0
DIARRHEA: 0
VOMITING: 0
COUGH: 0

## 2020-09-22 NOTE — PROGRESS NOTES
Harleen Raphael is a 76 y.o. female being seen for her weekly follow up. Location of visit: [facility]    HPI:  New today:Pt c/o a lot of pain. No new issues otherwise. No SOB. Is still on lovenox- was ordered for 42 days- pt advised to double check with surgeon when she follows up next week to see if that can be d/c'd or changed to po. Pain meds are up and down with control of her pain. Works but sometimes takes a really long time to kick in. Review of Systems   Constitutional: Negative for activity change, appetite change, chills, diaphoresis and fever. HENT: Negative for congestion. Respiratory: Negative for cough and shortness of breath. Cardiovascular: Negative for chest pain and palpitations. Gastrointestinal: Negative for diarrhea, nausea and vomiting. Genitourinary: Negative for hematuria. Musculoskeletal: Negative for arthralgias and myalgias. Skin: Negative for rash and wound. Neurological: Negative for weakness and headaches. Psychiatric/Behavioral: Negative for agitation, dysphoric mood and sleep disturbance. The patient is not nervous/anxious. Physical Exam  Vitals signs reviewed. Constitutional:       General: She is not in acute distress. HENT:      Head: Normocephalic and atraumatic. Nose: No congestion or rhinorrhea. Eyes:      General:         Right eye: No discharge. Left eye: No discharge. Cardiovascular:      Rate and Rhythm: Normal rate and regular rhythm. Pulmonary:      Effort: Pulmonary effort is normal. No respiratory distress. Breath sounds: Normal breath sounds. Abdominal:      Palpations: Abdomen is soft. Tenderness: There is no abdominal tenderness. Musculoskeletal:      Right lower leg: No edema. Left lower leg: No edema. Skin:     General: Skin is warm and dry. Neurological:      Mental Status: She is alert. Mental status is at baseline. ASSESSMENT:   Diagnosis Orders   1. Essential hypertension     2. Other fracture of right femur, initial encounter for closed fracture [facility])         PLAN:  Ask surgeon about lovenox. Increase percocet to q 4 hours prn to see if that helps with staying on top of the pain.

## 2020-09-28 ENCOUNTER — OFFICE VISIT (OUTPATIENT)
Dept: ORTHOPEDIC SURGERY | Age: 75
End: 2020-09-28

## 2020-09-28 VITALS — HEIGHT: 66 IN | WEIGHT: 179 LBS | BODY MASS INDEX: 28.77 KG/M2 | RESPIRATION RATE: 12 BRPM

## 2020-09-28 PROCEDURE — 99024 POSTOP FOLLOW-UP VISIT: CPT | Performed by: ORTHOPAEDIC SURGERY

## 2020-09-28 NOTE — PROGRESS NOTES
PWB       [x]  WBAT    Immobilization:      []  Cast  []  CAM boot  [x]  Comfortable shoe    DVT prophylaxis:      [x]  Continue as prescribed   []  No chemical ppx needed; mechanical only      []  Information has been provided on routine antibiotic prophylaxis recommendations       []  Avoid the routine use of NSAIDs       [x]  Avoid strenuous activity/pain provoking maneuvers and high-impact repetitive exercises      All questions were answered and the patient agrees with the above plan. The patient will return to clinic in 4 weeks         No follow-ups on file. No orders of the defined types were placed in this encounter. No orders of the defined types were placed in this encounter. Balta Rendon MD  Orthopedic Surgery, Foot and Ankle        Please excuse any typos/errors, as this note was created with the assistance of voice recognition software. While intending to generate a document that actually reflects the content of the visit, the document can still have some errors including those of syntax and sound-a-like substitutions which may escape proof reading. In such instances, actual meaning can be extrapolated by context.

## 2020-09-29 ENCOUNTER — OUTSIDE SERVICES (OUTPATIENT)
Dept: PRIMARY CARE CLINIC | Age: 75
End: 2020-09-29

## 2020-09-29 ASSESSMENT — ENCOUNTER SYMPTOMS
SHORTNESS OF BREATH: 0
VOMITING: 0
COUGH: 0
NAUSEA: 0
DIARRHEA: 0

## 2020-09-29 NOTE — PROGRESS NOTES
fracture of right femur, initial encounter for closed fracture ([facility] [address]     3. Essential hypertension         PLAN:  Check BS random x1 to make sure no issues with recurrent yeast.  Pt advised that discoloration is most likely due to past yeast infection and need to treat. Will continue therapy and pain meds. Hopefully pain will improve as healing continues.

## 2020-10-07 ENCOUNTER — TELEPHONE (OUTPATIENT)
Dept: ORTHOPEDIC SURGERY | Age: 75
End: 2020-10-07

## 2020-10-07 NOTE — TELEPHONE ENCOUNTER
Patient states she only has 2 injections left. She stated when she was sent home, she was given the Lovenox, she did not have to go  at pharmacy. From what she told me, it looks like she should need 17 more injections to get her to 42 doses, but I did go ahead and put 18 on the rx.  Pharmacy is [facility] in [facility]

## 2020-10-19 ENCOUNTER — TELEPHONE (OUTPATIENT)
Dept: ORTHOPEDIC SURGERY | Age: 75
End: 2020-10-19

## 2020-10-19 RX ORDER — LOSARTAN POTASSIUM 50 MG/1
TABLET ORAL
Qty: 90 TABLET | Refills: 1 | Status: SHIPPED | OUTPATIENT
Start: 2020-10-19 | End: 2020-12-09 | Stop reason: SDUPTHER

## 2020-10-19 NOTE — TELEPHONE ENCOUNTER
Please Approve or Refuse.   Send to Pharmacy per Pt's Request:      Next Visit Date:  Visit date not found   Last Visit Date: 6/10/2020    No results found for: LABA1C          ( goal A1C is < 7)   BP Readings from Last 3 Encounters:   09/17/20 (!) 113/52   09/14/20 (!) 120/55   06/10/20 120/80          (goal 120/80)  BUN   Date Value Ref Range Status   09/15/2020 11 8 - 23 mg/dL Final     CREATININE   Date Value Ref Range Status   09/15/2020 0.83 0.50 - 0.90 mg/dL Final     Potassium   Date Value Ref Range Status   09/15/2020 4.4 3.7 - 5.3 mmol/L Final

## 2020-10-20 ENCOUNTER — TELEPHONE (OUTPATIENT)
Dept: ORTHOPEDIC SURGERY | Age: 75
End: 2020-10-20

## 2020-10-21 RX ORDER — HYDROCODONE BITARTRATE AND ACETAMINOPHEN 5; 325 MG/1; MG/1
1 TABLET ORAL EVERY 6 HOURS PRN
Qty: 15 TABLET | Refills: 0 | Status: SHIPPED | OUTPATIENT
Start: 2020-10-21 | End: 2020-10-28

## 2020-10-26 ENCOUNTER — OFFICE VISIT (OUTPATIENT)
Dept: ORTHOPEDIC SURGERY | Age: 75
End: 2020-10-26

## 2020-10-26 VITALS — BODY MASS INDEX: 28.77 KG/M2 | RESPIRATION RATE: 16 BRPM | HEIGHT: 66 IN | WEIGHT: 179 LBS

## 2020-10-26 PROCEDURE — 99024 POSTOP FOLLOW-UP VISIT: CPT | Performed by: ORTHOPAEDIC SURGERY

## 2020-10-26 NOTE — PROGRESS NOTES
[facility] [address] [facility]  [address] [facility] [address]  Dept: [phone]    Ambulatory Orthopedic Postoperative Visit     Preoperative Diagnosis:   1. Right closed peritrochanteric hip fracture  2. Body mass index is 29.05 kg/m².     Postoperative Diagnosis:   1. same     Procedures Performed:  (9/14/2020)  1. Open treatment of Right hip intertrochanteric femur fracture with cephalomedullary nail (CPT code 63410)       SUBJECTIVE:     The patient returns for routine check 6 weeks post op from the above stated procedure. Reports doing well overall, reports improved pain, denies wound drainage/issues, fevers/chills/night sweats, calf swelling/pain, chest pain, shortness of breath. No complaints. She is ambulating today with a walker, and doing home PT. She is here today with her . OBJECTIVE:  Resp 16   Ht 5' 6\" (1.676 m)   Wt 179 lb (81.2 kg)   BMI 28.89 kg/m²    NAD, resting comfortably  Incisions clean/dry/intact, no erythema/dehiscence/drainage  Sensation to light touch grossly intact throughout   Can wiggle toes and plantarflex/dorsiflex foot appropriately  Toes warm and well perfused  Compartments of thigh/foot/leg soft and compressible  No calf swelling/tenderness  No signs of infection      RADIOLOGY:   10/26/2020 FINDINGS:  Three weightbearing views (AP Pelvis, AP hip and Lateral) of the right hip were obtained in the office today and reviewed, revealing hip fracture with intact well seated hardware without evidence of loosening. No interval displacement noted. Interval healing noted.     IMPRESSION: Hip fracture as above    Electronically signed by Becki Bai MD        FINDINGS:  Three weightbearing views (AP Pelvis, AP hip and Lateral) of the right hip and two weightbearing views (AP and Lateral) of the right femur were obtained in the office today and reviewed, revealing hip fracture with intact well seated hardware without evidence of loosening. No significant interval displacement of hip fracture. IMPRESSION: Hip fracture as above    Electronically signed by Ramírez Hankins MD      ASSESSMENT AND PLAN:     6 weeks s/p above, doing well overall        []  Sutures were removed and steri-strips applied            [x]  Physical Therapy/Home exercises       -General strengthening    []  Compression socks    Weight Bearing status:      []  NWB      []  PWB       [x]  WBAT    Immobilization:      []  Cast  []  CAM boot  [x]  Comfortable shoe    DVT prophylaxis:      []  Continue as prescribed   [x]  No chemical ppx needed; mechanical only      []  Information has been provided on routine antibiotic prophylaxis recommendations       []  Avoid the routine use of NSAIDs       [x]  Avoid strenuous activity/pain provoking maneuvers and high-impact repetitive exercises      All questions were answered and the patient agrees with the above plan. The patient will return to clinic in 6 weeks with right hip and femur x-rays         Return in about 6 weeks (around 12/7/2020). No orders of the defined types were placed in this encounter. No orders of the defined types were placed in this encounter. Kayla Gallegos MD  Orthopedic Surgery, Foot and Ankle        Please excuse any typos/errors, as this note was created with the assistance of voice recognition software. While intending to generate a document that actually reflects the content of the visit, the document can still have some errors including those of syntax and sound-a-like substitutions which may escape proof reading. In such instances, actual meaning can be extrapolated by context.

## 2020-12-07 ENCOUNTER — OFFICE VISIT (OUTPATIENT)
Dept: ORTHOPEDIC SURGERY | Age: 75
End: 2020-12-07

## 2020-12-07 VITALS — HEIGHT: 66 IN | WEIGHT: 180 LBS | TEMPERATURE: 98 F | HEART RATE: 97 BPM | BODY MASS INDEX: 28.93 KG/M2

## 2020-12-07 PROCEDURE — 99024 POSTOP FOLLOW-UP VISIT: CPT | Performed by: ORTHOPAEDIC SURGERY

## 2020-12-07 NOTE — PROGRESS NOTES
[facility] [address] [facility]  [id number] [place] [id number]  Dept: [phone]    Ambulatory Orthopedic Postoperative Visit     Preoperative Diagnosis:   1. Right closed peritrochanteric hip fracture  2. Body mass index is 29.05 kg/m².     Postoperative Diagnosis:   1. same     Procedures Performed:  (9/14/2020)  1. Open treatment of Right hip intertrochanteric femur fracture with cephalomedullary nail (CPT code 32283)       SUBJECTIVE:     The patient returns for routine check 12 weeks post op from the above stated procedure. Reports doing well overall, reports improved pain, denies wound drainage/issues, fevers/chills/night sweats, calf swelling/pain, chest pain, shortness of breath. No complaints. She is here today with her . She reports that she has no pain. She has been doing physical therapy. OBJECTIVE:  Pulse 97   Temp 98 °F (36.7 °C)   Ht 5' 6\" (1.676 m)   Wt 180 lb (81.6 kg)   BMI 29.05 kg/m²    NAD, resting comfortably  Incisions clean/dry/intact, no erythema/dehiscence/drainage  Sensation to light touch grossly intact throughout   Can wiggle toes and plantarflex/dorsiflex foot appropriately  Toes warm and well perfused  Compartments of thigh/foot/leg soft and compressible  No calf swelling/tenderness  No signs of infection      RADIOLOGY:   12/7/2020 FINDINGS:  Three weightbearing views (AP Pelvis, AP hip and Lateral) of the right hip and two weightbearing views (AP and Lateral) of the right femur were obtained in the office today and reviewed, revealing hip fracture with intact well seated hardware without evidence of loosening. No significant interval displacement of hip fracture. Interval healing noted.      IMPRESSION: Hip fracture as above    Electronically signed by Nathan Linares MD      ASSESSMENT AND PLAN:     12 weeks s/p above, doing well overall        []  Sutures were removed and steri-strips applied [x]  Physical Therapy/Home exercises       -General strengthening    []  Compression socks    Weight Bearing status:      []  NWB      []  PWB       [x]  WBAT    Immobilization:      []  Cast  []  CAM boot  [x]  Comfortable shoe    DVT prophylaxis:      []  Continue as prescribed   [x]  No chemical ppx needed; mechanical only      [x]  Avoid the routine use of NSAIDs       [x]  Avoid strenuous activity/pain provoking maneuvers and high-impact repetitive exercises      All questions were answered and the patient agrees with the above plan. The patient will return to clinic in 3 months with right hip and femur x-rays         Return in about 3 months (around 3/7/2021). No orders of the defined types were placed in this encounter. Orders Placed This Encounter   Procedures    XR HIP 2-3 VW W PELVIS RIGHT     Standing Status:   Future     Number of Occurrences:   1     Standing Expiration Date:   12/7/2021    XR FEMUR RIGHT (MIN 2 VIEWS)     Standing Status:   Future     Number of Occurrences:   1     Standing Expiration Date:   12/7/2021         Ольга Morrow MD  Orthopedic Surgery, Foot and Ankle        Please excuse any typos/errors, as this note was created with the assistance of voice recognition software. While intending to generate a document that actually reflects the content of the visit, the document can still have some errors including those of syntax and sound-a-like substitutions which may escape proof reading. In such instances, actual meaning can be extrapolated by context.

## 2020-12-08 NOTE — TELEPHONE ENCOUNTER
Please Approve or Refuse.   Send to Pharmacy per Pt's Request:      Next Visit Date:  12/22/20   Last Visit Date: 6/10/2020    No results found for: LABA1C          ( goal A1C is < 7)   BP Readings from Last 3 Encounters:   09/17/20 (!) 113/52   09/14/20 (!) 120/55   06/10/20 120/80          (goal 120/80)  BUN   Date Value Ref Range Status   09/15/2020 11 8 - 23 mg/dL Final     CREATININE   Date Value Ref Range Status   09/15/2020 0.83 0.50 - 0.90 mg/dL Final     Potassium   Date Value Ref Range Status   09/15/2020 4.4 3.7 - 5.3 mmol/L Final

## 2020-12-09 ENCOUNTER — TELEPHONE (OUTPATIENT)
Dept: ORTHOPEDIC SURGERY | Age: 75
End: 2020-12-09

## 2020-12-09 RX ORDER — LOSARTAN POTASSIUM 50 MG/1
TABLET ORAL
Qty: 90 TABLET | Refills: 0 | Status: SHIPPED | OUTPATIENT
Start: 2020-12-09 | End: 2021-09-07

## 2020-12-09 NOTE — TELEPHONE ENCOUNTER
Patient called with concern that she went to [facility] in [facility] and staff were not wearing facemasks, including the owner of the facility. As a concern for her and her 's health she called asking for recommendations for other facilities for physical therapy. I did recommend [facility] in [place], [place] as the patient lives in [facility]. She will call those locations to see if they take her insurance. She will call our office back if they need a new referral or any other documentation.

## 2020-12-15 ENCOUNTER — TELEPHONE (OUTPATIENT)
Dept: ORTHOPEDIC SURGERY | Age: 75
End: 2020-12-15

## 2020-12-21 PROBLEM — S72.001A CLOSED FRACTURE OF RIGHT HIP (HCC): Status: ACTIVE | Noted: 2020-12-21

## 2020-12-21 ASSESSMENT — PATIENT HEALTH QUESTIONNAIRE - PHQ9
SUM OF ALL RESPONSES TO PHQ QUESTIONS 1-9: 1
SUM OF ALL RESPONSES TO PHQ QUESTIONS 1-9: 1
2. FEELING DOWN, DEPRESSED OR HOPELESS: 1
1. LITTLE INTEREST OR PLEASURE IN DOING THINGS: 0
SUM OF ALL RESPONSES TO PHQ QUESTIONS 1-9: 1
SUM OF ALL RESPONSES TO PHQ9 QUESTIONS 1 & 2: 1

## 2020-12-21 NOTE — PROGRESS NOTES
carbonate (OSCAL) 500 MG TABS tablet Take 500 mg by mouth daily.  aspirin 81 MG tablet Take 81 mg by mouth daily. No current facility-administered medications on file prior to visit. DIAGNOSIS and DOCUMENTATION  I communicated with the patient and/or health care decision maker listed below. Details of this discussion including any medical advice provided: below  1. Closed fracture of right hip, sequela  Improving  Continue PT  Continue Ortho Ff up    2. Essential hypertension  Stable  Continue current therapy. DISCUSSED AND ADVISED TO:  Cut down on your salt intake. Cut down on caffeinated drinks, sports drinks. Instructed to check BP at home regularly. Report for any chest pains, shortness of breath, headaches, and lightheadedness. Call the office if your blood pressure continue to be higher than 140/90 or 90/50. 3. Hyperlipidemia, unspecified hyperlipidemia type  Stable  Continue therapy. Advised to decrease the consumption of red meats, fried foods, trans fats, sweets, sugary beverages. Advised to increase fish, vegetables, and fruits consumption. Advised to add fiber or OTC supplements in diet. Discussed weight loss which will result in improvement of lipids levels. Advised to increase daily physical activities and add regular exercises. 4. Adjustment disorder with mixed anxiety and depressed mood  Stable  Continue current therapy. DISCUSSED and ADVISED TO:  Not stopping medication suddenly. See the specialist as discussed. Report for feelings of SI, HI, and hallucinations. Go to the ER for increasing urge to hurt yourself. 5. Bacterial vaginosis  Recurrent  Treat and re evaluate  Declined gynecology  - metroNIDAZOLE (FLAGYL) 500 MG tablet; Take 1 tablet by mouth 2 times daily for 7 days  Dispense: 14 tablet; Refill: 0    6.  Eczema intertrigo  Recurrent  Change steroid cream  Derm referral if not better    - triamcinolone (ARISTOCORT) 0.5 % cream; Apply topically 3 times daily. Dispense: 1 Tube; Refill: 2    7. At high risk for falls  Ongoing  PT ongoing  Monitor     I affirm this is a Patient Initiated Episode with a Patient who has not had a related appointment within my department in the past 7 days or scheduled within the next 24 hours. Patient identification was verified at the start of the visit: Yes  Total Time: minutes: 21-30 minutes  Note: not billable if this call serves to triage the patient into an appointment for the relevant concern    Brooke Salas is a 76 y.o. female being evaluated by a Virtual Visit (Telephone) encounter to address concerns as mentioned above. A caregiver was present when appropriate. Due to this being a TeleHealth encounter (During KUBEM-18 public health emergency), evaluation of the following organ systems was limited:Vitals/Constitutional/EENT/Resp/CV/GI//MS/Neuro/Skin/Heme-Lymph-Imm. Pursuant to the emergency declaration under the [address] [place] [address] authority and the [employer] Act, this Virtual Visit was conducted with patient's (and/or legal guardian's) consent, to reduce the patient's risk of exposure to COVID-19 and provide necessary medical care. The patient (and/or legal guardian) has also been advised to contact this office for worsening conditions or problems, and seek emergency medical treatment and/or call 911 if deemed necessary. This note was completed by using the assistance of a speech-recognition program. However, inadvertent computerized transcription errors may be present. Although every effort was made to ensure accuracy, no guarantees can be provided that every mistake has been identified and corrected by editing.   Electronically signed by MIGUEL Cruz CNP on 12/21/20 at 6:02 PM EST

## 2020-12-21 NOTE — PROGRESS NOTES
Visit Information    Have you changed or started any medications since your last visit including any over-the-counter medicines, vitamins, or herbal medicines? no   Are you having any side effects from any of your medications? -  no  Have you stopped taking any of your medications? Is so, why? -  no    Have you seen any other physician or provider since your last visit? No  Have you had any other diagnostic tests since your last visit? Yes - Records Obtained  Have you been seen in the emergency room and/or had an admission to a hospital since we last saw you? Yes - Records Obtained  Have you had your routine dental cleaning in the past 6 months? no    Have you activated your [employer] account? If not, what are your barriers?  Yes     Patient Care Team:  MIGUEL Galo CNP as PCP - General (Family Medicine)  MIGUEL Patel CNP as P[facility]  Essie Vázquez MD as Consulting Physician (Gastroenterology)    Medical History Review  Past Medical, Family, and Social History reviewed and does contribute to the patient presenting condition    Health Maintenance   Topic Date Due    Shingles Vaccine (1 of 2) 12/15/1995    Annual Wellness Visit (AWV)  05/29/2019    Flu vaccine (1) 09/01/2020    Lipid screen  12/27/2020    DTaP/Tdap/Td vaccine (1 - Tdap) 08/09/2022 (Originally 12/15/1964)    Hepatitis C screen  08/09/2022 (Originally 1945)    Potassium monitoring  09/15/2021    Creatinine monitoring  09/15/2021    Colon cancer screen colonoscopy  05/23/2023    DEXA (modify frequency per FRAX score)  Completed    Pneumococcal 65+ years Vaccine  Completed    Hepatitis A vaccine  Aged Out    Hepatitis B vaccine  Aged Out    Hib vaccine  Aged Out    Meningococcal (ACWY) vaccine  Aged Out

## 2020-12-22 ENCOUNTER — VIRTUAL VISIT (OUTPATIENT)
Dept: FAMILY MEDICINE CLINIC | Age: 75
End: 2020-12-22
Payer: MEDICARE

## 2020-12-22 PROBLEM — N76.0 BACTERIAL VAGINOSIS: Status: ACTIVE | Noted: 2020-12-22

## 2020-12-22 PROBLEM — B96.89 BACTERIAL VAGINOSIS: Status: ACTIVE | Noted: 2020-12-22

## 2020-12-22 PROCEDURE — 99443 PR PHYS/QHP TELEPHONE EVALUATION 21-30 MIN: CPT | Performed by: FAMILY MEDICINE

## 2020-12-22 RX ORDER — TRIAMCINOLONE ACETONIDE 5 MG/G
CREAM TOPICAL
Qty: 1 TUBE | Refills: 2 | Status: SHIPPED | OUTPATIENT
Start: 2020-12-22 | End: 2020-12-29

## 2020-12-22 RX ORDER — METRONIDAZOLE 500 MG/1
500 TABLET ORAL 2 TIMES DAILY
Qty: 14 TABLET | Refills: 0 | Status: SHIPPED | OUTPATIENT
Start: 2020-12-22 | End: 2020-12-29

## 2020-12-22 NOTE — PATIENT INSTRUCTIONS
Patient Education        Bacterial Vaginosis: Care Instructions  Overview     Bacterial vaginosis is a type of vaginal infection. It is caused by excess growth of certain bacteria that are normally found in the vagina. Symptoms can include itching, swelling, pain when you urinate or have sex, and a gray or yellow discharge with a \"fishy\" odor. It is not considered an infection that is spread through sexual contact. Symptoms can be annoying and uncomfortable. But bacterial vaginosis does not usually cause other health problems. However, if you have it while you are pregnant, it can cause complications. While the infection may go away on its own, most doctors use antibiotics to treat it. You may have been prescribed pills or vaginal cream. With treatment, bacterial vaginosis usually clears up in 5 to 7 days. Follow-up care is a key part of your treatment and safety. Be sure to make and go to all appointments, and call your doctor if you are having problems. It's also a good idea to know your test results and keep a list of the medicines you take. How can you care for yourself at home? · Take your antibiotics as directed. Do not stop taking them just because you feel better. You need to take the full course of antibiotics. · Do not eat or drink anything that contains alcohol if you are taking metronidazole or tinidazole. · Keep using your medicine if you start your period. Use pads instead of tampons while using a vaginal cream or suppository. Tampons can absorb the medicine. · Wear loose cotton clothing. Do not wear nylon and other materials that hold body heat and moisture close to the skin. · Do not scratch. Relieve itching with a cold pack or a cool bath. · Do not wash your vaginal area more than once a day. Use plain water or a mild, unscented soap. Do not douche. When should you call for help?   Watch closely for changes in your health, and be sure to contact your doctor if:   · You have unexpected vaginal bleeding.     · You have a fever.     · You have new or increased pain in your vagina or pelvis.     · You are not getting better after 1 week.     · Your symptoms return after you finish the course of your medicine. Where can you learn more? Go to [email or web address] org and sign in to your [employer] account. Enter A492 in the [employer] box to learn more about \"Bacterial Vaginosis: Care Instructions. \"     If you do not have an account, please click on the \"Sign Up Now\" link. Current as of: November 8, 2019               Content Version: 12.6  © 8644-5659 [employer]now [employer]. Care instructions adapted under license by [facility] ([facility]). If you have questions about a medical condition or this instruction, always ask your healthcare professional. [address] any warranty or liability for your use of this information.

## 2020-12-23 PROBLEM — Z91.81 AT HIGH RISK FOR FALLS: Status: ACTIVE | Noted: 2020-12-23

## 2020-12-23 PROBLEM — L28.2 PRURITIC RASH: Status: ACTIVE | Noted: 2020-12-23

## 2021-01-18 RX ORDER — ESCITALOPRAM OXALATE 10 MG/1
TABLET ORAL
Qty: 30 TABLET | Refills: 3 | Status: SHIPPED | OUTPATIENT
Start: 2021-01-18 | Stop reason: SDUPTHER

## 2021-01-18 NOTE — TELEPHONE ENCOUNTER
Please Approve or Refuse.   Send to Pharmacy per Pt's Request:    Next Visit Date:  3/22/2021   Last Visit Date: 12/22/2020    No results found for: LABA1C          ( goal A1C is < 7)   BP Readings from Last 3 Encounters:   09/17/20 (!) 113/52   09/14/20 (!) 120/55   06/10/20 120/80          (goal 120/80)  BUN   Date Value Ref Range Status   09/15/2020 11 8 - 23 mg/dL Final     CREATININE   Date Value Ref Range Status   09/15/2020 0.83 0.50 - 0.90 mg/dL Final     Potassium   Date Value Ref Range Status   09/15/2020 4.4 3.7 - 5.3 mmol/L Final

## 2021-01-29 ENCOUNTER — TELEPHONE (OUTPATIENT)
Dept: FAMILY MEDICINE CLINIC | Age: 76
End: 2021-01-29

## 2021-01-29 RX ORDER — SIMVASTATIN 20 MG
TABLET ORAL
Qty: 90 TABLET | Refills: 1 | Status: SHIPPED | OUTPATIENT
Start: 2021-01-29 | End: 2021-07-30

## 2021-01-29 NOTE — TELEPHONE ENCOUNTER
[facility] received a call from: [address] [place] [address]     Name of Caller: Karissa Hooper contact number: [phone]    Reason for call: Gilberto Morales called in wanting to get the fax number for pt current pcp and wanted to know if pt was still a current pt of [facility].

## 2021-01-29 NOTE — TELEPHONE ENCOUNTER
Marry Kate is calling to request a refill on the following medication(s):    Medication Request:  Requested Prescriptions     Pending Prescriptions Disp Refills    simvastatin (ZOCOR) 20 MG tablet 90 tablet 1     Sig: take 1 tablet by mouth every evening       Last Visit Date (If Applicable):  78/51/8158    Next Visit Date:    3/22/2021

## 2021-02-01 ENCOUNTER — TELEPHONE (OUTPATIENT)
Dept: FAMILY MEDICINE CLINIC | Age: 76
End: 2021-02-01

## 2021-02-01 DIAGNOSIS — M81.0 AGE-RELATED OSTEOPOROSIS WITHOUT CURRENT PATHOLOGICAL FRACTURE: Primary | ICD-10-CM

## 2021-02-01 DIAGNOSIS — S72.001S CLOSED FRACTURE OF RIGHT HIP, SEQUELA: ICD-10-CM

## 2021-02-01 NOTE — TELEPHONE ENCOUNTER
Please let the patient know that I placed an order for a dexa scan. Please assist patient to schedule. Thanks.

## 2021-02-25 ENCOUNTER — HOSPITAL ENCOUNTER (OUTPATIENT)
Age: 76
Discharge: HOME | End: 2021-02-25
Payer: MEDICARE

## 2021-02-25 DIAGNOSIS — E78.5 HYPERLIPIDEMIA, UNSPECIFIED HYPERLIPIDEMIA TYPE: ICD-10-CM

## 2021-02-25 DIAGNOSIS — K76.0 FATTY LIVER: ICD-10-CM

## 2021-02-25 DIAGNOSIS — R73.9 HYPERGLYCEMIA: ICD-10-CM

## 2021-02-25 DIAGNOSIS — Z11.59 SCREENING FOR VIRAL DISEASE: ICD-10-CM

## 2021-02-25 DIAGNOSIS — I10 ESSENTIAL HYPERTENSION: Primary | ICD-10-CM

## 2021-02-25 DIAGNOSIS — E55.9 VITAMIN D DEFICIENCY: ICD-10-CM

## 2021-02-25 DIAGNOSIS — I10 ESSENTIAL HYPERTENSION: ICD-10-CM

## 2021-02-25 LAB
-: ABNORMAL
ABSOLUTE EOS #: 0.2 K/UL (ref 0–0.4)
ABSOLUTE IMMATURE GRANULOCYTE: NORMAL K/UL (ref 0–0.3)
ABSOLUTE LYMPH #: 1.9 K/UL (ref 1–4.8)
ABSOLUTE MONO #: 0.4 K/UL (ref 0.1–1.3)
ALBUMIN SERPL-MCNC: 4.4 G/DL (ref 3.5–5.2)
ALBUMIN/GLOBULIN RATIO: ABNORMAL (ref 1–2.5)
ALP BLD-CCNC: 112 U/L (ref 35–104)
ALT SERPL-CCNC: 13 U/L (ref 5–33)
AMORPHOUS: ABNORMAL
ANION GAP SERPL CALCULATED.3IONS-SCNC: 10 MMOL/L (ref 9–17)
AST SERPL-CCNC: 15 U/L
BACTERIA: ABNORMAL
BASOPHILS # BLD: 1 % (ref 0–2)
BASOPHILS ABSOLUTE: 0 K/UL (ref 0–0.2)
BILIRUB SERPL-MCNC: 0.77 MG/DL (ref 0.3–1.2)
BILIRUBIN URINE: NEGATIVE
BUN BLDV-MCNC: 10 MG/DL (ref 8–23)
BUN/CREAT BLD: ABNORMAL (ref 9–20)
CALCIUM SERPL-MCNC: 9.5 MG/DL (ref 8.6–10.4)
CASTS UA: ABNORMAL /LPF
CHLORIDE BLD-SCNC: 108 MMOL/L (ref 98–107)
CHOLESTEROL, FASTING: 190 MG/DL
CHOLESTEROL/HDL RATIO: 4.2
CO2: 25 MMOL/L (ref 20–31)
COLOR: YELLOW
COMMENT UA: ABNORMAL
CREAT SERPL-MCNC: 0.73 MG/DL (ref 0.5–0.9)
CRYSTALS, UA: ABNORMAL /HPF
DIFFERENTIAL TYPE: NORMAL
EOSINOPHILS RELATIVE PERCENT: 3 % (ref 0–4)
EPITHELIAL CELLS UA: ABNORMAL /HPF
ESTIMATED AVERAGE GLUCOSE: 117 MG/DL
GFR AFRICAN AMERICAN: >60 ML/MIN
GFR NON-AFRICAN AMERICAN: >60 ML/MIN
GFR SERPL CREATININE-BSD FRML MDRD: ABNORMAL ML/MIN/{1.73_M2}
GFR SERPL CREATININE-BSD FRML MDRD: ABNORMAL ML/MIN/{1.73_M2}
GLUCOSE FASTING: 96 MG/DL (ref 70–99)
GLUCOSE URINE: NEGATIVE
HBA1C MFR BLD: 5.7 % (ref 4–6)
HCT VFR BLD CALC: 43.8 % (ref 36–46)
HDLC SERPL-MCNC: 45 MG/DL
HEMOGLOBIN: 14.4 G/DL (ref 12–16)
IMMATURE GRANULOCYTES: NORMAL %
KETONES, URINE: NEGATIVE
LDL CHOLESTEROL: 108 MG/DL (ref 0–130)
LEUKOCYTE ESTERASE, URINE: ABNORMAL
LYMPHOCYTES # BLD: 29 % (ref 24–44)
MCH RBC QN AUTO: 30.6 PG (ref 26–34)
MCHC RBC AUTO-ENTMCNC: 32.8 G/DL (ref 31–37)
MCV RBC AUTO: 93.1 FL (ref 80–100)
MONOCYTES # BLD: 7 % (ref 1–7)
MUCUS: ABNORMAL
NITRITE, URINE: NEGATIVE
NRBC AUTOMATED: NORMAL PER 100 WBC
OTHER OBSERVATIONS UA: ABNORMAL
PDW BLD-RTO: 14.5 % (ref 11.5–14.9)
PH UA: 7 (ref 5–8)
PLATELET # BLD: 252 K/UL (ref 150–450)
PLATELET ESTIMATE: NORMAL
PMV BLD AUTO: 8.8 FL (ref 6–12)
POTASSIUM SERPL-SCNC: 4.3 MMOL/L (ref 3.7–5.3)
PROTEIN UA: NEGATIVE
RBC # BLD: 4.71 M/UL (ref 4–5.2)
RBC # BLD: NORMAL 10*6/UL
RBC UA: ABNORMAL /HPF
RENAL EPITHELIAL, UA: ABNORMAL /HPF
SEG NEUTROPHILS: 60 % (ref 36–66)
SEGMENTED NEUTROPHILS ABSOLUTE COUNT: 4 K/UL (ref 1.3–9.1)
SODIUM BLD-SCNC: 143 MMOL/L (ref 135–144)
SPECIFIC GRAVITY UA: 1.02 (ref 1–1.03)
TOTAL PROTEIN: 7 G/DL (ref 6.4–8.3)
TRICHOMONAS: ABNORMAL
TRIGLYCERIDE, FASTING: 183 MG/DL
TURBIDITY: CLEAR
URINE HGB: NEGATIVE
UROBILINOGEN, URINE: NORMAL
VITAMIN D 25-HYDROXY: 43.8 NG/ML (ref 30–100)
VLDLC SERPL CALC-MCNC: ABNORMAL MG/DL (ref 1–30)
WBC # BLD: 6.7 K/UL (ref 3.5–11)
WBC # BLD: NORMAL 10*3/UL
WBC UA: ABNORMAL /HPF
YEAST: ABNORMAL

## 2021-02-25 PROCEDURE — 80061 LIPID PANEL: CPT

## 2021-02-25 PROCEDURE — 82306 VITAMIN D 25 HYDROXY: CPT

## 2021-02-25 PROCEDURE — 81003 URINALYSIS AUTO W/O SCOPE: CPT | Performed by: FAMILY MEDICINE

## 2021-02-25 PROCEDURE — 80053 COMPREHEN METABOLIC PANEL: CPT

## 2021-02-25 PROCEDURE — 86787 VARICELLA-ZOSTER ANTIBODY: CPT

## 2021-02-25 PROCEDURE — 83036 HEMOGLOBIN GLYCOSYLATED A1C: CPT

## 2021-02-25 PROCEDURE — 87086 URINE CULTURE/COLONY COUNT: CPT

## 2021-02-25 PROCEDURE — 85025 COMPLETE CBC W/AUTO DIFF WBC: CPT

## 2021-02-25 PROCEDURE — 36415 COLL VENOUS BLD VENIPUNCTURE: CPT

## 2021-02-25 PROCEDURE — 81001 URINALYSIS AUTO W/SCOPE: CPT

## 2021-02-26 LAB
CULTURE: NORMAL
Lab: NORMAL
SPECIMEN DESCRIPTION: NORMAL
VZV IGG SER QL IA: 1.39

## 2021-03-04 NOTE — TELEPHONE ENCOUNTER
Please Approve or Refuse.   Send to Pharmacy per Pt's Request:      Next Visit Date:  9/10/2020   Last Visit Date: 6/10/2020    No results found for: LABA1C          ( goal A1C is < 7)   BP Readings from Last 3 Encounters:   06/10/20 120/80   12/30/19 120/84   12/16/19 (!) 153/79          (goal 120/80)  BUN   Date Value Ref Range Status   12/27/2019 13 8 - 23 mg/dL Final     CREATININE   Date Value Ref Range Status   12/27/2019 0.79 0.50 - 0.90 mg/dL Final     Potassium   Date Value Ref Range Status   12/27/2019 4.4 3.7 - 5.3 mmol/L Final
well-developed/well-groomed/well-nourished/no distress

## 2021-03-08 ENCOUNTER — OFFICE VISIT (OUTPATIENT)
Dept: ORTHOPEDIC SURGERY | Age: 76
End: 2021-03-08
Payer: MEDICARE

## 2021-03-08 VITALS — HEART RATE: 76 BPM | BODY MASS INDEX: 28.93 KG/M2 | HEIGHT: 66 IN | TEMPERATURE: 98 F | WEIGHT: 180 LBS

## 2021-03-08 DIAGNOSIS — M72.2 PLANTAR FASCIITIS: ICD-10-CM

## 2021-03-08 DIAGNOSIS — S72.001D CLOSED FRACTURE OF RIGHT HIP WITH ROUTINE HEALING, SUBSEQUENT ENCOUNTER: Primary | ICD-10-CM

## 2021-03-08 PROCEDURE — G8484 FLU IMMUNIZE NO ADMIN: HCPCS | Performed by: ORTHOPAEDIC SURGERY

## 2021-03-08 PROCEDURE — 4040F PNEUMOC VAC/ADMIN/RCVD: CPT | Performed by: ORTHOPAEDIC SURGERY

## 2021-03-08 PROCEDURE — G8417 CALC BMI ABV UP PARAM F/U: HCPCS | Performed by: ORTHOPAEDIC SURGERY

## 2021-03-08 PROCEDURE — G8399 PT W/DXA RESULTS DOCUMENT: HCPCS | Performed by: ORTHOPAEDIC SURGERY

## 2021-03-08 PROCEDURE — 3017F COLORECTAL CA SCREEN DOC REV: CPT | Performed by: ORTHOPAEDIC SURGERY

## 2021-03-08 PROCEDURE — 99213 OFFICE O/P EST LOW 20 MIN: CPT | Performed by: ORTHOPAEDIC SURGERY

## 2021-03-08 PROCEDURE — 1090F PRES/ABSN URINE INCON ASSESS: CPT | Performed by: ORTHOPAEDIC SURGERY

## 2021-03-08 PROCEDURE — 1123F ACP DISCUSS/DSCN MKR DOCD: CPT | Performed by: ORTHOPAEDIC SURGERY

## 2021-03-08 PROCEDURE — G8427 DOCREV CUR MEDS BY ELIG CLIN: HCPCS | Performed by: ORTHOPAEDIC SURGERY

## 2021-03-08 PROCEDURE — 1036F TOBACCO NON-USER: CPT | Performed by: ORTHOPAEDIC SURGERY

## 2021-03-08 NOTE — PROGRESS NOTES
[address] [facility] [place] [id number] [facility]  [address] [address]  Dept: [phone]    Ambulatory Orthopedic Follow Up Visit    Preoperative Diagnosis:   1. Right closed peritrochanteric hip fracture  2. Body mass index is 29.05 kg/m².     Postoperative Diagnosis:   1. same     Procedures Performed:  (9/14/2020)  1. Open treatment of Right hip intertrochanteric femur fracture with cephalomedullary nail        CHIEF COMPLAINT:    Chief Complaint   Patient presents with    Hip Pain     right    Foot Pain     bilateral         HISTORY OF PRESENT ILLNESS:       She is a 76 y.o. female, seen again today in the office for follow up of the above problem with a history of pain at the above location. The pain is described mainly with mechanical terms (dull/sharp/throbbing). The pain is worse with activity and better with rest. Since being seen last, the patient is doing better. At today's visit, she is using no brace or assistive device. At today's visit, she reports that she has no pain in her hip, and she is happy with her outcome. She also reports pain in her left greater than right plantar heels, which began atraumatically years ago. REVIEW OF SYSTEMS:   Constitutional: Negative for fever. HENT: Negative for tinnitus. Eyes: Negative for pain. Respiratory: Negative for shortness of breath. Cardiovascular: Negative for chest pain. Gastrointestinal: Negative for abdominal pain. Genitourinary: Negative for dysuria. Skin: Negative for rash. Neurological: Negative for headaches. Hematological: Does not bruise/bleed easily. Musculoskeletal: See HPI for pertinent positives     Past Medical History:    She  has a past medical history of Diverticulosis, Fatty liver, and Hyperlipidemia. Past Surgical History:    She  has a past surgical history that includes Spine surgery; Tonsillectomy; Appendectomy;  Hysterectomy; bladder suspension; Colonoscopy (05/23/13); Upper gastrointestinal endoscopy (05/23/13); tympanomastoidectomy (Left, 2000); and Femur fracture surgery (Right, 9/14/2020). Current Medications:     Current Outpatient Medications:     simvastatin (ZOCOR) 20 MG tablet, take 1 tablet by mouth every evening, Disp: 90 tablet, Rfl: 1    escitalopram (LEXAPRO) 10 MG tablet, take 1 tablet by mouth once daily, Disp: 30 tablet, Rfl: 3    losartan (COZAAR) 50 MG tablet, take 1 tablet by mouth once daily, Disp: 90 tablet, Rfl: 0    Vitamin Mixture (VITAMIN E COMPLETE PO), Take by mouth, Disp: , Rfl:     Cetirizine HCl (ZYRTEC PO), Take by mouth, Disp: , Rfl:     diphenhydrAMINE-APAP, sleep, (TYLENOL PM EXTRA STRENGTH PO), Take 2 tablets by mouth nightly, Disp: , Rfl:     calcium carbonate (OSCAL) 500 MG TABS tablet, Take 500 mg by mouth daily. , Disp: , Rfl:     aspirin 81 MG tablet, Take 81 mg by mouth daily. , Disp: , Rfl:     Multiple Vitamins-Minerals (THERAPEUTIC MULTIVITAMIN-MINERALS) tablet, Take 1 tablet by mouth daily. , Disp: , Rfl:      Allergies:    Adhesive tape and Lisinopril    Family History:  family history includes Cancer in her maternal aunt and mother.     Social History:   Social History     Occupational History    Not on file   Tobacco Use    Smoking status: Never Smoker    Smokeless tobacco: Never Used   Substance and Sexual Activity    Alcohol use: Yes     Comment: social     Drug use: No    Sexual activity: Yes     Partners: Male       OBJECTIVE:  Pulse 76   Temp 98 °F (36.7 °C)   Ht 5' 6\" (1.676 m)   Wt 180 lb (81.6 kg)   BMI 29.05 kg/m²    Psych: alert and oriented to person, time, and place  Cardio:  well perfused extremities  Resp:  normal respiratory effort  Skin:  no cyanosis  Hem/lymph:  no lymphedema  Neuro:  sensation to light touch unchanged since last visit  Musculoskeletal:    Incisions well-healed, no erythema/dehiscence/drainage  Sensation to light touch grossly intact throughout  Can wiggle toes and plantarflex/dorsiflex foot appropriately  Toes warm and well perfused  Compartments of thigh/foot/leg soft and compressible  No calf swelling/tenderness  No signs of infection      RLE:  Vascular: Toes warm and well perfused, compartments soft/compressible. Skin: Intact. No erythema/ulcers. Strength: Grossly normal ankle/foot strength throughout   Motion: Grossly normal ankle/foot motion throughout   Tenderness to Palpation: Plantar heel      LLE:  Vascular: Toes warm and well perfused, compartments soft/compressible. Skin: Intact. No erythema/ulcers. Strength: Grossly normal ankle/foot strength throughout   Motion: Grossly normal ankle/foot motion throughout   Tenderness to Palpation: Plantar heel      RADIOLOGY:   3/8/2021 FINDINGS:  Three weightbearing views (AP Pelvis, AP hip and Lateral) of the right hip and two weightbearing views (AP and Lateral) of the right femur were obtained in the office today and reviewed, revealing hip fracture with intact well seated hardware without evidence of loosening. No significant interval displacement of hip fracture. Interval healing noted.      IMPRESSION: Hip fracture as above     Electronically signed by Della Hamm MD      ASSESSMENT AND PLAN:  Body mass index is 29.05 kg/m². She is status post the above (on 9/14/2020) doing well overall. She also has left greater than right heel pain, likely secondary to plantar fasciitis. Notably, she has no relevant past medical history. We had a discussion today about the likely diagnosis and its natural history, physical exam and imaging findings, as well as various treatment options in detail. Surgically, I recommended conservative management for plantar fasciitis at today's visit. Orders/referrals were placed as below at today's visit. She may continue activity as tolerated, and has no specific restrictions.   She was provided heel cups for her plantar fasciitis, and she will use these in her shoes when ambulatory. All questions were answered and the above plan was agreed upon. The patient will return to clinic in 6 weeks with bilateral foot and ankle x-rays. At her next visit, we will discuss her plantar fasciitis in greater detail. At the patient's next visit, depending on how the patient is doing and/or new imaging/labs results, we may consider the following options:    []  Orthotic (OTC)     []  Orthotic (custom)          []  Rocker bottom shoes     []  Brace (OTC)        []  Brace (custom)             []  CAM boot        []  Night splint         []  Heel cups        []  Strap      []  Toe sleeves/splints    []  PT:                     []  Wean out of immobilization   []  Advance activity       []  Topical               []  NSAIDs          []  Brayan         []  Referral:         []  Stress xrays       []  CT         []  MRI        []  Injection:         []  Consider OR      []  Pick OR date    No follow-ups on file. No orders of the defined types were placed in this encounter. No orders of the defined types were placed in this encounter. Miles Daniels MD  Orthopedic Surgery        Please excuse any typos/errors, as this note was created with the assistance of voice recognition software. While intending to generate a document that actually reflects the content of the visit, the document can still have some errors including those of syntax and sound-a-like substitutions which may escape proof reading. In such instances, actual meaning can be extrapolated by context.

## 2021-03-09 ENCOUNTER — OFFICE VISIT (OUTPATIENT)
Dept: FAMILY MEDICINE CLINIC | Age: 76
End: 2021-03-09
Payer: MEDICARE

## 2021-03-09 VITALS
WEIGHT: 165 LBS | SYSTOLIC BLOOD PRESSURE: 120 MMHG | HEIGHT: 66 IN | TEMPERATURE: 98.5 F | DIASTOLIC BLOOD PRESSURE: 80 MMHG | BODY MASS INDEX: 26.52 KG/M2 | OXYGEN SATURATION: 98 % | HEART RATE: 81 BPM

## 2021-03-09 DIAGNOSIS — N76.0 BACTERIAL VAGINOSIS: ICD-10-CM

## 2021-03-09 DIAGNOSIS — S72.001S CLOSED FRACTURE OF RIGHT HIP, SEQUELA: ICD-10-CM

## 2021-03-09 DIAGNOSIS — E78.5 HYPERLIPIDEMIA, UNSPECIFIED HYPERLIPIDEMIA TYPE: ICD-10-CM

## 2021-03-09 DIAGNOSIS — I10 ESSENTIAL HYPERTENSION: Primary | ICD-10-CM

## 2021-03-09 DIAGNOSIS — K76.0 FATTY LIVER: ICD-10-CM

## 2021-03-09 DIAGNOSIS — Z23 NEED FOR VARICELLA VACCINE: ICD-10-CM

## 2021-03-09 DIAGNOSIS — F43.23 ADJUSTMENT DISORDER WITH MIXED ANXIETY AND DEPRESSED MOOD: ICD-10-CM

## 2021-03-09 DIAGNOSIS — B96.89 BACTERIAL VAGINOSIS: ICD-10-CM

## 2021-03-09 PROBLEM — M81.0 AGE-RELATED OSTEOPOROSIS WITHOUT CURRENT PATHOLOGICAL FRACTURE: Status: ACTIVE | Noted: 2021-03-09

## 2021-03-09 PROCEDURE — 99214 OFFICE O/P EST MOD 30 MIN: CPT | Performed by: FAMILY MEDICINE

## 2021-03-09 PROCEDURE — G8417 CALC BMI ABV UP PARAM F/U: HCPCS | Performed by: FAMILY MEDICINE

## 2021-03-09 PROCEDURE — 1090F PRES/ABSN URINE INCON ASSESS: CPT | Performed by: FAMILY MEDICINE

## 2021-03-09 PROCEDURE — 4040F PNEUMOC VAC/ADMIN/RCVD: CPT | Performed by: FAMILY MEDICINE

## 2021-03-09 PROCEDURE — 1123F ACP DISCUSS/DSCN MKR DOCD: CPT | Performed by: FAMILY MEDICINE

## 2021-03-09 PROCEDURE — 1036F TOBACCO NON-USER: CPT | Performed by: FAMILY MEDICINE

## 2021-03-09 PROCEDURE — G8399 PT W/DXA RESULTS DOCUMENT: HCPCS | Performed by: FAMILY MEDICINE

## 2021-03-09 PROCEDURE — G8484 FLU IMMUNIZE NO ADMIN: HCPCS | Performed by: FAMILY MEDICINE

## 2021-03-09 PROCEDURE — G8427 DOCREV CUR MEDS BY ELIG CLIN: HCPCS | Performed by: FAMILY MEDICINE

## 2021-03-09 PROCEDURE — 3017F COLORECTAL CA SCREEN DOC REV: CPT | Performed by: FAMILY MEDICINE

## 2021-03-09 RX ORDER — ZOSTER VACCINE RECOMBINANT, ADJUVANTED 50 MCG/0.5
0.5 KIT INTRAMUSCULAR ONCE
Qty: 0.5 ML | Refills: 0 | Status: SHIPPED | OUTPATIENT
Start: 2021-03-09 | End: 2021-03-09

## 2021-03-09 RX ORDER — METRONIDAZOLE 7.5 MG/G
GEL TOPICAL
Qty: 1 TUBE | Refills: 1 | Status: SHIPPED | OUTPATIENT
Start: 2021-03-09 | End: 2021-04-12

## 2021-03-09 ASSESSMENT — ANXIETY QUESTIONNAIRES
2. NOT BEING ABLE TO STOP OR CONTROL WORRYING: 0-NOT AT ALL
GAD7 TOTAL SCORE: 2
3. WORRYING TOO MUCH ABOUT DIFFERENT THINGS: 0-NOT AT ALL

## 2021-03-09 ASSESSMENT — ENCOUNTER SYMPTOMS
COLOR CHANGE: 1
SORE THROAT: 0
NAUSEA: 0
ABDOMINAL PAIN: 0
SHORTNESS OF BREATH: 0

## 2021-03-09 ASSESSMENT — PATIENT HEALTH QUESTIONNAIRE - PHQ9
4. FEELING TIRED OR HAVING LITTLE ENERGY: 1
2. FEELING DOWN, DEPRESSED OR HOPELESS: 3
10. IF YOU CHECKED OFF ANY PROBLEMS, HOW DIFFICULT HAVE THESE PROBLEMS MADE IT FOR YOU TO DO YOUR WORK, TAKE CARE OF THINGS AT HOME, OR GET ALONG WITH OTHER PEOPLE: 1
6. FEELING BAD ABOUT YOURSELF - OR THAT YOU ARE A FAILURE OR HAVE LET YOURSELF OR YOUR FAMILY DOWN: 0
9. THOUGHTS THAT YOU WOULD BE BETTER OFF DEAD, OR OF HURTING YOURSELF: 0
5. POOR APPETITE OR OVEREATING: 0
8. MOVING OR SPEAKING SO SLOWLY THAT OTHER PEOPLE COULD HAVE NOTICED. OR THE OPPOSITE, BEING SO FIGETY OR RESTLESS THAT YOU HAVE BEEN MOVING AROUND A LOT MORE THAN USUAL: 0

## 2021-03-09 NOTE — PATIENT INSTRUCTIONS
[employer] COVID-19 Vaccination Hotline: [phone]    COVID-19 vaccine appointments are not available through our practice. As you're eligible to receive the COVID-19 vaccine, as determined by your state's department of health, you will be able to schedule an appointment through [address] or by calling [phone]. Appointments are required to receive the COVID-19 vaccine. As vaccine supply continues to be limited, we anticipate open appointments to fill up quickly and appreciate your patience as we work through the process of providing vaccines to those in our communities. Schedule a Vaccine  When you qualify to receive the vaccine, call the [employer] COVID-19 Vaccination Hotline to schedule your appointment or to get additional information about the [employer] locations which are offering the COVID-19 vaccine. To be 94% effective, it's important that you receive two doses of one of the COVID-19 vaccines. -If you are receiving the [employer] vaccine, your second shot will be scheduled as close to 21 days after the first shot as possible. -If you are receiving the Moderna vaccine, your second shot will be scheduled as close to 28 days after the first shot as possible. Links to [employer] website and [facility] website:    [employer]/[facility]-monitoring-coronavirus-covid-19/covid-19-vaccine/[place]/rodriguez-vaccine    [email or web address]    [facility]  https://sites. google. com/view/wchd[place]-coronavirus/home/vaccines/get-vaccinated  [employer].. com/r/[place]OVID_Vaccine_On-Call_List      https://[employer]/shared/[email or web address]?:toolbar=n&:display_count=n&:origin=viz_share_link&:embed=y    PHARMACY LOCATIONS  Https://vaccine. coronavirus. [place].gov/    [facility]  Https://[employer]/shared/[email or web address]?:toolbar=n&:display_count=n&:origin=viz_share_link&:embed=y    [facility]  Https://[employer]/shared/[email or web address]?:toolbar=n&:display_count=n&:origin=Micello_share_link&:embed=y    [address]  Https://[employer]/shared/[email or web address]?:toolbar=n&:display_count=n&:origin=[employer]ment_link&:embed=y    [address]  https://[employer]/shared/[email or web address]?:toolbar=n&:display_count=n&:origin=Micello_Retrevo_link&:embed=y

## 2021-03-22 ENCOUNTER — HOSPITAL ENCOUNTER (OUTPATIENT)
Dept: WOMENS IMAGING | Age: 76
Discharge: HOME | End: 2021-03-24
Payer: MEDICARE

## 2021-03-22 DIAGNOSIS — Z12.31 ENCOUNTER FOR SCREENING MAMMOGRAM FOR BREAST CANCER: ICD-10-CM

## 2021-03-22 DIAGNOSIS — S72.001S CLOSED FRACTURE OF RIGHT HIP, SEQUELA: ICD-10-CM

## 2021-03-22 DIAGNOSIS — M81.0 AGE-RELATED OSTEOPOROSIS WITHOUT CURRENT PATHOLOGICAL FRACTURE: ICD-10-CM

## 2021-03-22 PROCEDURE — 77063 BREAST TOMOSYNTHESIS BI: CPT

## 2021-04-12 ENCOUNTER — OFFICE VISIT (OUTPATIENT)
Dept: OBGYN CLINIC | Age: 76
End: 2021-04-12
Payer: MEDICARE

## 2021-04-12 ENCOUNTER — HOSPITAL ENCOUNTER (OUTPATIENT)
Age: 76
Setting detail: SPECIMEN
Discharge: HOME | End: 2021-04-12
Payer: MEDICARE

## 2021-04-12 VITALS
HEART RATE: 68 BPM | SYSTOLIC BLOOD PRESSURE: 138 MMHG | TEMPERATURE: 97.7 F | WEIGHT: 166.2 LBS | HEIGHT: 66 IN | DIASTOLIC BLOOD PRESSURE: 70 MMHG | BODY MASS INDEX: 26.71 KG/M2

## 2021-04-12 DIAGNOSIS — Z12.11 SCREEN FOR COLON CANCER: ICD-10-CM

## 2021-04-12 DIAGNOSIS — Z12.31 ENCOUNTER FOR SCREENING MAMMOGRAM FOR MALIGNANT NEOPLASM OF BREAST: ICD-10-CM

## 2021-04-12 DIAGNOSIS — Z01.419 VISIT FOR GYNECOLOGIC EXAMINATION: Primary | ICD-10-CM

## 2021-04-12 DIAGNOSIS — Z11.51 SCREENING FOR HPV (HUMAN PAPILLOMAVIRUS): ICD-10-CM

## 2021-04-12 DIAGNOSIS — R30.0 DYSURIA: ICD-10-CM

## 2021-04-12 DIAGNOSIS — N81.11 CYSTOCELE, MIDLINE: ICD-10-CM

## 2021-04-12 DIAGNOSIS — N76.0 ACUTE VAGINITIS: ICD-10-CM

## 2021-04-12 DIAGNOSIS — Z78.0 POST-MENOPAUSAL: ICD-10-CM

## 2021-04-12 LAB
-: ABNORMAL
AMORPHOUS: ABNORMAL
BACTERIA: ABNORMAL
BILIRUBIN URINE: NEGATIVE
CASTS UA: ABNORMAL /LPF
COLOR: YELLOW
COMMENT UA: ABNORMAL
CRYSTALS, UA: ABNORMAL /HPF
DIRECT EXAM: NORMAL
EPITHELIAL CELLS UA: ABNORMAL /HPF
GLUCOSE URINE: NEGATIVE
KETONES, URINE: NEGATIVE
LEUKOCYTE ESTERASE, URINE: NEGATIVE
Lab: NORMAL
MUCUS: ABNORMAL
NITRITE, URINE: NEGATIVE
OTHER OBSERVATIONS UA: ABNORMAL
PH UA: 6 (ref 5–8)
PROTEIN UA: NEGATIVE
RBC UA: ABNORMAL /HPF
RENAL EPITHELIAL, UA: ABNORMAL /HPF
SPECIFIC GRAVITY UA: 1.01 (ref 1–1.03)
SPECIMEN DESCRIPTION: NORMAL
TRICHOMONAS: ABNORMAL
TURBIDITY: CLEAR
URINE HGB: ABNORMAL
UROBILINOGEN, URINE: NORMAL
WBC UA: ABNORMAL /HPF
YEAST: ABNORMAL

## 2021-04-12 PROCEDURE — 87510 GARDNER VAG DNA DIR PROBE: CPT

## 2021-04-12 PROCEDURE — 81001 URINALYSIS AUTO W/SCOPE: CPT

## 2021-04-12 PROCEDURE — 87624 HPV HI-RISK TYP POOLED RSLT: CPT

## 2021-04-12 PROCEDURE — 87660 TRICHOMONAS VAGIN DIR PROBE: CPT

## 2021-04-12 PROCEDURE — 99397 PER PM REEVAL EST PAT 65+ YR: CPT | Performed by: NURSE PRACTITIONER

## 2021-04-12 PROCEDURE — 87591 N.GONORRHOEAE DNA AMP PROB: CPT

## 2021-04-12 PROCEDURE — 87491 CHLMYD TRACH DNA AMP PROBE: CPT

## 2021-04-12 PROCEDURE — G0145 SCR C/V CYTO,THINLAYER,RESCR: HCPCS

## 2021-04-12 PROCEDURE — 87480 CANDIDA DNA DIR PROBE: CPT

## 2021-04-12 RX ORDER — CLOTRIMAZOLE AND BETAMETHASONE DIPROPIONATE 10; .64 MG/G; MG/G
CREAM TOPICAL
Qty: 1 TUBE | Refills: 1 | Status: SHIPPED | OUTPATIENT
Start: 2021-04-12 | End: 2021-10-11 | Stop reason: ALTCHOICE

## 2021-04-12 ASSESSMENT — PATIENT HEALTH QUESTIONNAIRE - PHQ9
SUM OF ALL RESPONSES TO PHQ QUESTIONS 1-9: 0
SUM OF ALL RESPONSES TO PHQ9 QUESTIONS 1 & 2: 0
SUM OF ALL RESPONSES TO PHQ QUESTIONS 1-9: 0

## 2021-04-12 NOTE — PROGRESS NOTES
History and Physical  [address], [address] [address] [address] [facility] [phone]   Fax [phone]  Michael Bentley  2021              76 y.o. Chief Complaint   Patient presents with    Gynecologic Exam       No LMP recorded. Patient has had a hysterectomy. Primary Care Physician: MIGUEL Luna - CNP    The patient was seen and examined. She has no chief complaint today and is here for her annual exam.  Her bowels are regular. There are no voiding complaints. She denies any bloating. She denies vaginal discharge and was counseled on STD's and the need for barrier contraception. HPI : Michael Bentley is a 76 y.o. female [id number]    Annual exam  Complaining of vaginal spotting and odor for past year. Occurs at least once a week- notices after she goes to the bathroom- positive it is coming from vagina. Hx of hysterectomy in , 6 weeks later- had BSO. Denies hx of abnormal pap smears. hx of bladder sling repair 15 years ago with DR Svitlana Stauffer.  Has followed up with urologist, DR Addi Minaya and was placed on Myrbetriq approx 4 years ago. No longer taking this. Complaining of feeling like she is unable to empty her bladder completely. Will sit on toilet and try to get it to completely empty.     ________________________________________________________________________  OB History    Para Term  AB Living   3 3 2 1 0 3   SAB TAB Ectopic Molar Multiple Live Births   0 0 0 0 0 0      # Outcome Date GA Lbr Richard/2nd Weight Sex Delivery Anes PTL Lv   3 Term 80    M Vag-Spont      2 Term 26    F Vag-Spont      1  1969    M Vag-Spont        Past Medical History:   Diagnosis Date    Diverticulosis     Fatty liver     Hyperlipidemia                                                                    Past Surgical History:   Procedure Laterality Date    APPENDECTOMY      BLADDER SUSPENSION      COLONOSCOPY  13 DIVERTICULOSIS     FEMUR FRACTURE SURGERY Right 9/14/2020    FEMUR IM NAIL YARITZA INSERTION performed by Adelaida Villalobos MD at [address]      TYMPANOMASTOIDECTOMY Left 2000    UPPER GASTROINTESTINAL ENDOSCOPY  05/23/13     Family History   Problem Relation Age of Onset    Cancer Mother     Cancer Maternal Aunt      Social History     Socioeconomic History    Marital status:       Spouse name: Not on file    Number of children: Not on file    Years of education: Not on file    Highest education level: Not on file   Occupational History    Not on file   Social Needs    Financial resource strain: Not on file    Food insecurity     Worry: Not on file     Inability: Not on file    Transportation needs     Medical: Not on file     Non-medical: Not on file   Tobacco Use    Smoking status: Never Smoker    Smokeless tobacco: Never Used   Substance and Sexual Activity    Alcohol use: Yes     Comment: social     Drug use: No    Sexual activity: Yes     Partners: Male   Lifestyle    Physical activity     Days per week: Not on file     Minutes per session: Not on file    Stress: Not on file   Relationships    Social connections     Talks on phone: Not on file     Gets together: Not on file     Attends Baptism service: Not on file     Active member of club or organization: Not on file     Attends meetings of clubs or organizations: Not on file     Relationship status: Not on file    Intimate partner violence     Fear of current or ex partner: Not on file     Emotionally abused: Not on file     Physically abused: Not on file     Forced sexual activity: Not on file   Other Topics Concern    Not on file   Social History Narrative    Not on file       MEDICATIONS:  Current Outpatient Medications   Medication Sig Dispense Refill    diphenhydrAMINE-APAP, sleep, (TYLENOL PM EXTRA STRENGTH PO) Take by mouth      clotrimazole-betamethasone (Nick Gascoco) 1-0.05 % cream Apply to affected area bid externally x 4 days then daily for 3 days 1 Tube 1    simvastatin (ZOCOR) 20 MG tablet take 1 tablet by mouth every evening 90 tablet 1    escitalopram (LEXAPRO) 10 MG tablet take 1 tablet by mouth once daily 30 tablet 3    losartan (COZAAR) 50 MG tablet take 1 tablet by mouth once daily 90 tablet 0    Vitamin Mixture (VITAMIN E COMPLETE PO) Take by mouth      Cetirizine HCl (ZYRTEC PO) Take by mouth      calcium carbonate (OSCAL) 500 MG TABS tablet Take 500 mg by mouth daily.  aspirin 81 MG tablet Take 81 mg by mouth daily.  Multiple Vitamins-Minerals (THERAPEUTIC MULTIVITAMIN-MINERALS) tablet Take 1 tablet by mouth daily. No current facility-administered medications for this visit. ALLERGIES:  Allergies as of 04/12/2021 - Review Complete 04/12/2021   Allergen Reaction Noted    Adhesive tape Itching 02/16/2015    Lisinopril Other (See Comments) 07/27/2018       Symptoms of decreased mood absent  Symptoms of anhedonia absent    **If either question is answered in a  positive fashion then complete the PHQ9 Scoring Evaluation and make the appropriate referral**      Immunization status: stated as current, but no records available. Gynecologic History:  Menarche: 15 yo  Menopause at 35s with hyst then BSO yo     No LMP recorded. Patient has had a hysterectomy. Sexually Active: not for couple years    STD History: No     Permanent Sterilization: Yes    Reversible Birth Control: No        Hormone Replacement Exposure: Yes 3 months      Genetic Qualified Family History of Breast, Ovarian , Colon or Uterine Cancer: No (patient's mother with breast cancer- 62s)     If YES see scanned worksheet.     Preventative Health Testing:    Health Maintenance:  Health Maintenance Due   Topic Date Due    Shingles Vaccine (1 of 2) Never done   [facility] Annual Wellness Visit (AWV)  Never done       Date of Last Pap Smear: over 10 years ago- no records available (36.5 °C)   TempSrc: Temporal   Weight: 166 lb 3.2 oz (75.4 kg)   Height: 5' 6\" (1.676 m)       Chaperone for Intimate Exam   Chaperone was offered and accepted as part of the rooming process.  Chaperone: Jacquelin          General Appearance: This  is a well Developed, well Nourished, well groomed female. Her BMI was reviewed. Nutritional decision making was discussed. Skin:  There was a Normal Inspection of the skin without rashes or lesions. There were no rashes. (Papular, Maculopapular, Hives, Pustular, Macular)     There were no lesions (Ulcers, Erythema, Abn. Appearing Nevi)            Lymphatic:  No Lymph Nodes were Palpable in the neck , axilla or groin.  0 # Of Lymph Nodes; Location ; Character [Normal]  [Shotty] [Tender] [Enlarged]     Neck and EENT:  The neck was supple. There were no masses   The thyroid was not enlarged and had no masses. Perrla, EOMI B/L, TMI B/L No Abnormalities. Throat inspected-No exudates or Masses, Nares Patent No Masses        Respiratory: The lungs were auscultated and found to be clear. There were no rales, rhonchi or wheezes. There was a good respiratory effort. Cardiovascular: The heart was in a regular rate and rhythm. . No S3 or S4. There was no murmur appreciated. Location, grade, and radiation are not applicable. Extremities: The patients extremities were without calf tenderness, edema, or varicosities. There was full range of motion in all four extremities. Pulses in all four extremities were appreciated and are 2/4. Abdomen: The abdomen was soft and non-tender. There were good bowel sounds in all quadrants and there was no guarding, rebound or rigidity. On evaluation there was no evidence of hepatosplenomegaly and there was no costal vertebral ramiro tenderness bilaterally. No hernias were appreciated. Abdominal Scars: intact    Psych:   The patient had a normal Orientation to: Time, Place, Person, and Situation  There is no Mood / Affect changes    Breast:  (Chest)  normal appearance, no masses or tenderness  Self breast exams were reviewed in detail. Literature was given. Pelvic Exam:  Vulva and vagina appear atrophic. Bimanual exam reveals normal cuff intact. No visible lesions or active bleeding noted. Cervix absent. Cystocele grade 1-2 noted. Rectal Exam:  exam declined by patient          Musculosk:  Normal Gait and station was noted. Digits were evaluated without abnormal findings. Range of motion, stability and strength were evaluated and found to be appropriate for the patients age. ASSESSMENT:      76 y.o. Annual   Diagnosis Orders   1. Visit for gynecologic examination  PAP SMEAR   2. Screening for HPV (human papillomavirus)  PAP SMEAR   3. Encounter for screening mammogram for malignant neoplasm of breast     4. Post-menopausal     5. Screen for colon cancer     6. Acute vaginitis  VAGINITIS DNA PROBE    C.trachomatis N.gonorrhoeae DNA   7. Dysuria  Urinalysis Reflex to Culture   8.  Cystocele, midline  Urinalysis Reflex to Culture          Chief Complaint   Patient presents with    Gynecologic Exam          Past Medical History:   Diagnosis Date    Diverticulosis     Fatty liver     Hyperlipidemia          Patient Active Problem List    Diagnosis Date Noted    Age-related osteoporosis without current pathological fracture 03/09/2021    At high risk for falls 12/23/2020    Pruritic rash 12/23/2020    Bacterial vaginosis 12/22/2020    Closed fracture of right hip ([address] 12/21/2020    Adjustment disorder with mixed anxiety and depressed mood 09/21/2020    Mass of left parotid gland 17 mm 09/14/2020    Acute urinary retention 09/14/2020    Other fracture of right femur, initial encounter for closed fracture ([address] 09/13/2020    Allergic rhinitis due to allergen 06/10/2020    Candidiasis of breast 06/10/2020    Anxiety and depression 12/12/2019    Essential hypertension 12/17/2018    Diverticulosis of

## 2021-04-13 ENCOUNTER — TELEPHONE (OUTPATIENT)
Dept: OBGYN CLINIC | Age: 76
End: 2021-04-13

## 2021-04-13 DIAGNOSIS — R31.9 HEMATURIA, UNSPECIFIED TYPE: Primary | ICD-10-CM

## 2021-04-13 LAB
C TRACH DNA GENITAL QL NAA+PROBE: NEGATIVE
N. GONORRHOEAE DNA: NEGATIVE
SPECIMEN DESCRIPTION: NORMAL

## 2021-04-13 RX ORDER — CEPHALEXIN 500 MG/1
500 CAPSULE ORAL 2 TIMES DAILY
Qty: 14 CAPSULE | Refills: 0 | Status: SHIPPED | OUTPATIENT
Start: 2021-04-13 | End: 2021-04-20

## 2021-04-13 NOTE — TELEPHONE ENCOUNTER
----- Message from MIGUEL Mcneill CNP sent at 4/13/2021  7:37 AM EDT -----  Moderate hgb in urine  Keflex 500mg PO BID X 7 days  Repeat UA C&S after completing antibiotic  If hgb remains, refer patient to urology.

## 2021-04-13 NOTE — TELEPHONE ENCOUNTER
Per Silva Godoy pt notified of moderate Hgb in urine and pt to get Keflex 500mg bid X 7 days sent to pt pharm. Pt instructed to repeat UA C&S after completion of Keflex.   If Hgb still remains pt will need Urology referral. She has a history of  CVA and left hemiparesis.  She is nonambulatory for 2 years.  A onel lift is required for transfers.  PT eval appreciated.  Further PT is not required.  She has considerable alf care needs.  Long term placement could be considered.  Case management assessment is needed. JAYASHREE FAGAN 80y Female      Patient is a 80y old  Female who presents with a chief complaint of UTI (26 Aug 2019 14:59)        INTERVAL HPI/OVERNIGHT EVENTS: No acute events overnight. Patient was seen and evaluated at the bedside. The patient endorses pain. Vitals stable. Patient denies fever/chills, chest pain, shortness of breath, abdominal pain, headaches, nausea/vomiting, and diarrhea/constipation.    PHYSICAL EXAM:  GENERAL: NAD, well-groomed, well-developed  HEAD:  Atraumatic, Normocephalic  EYES: EOMI, PERRLA, conjunctiva and sclera clear  NERVOUS SYSTEM:  Alert & Oriented, CN II-XII intact bilaterally  CHEST/LUNG: Clear to auscultation bilaterally; No rales, rhonchi, wheezing, or rubs  HEART: Regular rate and rhythm; No murmurs, rubs, or gallops  ABDOMEN: Soft, Nontender, Nondistended; Bowel sounds present  EXTREMITIES:  2+ Peripheral Pulses, No clubbing, cyanosis, or edema        Vital Signs Last 24 Hrs  T(C): 36.1 (27 Aug 2019 04:44), Max: 36.4 (26 Aug 2019 21:03)  T(F): 96.9 (27 Aug 2019 04:44), Max: 97.6 (26 Aug 2019 21:03)  HR: 80 (27 Aug 2019 04:44) (80 - 85)  BP: 152/72 (27 Aug 2019 04:44) (131/63 - 155/77)  BP(mean): --  RR: 18 (27 Aug 2019 04:44) (18 - 18)  SpO2: --      Consultant(s) Notes Reviewed:  [X] YES  [ ] NO  Care Discussed with Consultants/Other Providers [X] YES  [ ] NO  Imaging Personally Reviewed:  [X] YES  [ ] NO      LABS:                        8.1    5.84  )-----------( 219      ( 27 Aug 2019 06:11 )             25.4         143  |  103  |  20  ----------------------------<  105<H>  3.7   |  27  |  1.3    Ca    9.4      27 Aug 2019 06:11  Phos  4.9     -  Mg     2.1     -    TPro  6.3  /  Alb  3.9  /  TBili  0.3  /  DBili  x   /  AST  15  /  ALT  9   /  AlkPhos  107  08-27      Urinalysis Basic - ( 25 Aug 2019 21:30 )    Color: Yellow / Appearance: Turbid / S.010 / pH: x  Gluc: x / Ketone: Negative  / Bili: Negative / Urobili: <2 mg/dL   Blood: x / Protein: 30 mg/dL / Nitrite: Negative   Leuk Esterase: Large / RBC: 28 /HPF /  /HPF   Sq Epi: x / Non Sq Epi: 1 /HPF / Bacteria: Many        CAPILLARY BLOOD GLUCOSE      POCT Blood Glucose.: 111 mg/dL (27 Aug 2019 07:40)  POCT Blood Glucose.: 156 mg/dL (26 Aug 2019 21:21)  POCT Blood Glucose.: 156 mg/dL (26 Aug 2019 16:57)  POCT Blood Glucose.: 279 mg/dL (26 Aug 2019 12:06)  POCT Blood Glucose.: 262 mg/dL (26 Aug 2019 11:38) SUBJECTIVE:    Patient is a 80y old Female who presents with a chief complaint of   Currently admitted to medicine with the primary diagnosis of UTI (urinary tract infection)   HPI:  79 yo female with a PMH of DM, HTN, HLD, CVA (2018) with residual left sided weakness, hearing loss, and breast CA comes here with complains agitation, anxiety, SOB. As per the patient she has been having an arguments with her son over her late  (told son that his father had extra-marital affair), has been depressed and anxious because as per the patient, son is avoiding her. Also it was reported that she wanted to jump off the stairs but currently she denies any suicidal or self harming thoughts. She complains of left shoulder pain, denies any chest pain, racing of heart, denies abdominal pain. (26 Aug 2019 03:00)    Today is hospital day 1d.     PAST MEDICAL & SURGICAL HISTORY  CVA (cerebral vascular accident):  lft weakness  Bilateral hearing loss, unspecified hearing loss type  High cholesterol  Depression  BP (high blood pressure): 20 yr  Breast CA:  s/p rt  DM (diabetes mellitus): 24 yr  History of cholecystectomy:   History of lumpectomy of right breast  H/O total knee replacement, right    SOCIAL HISTORY:  Negative for smoking/alcohol/drug use.     ALLERGIES:  honeydew (Unknown)  latex (Unknown)  penicillins (Unknown)  pickles (Unknown)  shellfish (Unknown)  Sulfacetamide Sodium-Sulfur (Rash)    MEDICATIONS:  STANDING MEDICATIONS  amLODIPine   Tablet 10 milliGRAM(s) Oral daily  aspirin  Oral Chewable Tab - Peds 162 milliGRAM(s) Chew daily  atorvastatin 80 milliGRAM(s) Oral at bedtime  chlorhexidine 4% Liquid 1 Application(s) Topical <User Schedule>  dextrose 5%. 1000 milliLiter(s) IV Continuous <Continuous>  dextrose 50% Injectable 12.5 Gram(s) IV Push once  dextrose 50% Injectable 25 Gram(s) IV Push once  dextrose 50% Injectable 25 Gram(s) IV Push once  docusate sodium 100 milliGRAM(s) Oral daily  heparin  Injectable 5000 Unit(s) SubCutaneous every 12 hours  insulin glargine SubCutaneous Injection (LANTUS) - Peds 18 Unit(s) SubCutaneous at bedtime  insulin lispro (HumaLOG) corrective regimen sliding scale   SubCutaneous three times a day before meals  insulin lispro Injectable (HumaLOG) 7 Unit(s) SubCutaneous three times a day before meals  nortriptyline 25 milliGRAM(s) Oral at bedtime  pantoprazole    Tablet 40 milliGRAM(s) Oral before breakfast  senna 2 Tablet(s) Oral at bedtime  sertraline 100 milliGRAM(s) Oral daily    PRN MEDICATIONS  acetaminophen   Tablet .. 650 milliGRAM(s) Oral every 6 hours PRN  clonazePAM Oral Disintegrating Tablet 0.25 milliGRAM(s) Oral at bedtime PRN  dextrose 40% Gel 15 Gram(s) Oral once PRN  glucagon  Injectable 1 milliGRAM(s) IntraMuscular once PRN  hydrOXYzine  Oral Tab/Cap - Peds 25 milliGRAM(s) Oral every 8 hours PRN    VITALS:   T(F): 99.2  HR: 101  BP: 157/90  RR: 18  SpO2: 97%    LABS:                        9.9    8.58  )-----------( 266      ( 25 Aug 2019 20:45 )             29.8         142  |  101  |  21<H>  ----------------------------<  284<H>  3.6   |  27  |  1.5    Ca    9.4      26 Aug 2019 05:47    TPro  6.8  /  Alb  4.3  /  TBili  0.3  /  DBili  x   /  AST  15  /  ALT  11  /  AlkPhos  121<H>        Urinalysis Basic - ( 25 Aug 2019 21:30 )    Color: Yellow / Appearance: Turbid / S.010 / pH: x  Gluc: x / Ketone: Negative  / Bili: Negative / Urobili: <2 mg/dL   Blood: x / Protein: 30 mg/dL / Nitrite: Negative   Leuk Esterase: Large / RBC: 28 /HPF /  /HPF   Sq Epi: x / Non Sq Epi: 1 /HPF / Bacteria: Many        Troponin T, Serum: <0.01 ng/mL (19 @ 20:45)      CARDIAC MARKERS ( 25 Aug 2019 20:45 )  x     / <0.01 ng/mL / x     / x     / x          RADIOLOGY:    PHYSICAL EXAM:  GEN: No acute distress  LUNGS: Clear to auscultation bilaterally   HEART: Regular  ABD: Soft, non-tender, non-distended.  EXT: NC/NC/NE/2+PP/MORALES/Skin Intact.   NEURO: AAOX3    Intravenous access:   NG tube:   Pierce Catheter: SUBJECTIVE:    Patient is a 80y old Female who presents with a chief complaint of UTI (26 Aug 2019 14:59)    Currently admitted to medicine with the primary diagnosis of UTI (urinary tract infection)     Today is hospital day 2d.  wants to go home     PAST MEDICAL & SURGICAL HISTORY  CVA (cerebral vascular accident):  lft weakness  Bilateral hearing loss, unspecified hearing loss type  High cholesterol  Depression  BP (high blood pressure): 20 yr  Breast CA:  s/p rt  DM (diabetes mellitus): 24 yr  History of cholecystectomy:   History of lumpectomy of right breast  H/O total knee replacement, right    SOCIAL HISTORY:  Negative for smoking/alcohol/drug use.     ALLERGIES:  honeydew (Unknown)  latex (Unknown)  penicillins (Unknown)  pickles (Unknown)  shellfish (Unknown)  Sulfacetamide Sodium-Sulfur (Rash)    MEDICATIONS:  STANDING MEDICATIONS  amLODIPine   Tablet 10 milliGRAM(s) Oral daily  aspirin  Oral Chewable Tab - Peds 162 milliGRAM(s) Chew daily  atorvastatin 80 milliGRAM(s) Oral at bedtime  chlorhexidine 4% Liquid 1 Application(s) Topical <User Schedule>  dextrose 5%. 1000 milliLiter(s) IV Continuous <Continuous>  dextrose 50% Injectable 12.5 Gram(s) IV Push once  dextrose 50% Injectable 25 Gram(s) IV Push once  dextrose 50% Injectable 25 Gram(s) IV Push once  docusate sodium 100 milliGRAM(s) Oral daily  heparin  Injectable 5000 Unit(s) SubCutaneous every 12 hours  insulin glargine SubCutaneous Injection (LANTUS) - Peds 18 Unit(s) SubCutaneous at bedtime  insulin lispro (HumaLOG) corrective regimen sliding scale   SubCutaneous three times a day before meals  insulin lispro Injectable (HumaLOG) 7 Unit(s) SubCutaneous three times a day before meals  nortriptyline 25 milliGRAM(s) Oral at bedtime  pantoprazole    Tablet 40 milliGRAM(s) Oral before breakfast  senna 2 Tablet(s) Oral at bedtime  sertraline 100 milliGRAM(s) Oral daily    PRN MEDICATIONS  acetaminophen   Tablet .. 650 milliGRAM(s) Oral every 6 hours PRN  clonazePAM Oral Disintegrating Tablet 0.25 milliGRAM(s) Oral at bedtime PRN  dextrose 40% Gel 15 Gram(s) Oral once PRN  glucagon  Injectable 1 milliGRAM(s) IntraMuscular once PRN  hydrOXYzine  Oral Tab/Cap - Peds 25 milliGRAM(s) Oral every 8 hours PRN    VITALS:   T(F): 96.9  HR: 80  BP: 152/72  RR: 18  SpO2: --    LABS:                        8.1    5.84  )-----------( 219      ( 27 Aug 2019 06:11 )             25.4         143  |  103  |  20  ----------------------------<  105<H>  3.7   |  27  |  1.3    Ca    9.4      27 Aug 2019 06:11  Phos  4.9       Mg     2.1         TPro  6.3  /  Alb  3.9  /  TBili  0.3  /  DBili  x   /  AST  15  /  ALT  9   /  AlkPhos  107        Urinalysis Basic - ( 25 Aug 2019 21:30 )    Color: Yellow / Appearance: Turbid / S.010 / pH: x  Gluc: x / Ketone: Negative  / Bili: Negative / Urobili: <2 mg/dL   Blood: x / Protein: 30 mg/dL / Nitrite: Negative   Leuk Esterase: Large / RBC: 28 /HPF /  /HPF   Sq Epi: x / Non Sq Epi: 1 /HPF / Bacteria: Many            Culture - Urine (collected 25 Aug 2019 21:30)  Source: .Urine Clean Catch (Midstream)  Preliminary Report (27 Aug 2019 08:57):    >100,000 CFU/ml Gram Negative Rods      CARDIAC MARKERS ( 25 Aug 2019 20:45 )  x     / <0.01 ng/mL / x     / x     / x          RADIOLOGY:    PHYSICAL EXAM:  GEN: No acute distress  LUNGS: Clear to auscultation bilaterally   HEART: Regular  ABD: Soft, non-tender, non-distended.  EXT: NC/NC/NE/2+PP/MORALES/Skin Intact.   NEURO: AAOX3    Intravenous access:   NG tube:   Pierce Catheter:

## 2021-04-14 DIAGNOSIS — M79.671 FOOT PAIN, BILATERAL: Primary | ICD-10-CM

## 2021-04-14 DIAGNOSIS — M79.672 FOOT PAIN, BILATERAL: Primary | ICD-10-CM

## 2021-04-14 LAB
HPV SAMPLE: NORMAL
HPV, GENOTYPE 16: NOT DETECTED
HPV, GENOTYPE 18: NOT DETECTED
HPV, HIGH RISK OTHER: NOT DETECTED
HPV, INTERPRETATION: NORMAL
SPECIMEN DESCRIPTION: NORMAL

## 2021-04-15 LAB — CYTOLOGY REPORT: NORMAL

## 2021-04-19 ENCOUNTER — OFFICE VISIT (OUTPATIENT)
Dept: ORTHOPEDIC SURGERY | Age: 76
End: 2021-04-19
Payer: MEDICARE

## 2021-04-19 VITALS — BODY MASS INDEX: 26.68 KG/M2 | RESPIRATION RATE: 12 BRPM | WEIGHT: 166 LBS | HEIGHT: 66 IN

## 2021-04-19 DIAGNOSIS — M24.573 EQUINUS CONTRACTURE OF ANKLE: ICD-10-CM

## 2021-04-19 DIAGNOSIS — M72.2 PLANTAR FASCIITIS, BILATERAL: Primary | ICD-10-CM

## 2021-04-19 PROCEDURE — 4040F PNEUMOC VAC/ADMIN/RCVD: CPT | Performed by: ORTHOPAEDIC SURGERY

## 2021-04-19 PROCEDURE — G8399 PT W/DXA RESULTS DOCUMENT: HCPCS | Performed by: ORTHOPAEDIC SURGERY

## 2021-04-19 PROCEDURE — 1123F ACP DISCUSS/DSCN MKR DOCD: CPT | Performed by: ORTHOPAEDIC SURGERY

## 2021-04-19 PROCEDURE — G8427 DOCREV CUR MEDS BY ELIG CLIN: HCPCS | Performed by: ORTHOPAEDIC SURGERY

## 2021-04-19 PROCEDURE — 1090F PRES/ABSN URINE INCON ASSESS: CPT | Performed by: ORTHOPAEDIC SURGERY

## 2021-04-19 PROCEDURE — 3017F COLORECTAL CA SCREEN DOC REV: CPT | Performed by: ORTHOPAEDIC SURGERY

## 2021-04-19 PROCEDURE — 1036F TOBACCO NON-USER: CPT | Performed by: ORTHOPAEDIC SURGERY

## 2021-04-19 PROCEDURE — 99214 OFFICE O/P EST MOD 30 MIN: CPT | Performed by: ORTHOPAEDIC SURGERY

## 2021-04-19 PROCEDURE — G8417 CALC BMI ABV UP PARAM F/U: HCPCS | Performed by: ORTHOPAEDIC SURGERY

## 2021-04-19 RX ORDER — NAPROXEN 500 MG/1
500 TABLET ORAL 2 TIMES DAILY PRN
Qty: 60 TABLET | Refills: 0 | Status: SHIPPED | OUTPATIENT
Start: 2021-04-19 | End: 2021-10-11 | Stop reason: ALTCHOICE

## 2021-04-19 NOTE — PROGRESS NOTES
[address] [facility]  [address] [facility]  [address] [address]  Dept: [phone]    Ambulatory Orthopedic Follow Up Visit    Preoperative Diagnosis:   1. Right closed peritrochanteric hip fracture  2. Body mass index is 29.05 kg/m².     Postoperative Diagnosis:   1. same     Procedures Performed:  (9/14/2020)  1. Open treatment of Right hip intertrochanteric femur fracture with cephalomedullary nail        CHIEF COMPLAINT:    Chief Complaint   Patient presents with    Foot Pain     Bilateral         HISTORY OF PRESENT ILLNESS:       She is a 76 y.o. female, seen again today in the office for follow up of the above problem with a history of pain at the above location. The pain is described mainly with mechanical terms (dull/sharp/throbbing). The pain is worse with activity and better with rest. Since being seen last, the patient is doing better. At today's visit, she is using no brace or assistive device. At today's visit, she reports that she has no pain in her hip, and she is happy with her outcome. She also reports pain in her left greater than right plantar heels, which began atraumatically years ago. INTERVAL HISTORY 4/19/2021:  She is seen again today in the office for follow up of a previous issue (as above). Since being seen last, the patient is doing better. At today's visit, she is using a cane. She reports that her left plantar heel hurts more than her right plantar heel, and reports pain with her first steps in the morning. REVIEW OF SYSTEMS:   Constitutional: Negative for fever. HENT: Negative for tinnitus. Eyes: Negative for pain. Respiratory: Negative for shortness of breath. Cardiovascular: Negative for chest pain. Gastrointestinal: Negative for abdominal pain. Genitourinary: Negative for dysuria. Skin: Negative for rash. Neurological: Negative for headaches.    Hematological: Does not bruise/bleed easily. Musculoskeletal: See HPI for pertinent positives     Past Medical History:    She  has a past medical history of Diverticulosis, Fatty liver, and Hyperlipidemia. Past Surgical History:    She  has a past surgical history that includes Spine surgery; Tonsillectomy; Appendectomy; Hysterectomy; bladder suspension; Colonoscopy (05/23/13); Upper gastrointestinal endoscopy (05/23/13); tympanomastoidectomy (Left, 2000); and Femur fracture surgery (Right, 9/14/2020). Current Medications:     Current Outpatient Medications:     naproxen (EC NAPROSYN) 500 MG EC tablet, Take 1 tablet by mouth 2 times daily as needed for Pain, Disp: 60 tablet, Rfl: 0    cephALEXin (KEFLEX) 500 MG capsule, Take 1 capsule by mouth 2 times daily for 7 days, Disp: 14 capsule, Rfl: 0    diphenhydrAMINE-APAP, sleep, (TYLENOL PM EXTRA STRENGTH PO), Take by mouth, Disp: , Rfl:     clotrimazole-betamethasone (LOTRISONE) 1-0.05 % cream, Apply to affected area bid externally x 4 days then daily for 3 days, Disp: 1 Tube, Rfl: 1    simvastatin (ZOCOR) 20 MG tablet, take 1 tablet by mouth every evening, Disp: 90 tablet, Rfl: 1    escitalopram (LEXAPRO) 10 MG tablet, take 1 tablet by mouth once daily, Disp: 30 tablet, Rfl: 3    losartan (COZAAR) 50 MG tablet, take 1 tablet by mouth once daily, Disp: 90 tablet, Rfl: 0    Vitamin Mixture (VITAMIN E COMPLETE PO), Take by mouth, Disp: , Rfl:     Cetirizine HCl (ZYRTEC PO), Take by mouth, Disp: , Rfl:     calcium carbonate (OSCAL) 500 MG TABS tablet, Take 500 mg by mouth daily. , Disp: , Rfl:     aspirin 81 MG tablet, Take 81 mg by mouth daily. , Disp: , Rfl:     Multiple Vitamins-Minerals (THERAPEUTIC MULTIVITAMIN-MINERALS) tablet, Take 1 tablet by mouth daily. , Disp: , Rfl:      Allergies:    Adhesive tape and Lisinopril    Family History:  family history includes Cancer in her maternal aunt and mother.     Social History:   Social History     Occupational History    Not on file Tobacco Use    Smoking status: Never Smoker    Smokeless tobacco: Never Used   Substance and Sexual Activity    Alcohol use: Yes     Comment: social     Drug use: No    Sexual activity: Yes     Partners: Male       OBJECTIVE:  Resp 12   Ht 5' 6\" (1.676 m)   Wt 166 lb (75.3 kg)   BMI 26.79 kg/m²    Psych: alert and oriented to person, time, and place  Cardio:  well perfused extremities  Resp:  normal respiratory effort  Skin:  no cyanosis  Hem/lymph:  no lymphedema  Neuro:  sensation to light touch unchanged since last visit  Musculoskeletal:      RLE:  Vascular: Toes warm and well perfused, compartments soft/compressible. Skin: Intact. No erythema/ulcers. Strength: Grossly normal ankle/foot strength throughout   Motion: Grossly normal ankle/foot motion throughout   Tenderness to Palpation: Plantar heel  -Negative calcaneal squeeze test  -Negative for visual stretch test  -Equinus      LLE:  Vascular: Toes warm and well perfused, compartments soft/compressible. Skin: Intact. No erythema/ulcers. Strength: Grossly normal ankle/foot strength throughout   Motion: Grossly normal ankle/foot motion throughout   Tenderness to Palpation: Plantar heel  -Negative calcaneal squeeze test  -Negative for visual stretch test  -Equinus      (Previous exam): Incisions well-healed, no erythema/dehiscence/drainage  Sensation to light touch grossly intact throughout   Can wiggle toes and plantarflex/dorsiflex foot appropriately  Toes warm and well perfused  Compartments of thigh/foot/leg soft and compressible  No calf swelling/tenderness  No signs of infection      RADIOLOGY:   4/19/2021 FINDINGS:  Three weightbearing views (AP, Mortise, and Lateral) of the bilateral ankle and three weightbearing views (AP, Oblique, Lateral) of the bilateral foot were obtained in the office today and reviewed, revealing no acute fracture, dislocation, or radioopaque foreign body/tumor.  The ankle mortise is maintained with no widening time.    All questions were answered and the above plan was agreed upon. The patient will return to clinic in 3 months without x-rays. At her next visit, depending on how she is doing, we may consider plantar fascial injections. At the patient's next visit, depending on how the patient is doing and/or new imaging/labs results, we may consider the following options:    []  Orthotic (OTC)     []  Orthotic (custom)          []  Rocker bottom shoes     []  Brace (OTC)        []  Brace (custom)             []  CAM boot        []  Night splint         []  Heel cups        []  Strap      []  Toe sleeves/splints    []  PT:                     []  Wean out of immobilization   []  Advance activity       []  Topical               []  NSAIDs          []  Brayan         []  Referral:         []  Stress xrays       []  CT         []  MRI        []  Injection:         []  Consider OR      []  Pick OR date    Return in about 3 months (around 7/19/2021). Orders Placed This Encounter   Medications    naproxen (EC NAPROSYN) 500 MG EC tablet     Sig: Take 1 tablet by mouth 2 times daily as needed for Pain     Dispense:  60 tablet     Refill:  0     Orders Placed This Encounter   Procedures    Ambulatory referral to Physical Therapy     Referral Priority:   Routine     Referral Type:   Eval and Treat     Referral Reason:   Specialty Services Required     Requested Specialty:   Physical Therapy     Number of Visits Requested:   1         Carla Richardson MD  Orthopedic Surgery        Please excuse any typos/errors, as this note was created with the assistance of voice recognition software. While intending to generate a document that actually reflects the content of the visit, the document can still have some errors including those of syntax and sound-a-like substitutions which may escape proof reading. In such instances, actual meaning can be extrapolated by context.

## 2021-04-20 ENCOUNTER — HOSPITAL ENCOUNTER (OUTPATIENT)
Dept: WOMENS IMAGING | Age: 76
Discharge: HOME | End: 2021-04-22
Payer: MEDICARE

## 2021-04-20 DIAGNOSIS — M81.0 AGE-RELATED OSTEOPOROSIS WITHOUT CURRENT PATHOLOGICAL FRACTURE: Primary | ICD-10-CM

## 2021-04-20 PROCEDURE — 77080 DXA BONE DENSITY AXIAL: CPT

## 2021-04-20 RX ORDER — ALENDRONATE SODIUM 70 MG/1
70 TABLET ORAL
Qty: 4 TABLET | Refills: 0 | Status: SHIPPED | OUTPATIENT
Start: 2021-04-20 | End: 2021-05-17

## 2021-04-20 NOTE — RESULT ENCOUNTER NOTE
Dexa scan resulted in hip osteoporosis, worse that the last result. I do not remember seeing any type of treatment for her for osteoporosis I sent a prescription for Fosamax to be taken weekly. I also want to encourage to continue taking calcium with vitamin D.  DISCUSSED AND ADVISED TO:  Avoid smoking  Limit alcohol consumption to 2 drinks a day or less. Continue with calcium and vitamin-D supplements. Will repeat DEXA scan in 2 years. Weight-bearing exercise is important in helping to maintain and even increase bone mass. necessary to assist in the absorption of calcium. Eat foods that are rich in Calcium. It is important that you take all medications as directed. Bone loss can only be prevented and progression can be avoided with above therapy.

## 2021-04-27 ENCOUNTER — HOSPITAL ENCOUNTER (OUTPATIENT)
Age: 76
Discharge: HOME | End: 2021-04-27
Payer: MEDICARE

## 2021-04-27 DIAGNOSIS — R31.9 HEMATURIA, UNSPECIFIED TYPE: ICD-10-CM

## 2021-04-27 LAB
-: ABNORMAL
AMORPHOUS: ABNORMAL
BACTERIA: ABNORMAL
BILIRUBIN URINE: NEGATIVE
CASTS UA: ABNORMAL /LPF
COLOR: YELLOW
COMMENT UA: ABNORMAL
CRYSTALS, UA: ABNORMAL /HPF
EPITHELIAL CELLS UA: ABNORMAL /HPF
GLUCOSE URINE: NEGATIVE
KETONES, URINE: NEGATIVE
LEUKOCYTE ESTERASE, URINE: NEGATIVE
MUCUS: ABNORMAL
NITRITE, URINE: NEGATIVE
OTHER OBSERVATIONS UA: ABNORMAL
PH UA: 7 (ref 5–8)
PROTEIN UA: NEGATIVE
RBC UA: ABNORMAL /HPF
RENAL EPITHELIAL, UA: ABNORMAL /HPF
SPECIFIC GRAVITY UA: 1.01 (ref 1–1.03)
TRICHOMONAS: ABNORMAL
TURBIDITY: ABNORMAL
URINE HGB: NEGATIVE
UROBILINOGEN, URINE: NORMAL
WBC UA: ABNORMAL /HPF
YEAST: ABNORMAL

## 2021-04-27 PROCEDURE — 81001 URINALYSIS AUTO W/SCOPE: CPT

## 2021-05-04 ENCOUNTER — HOSPITAL ENCOUNTER (OUTPATIENT)
Dept: PHYSICAL THERAPY | Age: 76
Setting detail: THERAPIES SERIES
Discharge: HOME | End: 2021-05-04
Payer: MEDICARE

## 2021-05-04 PROCEDURE — 97161 PT EVAL LOW COMPLEX 20 MIN: CPT

## 2021-05-04 NOTE — CONSULTS
[address] [facility]              [address] [facility] [address]              Phone: [phone]              Fax: [phone]        Physical Therapy Lower Extremity Evaluation    Date:  2021  Patient: Paola Mendoza  :   MRN: [id number]  Physician: Adelaida Villalobos MD    Insurance: [facility]  Medical Diagnosis: Plantar fasciitis, bilateral, Equinus contracture of ankle  Rehab Codes: M72.2, M24.573  Onset date: 21   Next 's appt. : 21    Subjective:   CC/HPI: Pt reports to PT with L heel pain. Pt states that she had a fall last September in which she broke her femur, and during a follow up pt states that she was told that she had then developed a heel spur in her L ankle. Per pt, her heel pain started about 6 weeks ago. Since the pain started, pt states that her pain is worse at night after being on her feet throughout the day. Pt reports that she was given heel pads that did not help, as well as noting that she wears orthotics which help. Pt also states that she has a night splint which she notes that it seems to be helping. Currently, pt's biggest complaint is pain, however states that she is able to do all activities that she needs to do. Pt reports that she will usually walk around with a cane, but notes that she lost it before coming in today, so presents without one.     PMHx: [] Unremarkable [] Diabetes [] HTN  [] Pacemaker   [] MI/Heart Problems [] Cancer [] Arthritis   [] Other:              [x] Refer to full medical chart  In EPIC     Tests: [x] X-Ray: See EPIC    [] MRI:    [] Other:     Comorbidities:   [] Obesity [] Dialysis  [x] N/A   [] Asthma/COPD [] Dementia [] Other:   [] Stroke [] Sleep apnea [] Other:   [] Vascular disease [] Rheumatic disease [] Other:       Medications:  [x] Refer to full medical record [] None [] Other:  Allergies:       [x] Refer to full medical record [] None [] Other:    ADL/IADL Previous level of function Current level of function Who currently assists the patient with task   Bathing  [x] Independent  [] Assist [x] Independent  [] Assist    Dress/grooming [x] Independent  [] Assist [x] Independent  [] Assist    Transfer/mobility [x] Independent  [] Assist [x] Independent  [] Assist    Feeding [x] Independent  [] Assist [x] Independent  [] Assist    Toileting [x] Independent  [] Assist [x] Independent  [] Assist    Driving [x] Independent  [] Assist [x] Independent  [] Assist    Housekeeping [x] Independent  [] Assist [x] Independent  [] Assist    Grocery shop/meal prep [x] Independent  [] Assist [x] Independent  [] Assist      Gait Prior level of function Current level of function    [x] Independent  [] Assist [x] Independent  [] Assist   Device: [] Independent [] Independent    [x] Straight Cane [] Quad cane [x] Straight Cane [] Quad cane    [] Standard walker [] Rolling walker   [] 4 wheeled walker [] Standard walker [] Rolling walker   [] 4 wheeled walker    [] Wheelchair [] Wheelchair       Marital Status    Home type 1 SH   Stairs from outside 4 JUNG   Stairs inside --   Employement Retired   Job status --   Work Activities/duties  --   Recreational Activities None that are affected       Pain present? Yes   Location L heel   Pain Rating currently 3/10   Pain at worse 5/10   Pain at best 2/10   Description of pain Intermittent, aching   Altered Sensation Denies   What makes it worse In evening   What makes it better Sitting   Symptom progression Gotten a little better   Sleep Sleep affected d/t heel pain             Objective:    ROM  ° A/P STRENGTH    Left Right Left Right   Hip Flex   4/5 4/5   Ext       ER       IR       ABD       ADD       Knee Flex   5/5 4/5   Ext   5/5 4/5   Ankle DF 7* from 0 knee straight  8* knee bent 4* from 0 knee straight  12* knee bent 5/5 4+/5   PF   4+/5 4+/5   INV 32 25 4+/5 4/5   EVER 18 11 4+/5 4/5              TESTS (+/-) Left Right Not Tested   Ant.  Drawer   [x] Fall Risk Assessment    Patient Name:  Tanesha Wong  : 1945        Risk Factor Scale  Score   History of Falls [x] Yes  [] No 25  0 25   Secondary Diagnosis [x] Yes  [] No 15  0 15   Ambulatory Aid [] Furniture  [x] Crutches/cane/walker  [] None/bedrest/wheelchair/nurse 30  15  0 15   IV/Heparin Lock [] Yes  [x] No 20  0 0   Gait/Transferring [] Impaired  [] Weak  [x] Normal/bedrest/immobile 20  10  0 0   Mental Status [] Forgets limitations  [x] Oriented to own ability 15  0 0      Total: 55     Based on the Assessment score: check the appropriate box. []  No intervention needed   Low =   Score of 0-24    []  Use standard prevention interventions Moderate =  Score of 24-44   [] Give patient handout and discuss fall prevention strategies   [] Establish goal of education for patient/family RE: fall prevention strategies    [x]  Use high risk prevention interventions High = Score of 45 and higher   [] Give patient handout and discuss fall prevention strategies   [x] Establish goal of education for patient/family Re: fall prevention strategies   [x] Discuss lifeline / other resources    Electronically signed by: Dedra Guillermo PT  Date: 2021            Assessment:    Patient would benefit from skilled physical therapy services in order to: Help improve gastroc flexibility, help improve walking tolerance/technique, and help decrease pain     Problems:    [x] ? Pain:  [x] ? ROM:  [x] ? Strength:  [x] ? Function:  [] Other:      STG: (to be met in 8 treatments)  1. ? Pain: Decrease pain levels to 3/10 with all activities to help ease all gait and ADLs. 2. ? ROM: Increase flexibility in L gastroc to help improve L ankle joint mechanics. 3. Independent with Home Exercise Programs  4. Demonstrate Knowledge of fall prevention    LTG: (to be met in 16 treatments)  1. Improve score on LEFS from 15% impaired to <5% impaired, demonstrating improved tolerance to activity. 2. ? Strength:  Increase  L ankle 1-2 [] 3 [] 4+   Clinical presentation (progression) [] Stable [x] Evolving  [] Unstable   Decision Making [x] Low [] Moderate [] High    [x] Low Complexity [] Moderate Complexity [] High Complexity       Treatment Charges: Mins Units   [x] Evaluation       [x]  Low       []  Moderate       []  High 36 1   []  Modalities     []  Ther Exercise     []  Manual Therapy     []  Ther Activities     []  Aquatics     []  Vasocompression     []  Other       TOTAL TREATMENT TIME: 36    Time in: 0911    Time Out: 0957    Electronically signed by: Shahana Cho PT        Physician Signature:________________________________Date:__________________  By signing above or cosigning this note, I have reviewed this plan of care and certify a need for medically necessary rehabilitation services.      *PLEASE SIGN ABOVE AND FAX BACK ALL PAGES*

## 2021-05-10 ENCOUNTER — HOSPITAL ENCOUNTER (OUTPATIENT)
Dept: PHYSICAL THERAPY | Age: 76
Setting detail: THERAPIES SERIES
Discharge: HOME | End: 2021-05-10
Payer: MEDICARE

## 2021-05-10 PROCEDURE — 97110 THERAPEUTIC EXERCISES: CPT

## 2021-05-10 NOTE — PROGRESS NOTES
[address] [facility]   [address] [facility] [address]   Phone: [phone]   Fax: [phone]    Physical Therapy Daily Treatment Note      Date:  5/10/2021  Patient Name:  Vicky Fonseca    :    MRN: [id number]  Physician: Carlos Skinner MD                                      Insurance: [facility]  Medical Diagnosis: Plantar fasciitis, bilateral, Equinus contracture of ankle                      Rehab Codes: M72.2, M24.573  Onset date: 21                           Next Dr's appt. : 21  Visit# / total visits:   Cancels/No Shows: 0/0    Subjective:  Patient reports her pain is continuous unless she's sitting, also found her cane and is able to walk a little longer with support. Pain:  [x] Yes  [] No Location: L>R heel Pain Rating: (0-10 scale) 5/10  Pain altered Tx:  [] No  [] Yes  Action:  Comments:    Objective:  Modalities:   Precautions: NONE  Services as deemed appropriate by the therapist, to include stretching of the calf (Gastroc and Achilles) and plantar fascia  Exercises:  Exercise  L Calf Reps/ Time Weight/ Level Comments   SB S  1'x3       Gastroc step S  1'x3       Gastroc inver S  1'x3                            PF rollout  2'/2' 6# DB                Toe yoga  2'/2'  X 2 sets        Foot doming  2'/2'  X 2 sets                                                                    Other:      Specific Instructions for next treatment:      Assessment: Patient able to complete and stretches and exercises this date without increased pain post session although increased discomfort during most stretches. Tactile cueing required to complete toe yoga exercises, and patient education provided for use of at home ice rolling with water bottle. [] Progressing toward goals. [] No change.      [] Other:    [] Patient would continue to benefit from skilled physical therapy services in order to: Help improve gastroc flexibility, help improve walking tolerance/technique, and help decrease pain. STG: (to be met in 8 treatments)  1. ? Pain: Decrease pain levels to 3/10 with all activities to help ease all gait and ADLs. 2. ? ROM: Increase flexibility in L gastroc to help improve L ankle joint mechanics. 3. Independent with Home Exercise Programs  4. Demonstrate Knowledge of fall prevention     LTG: (to be met in 16 treatments)  1. Improve score on LEFS from 15% impaired to <5% impaired, demonstrating improved tolerance to activity. 2. ? Strength: Increase  L ankle strength to 5/5 grossly to help improve stability. 3. ? Function: Pt will demonstrate ability to ambulate without any AD and no deviations to allow her to return to baseline mobility. 4. Reduce pain levels to 0/10                    Patient goals: \"Walk better\"    Pt. Education:  [x] Yes  [] No  [] Reviewed Prior HEP/Ed  Method of Education: [x] Verbal  [] Demo  [] Written  Comprehension of Education:  [x] Verbalizes understanding. [] Demonstrates understanding. [] Needs review. [] Demonstrates/verbalizes HEP/Ed previously given. Plan: [x] Continue per plan of care.    [] Other:      Treatment Charges: Mins Units   []  Modalities     []  Ther Exercise 36 2   []  Manual Therapy     []  Ther Activities     []  Aquatics     []  Neuromuscular     [] Vasocompression     [] Gait Training     [] Dry needling        [] 1 or 2 muscles        [] 3 or more muscles     []  Other     Total Treatment time 36 2     Time In:335            Time Out:411pm    Electronically signed by:  Ofelia Burnham PTA

## 2021-05-13 ENCOUNTER — HOSPITAL ENCOUNTER (OUTPATIENT)
Dept: PHYSICAL THERAPY | Age: 76
Setting detail: THERAPIES SERIES
Discharge: HOME | End: 2021-05-13
Payer: MEDICARE

## 2021-05-13 PROCEDURE — 97110 THERAPEUTIC EXERCISES: CPT

## 2021-05-13 NOTE — PROGRESS NOTES
[address] [facility]   [address] [facility] [address]   Phone: [phone]   Fax: [phone]    Physical Therapy Daily Treatment Note      Date:  2021  Patient Name:  Vicky Fonseca    :    MRN: [id number]  Physician: Carlos Skinner MD                                      Insurance: [facility]  Medical Diagnosis: Plantar fasciitis, bilateral, Equinus contracture of ankle                      Rehab Codes: M72.2, M24.573  Onset date: 21                           Next Dr's appt. : 21  Visit# / total visits: 3/16  Cancels/No Shows: 0/0    Subjective:  Patient reports her pain was a lot better right after therapy and the next day but then she did yard work yesterday causing a lot more pain. Denies use of frozen water bottle roll outs states \"she forgot\". 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 Pain:  [x] Yes  [] No Location: L>R heel Pain Rating: (0-10 scale) 5/10  Pain altered Tx:  [] No  [] Yes  Action:  Comments:    Objective:  Modalities:   Precautions: NONE  Services as deemed appropriate by the therapist, to include stretching of the calf (Gastroc and Achilles) and plantar fascia  Exercises:  Exercise  L Calf Reps/ Time Weight/ Level Comments   SB S  1'x3       Gastroc step S  1'x3       Gastroc inver S  1'x3                            PF rollout  2'/2' 6# DB                Toe yoga  2'/2'  X 2 sets        Foot doming  2'/2'  X 2 sets        Plantar fascia stretch sitting  1'x3 ea         towel scrunches  2'x2' ea   x2 sets                                                Other:      Specific Instructions for next treatment:      Assessment: Patient able to complete exercises with noting pain relief especially from stretches.  Continues to have poor control of foot intrinsics due to fatigue and weakness resulting in compensating movements, patient educated to complete at home as often as possible in order to further progress in intrinsic foot muscle strength. [] Progressing toward goals. [] No change. [] Other:    [] Patient would continue to benefit from skilled physical therapy services in order to: Help improve gastroc flexibility, help improve walking tolerance/technique, and help decrease pain. STG: (to be met in 8 treatments)  1. ? Pain: Decrease pain levels to 3/10 with all activities to help ease all gait and ADLs. 2. ? ROM: Increase flexibility in L gastroc to help improve L ankle joint mechanics. 3. Independent with Home Exercise Programs  4. Demonstrate Knowledge of fall prevention     LTG: (to be met in 16 treatments)  1. Improve score on LEFS from 15% impaired to <5% impaired, demonstrating improved tolerance to activity. 2. ? Strength: Increase  L ankle strength to 5/5 grossly to help improve stability. 3. ? Function: Pt will demonstrate ability to ambulate without any AD and no deviations to allow her to return to baseline mobility. 4. Reduce pain levels to 0/10                    Patient goals: \"Walk better\"    Pt. Education:  [x] Yes  [] No  [] Reviewed Prior HEP/Ed  Method of Education: [x] Verbal  [] Demo  [] Written  Comprehension of Education:  [x] Verbalizes understanding. [] Demonstrates understanding. [] Needs review. [] Demonstrates/verbalizes HEP/Ed previously given. Plan: [x] Continue per plan of care.    [] Other:      Treatment Charges: Mins Units   []  Modalities     []  Ther Exercise 39 3   []  Manual Therapy     []  Ther Activities     []  Aquatics     []  Neuromuscular     [] Vasocompression     [] Gait Training     [] Dry needling        [] 1 or 2 muscles        [] 3 or more muscles     []  Other     Total Treatment time 39 3     Time In:304            Time Out:343pm    Electronically signed by:  Rosa Hazel PTA

## 2021-05-15 DIAGNOSIS — M81.0 AGE-RELATED OSTEOPOROSIS WITHOUT CURRENT PATHOLOGICAL FRACTURE: ICD-10-CM

## 2021-05-17 RX ORDER — ALENDRONATE SODIUM 70 MG/1
70 TABLET ORAL
Qty: 4 TABLET | Refills: 0 | Status: SHIPPED | OUTPATIENT
Start: 2021-05-17 | Stop reason: SDUPTHER

## 2021-05-19 RX ORDER — ESCITALOPRAM OXALATE 10 MG/1
TABLET ORAL
Qty: 30 TABLET | Refills: 3 | Status: SHIPPED | OUTPATIENT
Start: 2021-05-19 | End: 2021-09-27

## 2021-05-19 NOTE — TELEPHONE ENCOUNTER
Please Approve or Refuse.   Send to Pharmacy per Pt's Request:      Next Visit Date:  6/11/2021   Last Visit Date: 3/9/2021    Hemoglobin A1C (%)   Date Value   02/25/2021 5.7             ( goal A1C is < 7)   BP Readings from Last 3 Encounters:   04/12/21 138/70   03/09/21 120/80   09/17/20 (!) 113/52          (goal 120/80)  BUN   Date Value Ref Range Status   02/25/2021 10 8 - 23 mg/dL Final     CREATININE   Date Value Ref Range Status   02/25/2021 0.73 0.50 - 0.90 mg/dL Final     Potassium   Date Value Ref Range Status   02/25/2021 4.3 3.7 - 5.3 mmol/L Final

## 2021-05-25 ENCOUNTER — OFFICE VISIT (OUTPATIENT)
Dept: OBGYN CLINIC | Age: 76
End: 2021-05-25
Payer: MEDICARE

## 2021-05-25 VITALS
WEIGHT: 165 LBS | DIASTOLIC BLOOD PRESSURE: 66 MMHG | HEART RATE: 76 BPM | SYSTOLIC BLOOD PRESSURE: 118 MMHG | HEIGHT: 66 IN | BODY MASS INDEX: 26.52 KG/M2

## 2021-05-25 DIAGNOSIS — R31.9 HEMATURIA OF UNKNOWN CAUSE: Primary | ICD-10-CM

## 2021-05-25 DIAGNOSIS — Z90.79 H/O BILATERAL SALPINGO-OOPHORECTOMY: ICD-10-CM

## 2021-05-25 DIAGNOSIS — M81.0 AGE-RELATED OSTEOPOROSIS WITHOUT CURRENT PATHOLOGICAL FRACTURE: ICD-10-CM

## 2021-05-25 DIAGNOSIS — Z96.0 PRESENCE OF PESSARY: ICD-10-CM

## 2021-05-25 DIAGNOSIS — Z90.710 H/O TOTAL VAGINAL HYSTERECTOMY: ICD-10-CM

## 2021-05-25 DIAGNOSIS — Z90.722 H/O BILATERAL SALPINGO-OOPHORECTOMY: ICD-10-CM

## 2021-05-25 DIAGNOSIS — R31.1 BENIGN ESSENTIAL MICROSCOPIC HEMATURIA: ICD-10-CM

## 2021-05-25 DIAGNOSIS — N81.11 MIDLINE CYSTOCELE: Primary | ICD-10-CM

## 2021-05-25 PROBLEM — N95.0 PMB (POSTMENOPAUSAL BLEEDING): Status: ACTIVE | Noted: 2021-05-25

## 2021-05-25 PROCEDURE — A4562 PESSARY, NON RUBBER,ANY TYPE: HCPCS | Performed by: OBSTETRICS & GYNECOLOGY

## 2021-05-25 PROCEDURE — 57160 INSERT PESSARY/OTHER DEVICE: CPT | Performed by: OBSTETRICS & GYNECOLOGY

## 2021-05-25 NOTE — PROGRESS NOTES
Report     Date Ordered:     4/13/2021     Status:   Signed Out        Date Complete:     4/13/2021     By: GEOVANNY Minor(ASCP)        Date Reported:     4/15/2021       INTERPRETATION   Roche HPV DNA High Risk                                   HPV Sample               Thin Prep                    (Ref Range)   HPV Type 16               Not Detected                    (Not   Detected)   HPV Type 18               Not Detected                    (Not   Detected)   Other High Risk HPV          Not Detected                    (Not   Detected)        This test amplifies and detects DNA of 14 high-risk HPV types   associated with cervical cancer and its precursor lesions (HPV types   16, 18, 31, 33, 35, 39, 45, 51, 52, 56, 58, 59, 66, and 68). Sensitivity may be affected by specimen collection methods, stage of   infection, and the presence of interfering substances.  Results should   be interpreted in conjunction with other available laboratory and   clinical data.  A negative high-risk HPV result does not exclude the   possibility of future cytologic HSIL or underlying CIN2-3 or cancer. This test is intended for medical purposes only and is not valid for   the evaluation of suspected sexual abuse or for other forensic   purposes.                Assessment:   Diagnosis Orders   1. Midline cystocele  AL FIT/INSERT INTRAVAG SUPPORT DEVICE    PESSARY   2. H/O total vaginal hysterectomy     3. H/O bilateral salpingo-oophorectomy S/P vag hyst (6 weeks)     4. Benign essential microscopic hematuria     5. Age-related osteoporosis without current pathological fracture     6.  Presence of pessary-#4 Ring with support       Chief Complaint   Patient presents with    Follow-up     cystocele     Patient Active Problem List    Diagnosis Date Noted    Benign essential microscopic hematuria 05/25/2021     Priority: High    Essential hypertension 12/17/2018     Priority: High    H/O total vaginal hysterectomy 05/25/2021 Priority: Medium    H/O bilateral salpingo-oophorectomy S/P vag hyst (6 weeks) 05/25/2021     Priority: Medium    Presence of pessary-#4 Ring with support 05/25/2021     Priority: Medium    Age-related osteoporosis without current pathological fracture 03/09/2021     Priority: Medium     Actonel RX Managed by PCP      PMB (postmenopausal bleeding) 05/25/2021    At high risk for falls 12/23/2020    Pruritic rash 12/23/2020    Bacterial vaginosis 12/22/2020    Closed fracture of right hip ([facility] [address] 12/21/2020    Adjustment disorder with mixed anxiety and depressed mood 09/21/2020    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 Return to the office 8-10 weeks  2. Report any vaginal bleeding or discharge  3. Abstinence  4. Annual Follow-up reviewed with patient. They will schedule appointment  5. Urology referral for hematuria    The patient, Ilan Ac is a 76 y.o. female, was seen with a total time spent of 20 minutes for the visit on this date of service by the E/M provider. The time component had both face to face and non face to face time spent in determining the total time component. Counseling and education regarding her diagnosis listed below and her options regarding those diagnoses were also included in determining her time component. Diagnosis Orders   1. Midline cystocele  VT FIT/INSERT INTRAVAG SUPPORT DEVICE    PESSARY   2. H/O total vaginal hysterectomy     3. H/O bilateral salpingo-oophorectomy S/P vag hyst (6 weeks)     4. Benign essential microscopic hematuria     5.  Age-related

## 2021-06-01 ENCOUNTER — HOSPITAL ENCOUNTER (OUTPATIENT)
Dept: PHYSICAL THERAPY | Age: 76
Setting detail: THERAPIES SERIES
Discharge: HOME | End: 2021-06-01
Payer: MEDICARE

## 2021-06-01 PROCEDURE — 97110 THERAPEUTIC EXERCISES: CPT

## 2021-06-01 NOTE — FLOWSHEET NOTE
[address] [facility]   [address] [facility] [address]   Phone: [phone]   Fax: [phone]    Physical Therapy Daily Treatment Note  Date:  2021  Patient Name:  Dick Caruso    :    MRN: [id number]  Physician: José Aguilera MD                                      Insurance: [facility]  Medical Diagnosis: Plantar fasciitis, bilateral, Equinus contracture of ankle                      Rehab Codes: M72.2, M24.573  Onset date: 21                           Next 's appt. : 21  Visit# / total visits:   Cancels/No Shows: 0/0  Subjective:  Pt reports improved tolerance to amb and dec in pain. Pt states she has been compliant with HEP. Pain:  [x] Yes  [] No Location: L>R heel Pain Rating: (0-10 scale) 2/10  Pain altered Tx:  [] No  [] Yes  Action:  Comments:    Objective:  Modalities:   Precautions: NONE  Services as deemed appropriate by the therapist, to include stretching of the calf (Gastroc and Achilles) and plantar fascia  Exercises:  Exercise  L Calf Reps/ Time Weight/ Level Comments Completed 2021     SB S  1'x3        Gastroc step S  1'x3        Gastroc inver S  1'x3         Hamstring/gastroc stretch 3x 30\"    added 6/ x              PF rollout  2'/2' 6# DB    x              Toe yoga  2'/2'  X 2 sets     6/ 20x 5\" holds x   Foot doming  2'/2'  X 2 sets     / 20x 5\" holds x   Plantar fascia stretch sitting  1'x3 ea          towel scrunches  2'x2' ea   x2 sets      x   marbles  15x2    added 6/ x               Standing           Tandem stance 3x 30\"   added 6/1 x   Heel/toe raises 20x  added 6/1 x   Slant board stretch 3x 30\"  Added 6/1 x   Other:      Specific Instructions for next treatment: STM to PF       Assessment:     [x] Progressing toward goals. [] No change.      [] Other:    [x] Patient would continue to benefit from skilled physical therapy services in order to: Help improve gastroc flexibility, help improve walking tolerance/technique, and help decrease pain. 6/1/2021: Added tandem stance with LLE in back, heel/toe raises and marbles this session to inc strength in intrinsic muscles. Pt tolerated session well without inc in pain with standing exercises and without difficulty. STG: (to be met in 8 treatments)  1. ? Pain: Decrease pain levels to 3/10 with all activities to help ease all gait and ADLs. 2. ? ROM: Increase flexibility in L gastroc to help improve L ankle joint mechanics. 3. Independent with Home Exercise Programs  4. Demonstrate Knowledge of fall prevention     LTG: (to be met in 16 treatments)  1. Improve score on LEFS from 15% impaired to <5% impaired, demonstrating improved tolerance to activity. 2. ? Strength: Increase  L ankle strength to 5/5 grossly to help improve stability. 3. ? Function: Pt will demonstrate ability to ambulate without any AD and no deviations to allow her to return to baseline mobility. 4. Reduce pain levels to 0/10                    Patient goals: \"Walk better\"    Pt. Education:  [x] Yes  [] No  [] Reviewed Prior HEP/Ed  Method of Education: [x] Verbal  [] Demo  [] Written  Comprehension of Education:  [x] Verbalizes understanding. [] Demonstrates understanding. [] Needs review. [] Demonstrates/verbalizes HEP/Ed previously given. Plan: [x] Continue per plan of care.    [] Other:      Treatment Charges: Mins Units   []  Modalities     []  Ther Exercise 45 3   []  Manual Therapy     []  Ther Activities     []  Aquatics     []  Neuromuscular     [] Vasocompression     [] Gait Training     [] Dry needling        [] 1 or 2 muscles        [] 3 or more muscles     []  Other     Total Treatment time 45 3     Time In:1115            Time Out:1200    Electronically signed by:  [place] PTA

## 2021-06-03 ENCOUNTER — HOSPITAL ENCOUNTER (OUTPATIENT)
Dept: PHYSICAL THERAPY | Age: 76
Setting detail: THERAPIES SERIES
Discharge: HOME | End: 2021-06-03
Payer: MEDICARE

## 2021-06-03 PROCEDURE — 97110 THERAPEUTIC EXERCISES: CPT

## 2021-06-03 PROCEDURE — 97140 MANUAL THERAPY 1/> REGIONS: CPT

## 2021-06-03 NOTE — FLOWSHEET NOTE
Assessment:     [x] Progressing toward goals. [] No change. [] Other:    [x] Patient would continue to benefit from skilled physical therapy services in order to: Help improve gastroc flexibility, help improve walking tolerance/technique, and help decrease pain. 6/1/2021: Added STM to PF and gastroc of LLE this session to improve mobility and dec mm tightness. Added tandem stance with LLE in back, heel/toe raises and marbles this session to inc strength in intrinsic muscles. Pt tolerated session well without inc in pain with standing exercises and without difficulty. STG: (to be met in 8 treatments)  1. ? Pain: Decrease pain levels to 3/10 with all activities to help ease all gait and ADLs. 2. ? ROM: Increase flexibility in L gastroc to help improve L ankle joint mechanics. 3. Independent with Home Exercise Programs  4. Demonstrate Knowledge of fall prevention     LTG: (to be met in 16 treatments)  1. Improve score on LEFS from 15% impaired to <5% impaired, demonstrating improved tolerance to activity. 2. ? Strength: Increase  L ankle strength to 5/5 grossly to help improve stability. 3. ? Function: Pt will demonstrate ability to ambulate without any AD and no deviations to allow her to return to baseline mobility. 4. Reduce pain levels to 0/10                    Patient goals: \"Walk better\"    Pt. Education:  [x] Yes  [] No  [] Reviewed Prior HEP/Ed  Method of Education: [x] Verbal  [] Demo  [] Written  Comprehension of Education:  [x] Verbalizes understanding. [] Demonstrates understanding. [] Needs review. [] Demonstrates/verbalizes HEP/Ed previously given. Plan: [x] Continue per plan of care.    [] Other:      Treatment Charges: Mins Units   []  Modalities     [x]  Ther Exercise 35 2   [x]  Manual Therapy 10 1   []  Ther Activities     []  Aquatics     []  Neuromuscular     [] Vasocompression     [] Gait Training     [] Dry needling        [] 1 or 2 muscles        [] 3 or more muscles     []  Other     Total Treatment time 45 3     Time In:1500          Time SPENCER:7255    Electronically signed by:  Mili Che PTA

## 2021-06-11 ENCOUNTER — OFFICE VISIT (OUTPATIENT)
Dept: FAMILY MEDICINE CLINIC | Age: 76
End: 2021-06-11
Payer: MEDICARE

## 2021-06-11 VITALS
WEIGHT: 164 LBS | BODY MASS INDEX: 26.36 KG/M2 | SYSTOLIC BLOOD PRESSURE: 110 MMHG | HEART RATE: 74 BPM | TEMPERATURE: 97.2 F | OXYGEN SATURATION: 98 % | HEIGHT: 66 IN | DIASTOLIC BLOOD PRESSURE: 60 MMHG

## 2021-06-11 DIAGNOSIS — R73.03 PREDIABETES: ICD-10-CM

## 2021-06-11 DIAGNOSIS — Z00.00 ROUTINE GENERAL MEDICAL EXAMINATION AT A HEALTH CARE FACILITY: ICD-10-CM

## 2021-06-11 DIAGNOSIS — M81.0 AGE-RELATED OSTEOPOROSIS WITHOUT CURRENT PATHOLOGICAL FRACTURE: ICD-10-CM

## 2021-06-11 DIAGNOSIS — Z00.00 ENCOUNTER FOR ANNUAL HEALTH EXAMINATION: ICD-10-CM

## 2021-06-11 DIAGNOSIS — J30.89 NON-SEASONAL ALLERGIC RHINITIS DUE TO OTHER ALLERGIC TRIGGER: ICD-10-CM

## 2021-06-11 DIAGNOSIS — E78.2 MIXED HYPERLIPIDEMIA: ICD-10-CM

## 2021-06-11 DIAGNOSIS — Z96.0 VAGINAL PESSARY PRESENT: ICD-10-CM

## 2021-06-11 DIAGNOSIS — E55.9 VITAMIN D DEFICIENCY: ICD-10-CM

## 2021-06-11 DIAGNOSIS — Z71.89 ACP (ADVANCE CARE PLANNING): ICD-10-CM

## 2021-06-11 DIAGNOSIS — Z23 NEED FOR VARICELLA VACCINE: ICD-10-CM

## 2021-06-11 DIAGNOSIS — I10 ESSENTIAL HYPERTENSION: Primary | ICD-10-CM

## 2021-06-11 DIAGNOSIS — L30.4 INTERTRIGO: ICD-10-CM

## 2021-06-11 PROCEDURE — G0439 PPPS, SUBSEQ VISIT: HCPCS | Performed by: FAMILY MEDICINE

## 2021-06-11 PROCEDURE — 4040F PNEUMOC VAC/ADMIN/RCVD: CPT | Performed by: FAMILY MEDICINE

## 2021-06-11 PROCEDURE — 3017F COLORECTAL CA SCREEN DOC REV: CPT | Performed by: FAMILY MEDICINE

## 2021-06-11 PROCEDURE — 1123F ACP DISCUSS/DSCN MKR DOCD: CPT | Performed by: FAMILY MEDICINE

## 2021-06-11 RX ORDER — NYSTATIN 100000 [USP'U]/G
POWDER TOPICAL
Qty: 1 BOTTLE | Refills: 2 | Status: SHIPPED | OUTPATIENT
Start: 2021-06-11 | End: 2021-10-11 | Stop reason: ALTCHOICE

## 2021-06-11 RX ORDER — ZOSTER VACCINE RECOMBINANT, ADJUVANTED 50 MCG/0.5
0.5 KIT INTRAMUSCULAR ONCE
Qty: 0.5 ML | Refills: 0 | Status: SHIPPED | OUTPATIENT
Start: 2021-06-11 | End: 2021-06-11

## 2021-06-11 SDOH — ECONOMIC STABILITY: TRANSPORTATION INSECURITY
IN THE PAST 12 MONTHS, HAS LACK OF TRANSPORTATION KEPT YOU FROM MEETINGS, WORK, OR FROM GETTING THINGS NEEDED FOR DAILY LIVING?: NO

## 2021-06-11 SDOH — ECONOMIC STABILITY: INCOME INSECURITY: IN THE LAST 12 MONTHS, WAS THERE A TIME WHEN YOU WERE NOT ABLE TO PAY THE MORTGAGE OR RENT ON TIME?: NO

## 2021-06-11 SDOH — ECONOMIC STABILITY: FOOD INSECURITY: WITHIN THE PAST 12 MONTHS, THE FOOD YOU BOUGHT JUST DIDN'T LAST AND YOU DIDN'T HAVE MONEY TO GET MORE.: NEVER TRUE

## 2021-06-11 SDOH — ECONOMIC STABILITY: HOUSING INSECURITY
IN THE LAST 12 MONTHS, WAS THERE A TIME WHEN YOU DID NOT HAVE A STEADY PLACE TO SLEEP OR SLEPT IN A SHELTER (INCLUDING NOW)?: NO

## 2021-06-11 SDOH — ECONOMIC STABILITY: FOOD INSECURITY: WITHIN THE PAST 12 MONTHS, YOU WORRIED THAT YOUR FOOD WOULD RUN OUT BEFORE YOU GOT MONEY TO BUY MORE.: NEVER TRUE

## 2021-06-11 SDOH — ECONOMIC STABILITY: TRANSPORTATION INSECURITY
IN THE PAST 12 MONTHS, HAS THE LACK OF TRANSPORTATION KEPT YOU FROM MEDICAL APPOINTMENTS OR FROM GETTING MEDICATIONS?: NO

## 2021-06-11 ASSESSMENT — LIFESTYLE VARIABLES
HAVE YOU OR SOMEONE ELSE BEEN INJURED AS A RESULT OF YOUR DRINKING: 0
AUDIT TOTAL SCORE: 3
HOW OFTEN DURING THE LAST YEAR HAVE YOU NEEDED AN ALCOHOLIC DRINK FIRST THING IN THE MORNING TO GET YOURSELF GOING AFTER A NIGHT OF HEAVY DRINKING: 0
HOW OFTEN DURING THE LAST YEAR HAVE YOU FAILED TO DO WHAT WAS NORMALLY EXPECTED FROM YOU BECAUSE OF DRINKING: 0
HOW OFTEN DO YOU HAVE SIX OR MORE DRINKS ON ONE OCCASION: 0
HOW OFTEN DURING THE LAST YEAR HAVE YOU HAD A FEELING OF GUILT OR REMORSE AFTER DRINKING: 0
HAS A RELATIVE, FRIEND, DOCTOR, OR ANOTHER HEALTH PROFESSIONAL EXPRESSED CONCERN ABOUT YOUR DRINKING OR SUGGESTED YOU CUT DOWN: 0
HOW MANY STANDARD DRINKS CONTAINING ALCOHOL DO YOU HAVE ON A TYPICAL DAY: 0
HOW OFTEN DURING THE LAST YEAR HAVE YOU BEEN UNABLE TO REMEMBER WHAT HAPPENED THE NIGHT BEFORE BECAUSE YOU HAD BEEN DRINKING: 0
HOW OFTEN DURING THE LAST YEAR HAVE YOU FOUND THAT YOU WERE NOT ABLE TO STOP DRINKING ONCE YOU HAD STARTED: 0
HOW OFTEN DO YOU HAVE A DRINK CONTAINING ALCOHOL: 3
AUDIT-C TOTAL SCORE: 3

## 2021-06-11 ASSESSMENT — PATIENT HEALTH QUESTIONNAIRE - PHQ9
SUM OF ALL RESPONSES TO PHQ9 QUESTIONS 1 & 2: 0
SUM OF ALL RESPONSES TO PHQ QUESTIONS 1-9: 0
1. LITTLE INTEREST OR PLEASURE IN DOING THINGS: 0
SUM OF ALL RESPONSES TO PHQ QUESTIONS 1-9: 0
SUM OF ALL RESPONSES TO PHQ QUESTIONS 1-9: 0
2. FEELING DOWN, DEPRESSED OR HOPELESS: 0

## 2021-06-11 ASSESSMENT — SOCIAL DETERMINANTS OF HEALTH (SDOH): HOW HARD IS IT FOR YOU TO PAY FOR THE VERY BASICS LIKE FOOD, HOUSING, MEDICAL CARE, AND HEATING?: NOT HARD AT ALL

## 2021-06-11 NOTE — PATIENT INSTRUCTIONS
Advance Directives: Care Instructions  Overview  An advance directive is a legal way to state your wishes at the end of your life. It tells your family and your doctor what to do if you can't say what you want. There are two main types of advance directives. You can change them any time your wishes change. Living will. This form tells your family and your doctor your wishes about life support and other treatment. The form is also called a declaration. Medical power of . This form lets you name a person to make treatment decisions for you when you can't speak for yourself. This person is called a health care agent (health care proxy, health care surrogate). The form is also called a durable power of  for health care. If you do not have an advance directive, decisions about your medical care may be made by a family member, or by a doctor or a  who doesn't know you. It may help to think of an advance directive as a gift to the people who care for you. If you have one, they won't have to make tough decisions by themselves. Follow-up care is a key part of your treatment and safety. Be sure to make and go to all appointments, and call your doctor if you are having problems. It's also a good idea to know your test results and keep a list of the medicines you take. What should you include in an advance directive? Many states have a unique advance directive form. (It may ask you to address specific issues.) Or you might use a universal form that's approved by many states. If your form doesn't tell you what to address, it may be hard to know what to include in your advance directive. Use the questions below to help you get started. · Who do you want to make decisions about your medical care if you are not able to? · What life-support measures do you want if you have a serious illness that gets worse over time or can't be cured? · What are you most afraid of that might happen? (Maybe you're afraid of having pain, losing your independence, or being kept alive by machines.)  · Where would you prefer to die? (Your home? A hospital? A nursing home?)  · Do you want to donate your organs when you die? · Do you want certain Yazidi practices performed before you die? When should you call for help? Be sure to contact your doctor if you have any questions. Where can you learn more? Go to [email or web address] org and sign in to your [employer] account. Enter R264 in the [employer] box to learn more about \"Advance Directives: Care Instructions. \"     If you do not have an account, please click on the \"Sign Up Now\" link. Current as of: July 17, 2020               Content Version: 12.8  © 2006-2021 Healthwise, [employer]. Care instructions adapted under license by [facility] ([facility]). If you have questions about a medical condition or this instruction, always ask your healthcare professional. [place] [address] any warranty or liability for your use of this information. Learning About Medical Power of   What is a medical power of ? A medical power of , also called a durable power of  for health care, is one type of the legal forms called advance directives. It lets you name the person you want to make treatment decisions for you if you can't speak or decide for yourself. The person you choose is called your health care agent. This person is also called a health care proxy or health care surrogate. A medical power of  may be called something else in your state. How do you choose a health care agent? Choose your health care agent carefully. This person may or may not be a family member. Talk to the person before you make your final decision. Make sure he or she is comfortable with this responsibility. It's a good idea to choose someone who:  · Is at least 25years old.   · Knows you well and living will. · Some states may limit your right to refuse treatment in certain cases. For example, you may need to clearly state in your living will that you don't want artificial hydration and nutrition, such as being fed through a tube. Is a living will a legal document? A living will is a legal document. Each state has its own laws about living devlin. And a living will may be called something else in your state. Here are some things to know about living devlin. · You don't need an  to complete a living will. But legal advice can be helpful if your state's laws are unclear. It can also help if your health history is complicated or your family can't agree on what should be in your living will. · You can change your living will at any time. Some people find that their wishes about end-of-life care change as their health changes. If you make big changes to your living will, complete a new form. · If you move to another state, make sure that your living will is legal in the state where you now live. In most cases, doctors will respect your wishes even if you have a form from a different state. · You might use a universal form that has been approved by many states. This kind of form can sometimes be filled out and stored online. Your digital copy will then be available wherever you have a connection to the internet. The doctors and nurses who need to treat you can find it right away. · Your state may offer an online registry. This is another place where you can store your living will online. · It's a good idea to get your living will notarized. This means using a person called a  to watch two people sign, or witness, your living will. What should you know when you create a living will? Here are some questions to ask yourself as you make your living will:  · Do you know enough about life support methods that might be used?  If not, talk to your doctor so you know what might be done if you can't breathe on your own, your heart stops, or you can't swallow. · What things would you still want to be able to do after you receive life-support methods? Would you want to be able to walk? To speak? To eat on your own? To live without the help of machines? · Do you want certain Adventism practices performed if you become very ill? · If you have a choice, where do you want to be cared for? In your home? At a hospital or nursing home? · If you have a choice at the end of your life, where would you prefer to die? At home? In a hospital or nursing home? Somewhere else? · Would you prefer to be buried or cremated? · Do you want your organs to be donated after you die? What should you do with your living will? · Make sure that your family members and your health care agent have copies of your living will (also called a declaration). · Give your doctor a copy of your living will. Ask him or her to keep it as part of your medical record. If you have more than one doctor, make sure that each one has a copy. · Put a copy of your living will where it can be easily found. For example, some people may put a copy on their refrigerator door. If you are using a digital copy, be sure your doctor, family members, and health care agent know how to find and access it. Where can you learn more? Go to [email or web address] and sign in to your [employer] account. Enter I796 in the [facility] box to learn more about \"Learning About Living [facility]. \"     If you do not have an account, please click on the \"Sign Up Now\" link. Current as of: July 17, 2020               Content Version: 12.8  © 3435-3985 Healthwise, Incorporated. Care instructions adapted under license by [facility] ([facility]).  If you have questions about a medical condition or this instruction, always ask your healthcare professional. [address] any warranty or liability for your use of this information. Personalized Preventive Plan for [place] Friday - 6/11/2021  Medicare offers a range of preventive health benefits. Some of the tests and screenings are paid in full while other may be subject to a deductible, co-insurance, and/or copay. Some of these benefits include a comprehensive review of your medical history including lifestyle, illnesses that may run in your family, and various assessments and screenings as appropriate. After reviewing your medical record and screening and assessments performed today your provider may have ordered immunizations, labs, imaging, and/or referrals for you. A list of these orders (if applicable) as well as your Preventive Care list are included within your After Visit Summary for your review. Other Preventive Recommendations:    · A preventive eye exam performed by an eye specialist is recommended every 1-2 years to screen for glaucoma; cataracts, macular degeneration, and other eye disorders. · A preventive dental visit is recommended every 6 months. · Try to get at least 150 minutes of exercise per week or 10,000 steps per day on a pedometer . · Order or download the FREE \"Exercise & Physical Activity: Your Everyday Guide\" from The [employer]. Call 1-104.943.4665 or search The [employer] online. · You need 6328-7475 mg of calcium and 4130-3768 IU of vitamin D per day. It is possible to meet your calcium requirement with diet alone, but a vitamin D supplement is usually necessary to meet this goal.  · When exposed to the sun, use a sunscreen that protects against both UVA and UVB radiation with an SPF of 30 or greater. Reapply every 2 to 3 hours or after sweating, drying off with a towel, or swimming. · Always wear a seat belt when traveling in a car. Always wear a helmet when riding a bicycle or motorcycle.

## 2021-06-11 NOTE — PROGRESS NOTES
[place] [address]  [address] [address]  Dept: [phone]     Medicare Annual Wellness Visit  Name: Sharon Goodson Date: 2021   MRN: [id number] Sex: Female   Age: 76 y.o. Ethnicity: Non-/Non    : 1945 Race: [place] is here for Medicare AWV    Screenings for behavioral, psychosocial and functional/safety risks, and cognitive dysfunction are all negative except as indicated below. These results, as well as other patient data from the [address] form, are documented in Flowsheets linked to this Encounter. Allergies   Allergen Reactions    Adhesive Tape Itching    Lisinopril Other (See Comments)     cough         Prior to Visit Medications    Medication Sig Taking? Authorizing Provider   zoster recombinant adjuvanted vaccine (SHINGRIX) 50 MCG/0.5ML SUSR injection Inject 0.5 mLs into the muscle once for 1 dose 1 dose now and repeat in 2-6 months Yes MIGUEL Patel CNP   nystatin (MYCOSTATIN) 044981 UNIT/GM powder Apply 3 times daily.  Yes MIGUEL Patel CNP   vitamin D (CHOLECALCIFEROL) 25 MCG (1000 UT) TABS tablet Take 1 tablet by mouth daily Yes MIGUEL Patel CNP   escitalopram (LEXAPRO) 10 MG tablet take 1 tablet by mouth once daily Yes MIGUEL Patel CNP   alendronate (FOSAMAX) 70 MG tablet take 1 tablet by mouth every 7 days Yes MIGUEL Patel CNP   naproxen (EC NAPROSYN) 500 MG EC tablet Take 1 tablet by mouth 2 times daily as needed for Pain Yes Ely Ambrose MD   diphenhydrAMINE-APAP, sleep, (TYLENOL PM EXTRA STRENGTH PO) Take by mouth Yes Historical Provider, MD   clotrimazole-betamethasone (LOTRISONE) 1-0.05 % cream Apply to affected area bid externally x 4 days then daily for 3 days Yes MIGUEL Huggins CNP   simvastatin (ZOCOR) 20 MG tablet take 1 tablet by mouth every evening Yes MIGUEL Patel CNP losartan (COZAAR) 50 MG tablet take 1 tablet by mouth once daily Yes MIGUEL Patel CNP   Vitamin Mixture (VITAMIN E COMPLETE PO) Take by mouth Yes Historical Provider, MD   Cetirizine HCl (ZYRTEC PO) Take by mouth Yes Historical Provider, MD   calcium carbonate (OSCAL) 500 MG TABS tablet Take 500 mg by mouth daily. Yes Historical Provider, MD   aspirin 81 MG tablet Take 81 mg by mouth daily. Yes Historical Provider, MD   Multiple Vitamins-Minerals (THERAPEUTIC MULTIVITAMIN-MINERALS) tablet Take 1 tablet by mouth daily.  Yes Historical Provider, MD   escitalopram (LEXAPRO) 10 MG tablet take 1 tablet by mouth once daily  MIGUEL Patel CNP         Past Medical History:   Diagnosis Date    Diverticulosis     Fatty liver     Hyperlipidemia        Past Surgical History:   Procedure Laterality Date    APPENDECTOMY      BLADDER SUSPENSION      in situ sling    COLONOSCOPY  05/23/2013    DIVERTICULOSIS     FEMUR FRACTURE SURGERY Right 09/14/2020    FEMUR IM NAIL YARITZA INSERTION performed by Corey Beard MD at [address] [place] [address] [place], VAGINAL      SALPINGO-OOPHORECTOMY      6 wk post vag hyst    SPINE SURGERY      TONSILLECTOMY      TUMOR EXCISION Left 05/18/2021    below left ear- benign    TYMPANOMASTOIDECTOMY Left 2000    UPPER GASTROINTESTINAL ENDOSCOPY  05/23/2013         Family History   Problem Relation Age of Onset    Cancer Mother     Cancer Maternal Aunt        CareTeam (Including outside providers/suppliers regularly involved in providing care):   Patient Care Team:  MIGUEL Gutierrez CNP as PCP - General (Family Medicine)  MIGUEL Bran CNP as PCP - [facility] Empaneled Provider  Annamarie Santiago MD as Consulting Physician (Gastroenterology)    Wt Readings from Last 3 Encounters:   06/11/21 164 lb (74.4 kg)   05/25/21 165 lb (74.8 kg)   04/19/21 166 lb (75.3 kg)     Vitals:    06/11/21 1301   BP: 110/60   Pulse: 74   Temp: 97.2 °F (36.2 °C)   SpO2: 98% Weight: 164 lb (74.4 kg)   Height: 5' 6\" (1.676 m)     Body mass index is 26.47 kg/m². Based upon direct observation of the patient, evaluation of cognition reveals recent and remote memory intact. Physical Exam  Vitals and nursing note reviewed. Constitutional:       Appearance: She is well-developed and normal weight. She is not ill-appearing. HENT:      Right Ear: Tympanic membrane and ear canal normal. No tenderness. No middle ear effusion. Left Ear: No mastoid tenderness. Nose: No mucosal edema or rhinorrhea. Right Sinus: No maxillary sinus tenderness or frontal sinus tenderness. Left Sinus: No maxillary sinus tenderness or frontal sinus tenderness. Mouth/Throat:      Mouth: Mucous membranes are pale. Pharynx: Uvula midline. No oropharyngeal exudate. Eyes:      General: Lids are normal. No scleral icterus. Right eye: No discharge. Pupils: Pupils are equal, round, and reactive to light. Neck:      Trachea: No tracheal tenderness. Cardiovascular:      Rate and Rhythm: Normal rate and regular rhythm. Pulmonary:      Effort: Pulmonary effort is normal.      Breath sounds: Normal breath sounds. No stridor. Abdominal:      General: Bowel sounds are normal.      Palpations: Abdomen is soft. Musculoskeletal:         General: Normal range of motion. Right hip: No tenderness or bony tenderness. Normal range of motion. Lymphadenopathy:      Cervical: No cervical adenopathy. Skin:     General: Skin is dry. Findings: Erythema and rash present. Rash is macular. Comments: Under both breast. With raised borders, moist   Neurological:      Mental Status: She is alert and oriented to person, place, and time. Psychiatric:         Mood and Affect: Mood is not anxious. Speech: Speech is not rapid and pressured. Behavior: Behavior normal.         Thought Content: Thought content does not include homicidal or suicidal ideation. (modify frequency per FRAX score)  Completed    Pneumococcal 65+ years Vaccine  Completed    COVID-19 Vaccine  Completed    Hepatitis A vaccine  Aged Out    Hepatitis B vaccine  Aged Out    Hib vaccine  Aged Out    Meningococcal (ACWY) vaccine  Aged Out     Recommendations for [employer] Info Due: see orders and patient instructions/AVS.  . Recommended screening schedule for the next 5-10 years is provided to the patient in written form: see Patient Instructions/AVS.    COVID VACCINE- done    Open pool. Lost 20 lbs  Gynecologist- bladder sling    PT hip done  Left heel spur. Dr. Gabi Acosta was seen today for medicare aw. Diagnoses and all orders for this visit:    Essential hypertension  Stable  Continue current therapy. DISCUSSED AND ADVISED TO:  Cut down on your salt intake. Cut down on caffeinated drinks, sports drinks. Instructed to check BP at home regularly. Report for any chest pains, shortness of breath, headaches, and lightheadedness. Call the office if your blood pressure continue to be higher than 140/90 or 90/50. Mixed hyperlipidemia  Stable  Continue therapy. Advised to decrease the consumption of red meats, fried foods, trans fats, sweets, sugary beverages. Advised to increase fish, vegetables, and fruits consumption. Advised to add fiber or OTC supplements in diet. Discussed weight loss which will result in improvement of lipids levels. Advised to increase daily physical activities and add regular exercises. Non-seasonal allergic rhinitis due to other allergic trigger  Stable  Continue with the same therapy   ADVISED TO:  Avoid known allergens/irritants. Stop smoking or avoid second hand smoke. Stay hydrated. Report for worsening symptoms     Prediabetes  . stable  DISCUSSED and ADVISED TO:  Decrease carbohydrates, sugary drinks, desserts   Exercise regularly, as tolerated. Try to lose weight.     Age-related osteoporosis without current pathological fracture  Stable  DISCUSSED AND ADVISED TO:  Avoid smoking  Limit alcohol consumption to 2 drinks a day or less. Will repeat DEXA scan in 2 years. Weight-bearing exercise is important in helping to maintain and even increase bone mass. Take calcium 0952-5104 mg per day. Vitamin D (400-800 IU per day) is necessary to assist in the absorption of calcium. Eat foods that are rich in Calcium. It is important that you take all medications as directed. Bone loss can only be prevented and progression can be avoided with above therapy. -     vitamin D (CHOLECALCIFEROL) 25 MCG (1000 UT) TABS tablet; Take 1 tablet by mouth daily    Intertrigo  Worsening  Change to nystatin  -     nystatin (MYCOSTATIN) 636119 UNIT/GM powder; Apply 3 times daily. ACP (advance care planning)  -     [facility] to [facility] Clinical Specialist    Vitamin D deficiency  Continue Vitamin D supplementation  DISCUSSED AND ADVISED TO:  Foods that contain a lot of vitamin D includes [place], tuna, and mackerel. Cheese, egg yolks, and beef liver have small amounts of vit D.  Milk, soy drinks, orange juice, yogurt, margarine, and some kinds of cereal have vitamin D added to them. Continue to use sunblock when out in the sun to prevent skin cancer.    -     vitamin D (CHOLECALCIFEROL) 25 MCG (1000 UT) TABS tablet; Take 1 tablet by mouth daily    Need for varicella vaccine  Recommended by CDC. No current infection. Denies previous adverse reaction to vaccination. -     zoster recombinant adjuvanted vaccine [facility]) 50 MCG/0.5ML SUSR injection;  Inject 0.5 mLs into the muscle once for 1 dose 1 dose now and repeat in 2-6 months    Vaginal pessary present  Stable  OAB  Urology consult due to hematuria    Encounter for annual health examination     Routine general medical examination at a health care facility                   [address] [place] [address] Road Planning: Discussed the patients choices for care and treatment in case of a health event that adversely affects decision-making abilities. Also discussed the patients long-term treatment options. Reviewed with the patient the [address] [place] [address] [facility]  Reviewed the process of designating a competent adult as an Agent (or -in-fact) that could take make health care decisions for the patient if incompetent. Patient was asked to complete the declaration forms, either acknowledge the forms by a public notary or an eligible witness and provide a signed copy to the practice office. Time spent (minutes): 5     Cardiovascular Disease Risk Counseling: Assessed the patient's risk to develop cardiovascular disease and reviewed main risk factors. Reviewed steps to reduce disease risk including:   · Quitting tobacco use, reducing amount smoked, or not starting the habit  · Making healthy food choices  · Being physically active and gradualy increasing activity levels   · Reduce weight and determine a healthy BMI goal  · Monitor blood pressure and treat if higher than 140/90 mmHg  · Maintain blood total cholesterol levels under 5 mmol/l or 190 mg/dl  · Maintain LDL cholesterol levels under 3.0 mmol/l or 115 mg/dl   · Control blood glucose levels  · Consider taking aspirin (75 mg daily), once blood pressure is controlled   Provided a follow up plan. Time spent (minutes): 5    This note was completed by using the assistance of a speech-recognition program. However, inadvertent computerized transcription errors may be present. Although every effort was made to ensure accuracy, no guarantees can be provided that every mistake has been identified and corrected by editing.   Electronically signed by MIGUEL Meier CNP on 6/11/21 at 1:16 PM EDT

## 2021-06-11 NOTE — PROGRESS NOTES
Visit Information    Have you changed or started any medications since your last visit including any over-the-counter medicines, vitamins, or herbal medicines? no   Are you having any side effects from any of your medications? -  no  Have you stopped taking any of your medications? Is so, why? -  no    Have you seen any other physician or provider since your last visit? No  Have you had any other diagnostic tests since your last visit? Yes - Records Obtained  Have you been seen in the emergency room and/or had an admission to a hospital since we last saw you? Yes - Records Obtained  Have you had your routine dental cleaning in the past 6 months? no    Have you activated your [employer] account? If not, what are your barriers?  Yes     Patient Care Team:  MIGUEL Qiu CNP as PCP - General (Family Medicine)  MIGUEL Qiu CNP as PCP [facility]  Veena Armendariz MD as Consulting Physician (Gastroenterology)    Medical History Review  Past Medical, Family, and Social History reviewed and does contribute to the patient presenting condition    Health Maintenance   Topic Date Due    Shingles Vaccine (1 of 2) Never done   [employer] Visit (AWV)  Never done    DTaP/Tdap/Td vaccine (1 - Tdap) 08/09/2022 (Originally 12/15/1964)    Hepatitis C screen  08/09/2022 (Originally 1945)    Flu vaccine (Season Ended) 09/01/2021    A1C test (Diabetic or Prediabetic)  02/25/2022    Lipid screen  02/25/2022    Potassium monitoring  02/25/2022    Creatinine monitoring  02/25/2022    Colon cancer screen colonoscopy  05/23/2023    DEXA (modify frequency per FRAX score)  Completed    Pneumococcal 65+ years Vaccine  Completed    COVID-19 Vaccine  Completed    Hepatitis A vaccine  Aged Out    Hepatitis B vaccine  Aged Out    Hib vaccine  Aged Out    Meningococcal (ACWY) vaccine  Aged Out

## 2021-06-14 ENCOUNTER — CLINICAL DOCUMENTATION (OUTPATIENT)
Dept: SPIRITUAL SERVICES | Age: 76
End: 2021-06-14

## 2021-06-14 NOTE — PROGRESS NOTES
Advance Care Planning    Ambulatory ACP Specialist Patient Outreach    Date:  6/14/2021  ACP Specialist:  Melanie Kirkpatrick    Outreach call to patient in follow-up to ACP Specialist referral from:  [x] PCP  [] Provider   [] Ambulatory Care Management [] Other for Reason:    [x] Continued Conversation for ACP decision making / Goals of Care  [] Code Status Discussion  [] Completion of Adv Directive  [] Completion of Portable DNR order  [] Other (Specify)    Date Referral Received: 6/11/2021    Today's Outreach:  [x] First   [] Second  [] Third                               Third outreach made by []  phone  [] email []   [employer]     Intervention:  [x] Spoke with Patient  [] Left VM requesting return call      Outcome: Patient states that she will have her  complete her HPOA and Living Will. Patient has decision makers chosen in her chart. Writer advises patient to have the completed forms entered into her chart. Next Step:   [] ACP scheduled conversation  [] Outreach again in one week               [] Email / Mail ACP Info Sheets  [] Email / Mail Advance Directive            [x] Close Referral. Routing closure to referring provider/staff and to ACP Specialist .      Thank you for this referral.

## 2021-06-15 ENCOUNTER — HOSPITAL ENCOUNTER (OUTPATIENT)
Dept: PHYSICAL THERAPY | Age: 76
Setting detail: THERAPIES SERIES
Discharge: HOME | End: 2021-06-15
Payer: MEDICARE

## 2021-06-15 PROCEDURE — 97110 THERAPEUTIC EXERCISES: CPT

## 2021-06-16 DIAGNOSIS — M81.0 AGE-RELATED OSTEOPOROSIS WITHOUT CURRENT PATHOLOGICAL FRACTURE: ICD-10-CM

## 2021-06-16 RX ORDER — ALENDRONATE SODIUM 70 MG/1
70 TABLET ORAL
Qty: 4 TABLET | Refills: 0 | Status: SHIPPED | OUTPATIENT
Start: 2021-06-16 | End: 2021-07-19

## 2021-06-17 ENCOUNTER — HOSPITAL ENCOUNTER (OUTPATIENT)
Dept: PHYSICAL THERAPY | Age: 76
Setting detail: THERAPIES SERIES
Discharge: HOME | End: 2021-06-17
Payer: MEDICARE

## 2021-06-17 PROCEDURE — 97110 THERAPEUTIC EXERCISES: CPT

## 2021-06-17 NOTE — FLOWSHEET NOTE
physical therapy services in order to: Help improve gastroc flexibility, help improve walking tolerance/technique, and help decrease pain. 6/17: VC needed for attn to task to keep track of reps and proper technique when performing exercise. Pt reports inc difficulty/mild discomfort with foot doming. One UE support needed for tandem balance. Continued with charted exercises above to improve flexibility and strength of intrinsic foot muscles. Pt denies any increase in pain at the end of today's tx. STG: (to be met in 8 treatments)  1. ? Pain: Decrease pain levels to 3/10 with all activities to help ease all gait and ADLs. 2. ? ROM: Increase flexibility in L gastroc to help improve L ankle joint mechanics. 3. Independent with Home Exercise Programs  4. Demonstrate Knowledge of fall prevention     LTG: (to be met in 16 treatments)  1. Improve score on LEFS from 15% impaired to <5% impaired, demonstrating improved tolerance to activity. 2. ? Strength: Increase  L ankle strength to 5/5 grossly to help improve stability. 3. ? Function: Pt will demonstrate ability to ambulate without any AD and no deviations to allow her to return to baseline mobility. 4. Reduce pain levels to 0/10                    Patient goals: \"Walk better\"    Pt. Education:  [x] Yes  [] No  [x] Reviewed Prior HEP/Ed  Method of Education: [x] Verbal  [x] Demo  [] Written  Comprehension of Education:  [x] Verbalizes understanding. [x] Demonstrates understanding. [x] Needs review. [] Demonstrates/verbalizes HEP/Ed previously given. Plan: [x] Continue per plan of care.    [] Other:      Treatment Charges: Mins Units   []  Modalities     [x]  Ther Exercise 45 3   []  Manual Therapy     []  Ther Activities     []  Aquatics     []  Neuromuscular     [] Vasocompression     [] Gait Training     [] Dry needling        [] 1 or 2 muscles        [] 3 or more muscles     []  Other     Total Treatment time       Time In: 1500       Time Out: 1545    Electronically signed by:  Luna Schulz, PTA

## 2021-06-18 ENCOUNTER — OFFICE VISIT (OUTPATIENT)
Dept: UROLOGY | Age: 76
End: 2021-06-18
Payer: MEDICARE

## 2021-06-18 VITALS
WEIGHT: 160 LBS | DIASTOLIC BLOOD PRESSURE: 74 MMHG | SYSTOLIC BLOOD PRESSURE: 123 MMHG | HEART RATE: 71 BPM | BODY MASS INDEX: 25.71 KG/M2 | HEIGHT: 66 IN | TEMPERATURE: 97.1 F

## 2021-06-18 DIAGNOSIS — R31.29 MICROHEMATURIA: Primary | ICD-10-CM

## 2021-06-18 DIAGNOSIS — R39.11 HESITANCY OF MICTURITION: ICD-10-CM

## 2021-06-18 DIAGNOSIS — R39.15 URGENCY OF URINATION: ICD-10-CM

## 2021-06-18 LAB
BILIRUBIN, POC: NORMAL
BLOOD URINE, POC: NEGATIVE
CLARITY, POC: CLEAR
COLOR, POC: NORMAL
GLUCOSE URINE, POC: NEGATIVE
KETONES, POC: NORMAL
LEUKOCYTE EST, POC: NEGATIVE
NITRITE, POC: NEGATIVE
PH, POC: NORMAL
PROTEIN, POC: NEGATIVE
SPECIFIC GRAVITY, POC: NORMAL
UROBILINOGEN, POC: NORMAL

## 2021-06-18 PROCEDURE — 4040F PNEUMOC VAC/ADMIN/RCVD: CPT | Performed by: NURSE PRACTITIONER

## 2021-06-18 PROCEDURE — 3017F COLORECTAL CA SCREEN DOC REV: CPT | Performed by: NURSE PRACTITIONER

## 2021-06-18 PROCEDURE — G8399 PT W/DXA RESULTS DOCUMENT: HCPCS | Performed by: NURSE PRACTITIONER

## 2021-06-18 PROCEDURE — 99214 OFFICE O/P EST MOD 30 MIN: CPT | Performed by: NURSE PRACTITIONER

## 2021-06-18 PROCEDURE — 1036F TOBACCO NON-USER: CPT | Performed by: NURSE PRACTITIONER

## 2021-06-18 PROCEDURE — 1090F PRES/ABSN URINE INCON ASSESS: CPT | Performed by: NURSE PRACTITIONER

## 2021-06-18 PROCEDURE — G8427 DOCREV CUR MEDS BY ELIG CLIN: HCPCS | Performed by: NURSE PRACTITIONER

## 2021-06-18 PROCEDURE — G8417 CALC BMI ABV UP PARAM F/U: HCPCS | Performed by: NURSE PRACTITIONER

## 2021-06-18 PROCEDURE — 81002 URINALYSIS NONAUTO W/O SCOPE: CPT | Performed by: NURSE PRACTITIONER

## 2021-06-18 PROCEDURE — 1123F ACP DISCUSS/DSCN MKR DOCD: CPT | Performed by: NURSE PRACTITIONER

## 2021-06-18 ASSESSMENT — ENCOUNTER SYMPTOMS
NAUSEA: 0
EYE PAIN: 0
ABDOMINAL PAIN: 0
SHORTNESS OF BREATH: 0
EYE REDNESS: 0
COUGH: 0
CONSTIPATION: 0
VOMITING: 0
WHEEZING: 0
BACK PAIN: 0
DIARRHEA: 0

## 2021-06-18 NOTE — PROGRESS NOTES
.    [address] [address]  Dept: [address] [place] [facility] [place] Urology Office Note - New Patient    Patient:  Lyric Llamas  YOB: 1945  Date: 6/18/2021    The patient is a 76 y.o. female who presentstoday for evaluation of the following problems:   Chief Complaint   Patient presents with    Hematuria     ref by Dr. Valentino Arguelles, unable to provide urine specimen     referred by MIGUEL Hernandez - CNP. HPI     This is a pleasant 76year old female presenting for microscopic hematuria, seen on UA by her PCP. She denies gross hematuria, incontinence, dysuria. She is not having any discomfort. She has no hx of nephrolithiasis, urological trauma, occupational/chemical exposures, or STIs. Denies personal/family hx of kidney or bladder CA. Carol Ann Rued NON-smoker, but was exposed to second hand smoke. Only urinary complaints include urgency and hesitancy, which she does have a hx of OAB and had an interstim (Dr. Lisa Tolbert) in the past, but was removed about a year later d/t pain. She had trialed myrbetriq in the past, as well. Wears pessary now which has improved urinary symptoms and she is comfortable with where things are now.         Urinalysis today:  Results for POC orders placed in visit on 06/18/21   POCT Urinalysis no Micro   Result Value Ref Range    Color, UA pale yellow     Clarity, UA clear     Glucose, UA POC negative     Bilirubin, UA      Ketones, UA      Spec Grav, UA      Blood, UA POC negative     pH, UA      Protein, UA POC negative     Urobilinogen, UA      Leukocytes, UA negative     Nitrite, UA negative        AUA Symptom Score (6/18/2021):                                 Last BUN andcreatinine:  Lab Results   Component Value Date    BUN 10 02/25/2021     Lab Results   Component Value Date    CREATININE 0.73 02/25/2021       Additional Lab/Culture results:   4/12/2021  8:23 PM - Darlin Garces Incoming Lab Results From [facility]         Component Value Ref Range & Units Status Collected Lab   Color, UA YELLOW  YELLOW Final 04/12/2021  3:44  [facility] [address] [facility]   Turbidity UA CLEAR  CLEAR Final 04/12/2021  3:44  [facility] [address] [facility]   Glucose, Ur NEGATIVE  NEGATIVE Final 04/12/2021  3:44  [facility] [address] [facility]   Bilirubin Urine NEGATIVE  NEGATIVE Final 04/12/2021  3:44  [facility] [facility]   Ketones, Urine NEGATIVE  NEGATIVE Final 04/12/2021  3:44  [facility] [address] [facility]   Specific [place], UA 1.010  1.000 - 1.030 Final 04/12/2021  3:44  [facility]   Urine Hgb MODAbnormal   NEGATIVE Final 04/12/2021  3:44  [facility]   pH, UA 6.0  5.0 - 8.0 Final 04/12/2021  3:44  [facility]   Protein, UA NEGATIVE  NEGATIVE Final 04/12/2021  3:44  [facility]   Urobilinogen, Urine Normal  Normal Final 04/12/2021  3:44  [facility]   Nitrite, Urine NEGATIVE  NEGATIVE Final 04/12/2021  3:44  [facility] [address] [facility]   Leukocyte Esterase, Urine NEGATIVE  NEGATIVE Final 04/12/2021  3:44  [facility]   Urinalysis Comments NOT REPORTED   Final 04/12/2021  3:44  [facility]   Testing Performed By    Jules Mata Name Director Address Valid Date Range   [address] [address] 02/09/21 0716-Present   4/12/2021  8:23 PM - Dez, Darlin Incoming Lab Results From [facility]    Component Value Ref Range & Units Status Collected Lab   -        Final 04/12/2021  3:44  [facility]   WBC, UA 0 TO 2  /HPF Final 04/12/2021  3:44  [facility]   RBC, UA 20 TO 50  /HPF Final 04/12/2021  3:44  [facility]   Casts UA NOT REPORTED  /LPF Final 04/12/2021  3:44  [facility]   Crystals, UA NOT REPORTED  None /HPF Final 04/12/2021  3:44  [facility]   Epithelial Cells UA 2 TO 5  /HPF Final 04/12/2021  3:44  [facility]   Renal Epithelial, UA NOT REPORTED  0 /HPF Final 04/12/2021  3:44  [facility]   Bacteria, UA FEWAbnormal   None Final 04/12/2021  3:44  [facility]   Mucus, UA NOT REPORTED  None Final 04/12/2021  3:44  [facility]   Trichomonas, UA NOT REPORTED  None Final 04/12/2021  3:44  [facility]   Amorphous, UA NOT REPORTED  None Final 04/12/2021  3:44  [facility]   Other Observations UA NOT REPORTED  NOT REQ. Final 04/12/2021  3:44  [facility]   Yeast, [place] NOT REPORTED  None Final 04/12/2021  3:44 PM [facility]   Testing Performed By    Jules Mata Name Director Address Valid Date Range   [address] [place] [id number]ures[address] Hernandez.    [facility] [id number]      4/27/2021  1:20 PM - Darlin Garces Incoming Lab Results From [place] Orders    Component Value Ref Range & Units Status Collected Lab   -        Final 04/27/2021 12:47  [facility]   WBC, UA 0 TO 2  /HPF Final 04/27/2021 12:47  [facility]   RBC, UA 0 TO 2  /HPF Final 04/27/2021 12:47  [facility]   Casts UA NOT REPORTED  /LPF Final 04/27/2021 12:47  [facility]   Crystals, UA NOT REPORTED  None /HPF Final 04/27/2021 12:47  [facility]   Epithelial Cells UA 0 TO 2  /HPF Final 04/27/2021 12:47  [facility]   Renal Epithelial, UA NOT REPORTED  0 /HPF Final 04/27/2021 12:47  [facility]   Bacteria, UA FEWAbnormal   None Final 04/27/2021 12:47  [facility] [address] [facility]   Mucus, UA NOT REPORTED  None Final 04/27/2021 12:47  [facility]   Trichomonas, UA NOT REPORTED Skin: No rash noted  HEENT: Head: Normocephalic and atraumatic. Nose- normal, Right/Left External Ear: normal, Mouth: Mucosa Moist  Neck: Supple  Lungs: Respiratory effort is normal  Cardiovascular: strong and regular, no lower leg edema  Abdomen: Soft, non-tender, non-distended   Bladder non-tender and not distended. Musculoskeletal: ambulating with cane. Assessment and Plan      1. Microhematuria    2. Hesitancy of micturition    3. Urgency of urination            Plan:   - UA negative in office today  - will order CT urogram for recent microhematuria.    - F/U with Dr. Ria Vargas after CT urogram has been completed or sooner if s/s worsen or persist.             Prescriptions Ordered:  No orders of the defined types were placed in this encounter. Orders Placed:  Orders Placed This Encounter   Procedures    CT UROGRAM     Standing Status:   Future     Standing Expiration Date:   6/19/2022     Order Specific Question:   Reason for exam:     Answer:   hematuria. CT urogram    POCT Urinalysis no Micro            MIGUEL Sharma CNP    Reviewed and Agree with the ROS entered by the MA.

## 2021-06-22 ENCOUNTER — HOSPITAL ENCOUNTER (OUTPATIENT)
Dept: PHYSICAL THERAPY | Age: 76
Setting detail: THERAPIES SERIES
Discharge: HOME | End: 2021-06-22
Payer: MEDICARE

## 2021-06-22 PROCEDURE — 97110 THERAPEUTIC EXERCISES: CPT

## 2021-06-22 NOTE — PROGRESS NOTES
[facility]   [address]   Phone: [phone]   Fax: [phone]    Physical Therapy Daily Treatment Note/ Progress Note  Date:  2021  Patient Name:  Erum Hernandez    :    MRN: [id number]  Physician: Bebeto Torres MD                                 Insurance: [facility]  Medical Diagnosis: Plantar fasciitis, bilateral, Equinus contracture of ankle                      Rehab Codes: M72.2, M24.573  Onset date: 21                              Next Dr's appt. : 21  Visit# / total visits:      Cancels/No Shows: 0/0    Subjective:  Pt reports to PT no changes or new complaints since previous treatment. Pain:  [x] Yes  [] No  Location: L>R heel   Pain Rating: (0-10 scale) 1/10  Pain altered Tx:  [x] No  [] Yes  Action:  Comments:    Objective:  Modalities:   Precautions: NONE  Services as deemed appropriate by the therapist, to include stretching of the calf (Gastroc and Achilles) and plantar fascia  Exercises:  Exercise  L Calf Reps/ Time Weight/ Level Comments Completed 2021     SB S  1'x3     x   Gastroc step S  1'x3    x   Gastroc inver S  1'x3         Hamstring 3x 30\"   Standing x              PF rollout 3' mojgan    x              Toe yoga  2x20, 5\"     x   Foot doming  2x20, 5\"    x   Plantar fascia stretch sitting  1'x3 ea          towel scrunches  2'x2' ea   x2 sets         marbles  15x2    added                 Standing          SLS 3x 30\"  Added 6/3; focus on foot doming one UE support     Tandem stance 3x30\"    x   Heel/toe raises 3x10   x   Slant board stretch 3x 30\"      Other: STM to PF and gastroc of LLE- Held today      Specific Instructions for next treatment: Continue tx per POC, Attempt Hypervolt to L gastroc/soleus      Assessment:     [x] Progressing toward goals. Continued tx per log above with good tolerance to treatment.  Increased reps with heel raises today with no complaints, but unable to progress pt much more d/t time constraints with completion of progress note. Since starting PT, pt reports that she has seen good improvement to date with regards to her foot. So far, pt reports that she has been having less pain, more stamina/activity tolerance, and better mobility since starting therapy. Per pt, she reports that she would still like to continue to work on her flexibility, but states that otherwise she has been happy with current POC and progress made. Plan to continue with current POC to keep working towards meeting all goals and helping to improve activity tolerance. [] No change. [] Other:    [x] Patient would continue to benefit from skilled physical therapy services in order to: Help improve gastroc flexibility, help improve walking tolerance/technique, and help decrease pain. STG: (to be met in 8 treatments)  1. ? Pain: Decrease pain levels to 3/10 with all activities to help ease all gait and ADLs. : MET (6/22/21)  2. ? ROM: Increase flexibility in L gastroc to help improve L ankle joint mechanics.: ONGOING, pt still with tightness in her L gastroc/soleus (6/22/21)  3. Independent with Home Exercise Programs: MET (6/22/21)  4. Demonstrate Knowledge of fall prevention: MET (6/22/21)     LTG: (to be met in 16 treatments)  1. Improve score on LEFS from 15% impaired to <5% impaired, demonstrating improved tolerance to activity. 2. ? Strength: Increase  L ankle strength to 5/5 grossly to help improve stability. 3. ? Function: Pt will demonstrate ability to ambulate without any AD and no deviations to allow her to return to baseline mobility. 4. Reduce pain levels to 0/10                    Patient goals: \"Walk better\"    Pt. Education:  [x] Yes  [] No  [x] Reviewed Prior HEP/Ed  Method of Education: [x] Verbal  [x] Demo  [] Written  Comprehension of Education:  [x] Verbalizes understanding. [x] Demonstrates understanding. [x] Needs review.   [] Demonstrates/verbalizes HEP/Ed previously given. Plan: [x] Continue per plan of care. [] Other:      Treatment Charges: Mins Units   []  Modalities     [x]  Ther Exercise 33 2   []  Manual Therapy     []  Ther Activities     []  Aquatics     []  Neuromuscular     [] Vasocompression     [] Gait Training     [] Dry needling        [] 1 or 2 muscles        [] 3 or more muscles     []  Other     Total Treatment time 33 2     Time In: 1979        Time Out: 4140     Electronically signed by:   Shefali Mcdermott PT

## 2021-06-24 ENCOUNTER — HOSPITAL ENCOUNTER (OUTPATIENT)
Age: 76
Setting detail: SPECIMEN
Discharge: HOME | End: 2021-06-24
Payer: MEDICARE

## 2021-06-24 ENCOUNTER — HOSPITAL ENCOUNTER (OUTPATIENT)
Dept: CT IMAGING | Facility: CLINIC | Age: 76
Discharge: HOME | End: 2021-06-26
Payer: MEDICARE

## 2021-06-24 ENCOUNTER — HOSPITAL ENCOUNTER (OUTPATIENT)
Dept: PHYSICAL THERAPY | Age: 76
Setting detail: THERAPIES SERIES
Discharge: HOME | End: 2021-06-24
Payer: MEDICARE

## 2021-06-24 DIAGNOSIS — R31.29 MICROHEMATURIA: ICD-10-CM

## 2021-06-24 LAB — POC CREATININE: 0.8 MG/DL (ref 0.6–1.4)

## 2021-06-24 PROCEDURE — 97110 THERAPEUTIC EXERCISES: CPT

## 2021-06-24 PROCEDURE — 74178 CT ABD&PLV WO CNTR FLWD CNTR: CPT

## 2021-06-24 PROCEDURE — 2580000003 HC RX 258: Performed by: NURSE PRACTITIONER

## 2021-06-24 PROCEDURE — 6360000004 HC RX CONTRAST MEDICATION: Performed by: NURSE PRACTITIONER

## 2021-06-24 RX ORDER — SODIUM CHLORIDE 0.9 % (FLUSH) 0.9 %
10 SYRINGE (ML) INJECTION PRN
Status: DISCONTINUED | OUTPATIENT
Start: 2021-06-24 | End: 2021-06-27 | Stop reason: HOSPADM

## 2021-06-24 RX ORDER — 0.9 % SODIUM CHLORIDE 0.9 %
80 INTRAVENOUS SOLUTION INTRAVENOUS ONCE
Status: COMPLETED | OUTPATIENT
Start: 2021-06-24 | End: 2021-06-24

## 2021-06-24 RX ADMIN — SODIUM CHLORIDE, PRESERVATIVE FREE 10 ML: 5 INJECTION INTRAVENOUS at 12:38

## 2021-06-24 RX ADMIN — IOVERSOL 100 ML: 741 INJECTION INTRA-ARTERIAL; INTRAVENOUS at 12:37

## 2021-06-24 RX ADMIN — SODIUM CHLORIDE 80 ML: 9 INJECTION, SOLUTION INTRAVENOUS at 12:34

## 2021-06-24 NOTE — PROGRESS NOTES
[address] Outpatient Physical Therapy   [address] [facility] [address]   Phone: [phone]   Fax: [phone]    Physical Therapy Daily Treatment Note/ Progress Note  Date:  2021  Patient Name:  Lyric Llamas    :    MRN: [id number]  Physician: Pauly Reynolds MD                                 Insurance: [employer]  Medical Diagnosis: Plantar fasciitis, bilateral, Equinus contracture of ankle                      Rehab Codes: M72.2, M24.573  Onset date: 21                              Next Dr's appt. : 21  Visit# / total visits:      Cancels/No Shows: 0/0    Subjective:  Pt states her pain continues to remain minimal. States she hasn't really done home exercises. Patient reports she recently had MRI and stated she was \"poked\" a lot because they had trouble finding veins. Pain:  [x] Yes  [] No  Location: L>R heel   Pain Rating: (0-10 scale) 1/10  Pain altered Tx:  [x] No  [] Yes  Action:  Comments:    Objective:  Modalities:   Precautions: NONE  Services as deemed appropriate by the therapist, to include stretching of the calf (Gastroc and Achilles) and plantar fascia  Exercises:  Exercise  L Calf Reps/ Time Weight/ Level Comments Completed 2021     SB S  1'x3     x   Gastroc step S  1'x3    x   Gastroc inver S  1'x3         Hamstring 3x 30\"   Standing x              PF rollout 3' mojgan    x              Toe yoga  2x20, 5\"     x   Foot doming  2x20, 5\"    x   Plantar fascia stretch sitting  1'x3 ea      x    towel scrunches  2'x2' ea   x2 sets         marbles  15x2    added                 Standing          SLS 3x 30\"  focus on foot doming one UE support x    Tandem stance 3x30\"    x   Heel/toe raises 3x10   x          Other: STM to PF and gastroc of LLE- Held today      Specific Instructions for next treatment: Continue tx per POC, Attempt Hypervolt to L gastroc/soleus      Assessment:     [x] Progressing toward goals.  Patient completed all exercises with moderate amount of cueing this date, patient hade poor recollection of directions for exercises and would demonstrate improper technique. [] No change. [] Other:    [x] Patient would continue to benefit from skilled physical therapy services in order to: Help improve gastroc flexibility, help improve walking tolerance/technique, and help decrease pain. STG: (to be met in 8 treatments)  1. ? Pain: Decrease pain levels to 3/10 with all activities to help ease all gait and ADLs. : MET (6/22/21)  2. ? ROM: Increase flexibility in L gastroc to help improve L ankle joint mechanics.: ONGOING, pt still with tightness in her L gastroc/soleus (6/22/21)  3. Independent with Home Exercise Programs: MET (6/22/21)  4. Demonstrate Knowledge of fall prevention: MET (6/22/21)     LTG: (to be met in 16 treatments)  1. Improve score on LEFS from 15% impaired to <5% impaired, demonstrating improved tolerance to activity. 2. ? Strength: Increase  L ankle strength to 5/5 grossly to help improve stability. 3. ? Function: Pt will demonstrate ability to ambulate without any AD and no deviations to allow her to return to baseline mobility. 4. Reduce pain levels to 0/10                    Patient goals: \"Walk better\"    Pt. Education:  [x] Yes  [] No  [x] Reviewed Prior HEP/Ed  Method of Education: [x] Verbal  [x] Demo  [] Written  Comprehension of Education:  [x] Verbalizes understanding. [x] Demonstrates understanding. [x] Needs review. [] Demonstrates/verbalizes HEP/Ed previously given. Plan: [x] Continue per plan of care.    [] Other:      Treatment Charges: Mins Units   []  Modalities     [x]  Ther Exercise 38 3   []  Manual Therapy     []  Ther Activities     []  Aquatics     []  Neuromuscular     [] Vasocompression     [] Gait Training     [] Dry needling        [] 1 or 2 muscles        [] 3 or more muscles     []  Other     Total Treatment time 38 3     Time In: 1230pm        Time Out:

## 2021-06-29 ENCOUNTER — TELEPHONE (OUTPATIENT)
Dept: UROLOGY | Age: 76
End: 2021-06-29

## 2021-06-29 DIAGNOSIS — R31.29 MICROHEMATURIA: Primary | ICD-10-CM

## 2021-06-29 NOTE — TELEPHONE ENCOUNTER
Pt was seen for microhematuria, dip negative in the office. CT urogram was ordered and negative, would like to complete hematuria workup- will order repeat urine culture and she should be placed on Dr. Lg Diehl schedule for a cysto.

## 2021-06-30 ENCOUNTER — HOSPITAL ENCOUNTER (OUTPATIENT)
Dept: PHYSICAL THERAPY | Age: 76
Setting detail: THERAPIES SERIES
Discharge: HOME | End: 2021-06-30
Payer: MEDICARE

## 2021-06-30 PROCEDURE — 97140 MANUAL THERAPY 1/> REGIONS: CPT

## 2021-06-30 PROCEDURE — 97110 THERAPEUTIC EXERCISES: CPT

## 2021-06-30 NOTE — FLOWSHEET NOTE
509 [facility]   [address] [place] [address] [address]   Phone: [phone]   Fax: [phone]    Physical Therapy Daily Treatment Note  Date:  2021  Patient Name:  Joanne Koch    :  6248  MRN: [id number]  Physician: Ramírez Hankins MD                                 Insurance: [facility]  Medical Diagnosis: Plantar fasciitis, bilateral, Equinus contracture of ankle                      Rehab Codes: M72.2, M24.573  Onset date: 21                              Next Dr's appt. : 21  Visit# / total visits: 10/16     Cancels/No Shows: 0/0    Subjective:  Pt reports inc in pain/discomfort noted more at the end of the day and is relieved by taking medication. Pain:  [x] Yes  [] No  Location: L>R heel   Pain Rating: (0-10 scale) 1/10  Pain altered Tx:  [x] No  [] Yes  Action:  Comments:    Objective:  Modalities:   Precautions: NONE  Services as deemed appropriate by the therapist, to include stretching of the calf (Gastroc and Achilles) and plantar fascia  Exercises:  Exercise  L Calf Reps/ Time Weight/ Level Comments Completed 2021     SB S  1'x3        Gastroc step S  1'x3       Gastroc inver S  1'x3         Hamstring 3x 30\"   Standing               PF rollout 3' mojgan                  Toe yoga  2x20, 5\"        Foot doming  2x20, 5\"       Plantar fascia stretch sitting  1'x3 ea          towel scrunches  2'x2' ea   x2 sets         marbles  15x2    added                 Standing          SLS 2x 30\"  focus on foot doming one UE support x    Tandem stance 3x30\"    x   Heel/toe raises 3x10   x   Heel raises: 2 up, 1 down 10x   x   Step ups 10x  Fwd/lat x   Other: STM to PF and gastroc of LLE- 2021: 15'      Specific Instructions for next treatment: Continue tx per POC, Attempt Hypervolt to L gastroc/soleus      Assessment:     [x] Progressing toward goals. [] No change.      [] Other:    [x] Patient would continue to benefit from skilled physical therapy services in order to: Help improve gastroc flexibility, help improve walking tolerance/technique, and help decrease pain. 6/30: Added step ups this session to inc activity tolerance and strengthening in BLEs. Added heel raises 2 up Performed STM to BLEs with Rockblade to dec tightness in B plantar fascia. Performed standing exercises this session with focus on engaging intrinsic foot muscles in standing. No inc in pain noted at the end of therapy. STG: (to be met in 8 treatments)  1. ? Pain: Decrease pain levels to 3/10 with all activities to help ease all gait and ADLs. : MET (6/22/21)  2. ? ROM: Increase flexibility in L gastroc to help improve L ankle joint mechanics.: ONGOING, pt still with tightness in her L gastroc/soleus (6/22/21)  3. Independent with Home Exercise Programs: MET (6/22/21)  4. Demonstrate Knowledge of fall prevention: MET (6/22/21)     LTG: (to be met in 16 treatments)  1. Improve score on LEFS from 15% impaired to <5% impaired, demonstrating improved tolerance to activity. 2. ? Strength: Increase  L ankle strength to 5/5 grossly to help improve stability. 3. ? Function: Pt will demonstrate ability to ambulate without any AD and no deviations to allow her to return to baseline mobility. 4. Reduce pain levels to 0/10                    Patient goals: \"Walk better\"    Pt. Education:  [x] Yes  [] No  [x] Reviewed Prior HEP/Ed  Method of Education: [x] Verbal  [x] Demo  [] Written  Comprehension of Education:  [x] Verbalizes understanding. [x] Demonstrates understanding. [x] Needs review. [] Demonstrates/verbalizes HEP/Ed previously given. Plan: [x] Continue per plan of care.    [] Other:      Treatment Charges: Mins Units   []  Modalities     [x]  Ther Exercise 30 2   [x]  Manual Therapy 15 1   []  Ther Activities     []  Aquatics     []  Neuromuscular     [] Vasocompression     [] Gait Training     [] Dry needling        [] 1 or 2 muscles        [] 3 or more muscles     []  Other     Total Treatment time 45 3     Time In: 1430       Time Out: 5911    Electronically signed by:  Sherry Garrett PTA

## 2021-07-06 ENCOUNTER — HOSPITAL ENCOUNTER (OUTPATIENT)
Dept: PHYSICAL THERAPY | Age: 76
Setting detail: THERAPIES SERIES
Discharge: HOME | End: 2021-07-06
Payer: MEDICARE

## 2021-07-06 PROCEDURE — 97110 THERAPEUTIC EXERCISES: CPT

## 2021-07-06 NOTE — FLOWSHEET NOTE
509 [facility]   [address]  [facility] [address]   Phone: [phone]   Fax: [phone]    Physical Therapy Daily Treatment Note  Date:  2021  Patient Name:  Jorge Silveira    :    MRN: [id number]  Physician: Jonathan Nieto MD                                 Insurance: [facility]  Medical Diagnosis: Plantar fasciitis, bilateral, Equinus contracture of ankle                      Rehab Codes: M72.2, M24.573  Onset date: 21                              Next Dr's appt. : 21  Visit# / total visits:      Cancels/No Shows: 0/0    Subjective:  Pt has no new complaints   Pain:  [x] Yes  [] No  Location: L>R heel   Pain Rating: (0-10 scale) denies/10  Pain altered Tx:  [x] No  [] Yes  Action:  Comments:    Objective:  Modalities:   Precautions: NONE  Services as deemed appropriate by the therapist, to include stretching of the calf (Gastroc and Achilles) and plantar fascia  Exercises:  Exercise  L Calf Reps/ Time Weight/ Level Comments Completed 2021     SB S  1'x3     x   Gastroc step S  1'x3    x   Gastroc inver S  1'x3      x   Hamstring 3x 30\"   Standing x              PF rollout 3' mojgan                  Toe yoga  2x20, 5\"        Foot doming  2x20, 5\"       Plantar fascia stretch sitting  1'x3 ea          towel scrunches  2'x2' ea   x2 sets         marbles  15x2    added                 Standing          SLS 4x 30\"  focus on foot doming one UE support x    Tandem stance 4x30\"    x   Heel/toe raises 3x10   x   Heel raises: 2 up, 1 down 10x2   x   Step ups 10x  Fwd/lat x   Other: STM to PF and gastroc of LLE- 2021: 15'      Specific Instructions for next treatment: Continue tx per POC, Attempt Hypervolt to L gastroc/soleus      Assessment:     [x] Progressing toward goals. [] No change.      [] Other:    [x] Patient would continue to benefit from skilled physical therapy services in order to: Help improve gastroc flexibility, help improve walking tolerance/technique, and help decrease pain. 7/6: Able to increase reps and sets this date with all exercises in order to continue challenging LE strength and balance while keeping intrinsic foot muscles activated with fair tolerance, patient does require cueing throughout to remain engaged with treatment patient can \"zone out\" resulting in improper technique of exercises. Did not complete manual therapy secondary to patient having zero pain prior or post exercises and felt relief from standing stretches. STG: (to be met in 8 treatments)  1. ? Pain: Decrease pain levels to 3/10 with all activities to help ease all gait and ADLs. : MET (6/22/21)  2. ? ROM: Increase flexibility in L gastroc to help improve L ankle joint mechanics.: ONGOING, pt still with tightness in her L gastroc/soleus (6/22/21)  3. Independent with Home Exercise Programs: MET (6/22/21)  4. Demonstrate Knowledge of fall prevention: MET (6/22/21)     LTG: (to be met in 16 treatments)  1. Improve score on LEFS from 15% impaired to <5% impaired, demonstrating improved tolerance to activity. 2. ? Strength: Increase  L ankle strength to 5/5 grossly to help improve stability. 3. ? Function: Pt will demonstrate ability to ambulate without any AD and no deviations to allow her to return to baseline mobility. 4. Reduce pain levels to 0/10                    Patient goals: \"Walk better\"    Pt. Education:  [x] Yes  [] No  [x] Reviewed Prior HEP/Ed  Method of Education: [x] Verbal  [x] Demo  [] Written  Comprehension of Education:  [x] Verbalizes understanding. [x] Demonstrates understanding. [x] Needs review. [] Demonstrates/verbalizes HEP/Ed previously given. Plan: [x] Continue per plan of care.    [] Other:      Treatment Charges: Mins Units   []  Modalities     [x]  Ther Exercise 40 3   [x]  Manual Therapy     []  Ther Activities     []  Aquatics     []  Neuromuscular     [] Vasocompression     [] Gait Training     [] Dry needling        [] 1 or 2 muscles        [] 3 or more muscles     []  Other     Total Treatment time 40 3     Time In: 1245pm     Time Out: 125pm    Electronically signed by:  Lord Naseem PTA

## 2021-07-08 ENCOUNTER — HOSPITAL ENCOUNTER (OUTPATIENT)
Dept: PHYSICAL THERAPY | Age: 76
Setting detail: THERAPIES SERIES
Discharge: HOME | End: 2021-07-08
Payer: MEDICARE

## 2021-07-08 NOTE — FLOWSHEET NOTE
[] [address]  [place]: [phone]   F: [phone]     Physical Therapy Cancel/No Show note    Date: 2021  Patient: Rose Callaway  :   MRN: [id number]    Visit Count:   Cancels/No Shows to date:     For today's appointment patient:    [x]  Cancelled    [] Rescheduled appointment    [] No-show     Reason given by patient:    []  Patient ill    []  Conflicting appointment    [] No transportation      [] Conflict with work    [] No reason given    [] Weather related    [] [id number]    [] Other:      Comments: misread appointment time.         [] Next appointment was confirmed    Electronically signed by: Lord Naseem PTA

## 2021-07-18 DIAGNOSIS — M81.0 AGE-RELATED OSTEOPOROSIS WITHOUT CURRENT PATHOLOGICAL FRACTURE: ICD-10-CM

## 2021-07-19 ENCOUNTER — OFFICE VISIT (OUTPATIENT)
Dept: ORTHOPEDIC SURGERY | Age: 76
End: 2021-07-19
Payer: MEDICARE

## 2021-07-19 VITALS — WEIGHT: 160 LBS | RESPIRATION RATE: 12 BRPM | HEIGHT: 66 IN | BODY MASS INDEX: 25.71 KG/M2

## 2021-07-19 DIAGNOSIS — M72.2 PLANTAR FASCIITIS, BILATERAL: Primary | ICD-10-CM

## 2021-07-19 PROCEDURE — 4040F PNEUMOC VAC/ADMIN/RCVD: CPT | Performed by: ORTHOPAEDIC SURGERY

## 2021-07-19 PROCEDURE — 3017F COLORECTAL CA SCREEN DOC REV: CPT | Performed by: ORTHOPAEDIC SURGERY

## 2021-07-19 PROCEDURE — 1123F ACP DISCUSS/DSCN MKR DOCD: CPT | Performed by: ORTHOPAEDIC SURGERY

## 2021-07-19 PROCEDURE — 1036F TOBACCO NON-USER: CPT | Performed by: ORTHOPAEDIC SURGERY

## 2021-07-19 PROCEDURE — G8399 PT W/DXA RESULTS DOCUMENT: HCPCS | Performed by: ORTHOPAEDIC SURGERY

## 2021-07-19 PROCEDURE — 99213 OFFICE O/P EST LOW 20 MIN: CPT | Performed by: ORTHOPAEDIC SURGERY

## 2021-07-19 PROCEDURE — G8427 DOCREV CUR MEDS BY ELIG CLIN: HCPCS | Performed by: ORTHOPAEDIC SURGERY

## 2021-07-19 PROCEDURE — G8417 CALC BMI ABV UP PARAM F/U: HCPCS | Performed by: ORTHOPAEDIC SURGERY

## 2021-07-19 PROCEDURE — 1090F PRES/ABSN URINE INCON ASSESS: CPT | Performed by: ORTHOPAEDIC SURGERY

## 2021-07-19 RX ORDER — ALENDRONATE SODIUM 70 MG/1
70 TABLET ORAL
Qty: 4 TABLET | Refills: 0 | Status: SHIPPED | OUTPATIENT
Start: 2021-07-19 | End: 2021-08-16

## 2021-07-19 NOTE — TELEPHONE ENCOUNTER
Please Approve or Refuse.   Send to Pharmacy per Pt's Request:      Next Visit Date:  10/11/2021   Last Visit Date: 6/11/2021    Hemoglobin A1C (%)   Date Value   02/25/2021 5.7             ( goal A1C is < 7)   BP Readings from Last 3 Encounters:   06/18/21 123/74   06/11/21 110/60   05/25/21 118/66          (goal 120/80)  BUN   Date Value Ref Range Status   02/25/2021 10 8 - 23 mg/dL Final     CREATININE   Date Value Ref Range Status   02/25/2021 0.73 0.50 - 0.90 mg/dL Final     POC Creatinine   Date Value Ref Range Status   06/24/2021 0.8 0.6 - 1.4 mg/dL Final     Comment:     Performed at [address] [place] [address] [address]  [facility], [address] [place] [address]       Potassium   Date Value Ref Range Status   02/25/2021 4.3 3.7 - 5.3 mmol/L Final

## 2021-07-19 NOTE — PROGRESS NOTES
[address] [facility]  [address] [facility]  [address] [address]  Dept: [phone]    Ambulatory Orthopedic Follow Up Visit    Preoperative Diagnosis:   1. Right closed peritrochanteric hip fracture  2. Body mass index is 29.05 kg/m².     Postoperative Diagnosis:   1. same     Procedures Performed:  (9/14/2020)  1. Open treatment of Right hip intertrochanteric femur fracture with cephalomedullary nail        CHIEF COMPLAINT:    Chief Complaint   Patient presents with    Foot Pain     bilateral    Hip Pain     right         HISTORY OF PRESENT ILLNESS:       She is a 76 y.o. female, seen again today in the office for follow up of the above problem with a history of pain at the above location. The pain is described mainly with mechanical terms (dull/sharp/throbbing). The pain is worse with activity and better with rest. Since being seen last, the patient is doing better. At today's visit, she is using no brace or assistive device. At today's visit, she reports that she has no pain in her hip, and she is happy with her outcome. She also reports pain in her left greater than right plantar heels, which began atraumatically years ago. INTERVAL HISTORY 4/19/2021:  She is seen again today in the office for follow up of a previous issue (as above). Since being seen last, the patient is doing better. At today's visit, she is using a cane. She reports that her left plantar heel hurts more than her right plantar heel, and reports pain with her first steps in the morning. INTERVAL HISTORY 7/19/2021:  She is seen again today in the office for follow up of a previous issue (as above). Since being seen last, the patient is doing better. At today's visit, she is not using a brace or assistive device.     History is obtained today from:   [x]  the patient     []  EMR     [x]  one family member/friend--    []  multiple family members/friends Rfl: 1    losartan (COZAAR) 50 MG tablet, take 1 tablet by mouth once daily, Disp: 90 tablet, Rfl: 0    Vitamin Mixture (VITAMIN E COMPLETE PO), Take by mouth, Disp: , Rfl:     Cetirizine HCl (ZYRTEC PO), Take by mouth, Disp: , Rfl:     calcium carbonate (OSCAL) 500 MG TABS tablet, Take 500 mg by mouth daily. , Disp: , Rfl:     aspirin 81 MG tablet, Take 81 mg by mouth daily. , Disp: , Rfl:     Multiple Vitamins-Minerals (THERAPEUTIC MULTIVITAMIN-MINERALS) tablet, Take 1 tablet by mouth daily. , Disp: , Rfl:      Allergies:    Adhesive tape and Lisinopril    Family History:  family history includes Cancer in her maternal aunt and mother. Social History:   Social History     Occupational History    Not on file   Tobacco Use    Smoking status: Never Smoker    Smokeless tobacco: Never Used   Substance and Sexual Activity    Alcohol use: Yes     Comment: social     Drug use: No    Sexual activity: Yes     Partners: Male       OBJECTIVE:  Resp 12   Ht 5' 6\" (1.676 m)   Wt 160 lb (72.6 kg)   BMI 25.82 kg/m²    Psych: alert and oriented to person, time, and place  Cardio:  well perfused extremities  Resp:  normal respiratory effort  Skin:  no cyanosis  Hem/lymph:  no lymphedema  Neuro:  sensation to light touch unchanged since last visit  Musculoskeletal:      RLE:  Vascular: Toes warm and well perfused, compartments soft/compressible. Skin: Intact. No erythema/ulcers. Strength: Grossly normal ankle/foot strength throughout   Motion: Grossly normal ankle/foot motion throughout   Tenderness to Palpation: Plantar heel--mild  -Negative calcaneal squeeze test  -Negative for visual stretch test  -Equinus      LLE:  Vascular: Toes warm and well perfused, compartments soft/compressible. Skin: Intact. No erythema/ulcers.   Strength: Grossly normal ankle/foot strength throughout   Motion: Grossly normal ankle/foot motion throughout   Tenderness to Palpation: Plantar heel--mild  -Negative calcaneal squeeze test  -Negative for visual stretch test  -Equinus      (Previous exam): Incisions well-healed, no erythema/dehiscence/drainage  Sensation to light touch grossly intact throughout   Can wiggle toes and plantarflex/dorsiflex foot appropriately  Toes warm and well perfused  Compartments of thigh/foot/leg soft and compressible  No calf swelling/tenderness  No signs of infection      RADIOLOGY:   7/19/2021 No new radiology images today. Prior images reviewed for reference. FINDINGS:  Three weightbearing views (AP, Mortise, and Lateral) of the bilateral ankle and three weightbearing views (AP, Oblique, Lateral) of the bilateral foot were obtained in the office today and reviewed, revealing no acute fracture, dislocation, or radioopaque foreign body/tumor. The ankle mortise is maintained with no widening of the clear spaces. Overall alignment is satisfactory. IMPRESSION:  No acute fracture/dislocation. Electronically signed by Tiara Vu MD        FINDINGS:  Three weightbearing views (AP Pelvis, AP hip and Lateral) of the right hip and two weightbearing views (AP and Lateral) of the right femur were obtained in the office today and reviewed, revealing hip fracture with intact well seated hardware without evidence of loosening. No significant interval displacement of hip fracture. Interval healing noted.      IMPRESSION: Hip fracture as above     Electronically signed by Tiara Vu MD      ASSESSMENT AND PLAN:  Body mass index is 25.82 kg/m². She has left greater than right heel pain, secondary to plantar fasciitis with underlying equinus contractures. Overall, she is doing very well with conservative management. She is also status post right hip cephalomedullary nail (on 9/14/2020), doing very well overall. She does not have any pain at her right hip. Notably, she has no relevant past medical history.      We had a discussion today about the likely diagnosis and its natural history, physical exam and imaging findings, as well as various treatment options in detail. Surgically, we discussed the possible future left versus bilateral gastroc recession, depending on her clinical course. At today's visit, I do not recommend surgery, and we decided to continue with conservative management. Orders/referrals were placed as below at today's visit. She may continue to use the night splint and heel cups as needed, however, she does report that she has stopped using the heel cups, and has been using her over-the-counter orthotic inserts which help her more in terms of pain; she also reports that she has not been using the night splint, as she does not like it. She has been going to physical therapy, and I encouraged her to continue home exercises. She may continue using her prescribed oral NSAIDs as needed. We discussed a possible plantar fashion injection, however, she does not need this at this time as she is doing very well. All questions were answered and the above plan was agreed upon. The patient will return to clinic in the future as needed. At her next visit, depending on how she is doing, we may consider bilateral plantar fascia injections. At the patient's next visit, depending on how the patient is doing and/or new imaging/labs results, we may consider the following options:    []  Orthotic (OTC)     []  Orthotic (custom)          []  Rocker bottom shoes     []  Brace (OTC)        []  Brace (custom)             []  CAM boot        []  Night splint         []  Heel cups        []  Strap      []  Toe sleeves/splints    []  PT:                     []  Wean out of immobilization   []  Advance activity       []  Topical               []  NSAIDs          []  Brayan         []  Referral:         []  Stress xrays       []  CT         []  MRI        []  Injection:         []  Consider OR      []  Pick OR date    No follow-ups on file.     No orders of the defined types were placed in this encounter. No orders of the defined types were placed in this encounter. Ele Cunningham MD  Orthopedic Surgery        Please excuse any typos/errors, as this note was created with the assistance of voice recognition software. While intending to generate a document that actually reflects the content of the visit, the document can still have some errors including those of syntax and sound-a-like substitutions which may escape proof reading. In such instances, actual meaning can be extrapolated by context.

## 2021-07-28 ENCOUNTER — OFFICE VISIT (OUTPATIENT)
Dept: OBGYN CLINIC | Age: 76
End: 2021-07-28
Payer: MEDICARE

## 2021-07-28 VITALS
SYSTOLIC BLOOD PRESSURE: 126 MMHG | BODY MASS INDEX: 26.36 KG/M2 | HEIGHT: 66 IN | DIASTOLIC BLOOD PRESSURE: 64 MMHG | WEIGHT: 164 LBS

## 2021-07-28 DIAGNOSIS — Z96.0 PRESENCE OF PESSARY: ICD-10-CM

## 2021-07-28 DIAGNOSIS — Z46.89 PESSARY MAINTENANCE: Primary | ICD-10-CM

## 2021-07-28 DIAGNOSIS — N81.11 MIDLINE CYSTOCELE: ICD-10-CM

## 2021-07-28 DIAGNOSIS — Z90.710 H/O TOTAL VAGINAL HYSTERECTOMY: ICD-10-CM

## 2021-07-28 PROCEDURE — G8417 CALC BMI ABV UP PARAM F/U: HCPCS | Performed by: NURSE PRACTITIONER

## 2021-07-28 PROCEDURE — 3017F COLORECTAL CA SCREEN DOC REV: CPT | Performed by: NURSE PRACTITIONER

## 2021-07-28 PROCEDURE — 1036F TOBACCO NON-USER: CPT | Performed by: NURSE PRACTITIONER

## 2021-07-28 PROCEDURE — 4040F PNEUMOC VAC/ADMIN/RCVD: CPT | Performed by: NURSE PRACTITIONER

## 2021-07-28 PROCEDURE — G8427 DOCREV CUR MEDS BY ELIG CLIN: HCPCS | Performed by: NURSE PRACTITIONER

## 2021-07-28 PROCEDURE — 1123F ACP DISCUSS/DSCN MKR DOCD: CPT | Performed by: NURSE PRACTITIONER

## 2021-07-28 PROCEDURE — G8399 PT W/DXA RESULTS DOCUMENT: HCPCS | Performed by: NURSE PRACTITIONER

## 2021-07-28 PROCEDURE — 99213 OFFICE O/P EST LOW 20 MIN: CPT | Performed by: NURSE PRACTITIONER

## 2021-07-28 PROCEDURE — 1090F PRES/ABSN URINE INCON ASSESS: CPT | Performed by: NURSE PRACTITIONER

## 2021-07-28 NOTE — PROGRESS NOTES
planning) 06/11/2021    PMB (postmenopausal bleeding) 05/25/2021    Age-related osteoporosis without current pathological fracture 03/09/2021     Actonel RX Managed by PCP      At high risk for falls 12/23/2020    Pruritic rash 12/23/2020    Bacterial vaginosis 12/22/2020    Closed fracture of right hip ([facility] [address] 12/21/2020    Adjustment disorder with mixed anxiety and depressed mood 09/21/2020    Mass of left parotid gland 17 mm 09/14/2020    Acute urinary retention 09/14/2020    Other fracture of right femur, initial encounter for closed fracture ([facility] [address] 09/13/2020    Allergic rhinitis due to allergen 06/10/2020    Candidiasis of breast 06/10/2020    Anxiety and depression 12/12/2019    Essential hypertension 12/17/2018    Diverticulosis of large intestine without hemorrhage 05/15/2017    Hyperlipidemia 01/19/2017    Toenail fungus 08/03/2016    Fatty liver          Plan:  1. Return to the office 9 weeks  2. Report any vaginal bleeding or discharge  3. Abstinence  4. Annual Follow-up reviewed with patient. They will schedule appointment    The patient, Sudhir Segovia is a 76 y.o. female, was seen with a total time spent of 20 minutes for the visit on this date of service by the E/M provider. The time component had both face to face and non face to face time spent in determining the total time component. Counseling and education regarding her diagnosis listed below and her options regarding those diagnoses were also included in determining her time component. Diagnosis Orders   1. Pessary maintenance     2. Presence of pessary-#4 Ring with support     3. Midline cystocele     4. H/O total vaginal hysterectomy          The patient had her preventative health maintenance recommendations and follow-up reviewed with her at the completion of her visit.

## 2021-07-30 DIAGNOSIS — E78.5 HYPERLIPIDEMIA WITH TARGET LDL LESS THAN 100: Primary | ICD-10-CM

## 2021-07-30 RX ORDER — SIMVASTATIN 20 MG
TABLET ORAL
Qty: 90 TABLET | Refills: 1 | Status: SHIPPED | OUTPATIENT
Start: 2021-07-30 | End: 2022-02-01

## 2021-08-02 ENCOUNTER — PROCEDURE VISIT (OUTPATIENT)
Dept: UROLOGY | Age: 76
End: 2021-08-02
Payer: MEDICARE

## 2021-08-02 VITALS
TEMPERATURE: 96.3 F | HEART RATE: 69 BPM | WEIGHT: 164 LBS | SYSTOLIC BLOOD PRESSURE: 135 MMHG | BODY MASS INDEX: 26.36 KG/M2 | HEIGHT: 66 IN | DIASTOLIC BLOOD PRESSURE: 71 MMHG

## 2021-08-02 DIAGNOSIS — R31.29 MICROHEMATURIA: Primary | ICD-10-CM

## 2021-08-02 PROCEDURE — 52000 CYSTOURETHROSCOPY: CPT | Performed by: UROLOGY

## 2021-08-02 NOTE — PROGRESS NOTES
Cystoscopy Operative Note (8/2/21):  Surgeon: Aleksander Argueta MD  Anesthesia: Urethral 2% Xylocaine  Indications: Hematuria   Position: Dorsal Lithotomy    Findings:   Risks and Benefits discussed with patient prior to procedure. The patient was prepped and draped in the usual sterile fashion. The flexible cystoscope was advanced through the urethra and into the bladder. The bladder was thoroughly inspected and the following was noted:    Vagina: normal appearing vagina with normal color and discharge, no lesions  Residual Urine: none  Urethra: normal appearing urethra with no masses, tenderness or lesions  Bladder: No tumors or CIS noted. No bladder diverticulum. There was none trabeculation noted. Ureters: Clear efflux from both ureters. Orifices with normal configuration and location. The cystoscope was removed. The patient tolerated the procedure well. Agree with the ROS entered by the MA. Plan: Patient can follow-up in 1 year. We will get urinalysis prior to visit.

## 2021-08-16 DIAGNOSIS — M81.0 AGE-RELATED OSTEOPOROSIS WITHOUT CURRENT PATHOLOGICAL FRACTURE: ICD-10-CM

## 2021-08-16 RX ORDER — ALENDRONATE SODIUM 70 MG/1
70 TABLET ORAL
Qty: 4 TABLET | Refills: 0 | Status: SHIPPED | OUTPATIENT
Start: 2021-08-16 | End: 2021-09-13

## 2021-09-07 RX ORDER — LOSARTAN POTASSIUM 50 MG/1
TABLET ORAL
Qty: 90 TABLET | Refills: 0 | Status: SHIPPED | OUTPATIENT
Start: 2021-09-07 | End: 2021-10-11 | Stop reason: SDUPTHER

## 2021-09-27 ENCOUNTER — TELEPHONE (OUTPATIENT)
Dept: FAMILY MEDICINE CLINIC | Age: 76
End: 2021-09-27

## 2021-09-27 DIAGNOSIS — L30.4 ECZEMA INTERTRIGO: ICD-10-CM

## 2021-09-27 DIAGNOSIS — L30.4 INTERTRIGO: Primary | ICD-10-CM

## 2021-09-27 DIAGNOSIS — L28.2 PRURITIC RASH: ICD-10-CM

## 2021-09-27 RX ORDER — ESCITALOPRAM OXALATE 10 MG/1
TABLET ORAL
Qty: 30 TABLET | Refills: 3 | Status: SHIPPED | OUTPATIENT
Start: 2021-09-27 | End: 2022-02-02

## 2021-09-27 NOTE — TELEPHONE ENCOUNTER
----- Message from [address] sent at 9/27/2021 11:29 AM EDT -----  Subject: Message to Provider    QUESTIONS  Information for Provider? Deepa Landis called in and states that Dr. Caren Dobbs had referred her to a Dermatologist a few years ago and she would   like to get another referral to the same Dermatologist  ---------------------------------------------------------------------------  --------------  [address]  What is the best way for the office to contact you? OK to leave message on   voicemail  Preferred Call Back Phone Number? [phone]  ---------------------------------------------------------------------------  --------------  SCRIPT ANSWERS  Relationship to Patient?  Self

## 2021-09-29 ENCOUNTER — HOSPITAL ENCOUNTER (OUTPATIENT)
Age: 76
Setting detail: SPECIMEN
Discharge: HOME | End: 2021-09-29
Payer: MEDICARE

## 2021-09-29 ENCOUNTER — OFFICE VISIT (OUTPATIENT)
Dept: OBGYN CLINIC | Age: 76
End: 2021-09-29
Payer: MEDICARE

## 2021-09-29 VITALS
DIASTOLIC BLOOD PRESSURE: 70 MMHG | WEIGHT: 171 LBS | HEIGHT: 66 IN | SYSTOLIC BLOOD PRESSURE: 132 MMHG | BODY MASS INDEX: 27.48 KG/M2

## 2021-09-29 DIAGNOSIS — N76.0 ACUTE VAGINITIS: ICD-10-CM

## 2021-09-29 DIAGNOSIS — Z96.0 PRESENCE OF PESSARY: ICD-10-CM

## 2021-09-29 DIAGNOSIS — Z46.89 PESSARY MAINTENANCE: Primary | ICD-10-CM

## 2021-09-29 PROCEDURE — 99213 OFFICE O/P EST LOW 20 MIN: CPT | Performed by: NURSE PRACTITIONER

## 2021-09-29 PROCEDURE — G8427 DOCREV CUR MEDS BY ELIG CLIN: HCPCS | Performed by: NURSE PRACTITIONER

## 2021-09-29 PROCEDURE — G8399 PT W/DXA RESULTS DOCUMENT: HCPCS | Performed by: NURSE PRACTITIONER

## 2021-09-29 PROCEDURE — 4040F PNEUMOC VAC/ADMIN/RCVD: CPT | Performed by: NURSE PRACTITIONER

## 2021-09-29 PROCEDURE — 1090F PRES/ABSN URINE INCON ASSESS: CPT | Performed by: NURSE PRACTITIONER

## 2021-09-29 PROCEDURE — 1036F TOBACCO NON-USER: CPT | Performed by: NURSE PRACTITIONER

## 2021-09-29 PROCEDURE — 1123F ACP DISCUSS/DSCN MKR DOCD: CPT | Performed by: NURSE PRACTITIONER

## 2021-09-29 PROCEDURE — 3017F COLORECTAL CA SCREEN DOC REV: CPT | Performed by: NURSE PRACTITIONER

## 2021-09-29 PROCEDURE — G8417 CALC BMI ABV UP PARAM F/U: HCPCS | Performed by: NURSE PRACTITIONER

## 2021-09-29 NOTE — PROGRESS NOTES
Connie Alvarado  9/29/2021  1:46 PM          Connie Alvarado is a 76 y.o. female [id number]      The patient was seen. She has no chief complaints today. She has been fitted for a # 4; ring with support type pessary. She states all of her symptoms that she had prior to the pessary have been relieved with its use. She denies any vaginal bleeding. She last had her pessary cleaned 9 weeks ago. She admits to vaginal discharge or odor. Denies vaginal bleeding currently. Her bowels are regular and her voiding pattern is normal.     Blood pressure 132/70, height 5' 6\" (1.676 m), weight 171 lb (77.6 kg), not currently breastfeeding. Chaperone for Intimate Exam   Chaperone was offered and accepted as part of the rooming process.  Chaperone: Belén        Abdomen: Soft and non-tender; good bowel sounds; no guarding, rebound or rigidity; no CVA tenderness bilaterally. Extremities: No calf tenderness bilaterally. DTR 2/4 bilaterally. No edema. Perineum/Speculum: There is not any signs of infection; The vaginal vault is without any signs of erythema or erosion. Minimal vaginal discharge or odor appreciated. Genital culture collected. The pessary was cleansed and replaced without any problems and the patient tolerated the procedure well. T.O.S. Ointment was placed with the pessary to decrease discharge/odor. Assessment:   Diagnosis Orders   1. Pessary maintenance     2. Presence of pessary-#4 Ring with support     3.  Acute vaginitis  Culture, Genital     Chief Complaint   Patient presents with    Follow-up     Patient Active Problem List    Diagnosis Date Noted    Benign essential microscopic hematuria 05/25/2021     Priority: High    H/O total vaginal hysterectomy 05/25/2021     Priority: Medium    H/O bilateral salpingo-oophorectomy S/P vag hyst (6 weeks) 05/25/2021     Priority: Medium    Presence of pessary-#4 Ring with support 05/25/2021     Priority: Medium    Prediabetes 06/11/2021    Intertrigo 06/11/2021    ACP (advance care planning) 06/11/2021    PMB (postmenopausal bleeding) 05/25/2021    Age-related osteoporosis without current pathological fracture 03/09/2021     Actonel RX Managed by PCP      At high risk for falls 12/23/2020    Pruritic rash 12/23/2020    Bacterial vaginosis 12/22/2020    Closed fracture of right hip ([facility] [address] 12/21/2020    Adjustment disorder with mixed anxiety and depressed mood 09/21/2020    Mass of left parotid gland 17 mm 09/14/2020    Acute urinary retention 09/14/2020    Other fracture of right femur, initial encounter for closed fracture ([facility] [address] 09/13/2020    Allergic rhinitis due to allergen 06/10/2020    Candidiasis of breast 06/10/2020    Anxiety and depression 12/12/2019    Essential hypertension 12/17/2018    Diverticulosis of large intestine without hemorrhage 05/15/2017    Hyperlipidemia 01/19/2017    Toenail fungus 08/03/2016    Fatty liver          Plan:  1. Return to the office 8 weeks  2. Report any vaginal bleeding or discharge  3. Abstinence  4. Annual Follow-up reviewed with patient. They will schedule appointment  5. Genital culture collected. The patient, Ramesh Velasco is a 76 y.o. female, was seen with a total time spent of 20 minutes for the visit on this date of service by the E/M provider. The time component had both face to face and non face to face time spent in determining the total time component. Counseling and education regarding her diagnosis listed below and her options regarding those diagnoses were also included in determining her time component. Diagnosis Orders   1. Pessary maintenance     2. Presence of pessary-#4 Ring with support     3. Acute vaginitis  Culture, Genital        The patient had her preventative health maintenance recommendations and follow-up reviewed with her at the completion of her visit.

## 2021-10-02 LAB
CULTURE: NORMAL
CULTURE: NORMAL
Lab: NORMAL
SPECIMEN DESCRIPTION: NORMAL

## 2021-10-10 ASSESSMENT — ENCOUNTER SYMPTOMS
ABDOMINAL PAIN: 0
SORE THROAT: 0
NAUSEA: 0
SHORTNESS OF BREATH: 0
COLOR CHANGE: 1

## 2021-10-11 ENCOUNTER — OFFICE VISIT (OUTPATIENT)
Dept: FAMILY MEDICINE CLINIC | Age: 76
End: 2021-10-11
Payer: MEDICARE

## 2021-10-11 VITALS
DIASTOLIC BLOOD PRESSURE: 84 MMHG | TEMPERATURE: 97.9 F | OXYGEN SATURATION: 97 % | BODY MASS INDEX: 27.51 KG/M2 | SYSTOLIC BLOOD PRESSURE: 128 MMHG | HEART RATE: 68 BPM | WEIGHT: 171.2 LBS | HEIGHT: 66 IN

## 2021-10-11 DIAGNOSIS — J30.89 NON-SEASONAL ALLERGIC RHINITIS DUE TO OTHER ALLERGIC TRIGGER: ICD-10-CM

## 2021-10-11 DIAGNOSIS — R73.03 PREDIABETES: ICD-10-CM

## 2021-10-11 DIAGNOSIS — F43.23 ADJUSTMENT DISORDER WITH MIXED ANXIETY AND DEPRESSED MOOD: ICD-10-CM

## 2021-10-11 DIAGNOSIS — E78.2 MIXED HYPERLIPIDEMIA: ICD-10-CM

## 2021-10-11 DIAGNOSIS — I10 ESSENTIAL HYPERTENSION: Primary | ICD-10-CM

## 2021-10-11 DIAGNOSIS — L30.4 ECZEMA INTERTRIGO: ICD-10-CM

## 2021-10-11 DIAGNOSIS — M81.0 AGE-RELATED OSTEOPOROSIS WITHOUT CURRENT PATHOLOGICAL FRACTURE: ICD-10-CM

## 2021-10-11 DIAGNOSIS — Z23 NEED FOR INFLUENZA VACCINATION: ICD-10-CM

## 2021-10-11 DIAGNOSIS — L21.9 SEBORRHEIC DERMATITIS OF SCALP: ICD-10-CM

## 2021-10-11 LAB — HBA1C MFR BLD: 5.3 %

## 2021-10-11 PROCEDURE — G8484 FLU IMMUNIZE NO ADMIN: HCPCS | Performed by: FAMILY MEDICINE

## 2021-10-11 PROCEDURE — 1036F TOBACCO NON-USER: CPT | Performed by: FAMILY MEDICINE

## 2021-10-11 PROCEDURE — 1090F PRES/ABSN URINE INCON ASSESS: CPT | Performed by: FAMILY MEDICINE

## 2021-10-11 PROCEDURE — 4040F PNEUMOC VAC/ADMIN/RCVD: CPT | Performed by: FAMILY MEDICINE

## 2021-10-11 PROCEDURE — 99214 OFFICE O/P EST MOD 30 MIN: CPT | Performed by: FAMILY MEDICINE

## 2021-10-11 PROCEDURE — G8427 DOCREV CUR MEDS BY ELIG CLIN: HCPCS | Performed by: FAMILY MEDICINE

## 2021-10-11 PROCEDURE — G0008 ADMIN INFLUENZA VIRUS VAC: HCPCS | Performed by: FAMILY MEDICINE

## 2021-10-11 PROCEDURE — G8399 PT W/DXA RESULTS DOCUMENT: HCPCS | Performed by: FAMILY MEDICINE

## 2021-10-11 PROCEDURE — G8417 CALC BMI ABV UP PARAM F/U: HCPCS | Performed by: FAMILY MEDICINE

## 2021-10-11 PROCEDURE — 3017F COLORECTAL CA SCREEN DOC REV: CPT | Performed by: FAMILY MEDICINE

## 2021-10-11 PROCEDURE — 1123F ACP DISCUSS/DSCN MKR DOCD: CPT | Performed by: FAMILY MEDICINE

## 2021-10-11 PROCEDURE — 83036 HEMOGLOBIN GLYCOSYLATED A1C: CPT | Performed by: FAMILY MEDICINE

## 2021-10-11 PROCEDURE — 90694 VACC AIIV4 NO PRSRV 0.5ML IM: CPT | Performed by: FAMILY MEDICINE

## 2021-10-11 RX ORDER — LOSARTAN POTASSIUM 50 MG/1
TABLET ORAL
Qty: 90 TABLET | Refills: 2 | Status: SHIPPED | OUTPATIENT
Start: 2021-10-11 | End: 2022-08-29

## 2021-10-11 ASSESSMENT — PATIENT HEALTH QUESTIONNAIRE - PHQ9
SUM OF ALL RESPONSES TO PHQ QUESTIONS 1-9: 0
2. FEELING DOWN, DEPRESSED OR HOPELESS: 0
1. LITTLE INTEREST OR PLEASURE IN DOING THINGS: 0
SUM OF ALL RESPONSES TO PHQ9 QUESTIONS 1 & 2: 0
9. THOUGHTS THAT YOU WOULD BE BETTER OFF DEAD, OR OF HURTING YOURSELF: 0

## 2021-10-11 NOTE — PROGRESS NOTES
polyphagia, polydipsia, polyuria. We discussed the importance lifestyle changes such as cutting down food/drinks high in carbohydrates and regular exercise to avoid any progression on this condition. We are going to continue to monitor the [facility]. Treatment diet control. Salt no sugr  Lab Results   Component Value Date    LABA1C 5.3 10/11/2021    LABA1C 5.7 02/25/2021      FRACTURED   HIP- muscle pain lateral thigh area- mild  Not having ssues with hip joints  Patient Ismael Silva in September, was sent to the nursing home for a few weeks. She did well. Eventually went home in October. She had hip repair in December 2020. Surgery done by Dr. Lexi Bonds. Patient was very happy wih the surgeon. Had continues to do PT close to home, recently finished. Continues to use cane. Takes Tylenol for pain. Ariana- she also saw for foot bone spur. RASH  Patient also has recurrent intertrigo under breasts and have not had any success with Clotrimazole and Nystatin. We had some challenges with medications from her insurance companies. We will change medication to Triamcinolone. some relief. Derm follow up next week. Avel Cushing. Wart. ADJUSTMENT DISORDER/DEPRESSION AND ANXIETY  Caroline Gordon reported some ongoing issues with depression and anxiety. Symptoms includes irritable, psychomotor agitation, racing thoughts and anhedonia. Current therapy includes Lexapro, which is working well for her. she denies adverse reaction to current therapy. she also denies suicidal/homicidal ideation, plan or intent. .  PHQ-2 Over the past 2 weeks, how often have you been bothered by any of the following problems?   Little interest or pleasure in doing things: Not at all  Feeling down, depressed, or hopeless: Not at all  PHQ-2 Score: 0      ANALI-7 SCREENING 3/9/2021   Feeling nervous, anxious, or on edge 1-Several days   Not able to stop or control worrying 0-Not at all   Worrying too much about different things 0-Not at all   Trouble relaxing 0-Not at all   Being so restless that it's hard to sit still 0-Not at all   Becoming easily annoyed or irritable 0-Not at all   Feeling afraid as if something awful might happen 1-Several days   ANALI-7 Total Score 2      No data recorded     WEIGHT  Patient's BMI is Body mass index is 27.63 kg/m². kg/m2. BMI is stable. Patient understands that this condition increases the patient's risk for chronic conditions.   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    Acute urinary retention    Adjustment disorder with mixed anxiety and depressed mood    Closed fracture of right hip (HCC)    Bacterial vaginosis    At high risk for falls    Pruritic rash    Age-related osteoporosis without current pathological fracture    H/O total vaginal hysterectomy    H/O bilateral salpingo-oophorectomy S/P vag hyst (6 weeks)    Benign essential microscopic hematuria    PMB (postmenopausal bleeding)    Presence of pessary-#4 Ring with support    Prediabetes    Intertrigo    ACP (advance care planning)      Past Medical History:   Diagnosis Date    Diverticulosis     Fatty liver     Hyperlipidemia       Past Surgical History:   Procedure Laterality Date    APPENDECTOMY      BLADDER SUSPENSION      in situ sling    COLONOSCOPY  05/23/2013    DIVERTICULOSIS     FEMUR FRACTURE SURGERY Right 09/14/2020    FEMUR IM NAIL YARITZA INSERTION performed by Rodney Jordan MD at [address] [place] [address], VAGINAL      SALPINGO-OOPHORECTOMY      6 wk post vag hyst    SPINE SURGERY      TONSILLECTOMY      TUMOR EXCISION Left 05/18/2021    below left ear- benign    TYMPANOMASTOIDECTOMY Left 2000    UPPER GASTROINTESTINAL ENDOSCOPY  05/23/2013     Family History   Problem Relation Age of Onset    Cancer Mother     Cancer Maternal Aunt      Current Outpatient Medications   Medication Sig Dispense Refill    losartan (COZAAR) 50 MG tablet take 1 tablet by mouth once daily 90 tablet 2    escitalopram (LEXAPRO) 10 MG tablet take 1 tablet by mouth once daily 30 tablet 3    alendronate (FOSAMAX) 70 MG tablet take 1 tablet by mouth every 7 days 12 tablet 1    diphenhydrAMINE-APAP, sleep, (TYLENOL PM EXTRA STRENGTH PO) Take by mouth      Cetirizine HCl (ZYRTEC PO) Take by mouth      calcium carbonate (OSCAL) 500 MG TABS tablet Take 500 mg by mouth daily.  aspirin 81 MG tablet Take 81 mg by mouth daily.  Multiple Vitamins-Minerals (THERAPEUTIC MULTIVITAMIN-MINERALS) tablet Take 1 tablet by mouth daily.  simvastatin (ZOCOR) 20 MG tablet take 1 tablet by mouth every evening 90 tablet 1    ZINC PO Take by mouth      vitamin D (CHOLECALCIFEROL) 25 MCG (1000 UT) TABS tablet Take 1 tablet by mouth daily 90 tablet 5    Vitamin Mixture (VITAMIN E COMPLETE PO) Take by mouth       No current facility-administered medications for this visit. Review of Systems   Constitutional: Negative for appetite change, chills and fever. HENT: Negative for ear pain, sneezing and sore throat. Eyes:        Wears glasses   Respiratory: Negative for shortness of breath. Cardiovascular: Negative for chest pain. Gastrointestinal: Negative for abdominal pain and nausea. Endocrine: Negative. Genitourinary: Negative for difficulty urinating, dyspareunia, frequency and pelvic pain. Musculoskeletal: Positive for myalgias. Negative for arthralgias. Skin: Positive for color change and rash (Pruritic). Allergic/Immunologic: Positive for environmental allergies.         Allergy to dust grass pollen   Neurological: Negative for dizziness, syncope and headaches. Psychiatric/Behavioral: Positive for sleep disturbance. Negative for dysphoric mood and suicidal ideas. The patient is nervous/anxious. Prior to Visit Medications    Medication Sig Taking? Authorizing Provider   losartan (COZAAR) 50 MG tablet take 1 tablet by mouth once daily Yes MIGUEL Patel CNP   escitalopram (LEXAPRO) 10 MG tablet take 1 tablet by mouth once daily Yes MIGUEL Patel CNP   alendronate (FOSAMAX) 70 MG tablet take 1 tablet by mouth every 7 days Yes MIGUEL Patel CNP   diphenhydrAMINE-APAP, sleep, (TYLENOL PM EXTRA STRENGTH PO) Take by mouth Yes Historical Provider, MD   Cetirizine HCl (ZYRTEC PO) Take by mouth Yes Historical Provider, MD   calcium carbonate (OSCAL) 500 MG TABS tablet Take 500 mg by mouth daily. Yes Historical Provider, MD   aspirin 81 MG tablet Take 81 mg by mouth daily. Yes Historical Provider, MD   Multiple Vitamins-Minerals (THERAPEUTIC MULTIVITAMIN-MINERALS) tablet Take 1 tablet by mouth daily.  Yes Historical Provider, MD   simvastatin (ZOCOR) 20 MG tablet take 1 tablet by mouth every evening  Valerie Harris MD   ZINC PO Take by mouth  Historical Provider, MD   vitamin D (CHOLECALCIFEROL) 25 MCG (1000 UT) TABS tablet Take 1 tablet by mouth daily  MIGUEL Patel CNP   alendronate (FOSAMAX) 70 MG tablet take 1 tablet by mouth every 7 days  MIGUEL Patel CNP   escitalopram (LEXAPRO) 10 MG tablet take 1 tablet by mouth once daily  MIGUEL Patel CNP   Vitamin Mixture (VITAMIN E COMPLETE PO) Take by mouth  Historical Provider, MD      Social History     Tobacco Use    Smoking status: Never Smoker    Smokeless tobacco: Never Used   Substance Use Topics    Alcohol use: Yes     Comment: social       Vitals:    10/11/21 1258   BP: 128/84   Pulse: 68   Temp: 97.9 °F (36.6 °C)   TempSrc: Temporal   SpO2: 97%   Weight: 171 lb 3.2 oz (77.7 kg)   Height: 5' 6\" (1.676 m)     Estimated body mass index is 27.63 kg/m² as calculated from the following:    Height as of this encounter: 5' 6\" (1.676 m). Weight as of this encounter: 171 lb 3.2 oz (77.7 kg). Physical Exam  Vitals and nursing note reviewed. Constitutional:       Appearance: She is well-developed. She is not ill-appearing. HENT:      Right Ear: Tympanic membrane and ear canal normal. No tenderness. No middle ear effusion. Left Ear: No mastoid tenderness. Nose: No mucosal edema or rhinorrhea. Right Sinus: No maxillary sinus tenderness or frontal sinus tenderness. Left Sinus: No maxillary sinus tenderness or frontal sinus tenderness. Mouth/Throat:      Mouth: Mucous membranes are pale. Pharynx: Uvula midline. No oropharyngeal exudate. Eyes:      General: Lids are normal. No scleral icterus. Right eye: No discharge. Pupils: Pupils are equal, round, and reactive to light. Neck:      Trachea: No tracheal tenderness. Cardiovascular:      Rate and Rhythm: Normal rate and regular rhythm. Pulmonary:      Effort: Pulmonary effort is normal.      Breath sounds: Normal breath sounds. No stridor. Abdominal:      General: Bowel sounds are normal.      Palpations: Abdomen is soft. Musculoskeletal:         General: Normal range of motion. Right hip: No tenderness or bony tenderness. Normal range of motion. Lymphadenopathy:      Cervical: No cervical adenopathy. Skin:     General: Skin is dry. Findings: Erythema and rash present. Rash is macular. Comments: Under both breast. With raised borders, moist   Neurological:      Mental Status: She is alert and oriented to person, place, and time. Psychiatric:         Mood and Affect: Mood is anxious. Speech: Speech is not rapid and pressured. Behavior: Behavior normal.         Thought Content: Thought content does not include suicidal ideation. ASSESSMENT/PLAN:  1. Essential hypertension  Stable  Continue current therapy. DISCUSSED AND ADVISED TO:  Cut down on your salt intake. Cut down on caffeinated drinks, sports drinks. Instructed to check BP at home regularly. Report for any chest pains, shortness of breath, headaches, and lightheadedness. Call the office if your blood pressure continue to be higher than 140/90 or 90/50.      - losartan (COZAAR) 50 MG tablet; take 1 tablet by mouth once daily  Dispense: 90 tablet; Refill: 2    2. Mixed hyperlipidemia  Stable  Continue therapy. Advised to decrease the consumption of red meats, fried foods, trans fats, sweets, sugary beverages. Advised to increase fish, vegetables, and fruits consumption. Advised to add fiber or OTC supplements in diet. Discussed weight loss which will result in improvement of lipids levels. Advised to increase daily physical activities and add regular exercises. 3. Prediabetes  Stable  DISCUSSED and ADVISED TO:  Decrease carbohydrates, sugary drinks, desserts   Exercise regularly, as tolerated. Try to lose weight.    - POCT glycosylated hemoglobin (Hb A1C)    4. Eczema intertrigo  Failure to Improve  Derm consult      5. Age-related osteoporosis without current pathological fracture  Stable  DISCUSSED AND ADVISED TO:  Avoid smoking  Limit alcohol consumption to 2 drinks a day or less. Will repeat DEXA scan in 2 years. Weight-bearing exercise is important in helping to maintain and even increase bone mass. Take calcium 4143-7315 mg per day. Vitamin D (400-800 IU per day) is necessary to assist in the absorption of calcium. Eat foods that are rich in Calcium. It is important that you take all medications as directed. Bone loss can only be prevented and progression can be avoided with above therapy. 6. Adjustment disorder with mixed anxiety and depressed mood  Stable  Continue current therapy. Discussed how to recognize anxiety.   Advised to relieve

## 2021-10-11 NOTE — PATIENT INSTRUCTIONS
Patient Education        Learning About Carbohydrate (Carb) Counting and Eating Out When You Have Diabetes  Why plan your meals? Meal planning can be a key part of managing diabetes. Planning meals and snacks with the right balance of carbohydrate, protein, and fat can help you keep your blood sugar at the target level you set with your doctor. You don't have to eat special foods. You can eat what your family eats, including sweets once in a while. But you do have to pay attention to how often you eat and how much you eat of certain foods. You may want to work with a dietitian or a certified diabetes educator. He or she can give you tips and meal ideas and can answer your questions about meal planning. This health professional can also help you reach a healthy weight if that is one of your goals. What should you know about eating carbs? Managing the amount of carbohydrate (carbs) you eat is an important part of healthy meals when you have diabetes. Carbohydrate is found in many foods. · Learn which foods have carbs. And learn the amounts of carbs in different foods. ? Bread, cereal, pasta, and rice have about 15 grams of carbs in a serving. A serving is 1 slice of bread (1 ounce), ½ cup of cooked cereal, or 1/3 cup of cooked pasta or rice. ? Fruits have 15 grams of carbs in a serving. A serving is 1 small fresh fruit, such as an apple or orange; ½ of a banana; ½ cup of cooked or canned fruit; ½ cup of fruit juice; 1 cup of melon or raspberries; or 2 tablespoons of dried fruit. ? Milk and no-sugar-added yogurt have 15 grams of carbs in a serving. A serving is 1 cup of milk or 2/3 cup of no-sugar-added yogurt. ? Starchy vegetables have 15 grams of carbs in a serving. A serving is ½ cup of mashed potatoes or sweet potato; 1 cup winter squash; ½ of a small baked potato; ½ cup of cooked beans; or ½ cup cooked corn or green peas.   · Learn how much carbs to eat each day and at each meal. A dietitian or CDE can teach you how to keep track of the amount of carbs you eat. This is called carbohydrate counting. · If you are not sure how to count carbohydrate grams, use the Plate Method to plan meals. It is a good, quick way to make sure that you have a balanced meal. It also helps you spread carbs throughout the day. ? Divide your plate by types of foods. Put non-starchy vegetables on half the plate, meat or other protein food on one-quarter of the plate, and a grain or starchy vegetable in the final quarter of the plate. To this you can add a small piece of fruit and 1 cup of milk or yogurt, depending on how many carbs you are supposed to eat at a meal.  · Try to eat about the same amount of carbs at each meal. Do not \"save up\" your daily allowance of carbs to eat at one meal.  · Proteins have very little or no carbs per serving. Examples of proteins are beef, chicken, turkey, fish, eggs, tofu, cheese, cottage cheese, and peanut butter. A serving size of meat is 3 ounces, which is about the size of a deck of cards. Examples of meat substitute serving sizes (equal to 1 ounce of meat) are 1/4 cup of cottage cheese, 1 egg, 1 tablespoon of peanut butter, and ½ cup of tofu. How can you eat out and still eat healthy? · Learn to estimate the serving sizes of foods that have carbohydrate. If you measure food at home, it will be easier to estimate the amount in a serving of restaurant food. · If the meal you order has too much carbohydrate (such as potatoes, corn, or baked beans), ask to have a low-carbohydrate food instead. Ask for a salad or green vegetables. · If you use insulin, check your blood sugar before and after eating out to help you plan how much to eat in the future. · If you eat more carbohydrate at a meal than you had planned, take a walk or do other exercise. This will help lower your blood sugar. What are some tips for eating healthy? · Limit saturated fat, such as the fat from meat and dairy products.  This is a healthy choice because people who have diabetes are at higher risk of heart disease. So choose lean cuts of meat and nonfat or low-fat dairy products. Use olive or canola oil instead of butter or shortening when cooking. · Don't skip meals. Your blood sugar may drop too low if you skip meals and take insulin or certain medicines for diabetes. · Check with your doctor before you drink alcohol. Alcohol can cause your blood sugar to drop too low. Alcohol can also cause a bad reaction if you take certain diabetes medicines. Follow-up care is a key part of your treatment and safety. Be sure to make and go to all appointments, and call your doctor if you are having problems. It's also a good idea to know your test results and keep a list of the medicines you take. Where can you learn more? Go to https://GameLogicrainaeb.Kynogon. org and sign in to your [employer] account. Enter I228 in the B-152 box to learn more about \"Learning About Carbohydrate (Carb) Counting and Eating Out When You Have Diabetes. \"     If you do not have an account, please click on the \"Sign Up Now\" link. Current as of: December 17, 2020               Content Version: 13.0  © 6538-2489 Healthwise, [employer]. Care instructions adapted under license by [facility] ([facility]). If you have questions about a medical condition or this instruction, always ask your healthcare professional. [place] [address] any warranty or liability for your use of this information. Patient Education        Low Sodium Diet (2,000 Milligram): Care Instructions  Overview     Limiting sodium can be an important part of managing some health problems. The most common source of sodium is salt. People get most of the salt in their diet from canned, prepared, and packaged foods. Fast food and restaurant meals also are very high in sodium. Your doctor will probably limit your sodium to less than 2,000 milligrams (mg) a day.  This limit counts all the sodium in prepared and packaged foods and any salt you add to your food. Follow-up care is a key part of your treatment and safety. Be sure to make and go to all appointments, and call your doctor if you are having problems. It's also a good idea to know your test results and keep a list of the medicines you take. How can you care for yourself at home? Read food labels  · Read labels on cans and food packages. The labels tell you how much sodium is in each serving. Make sure that you look at the serving size. If you eat more than the serving size, you have eaten more sodium. · Food labels also tell you the Percent Daily Value for sodium. Choose products with low Percent Daily Values for sodium. · Be aware that sodium can come in forms other than salt, including monosodium glutamate (MSG), sodium citrate, and sodium bicarbonate (baking soda). MSG is often added to Asian food. When you eat out, you can sometimes ask for food without MSG or added salt. Buy low-sodium foods  · Buy foods that are labeled \"unsalted\" (no salt added), \"sodium-free\" (less than 5 mg of sodium per serving), or \"low-sodium\" (140 mg or less of sodium per serving). Foods labeled \"reduced-sodium\" and \"light sodium\" may still have too much sodium. Be sure to read the label to see how much sodium you are getting. · Buy fresh vegetables, or frozen vegetables without added sauces. Buy low-sodium versions of canned vegetables, soups, and other canned goods. Prepare low-sodium meals  · Cut back on the amount of salt you use in cooking. This will help you adjust to the taste. Do not add salt after cooking. One teaspoon of salt has about 2,300 mg of sodium. · Take the salt shaker off the table. · Flavor your food with garlic, lemon juice, onion, vinegar, herbs, and spices. Do not use soy sauce, lite soy sauce, steak sauce, onion salt, garlic salt, celery salt, or ketchup on your food.   · Use low-sodium salad dressings, sauces, and ketchup. Or make your own salad dressings and sauces without adding salt. · Use less salt (or none) when recipes call for it. You can often use half the salt a recipe calls for without losing flavor. Other foods such as rice, pasta, and grains do not need added salt. · Rinse canned vegetables, and cook them in fresh water. This removes some--but not all--of the salt. · Avoid water that is naturally high in sodium or that has been treated with water softeners, which add sodium. If you buy bottled water, read the label and choose a sodium-free brand. Avoid high-sodium foods  · Avoid eating:  ? Smoked, cured, salted, and canned meat, fish, and poultry. ? Ham, srivastava, hot dogs, and luncheon meats. ? Regular, hard, and processed cheese and regular peanut butter. ? Crackers with salted tops, and other salted snack foods such as pretzels, chips, and salted popcorn. ? Frozen prepared meals, unless labeled low-sodium. ? Canned and dried soups, broths, and bouillon, unless labeled sodium-free or low-sodium. ? Canned vegetables, unless labeled sodium-free or low-sodium. ? [place] fries, pizza, tacos, and other fast foods. ? Pickles, olives, ketchup, and other condiments, especially soy sauce, unless labeled sodium-free or low-sodium. Where can you learn more? Go to https://Vtapismael.ScalIT. org and sign in to your [employer] account. Enter KURT in the [facility] box to learn more about \"Low Sodium Diet (2,000 Milligram): Care Instructions. \"     If you do not have an account, please click on the \"Sign Up Now\" link. Current as of: December 17, 2020               Content Version: 13.0  © 2550-3962 Healthwise, Incorporated. Care instructions adapted under license by [facility] ([facility]). If you have questions about a medical condition or this instruction, always ask your healthcare professional. [place] [address] any warranty or liability for your use of this information.

## 2021-10-11 NOTE — PROGRESS NOTES
Visit Information    Have you changed or started any medications since your last visit including any over-the-counter medicines, vitamins, or herbal medicines? no   Are you having any side effects from any of your medications? -  no  Have you stopped taking any of your medications? Is so, why? -  no    Have you seen any other physician or provider since your last visit? Yes - Records Obtained  Have you had any other diagnostic tests since your last visit? Yes - Records Obtained  Have you been seen in the emergency room and/or had an admission to a hospital since we last saw you? No  Have you had your routine dental cleaning in the past 6 months? no    Have you activated your [employer] account? If not, what are your barriers?  Yes     Patient Care Team:  MIGUEL Lockett CNP as PCP - General (Family Medicine)  MIGUEL Lockett CNP [facility]  Adolfo Rich MD as Consulting Physician (Gastroenterology)    Medical History Review  Past Medical, Family, and Social History reviewed and does contribute to the patient presenting condition    Health Maintenance   Topic Date Due    Shingles Vaccine (1 of 2) Never done    Flu vaccine (1) 09/01/2021    DTaP/Tdap/Td vaccine (1 - Tdap) 08/09/2022 (Originally 12/15/1964)    Hepatitis C screen  08/09/2022 (Originally 1945)    A1C test (Diabetic or Prediabetic)  02/25/2022    Lipid screen  02/25/2022    Potassium monitoring  02/25/2022    Creatinine monitoring  02/25/2022    Annual Wellness Visit (AWV)  06/12/2022    Colon cancer screen colonoscopy  05/23/2023    DEXA (modify frequency per FRAX score)  Completed    Pneumococcal 65+ years Vaccine  Completed    COVID-19 Vaccine  Completed    Hepatitis A vaccine  Aged Out    Hepatitis B vaccine  Aged Out    Hib vaccine  Aged Out    Meningococcal (ACWY) vaccine  Aged Out

## 2021-11-24 ENCOUNTER — OFFICE VISIT (OUTPATIENT)
Dept: OBGYN CLINIC | Age: 76
End: 2021-11-24
Payer: MEDICARE

## 2021-11-24 VITALS
DIASTOLIC BLOOD PRESSURE: 68 MMHG | BODY MASS INDEX: 27.97 KG/M2 | SYSTOLIC BLOOD PRESSURE: 122 MMHG | WEIGHT: 174 LBS | HEIGHT: 66 IN

## 2021-11-24 DIAGNOSIS — Z96.0 PRESENCE OF PESSARY: ICD-10-CM

## 2021-11-24 DIAGNOSIS — Z46.89 PESSARY MAINTENANCE: Primary | ICD-10-CM

## 2021-11-24 DIAGNOSIS — N76.0 ACUTE VAGINITIS: ICD-10-CM

## 2021-11-24 DIAGNOSIS — N81.11 MIDLINE CYSTOCELE: ICD-10-CM

## 2021-11-24 PROCEDURE — G8399 PT W/DXA RESULTS DOCUMENT: HCPCS | Performed by: NURSE PRACTITIONER

## 2021-11-24 PROCEDURE — 3017F COLORECTAL CA SCREEN DOC REV: CPT | Performed by: NURSE PRACTITIONER

## 2021-11-24 PROCEDURE — 1090F PRES/ABSN URINE INCON ASSESS: CPT | Performed by: NURSE PRACTITIONER

## 2021-11-24 PROCEDURE — G8484 FLU IMMUNIZE NO ADMIN: HCPCS | Performed by: NURSE PRACTITIONER

## 2021-11-24 PROCEDURE — 1123F ACP DISCUSS/DSCN MKR DOCD: CPT | Performed by: NURSE PRACTITIONER

## 2021-11-24 PROCEDURE — G8427 DOCREV CUR MEDS BY ELIG CLIN: HCPCS | Performed by: NURSE PRACTITIONER

## 2021-11-24 PROCEDURE — G8417 CALC BMI ABV UP PARAM F/U: HCPCS | Performed by: NURSE PRACTITIONER

## 2021-11-24 PROCEDURE — 1036F TOBACCO NON-USER: CPT | Performed by: NURSE PRACTITIONER

## 2021-11-24 PROCEDURE — 4040F PNEUMOC VAC/ADMIN/RCVD: CPT | Performed by: NURSE PRACTITIONER

## 2021-11-24 PROCEDURE — 99213 OFFICE O/P EST LOW 20 MIN: CPT | Performed by: NURSE PRACTITIONER

## 2021-11-24 RX ORDER — FLUCONAZOLE 150 MG/1
150 TABLET ORAL ONCE
Qty: 2 TABLET | Refills: 2 | Status: SHIPPED | OUTPATIENT
Start: 2021-11-24 | End: 2021-11-24

## 2021-11-24 RX ORDER — CLINDAMYCIN HYDROCHLORIDE 300 MG/1
300 CAPSULE ORAL 2 TIMES DAILY
Qty: 14 CAPSULE | Refills: 0 | Status: SHIPPED | OUTPATIENT
Start: 2021-11-24 | End: 2021-12-01

## 2021-11-24 NOTE — PROGRESS NOTES
Storm Browne  11/24/2021  12:43 PM          Storm Browne is a 76 y.o. female [id number]      The patient was seen. She has no chief complaints today. She has been fitted for a # 4; ring with support type pessary. She states all of her symptoms that she had prior to the pessary have been relieved with its use. She denies any vaginal bleeding. She last had her pessary cleaned 8 weeks ago. She admits to to any vaginal odor. Her bowels are regular and her voiding pattern is normal.     Blood pressure 122/68, height 5' 6\" (1.676 m), weight 174 lb (78.9 kg), not currently breastfeeding. Chaperone for Intimate Exam   Chaperone was offered and accepted as part of the rooming process.  Chaperone: donna        Abdomen: Soft and non-tender; good bowel sounds; no guarding, rebound or rigidity; no CVA tenderness bilaterally. Extremities: No calf tenderness bilaterally. DTR 2/4 bilaterally. No edema. Perineum/Speculum: There is not any signs of infection; The vaginal vault is without any signs of erythema or erosion. There is no vaginal discharge or odor appreciated. The pessary was cleansed and replaced without any problems and the patient tolerated the procedure well. T.O.S. Ointment was placed with the pessary to decrease discharge/odor. Assessment:   Diagnosis Orders   1. Pessary maintenance     2. Presence of pessary-#4 Ring with support     3. Acute vaginitis  clindamycin (CLEOCIN) 300 MG capsule    fluconazole (DIFLUCAN) 150 MG tablet   4.  Midline cystocele       Chief Complaint   Patient presents with    Follow-up     Patient Active Problem List    Diagnosis Date Noted    Benign essential microscopic hematuria 05/25/2021     Priority: High    H/O total vaginal hysterectomy 05/25/2021     Priority: Medium    H/O bilateral salpingo-oophorectomy S/P vag hyst (6 weeks) 05/25/2021     Priority: Medium    Presence of pessary-#4 Ring with support 05/25/2021     Priority: Medium    Prediabetes 06/11/2021    Eczema intertrigo 06/11/2021    ACP (advance care planning) 06/11/2021    PMB (postmenopausal bleeding) 05/25/2021    Age-related osteoporosis without current pathological fracture 03/09/2021     Actonel RX Managed by PCP      At high risk for falls 12/23/2020    Pruritic rash 12/23/2020    Bacterial vaginosis 12/22/2020    Closed fracture of right hip ([facility] [place] [address] 12/21/2020    Adjustment disorder with mixed anxiety and depressed mood 09/21/2020    Mass of left parotid gland 17 mm 09/14/2020    Acute urinary retention 09/14/2020    Other fracture of right femur, initial encounter for closed fracture ([facility] [place] [address] 09/13/2020    Allergic rhinitis due to allergen 06/10/2020    Candidiasis of breast 06/10/2020    Anxiety and depression 12/12/2019    Essential hypertension 12/17/2018    Diverticulosis of large intestine without hemorrhage 05/15/2017    Hyperlipidemia 01/19/2017    Toenail fungus 08/03/2016    Fatty liver          Plan:  1. Return to the office 6 weeks  2. Report any vaginal bleeding or discharge  3. Abstinence  4. Annual Follow-up reviewed with patient. They will schedule appointment  Prescription for cleocin and diflucan sent for complaints of vaginal odor. The patient, Nitish James is a 76 y.o. female, was seen with a total time spent of 20 minutes for the visit on this date of service by the E/M provider. The time component had both face to face and non face to face time spent in determining the total time component. Counseling and education regarding her diagnosis listed below and her options regarding those diagnoses were also included in determining her time component. Diagnosis Orders   1. Pessary maintenance     2. Presence of pessary-#4 Ring with support     3. Acute vaginitis  clindamycin (CLEOCIN) 300 MG capsule    fluconazole (DIFLUCAN) 150 MG tablet   4.  Midline cystocele          The patient had her preventative health maintenance recommendations and follow-up reviewed with her at the completion of her visit.

## 2022-01-05 ENCOUNTER — OFFICE VISIT (OUTPATIENT)
Dept: OBGYN CLINIC | Age: 77
End: 2022-01-05
Payer: MEDICARE

## 2022-01-05 VITALS
DIASTOLIC BLOOD PRESSURE: 80 MMHG | WEIGHT: 171 LBS | BODY MASS INDEX: 27.48 KG/M2 | SYSTOLIC BLOOD PRESSURE: 134 MMHG | HEIGHT: 66 IN

## 2022-01-05 DIAGNOSIS — Z90.710 H/O TOTAL VAGINAL HYSTERECTOMY: ICD-10-CM

## 2022-01-05 DIAGNOSIS — N81.11 MIDLINE CYSTOCELE: ICD-10-CM

## 2022-01-05 DIAGNOSIS — Z46.89 PESSARY MAINTENANCE: Primary | ICD-10-CM

## 2022-01-05 DIAGNOSIS — Z96.0 PRESENCE OF PESSARY: ICD-10-CM

## 2022-01-05 PROCEDURE — 1123F ACP DISCUSS/DSCN MKR DOCD: CPT | Performed by: NURSE PRACTITIONER

## 2022-01-05 PROCEDURE — G8417 CALC BMI ABV UP PARAM F/U: HCPCS | Performed by: NURSE PRACTITIONER

## 2022-01-05 PROCEDURE — 1090F PRES/ABSN URINE INCON ASSESS: CPT | Performed by: NURSE PRACTITIONER

## 2022-01-05 PROCEDURE — 1036F TOBACCO NON-USER: CPT | Performed by: NURSE PRACTITIONER

## 2022-01-05 PROCEDURE — G8427 DOCREV CUR MEDS BY ELIG CLIN: HCPCS | Performed by: NURSE PRACTITIONER

## 2022-01-05 PROCEDURE — G8484 FLU IMMUNIZE NO ADMIN: HCPCS | Performed by: NURSE PRACTITIONER

## 2022-01-05 PROCEDURE — 4040F PNEUMOC VAC/ADMIN/RCVD: CPT | Performed by: NURSE PRACTITIONER

## 2022-01-05 PROCEDURE — G8399 PT W/DXA RESULTS DOCUMENT: HCPCS | Performed by: NURSE PRACTITIONER

## 2022-01-05 PROCEDURE — 99213 OFFICE O/P EST LOW 20 MIN: CPT | Performed by: NURSE PRACTITIONER

## 2022-01-05 NOTE — PROGRESS NOTES
Vasquez Fischer  1/5/2022  12:35 PM          Vasquez Fischer is a 68 y.o. female [id number]      The patient was seen. She has no chief complaints today. She has been fitted for a # 4; ring with support type pessary. She states all of her symptoms that she had prior to the pessary have been relieved with its use. She denies any vaginal bleeding. She last had her pessary cleaned 12 weeks ago. She denies to any vaginal discharge or odor. Her bowels are regular and her voiding pattern is normal.     Blood pressure 134/80, height 5' 6\" (1.676 m), weight 171 lb (77.6 kg), not currently breastfeeding. Chaperone for Intimate Exam   Chaperone was offered and accepted as part of the rooming process.  Chaperone: Belén        Abdomen: Soft and non-tender; good bowel sounds; no guarding, rebound or rigidity; no CVA tenderness bilaterally. Extremities: No calf tenderness bilaterally. DTR 2/4 bilaterally. No edema. Perineum/Speculum: There is not any signs of infection; The vaginal vault is without any signs of erythema or erosion. There is no vaginal discharge or odor appreciated. The pessary was cleansed and replaced without any problems and the patient tolerated the procedure well. T.O.S. Ointment was placed with the pessary to decrease discharge/odor. Assessment:   Diagnosis Orders   1. Pessary maintenance     2. Presence of pessary-#4 Ring with support     3. Midline cystocele     4.  H/O total vaginal hysterectomy       Chief Complaint   Patient presents with    Follow-up     Patient Active Problem List    Diagnosis Date Noted    Benign essential microscopic hematuria 05/25/2021     Priority: High    H/O total vaginal hysterectomy 05/25/2021     Priority: Medium    H/O bilateral salpingo-oophorectomy S/P vag hyst (6 weeks) 05/25/2021     Priority: Medium    Presence of pessary-#4 Ring with support 05/25/2021     Priority: Medium    Prediabetes 06/11/2021    Eczema intertrigo 06/11/2021    ACP (advance care planning) 06/11/2021    PMB (postmenopausal bleeding) 05/25/2021    Age-related osteoporosis without current pathological fracture 03/09/2021     Actonel RX Managed by PCP      At high risk for falls 12/23/2020    Pruritic rash 12/23/2020    Bacterial vaginosis 12/22/2020    Closed fracture of right hip ([facility] [address] 12/21/2020    Adjustment disorder with mixed anxiety and depressed mood 09/21/2020    Mass of left parotid gland 17 mm 09/14/2020    Acute urinary retention 09/14/2020    Other fracture of right femur, initial encounter for closed fracture ([facility] [address] 09/13/2020    Allergic rhinitis due to allergen 06/10/2020    Candidiasis of breast 06/10/2020    Anxiety and depression 12/12/2019    Essential hypertension 12/17/2018    Diverticulosis of large intestine without hemorrhage 05/15/2017    Hyperlipidemia 01/19/2017    Toenail fungus 08/03/2016    Fatty liver          Plan:  1. Return to the office 6 weeks  2. Report any vaginal bleeding or discharge  3. Abstinence  4. Annual Follow-up reviewed with patient. They will schedule appointment    The patient, Melvin Brandt is a 68 y.o. female, was seen with a total time spent of 20 minutes for the visit on this date of service by the E/M provider. The time component had both face to face and non face to face time spent in determining the total time component. Counseling and education regarding her diagnosis listed below and her options regarding those diagnoses were also included in determining her time component. Diagnosis Orders   1. Pessary maintenance     2. Presence of pessary-#4 Ring with support     3. Midline cystocele     4. H/O total vaginal hysterectomy          The patient had her preventative health maintenance recommendations and follow-up reviewed with her at the completion of her visit.

## 2022-02-16 ENCOUNTER — OFFICE VISIT (OUTPATIENT)
Dept: OBGYN CLINIC | Age: 77
End: 2022-02-16
Payer: MEDICARE

## 2022-02-16 VITALS
DIASTOLIC BLOOD PRESSURE: 66 MMHG | BODY MASS INDEX: 27.97 KG/M2 | SYSTOLIC BLOOD PRESSURE: 112 MMHG | HEIGHT: 66 IN | WEIGHT: 174 LBS

## 2022-02-16 DIAGNOSIS — N81.11 MIDLINE CYSTOCELE: ICD-10-CM

## 2022-02-16 DIAGNOSIS — Z96.0 PRESENCE OF PESSARY: ICD-10-CM

## 2022-02-16 DIAGNOSIS — N76.0 ACUTE VAGINITIS: ICD-10-CM

## 2022-02-16 DIAGNOSIS — Z46.89 PESSARY MAINTENANCE: Primary | ICD-10-CM

## 2022-02-16 PROCEDURE — 1090F PRES/ABSN URINE INCON ASSESS: CPT | Performed by: NURSE PRACTITIONER

## 2022-02-16 PROCEDURE — G8417 CALC BMI ABV UP PARAM F/U: HCPCS | Performed by: NURSE PRACTITIONER

## 2022-02-16 PROCEDURE — G8399 PT W/DXA RESULTS DOCUMENT: HCPCS | Performed by: NURSE PRACTITIONER

## 2022-02-16 PROCEDURE — 1036F TOBACCO NON-USER: CPT | Performed by: NURSE PRACTITIONER

## 2022-02-16 PROCEDURE — 4040F PNEUMOC VAC/ADMIN/RCVD: CPT | Performed by: NURSE PRACTITIONER

## 2022-02-16 PROCEDURE — G8484 FLU IMMUNIZE NO ADMIN: HCPCS | Performed by: NURSE PRACTITIONER

## 2022-02-16 PROCEDURE — 99213 OFFICE O/P EST LOW 20 MIN: CPT | Performed by: NURSE PRACTITIONER

## 2022-02-16 PROCEDURE — G8427 DOCREV CUR MEDS BY ELIG CLIN: HCPCS | Performed by: NURSE PRACTITIONER

## 2022-02-16 PROCEDURE — 1123F ACP DISCUSS/DSCN MKR DOCD: CPT | Performed by: NURSE PRACTITIONER

## 2022-02-16 RX ORDER — CLOTRIMAZOLE AND BETAMETHASONE DIPROPIONATE 10; .64 MG/G; MG/G
CREAM TOPICAL
Qty: 45 G | Refills: 1 | Status: SHIPPED | OUTPATIENT
Start: 2022-02-16 | End: 2022-09-26

## 2022-02-16 ASSESSMENT — ENCOUNTER SYMPTOMS
NAUSEA: 0
COLOR CHANGE: 1
SHORTNESS OF BREATH: 0
SORE THROAT: 0
ABDOMINAL PAIN: 0

## 2022-02-16 NOTE — PROGRESS NOTES
Dmitriy Bishop  2/16/2022  1:33 PM          Dmitriy Bishop is a 68 y.o. female [id number]      The patient was seen. She has no chief complaints today. She has been fitted for a # 4; ring with support type pessary. She states all of her symptoms that she had prior to the pessary have been relieved with its use. She denies any vaginal bleeding. She last had her pessary cleaned 12 weeks ago. She denies to any vaginal discharge or odor. Her bowels are regular and her voiding pattern is normal.     Blood pressure 112/66, height 5' 6\" (1.676 m), weight 174 lb (78.9 kg), not currently breastfeeding. Chaperone for Intimate Exam   Chaperone was offered and accepted as part of the rooming process.  Chaperone: Gladis        Abdomen: Soft and non-tender; good bowel sounds; no guarding, rebound or rigidity; no CVA tenderness bilaterally. Extremities: No calf tenderness bilaterally. DTR 2/4 bilaterally. No edema. Perineum/Speculum: There is not any signs of infection; The vaginal vault is without any signs of erythema or erosion. There is no vaginal discharge or odor appreciated. The pessary was cleansed and replaced without any problems and the patient tolerated the procedure well. T.O.S. Ointment was placed with the pessary to decrease discharge/odor. Assessment:   Diagnosis Orders   1. Pessary maintenance     2. Presence of pessary-#4 Ring with support  clotrimazole-betamethasone (LOTRISONE) 1-0.05 % cream   3. Midline cystocele     4.  Acute vaginitis  clotrimazole-betamethasone (LOTRISONE) 1-0.05 % cream     Chief Complaint   Patient presents with    Follow-up     Patient Active Problem List    Diagnosis Date Noted    Benign essential microscopic hematuria 05/25/2021     Priority: High    H/O total vaginal hysterectomy 05/25/2021     Priority: Medium    H/O bilateral salpingo-oophorectomy S/P vag hyst (6 weeks) 05/25/2021     Priority: Medium    Presence of pessary-#4 Ring with support 05/25/2021 Priority: Medium    Prediabetes 06/11/2021    Eczema intertrigo 06/11/2021    ACP (advance care planning) 06/11/2021    PMB (postmenopausal bleeding) 05/25/2021    Age-related osteoporosis without current pathological fracture 03/09/2021     Actonel RX Managed by PCP      At high risk for falls 12/23/2020    Pruritic rash 12/23/2020    Bacterial vaginosis 12/22/2020    Closed fracture of right hip ([facility] [address] 12/21/2020    Adjustment disorder with mixed anxiety and depressed mood 09/21/2020    Mass of left parotid gland 17 mm 09/14/2020    Acute urinary retention 09/14/2020    Other fracture of right femur, initial encounter for closed fracture ([facility] [address] 09/13/2020    Allergic rhinitis due to allergen 06/10/2020    Candidiasis of breast 06/10/2020    Anxiety and depression 12/12/2019    Essential hypertension 12/17/2018    Diverticulosis of large intestine without hemorrhage 05/15/2017    Hyperlipidemia 01/19/2017    Toenail fungus 08/03/2016    Fatty liver          Plan:  1. Return to the office 6 weeks  2. Report any vaginal bleeding or discharge  3. Abstinence  4. Annual Follow-up reviewed with patient. They will schedule appointment  Prescription for lotrisone cream sent- occasional irritation and itching to perineum. The patient, Bjorn Cantor is a 68 y.o. female, was seen with a total time spent of 20 minutes for the visit on this date of service by the E/M provider. The time component had both face to face and non face to face time spent in determining the total time component. Counseling and education regarding her diagnosis listed below and her options regarding those diagnoses were also included in determining her time component. Diagnosis Orders   1. Pessary maintenance     2. Presence of pessary-#4 Ring with support  clotrimazole-betamethasone (LOTRISONE) 1-0.05 % cream   3. Midline cystocele     4.  Acute vaginitis  clotrimazole-betamethasone (LOTRISONE) 1-0.05 % cream        The patient had her preventative health maintenance recommendations and follow-up reviewed with her at the completion of her visit.

## 2022-02-16 NOTE — PROGRESS NOTES
[facility] [place] [facility] [place]YSICIANS  [facility] FAMILY PHYSICIANS  ISA Valenzuela [facility].  [address] [address]  Dept: [phone]     2022   Chief Complaint   Patient presents with    Hypertension    Other     bump on her right wrist      HPI  Addi Fang (:  1945) is a 68 y.o. female is an established patient . Patient has a history of ganglion cyst,  hypertension, hyperlipidemia, prediabetes, osteoporosis,  fatty liver, adjustment disorder, hip pain/surgery, cystocele, vitamin D, and osteoporosis. WRIST PAIN  Small soft mobile nodule palmar wrist area mild tenderness, no restriction with ROM. No trauma, like to weave baskets. Scheduled for COVID vaccination  [employer]. Will get the second vaccine. Had booster  Son was sick- has DIABETES MELLITUS     Pessary/Cystocele  Managed by Brittaney Pelayo  Doing well     HYPERTENSION  Addi Fang has a well controlled hypertension. she is currently on Cozaar, ASA. Patient's most recent BP in the office was stable. she reports stable BP readings at home. Patient denies any adverse reaction to this therapy. she denies any CP, SOB, HA, or palpitations. Patient admits to exercising and adheres to low salt diet. No history of organ damage due to condition noted. Lab Results   Component Value Date/Time    CREATININE 0.8 2021 11:48 AM    CREATININE 0.73 2021 10:52 AM     HYPERLIPIDEMIA  Addi Fang is currently on simvastatin (Zocor). Patient denies adverse reaction to this medication. Compliance with treatment thus far has been good. The patient is not known to have coexisting coronary artery disease. The CVD Risk score (D'Agostino, et al., 2008) failed to calculate for the following reasons:     The 2008 CVD risk score is only valid for ages 27 to 76  Lab Results   Component Value Date/Time    CHOLFAST 190 2021 10:52 AM    CHOL 201 (H) 2019 10:27 AM    HDL 45 2021 10:52 AM    LDLCHOLESTEROL 108 2021 10:52 AM    TRIG 297 (H) 12/27/2019 10:27 AM    CHOLHDLRATIO 4.2 02/25/2021 10:52 AM    VLDL NOT REPORTED (H) 02/25/2021 10:52 AM     LFT's, FATTY LIVER, Vitamin E  Lab Results   Component Value Date/Time    ALKPHOS 112 (H) 02/25/2021 10:52 AM    ALT 13 02/25/2021 10:52 AM    AST 15 02/25/2021 10:52 AM    BILITOT 0.77 02/25/2021 10:52 AM    PROT 7.0 02/25/2021 10:52 AM    LABALBU 4.4 02/25/2021 10:52 AM    LABALBU 4.3 01/27/2012 08:57 AM      PREDIABETES  Patient's recent hemoglobin A1c below. Patient admits to health problems. Patient denies any polyphagia, polydipsia, polyuria. We discussed the importance lifestyle changes such as cutting down food/drinks high in carbohydrates and regular exercise to avoid any progression on this condition. We are going to continue to monitor the [facility] Treatment diet control. Lab Results   Component Value Date    LABA1C 5.3 02/18/2022    LABA1C 5.3 10/11/2021      HISTORY OF FRACTURED HIP  Patient Nathan Negron in September, was sent to the nursing home for a few weeks. She did well. Eventually went home in October. She had hip repair in December 2020. Surgery done by Dr. Dina Mcduffie. Patient was very happy wih the surgeon. Had continues to do PT close to home, recently finished. Continues to use cane. Takes Tylenol for pain. Hydrocodone  Was given after surgery. DOes not require this anymore. Advised to stop tylenol due to liver enzyme elevation. RASH  Patient also has recurrent intertrigo under breasts and have not had any success with Clotrimazole and Nystatin. We had some challenges with medications from her insurance companies. We will change medication to Triamcinolone. some relief. Declined to see the dermatologist for now. OSTEOPOROSIS  screening       FINDINGS:   Left       FOREARM:       The BMD of the middle third of the radius of the distal forearm equals 0.52   g/cm2 corresponding to a T-score of -2.7 and Z-score of -0.5. This is within   the osteoporosis range by WHO criteria.        LEFT HIP:       The bone mineral density in the total hip is measured at 0.65 g/cm2   corresponding to a T-score of -2.8 and a Z-score of -1.3. This is within the   osteoporosis range by WHO criteria. The bone mineral density of the femoral neck is measured at 0.56 g/cm2   corresponding to a T-score of -3.4 and a Z-score of -1.7. This is within the   osteoporosis range by WHO criteria. Impression   Osteoporosis by WHO criteria. WEIGHT  Patient's BMI is Body mass index is 28.41 kg/m². kg/m2. BMI is stable. Patient understands that this condition increases the patient's risk for chronic conditions. Wt Readings from Last 3 Encounters:   02/18/22 176 lb (79.8 kg)   02/16/22 174 lb (78.9 kg)   01/05/22 171 lb (77.6 kg)     ADJUSTMENT DISORDER/DEPRESSION AND ANXIETY  Vasquez Fischer reported some ongoing issues with depression and anxiety. Symptoms includes irritable, psychomotor agitation, racing thoughts and anhedonia. Current therapy includes Lexapro, which is working well for her. she denies adverse reaction to current therapy. she also denies suicidal/homicidal ideation, plan or intent. .  PHQ-2 Over the past 2 weeks, how often have you been bothered by any of the following problems? Little interest or pleasure in doing things: Several days  Feeling down, depressed, or hopeless: Several days  PHQ-2 Score: 2`  PHQ-9 Over the past 2 weeks, how often have you been bothered by any of the following problems?   PHQ-9 Total Score: 2  PHQ-9 Total Score: 2   ANALI-7 SCREENING 3/9/2021   Feeling nervous, anxious, or on edge 1-Several days   Not able to stop or control worrying 0-Not at all   Worrying too much about different things 0-Not at all   Trouble relaxing 0-Not at all   Being so restless that it's hard to sit still 0-Not at all   Becoming easily annoyed or irritable 0-Not at all   Feeling afraid as if something awful might happen 1-Several days   ANALI-7 Total Score 2       She is due for Mammogram. Denies breast pain, lumps or nipple discharge. She declines breast exam today. Rusty Albert is due for Varicella vaccine. her  indication is age over 48. Benefits of getting immunization against shingles discussed, and patient is agreeable. she  denies side effects to prior immunizations. Alisa Mirza PHQ-9 Total Score: 2 (2/18/2022  1:01 PM)      []Negative depression screening.    [x]1-4 = Minimal depression   []5-9 = Mild depression   []10-14 = Moderate depression   []15-19 = Moderately severe depression   []20-27 = Severe depression  PHQ Scores 2/18/2022 10/11/2021 6/11/2021 4/12/2021 3/9/2021 12/21/2020 6/10/2020   PHQ2 Score 2 0 0 0 3 1 3   PHQ9 Score 2 0 0 0 4 1 6     Counseling given: Not Answered    Patient Active Problem List   Diagnosis    Fatty liver    Toenail fungus    Hyperlipidemia    Diverticulosis of large intestine without hemorrhage    Essential hypertension    Anxiety and depression    Allergic rhinitis due to allergen    Candidiasis of breast    Other fracture of right femur, initial encounter for closed fracture (HCC)    Mass of left parotid gland 17 mm    Acute urinary retention    Adjustment disorder with mixed anxiety and depressed mood    Closed fracture of right hip (HCC)    Bacterial vaginosis    At high risk for falls    Pruritic rash    Age-related osteoporosis without current pathological fracture    H/O total vaginal hysterectomy    H/O bilateral salpingo-oophorectomy S/P vag hyst (6 weeks)    Benign essential microscopic hematuria    PMB (postmenopausal bleeding)    Presence of pessary-#4 Ring with support    Prediabetes    Eczema intertrigo    ACP (advance care planning)    Cystocele, midline    Vitamin D deficiency      Past Medical History:   Diagnosis Date    Diverticulosis     Fatty liver     Hyperlipidemia       Past Surgical History:   Procedure Laterality Date    APPENDECTOMY      BLADDER SUSPENSION      in situ sling    COLONOSCOPY  05/23/2013 DIVERTICULOSIS     FEMUR FRACTURE SURGERY Right 09/14/2020    FEMUR IM NAIL YARITZA INSERTION performed by Carlos Skinner MD at [id number], VAGINAL      SALPINGO-OOPHORECTOMY      6 wk post vag hyst    SPINE SURGERY      TONSILLECTOMY      TUMOR EXCISION Left 05/18/2021    below left ear- benign    TYMPANOMASTOIDECTOMY Left 2000    UPPER GASTROINTESTINAL ENDOSCOPY  05/23/2013     Family History   Problem Relation Age of Onset    Cancer Mother     Cancer Maternal Aunt      Current Outpatient Medications   Medication Sig Dispense Refill    zoster recombinant adjuvanted vaccine (SHINGRIX) 50 MCG/0.5ML SUSR injection Inject 0.5 mLs into the muscle once for 1 dose 1 dose now and repeat in 2-6 months 0.5 mL 1    escitalopram (LEXAPRO) 20 MG tablet take 1 tablet by mouth once daily 30 tablet 1    alendronate (FOSAMAX) 70 MG tablet Take 1 tablet by mouth every 7 days 12 tablet 1    calcium carbonate (OSCAL) 500 MG TABS tablet Take 1 tablet by mouth daily Take 500 mg by mouth daily. 180 tablet 2    vitamin D (CHOLECALCIFEROL) 25 MCG (1000 UT) TABS tablet Take 1 tablet by mouth daily 90 tablet 5    clotrimazole-betamethasone (LOTRISONE) 1-0.05 % cream Apply to affected area bid externally x 4 days then daily for 3 days 45 g 1    simvastatin (ZOCOR) 20 MG tablet take 1 tablet by mouth every evening 90 tablet 2    losartan (COZAAR) 50 MG tablet take 1 tablet by mouth once daily 90 tablet 2    ZINC PO Take by mouth      diphenhydrAMINE-APAP, sleep, (TYLENOL PM EXTRA STRENGTH PO) Take by mouth      Vitamin Mixture (VITAMIN E COMPLETE PO) Take by mouth      Cetirizine HCl (ZYRTEC PO) Take by mouth      aspirin 81 MG tablet Take 81 mg by mouth daily.  Multiple Vitamins-Minerals (THERAPEUTIC MULTIVITAMIN-MINERALS) tablet Take 1 tablet by mouth daily. No current facility-administered medications for this visit.      Review of Systems   Constitutional: Negative for appetite change, chills and fever. HENT: Negative for ear pain, sneezing and sore throat. Eyes:        Wears glasses   Respiratory: Negative for shortness of breath and wheezing. Cardiovascular: Negative for chest pain and palpitations. Gastrointestinal: Negative for abdominal pain, nausea and vomiting. Endocrine: Negative. Genitourinary: Negative for difficulty urinating, dyspareunia, frequency and pelvic pain. Musculoskeletal: Negative for arthralgias. Skin: Positive for color change and rash (Pruritic). Allergic/Immunologic: Positive for environmental allergies. Allergy to dust grass pollen   Neurological: Negative for dizziness, syncope and headaches. Psychiatric/Behavioral: Positive for dysphoric mood. Negative for sleep disturbance and suicidal ideas. The patient is nervous/anxious. Prior to Visit Medications    Medication Sig Taking? Authorizing Provider   zoster recombinant adjuvanted vaccine (SHINGRIX) 50 MCG/0.5ML SUSR injection Inject 0.5 mLs into the muscle once for 1 dose 1 dose now and repeat in 2-6 months Yes MIGUEL Patel CNP   escitalopram (LEXAPRO) 20 MG tablet take 1 tablet by mouth once daily Yes MIGUEL Patel CNP   alendronate (FOSAMAX) 70 MG tablet Take 1 tablet by mouth every 7 days Yes MIGUEL Patel CNP   calcium carbonate (OSCAL) 500 MG TABS tablet Take 1 tablet by mouth daily Take 500 mg by mouth daily.  Yes MIGUEL Patel CNP   vitamin D (CHOLECALCIFEROL) 25 MCG (1000 UT) TABS tablet Take 1 tablet by mouth daily Yes MIGUEL Patel CNP   clotrimazole-betamethasone (LOTRISONE) 1-0.05 % cream Apply to affected area bid externally x 4 days then daily for 3 days Yes MIGUEL Claire CNP   simvastatin (ZOCOR) 20 MG tablet take 1 tablet by mouth every evening Yes MIGUEL Patel CNP   losartan (COZAAR) 50 MG tablet take 1 tablet by mouth once daily Yes MIGUEL Patel CNP   ZINC PO Take by mouth Yes Historical Provider, MD   diphenhydrAMINE-APAP, sleep, (TYLENOL PM EXTRA STRENGTH PO) Take by mouth Yes Historical Provider, MD   Vitamin Mixture (VITAMIN E COMPLETE PO) Take by mouth Yes Historical Provider, MD   Cetirizine HCl (ZYRTEC PO) Take by mouth Yes Historical Provider, MD   aspirin 81 MG tablet Take 81 mg by mouth daily. Yes Historical Provider, MD   Multiple Vitamins-Minerals (THERAPEUTIC MULTIVITAMIN-MINERALS) tablet Take 1 tablet by mouth daily. Yes Historical Provider, MD   alendronate (FOSAMAX) 70 MG tablet take 1 tablet by mouth every 7 days  MIGUEL Patel CNP   escitalopram (LEXAPRO) 10 MG tablet take 1 tablet by mouth once daily  MIGUEL Patel CNP      Social History     Tobacco Use    Smoking status: Never Smoker    Smokeless tobacco: Never Used   Substance Use Topics    Alcohol use: Yes     Comment: social       Vitals:    02/18/22 1257   BP: 120/70   Pulse: 75   Temp: 97.5 °F (36.4 °C)   SpO2: 98%   Weight: 176 lb (79.8 kg)   Height: 5' 6\" (1.676 m)     Estimated body mass index is 28.41 kg/m² as calculated from the following:    Height as of this encounter: 5' 6\" (1.676 m). Weight as of this encounter: 176 lb (79.8 kg). Physical Exam  Vitals and nursing note reviewed. Constitutional:       Appearance: She is well-developed. HENT:      Right Ear: Tympanic membrane and ear canal normal. No tenderness. No middle ear effusion. Left Ear: No mastoid tenderness. Nose: No mucosal edema. Right Sinus: No maxillary sinus tenderness or frontal sinus tenderness. Left Sinus: No maxillary sinus tenderness or frontal sinus tenderness. Mouth/Throat:      Mouth: Mucous membranes are pale. Pharynx: Uvula midline. Eyes:      General: Lids are normal.         Right eye: No discharge. Pupils: Pupils are equal, round, and reactive to light. Neck:      Trachea: No tracheal tenderness.    Cardiovascular:      Rate and Rhythm: Normal rate and regular rhythm. Pulmonary:      Effort: Pulmonary effort is normal.      Breath sounds: Normal breath sounds. Abdominal:      General: Bowel sounds are normal.      Palpations: Abdomen is soft. Musculoskeletal:         General: Normal range of motion. Right hip: No tenderness or bony tenderness. Normal range of motion. Skin:     General: Skin is dry. Findings: Rash present. Rash is macular. Comments: Under both breast. With raised borders, moist   Neurological:      Mental Status: She is alert and oriented to person, place, and time. Psychiatric:         Mood and Affect: Mood is anxious. Behavior: Behavior normal.       ASSESSMENT/PLAN:  1. Ganglion of right wrist  Worsening  Continue to monitor  Declined to referral to orthopedic surgeon    2. Essential hypertension  Stable  Continue current therapy. DISCUSSED AND ADVISED TO:  Cut down on your salt intake. Cut down on caffeinated drinks, sports drinks. Instructed to check BP at home regularly. Report for any chest pains, shortness of breath, headaches, and lightheadedness. Call the office if your blood pressure continue to be higher than 140/90 or 90/50.      - CBC with Auto Differential; Future  - Comprehensive Metabolic Panel, Fasting; Future  - Urinalysis with Reflex to Culture; Future    3. Mixed hyperlipidemia  Stable  Continue therapy. Advised to decrease the consumption of red meats, fried foods, trans fats, sweets, sugary beverages. Advised to increase fish, vegetables, and fruits consumption. Advised to add fiber or OTC supplements in diet. Discussed weight loss which will result in improvement of lipids levels. Advised to increase daily physical activities and add regular exercises. - Comprehensive Metabolic Panel, Fasting; Future  - Lipid, Fasting; Future    4. Prediabetes  Stable  DISCUSSED and ADVISED TO:  Decrease carbohydrates, sugary drinks, desserts   Exercise regularly, as tolerated. Try to lose weight. 5. Adjustment disorder with mixed anxiety and depressed mood  Stable  Continue current therapy. DISCUSSED and ADVISED TO:  Not stopping medication suddenly. See the specialist as discussed. Report for feelings of SI, HI, and hallucinations. Go to the ER for increasing urge to hurt yourself. - escitalopram (LEXAPRO) 20 MG tablet; take 1 tablet by mouth once daily  Dispense: 30 tablet; Refill: 1    6. Age-related osteoporosis without current pathological fracture  Stable  DISCUSSED AND ADVISED TO:  Avoid smoking  Limit alcohol consumption to 2 drinks a day or less. Will repeat DEXA scan in 2 years. Weight-bearing exercise is important in helping to maintain and even increase bone mass. Take calcium 6525-8703 mg per day. Vitamin D (400-800 IU per day) is necessary to assist in the absorption of calcium. Eat foods that are rich in Calcium. It is important that you take all medications as directed. Bone loss can only be prevented and progression can be avoided with above therapy. - alendronate (FOSAMAX) 70 MG tablet; Take 1 tablet by mouth every 7 days  Dispense: 12 tablet; Refill: 1  - calcium carbonate (OSCAL) 500 MG TABS tablet; Take 1 tablet by mouth daily Take 500 mg by mouth daily. Dispense: 180 tablet; Refill: 2  - vitamin D (CHOLECALCIFEROL) 25 MCG (1000 UT) TABS tablet; Take 1 tablet by mouth daily  Dispense: 90 tablet; Refill: 5  - Vitamin B12 & Folate; Future    7. Cystocele, midline  Stable  Continue to monitor      8. Vitamin D deficiency  Stable  Continue Vitamin D supplementation  DISCUSSED AND ADVISED TO:  Foods that contain a lot of vitamin D includes [place], tuna, and mackerel. Cheese, egg yolks, and beef liver have small amounts of vit D.  Milk, soy drinks, orange juice, yogurt, margarine, and some kinds of cereal have vitamin D added to them. Continue to use sunblock when out in the sun to prevent skin cancer.    - Vitamin D 25 Hydroxy;  Future  - vitamin D (CHOLECALCIFEROL) 25 MCG (1000 UT) TABS tablet; Take 1 tablet by mouth daily  Dispense: 90 tablet; Refill: 5  - Vitamin B12 & Folate; Future    9. Hair loss  Stable  Continue to evaluate    - Vitamin B12 & Folate; Future    10. Hyperglycemia  Recommended    - CBC with Auto Differential; Future  - Comprehensive Metabolic Panel, Fasting; Future  - POCT glycosylated hemoglobin (Hb A1C); Future  - Urinalysis with Reflex to Culture; Future  - POCT glycosylated hemoglobin (Hb A1C)    11. Breast cancer screening by mammogram  Recommended    - [facility] MEGHA DIGITAL SCREEN BILATERAL; Future    12. Need for varicella vaccine  Recommended by CDC. No current infection. Denies previous adverse reaction to vaccination. - zoster recombinant adjuvanted vaccine [facility]) 50 MCG/0.5ML SUSR injection; Inject 0.5 mLs into the muscle once for 1 dose 1 dose now and repeat in 2-6 months  Dispense: 0.5 mL; Refill: 1       This note was completed by using the assistance of a speech-recognition program. However, inadvertent computerized transcription errors may be present. Although every effort was made to ensure accuracy, no guarantees can be provided that every mistake has been identified and corrected by editing   An electronic signature was used to authenticate this note.   --MIGUEL Barnes - CNP on 2/18/2022 at 11:36 PM

## 2022-02-18 ENCOUNTER — OFFICE VISIT (OUTPATIENT)
Dept: FAMILY MEDICINE CLINIC | Age: 77
End: 2022-02-18
Payer: MEDICARE

## 2022-02-18 VITALS
TEMPERATURE: 97.5 F | BODY MASS INDEX: 28.28 KG/M2 | HEIGHT: 66 IN | HEART RATE: 75 BPM | DIASTOLIC BLOOD PRESSURE: 70 MMHG | SYSTOLIC BLOOD PRESSURE: 120 MMHG | WEIGHT: 176 LBS | OXYGEN SATURATION: 98 %

## 2022-02-18 DIAGNOSIS — Z23 NEED FOR VARICELLA VACCINE: ICD-10-CM

## 2022-02-18 DIAGNOSIS — N81.11 CYSTOCELE, MIDLINE: ICD-10-CM

## 2022-02-18 DIAGNOSIS — E78.2 MIXED HYPERLIPIDEMIA: ICD-10-CM

## 2022-02-18 DIAGNOSIS — M81.0 AGE-RELATED OSTEOPOROSIS WITHOUT CURRENT PATHOLOGICAL FRACTURE: ICD-10-CM

## 2022-02-18 DIAGNOSIS — M67.431 GANGLION OF RIGHT WRIST: Primary | ICD-10-CM

## 2022-02-18 DIAGNOSIS — I10 ESSENTIAL HYPERTENSION: ICD-10-CM

## 2022-02-18 DIAGNOSIS — F43.23 ADJUSTMENT DISORDER WITH MIXED ANXIETY AND DEPRESSED MOOD: ICD-10-CM

## 2022-02-18 DIAGNOSIS — R73.9 HYPERGLYCEMIA: ICD-10-CM

## 2022-02-18 DIAGNOSIS — Z12.31 BREAST CANCER SCREENING BY MAMMOGRAM: ICD-10-CM

## 2022-02-18 DIAGNOSIS — E55.9 VITAMIN D DEFICIENCY: ICD-10-CM

## 2022-02-18 DIAGNOSIS — L65.9 HAIR LOSS: ICD-10-CM

## 2022-02-18 DIAGNOSIS — R73.03 PREDIABETES: ICD-10-CM

## 2022-02-18 LAB — HBA1C MFR BLD: 5.3 %

## 2022-02-18 PROCEDURE — 81003 URINALYSIS AUTO W/O SCOPE: CPT | Performed by: FAMILY MEDICINE

## 2022-02-18 PROCEDURE — 1123F ACP DISCUSS/DSCN MKR DOCD: CPT | Performed by: FAMILY MEDICINE

## 2022-02-18 PROCEDURE — G8417 CALC BMI ABV UP PARAM F/U: HCPCS | Performed by: FAMILY MEDICINE

## 2022-02-18 PROCEDURE — 83036 HEMOGLOBIN GLYCOSYLATED A1C: CPT | Performed by: FAMILY MEDICINE

## 2022-02-18 PROCEDURE — 4040F PNEUMOC VAC/ADMIN/RCVD: CPT | Performed by: FAMILY MEDICINE

## 2022-02-18 PROCEDURE — 1090F PRES/ABSN URINE INCON ASSESS: CPT | Performed by: FAMILY MEDICINE

## 2022-02-18 PROCEDURE — G8427 DOCREV CUR MEDS BY ELIG CLIN: HCPCS | Performed by: FAMILY MEDICINE

## 2022-02-18 PROCEDURE — 1036F TOBACCO NON-USER: CPT | Performed by: FAMILY MEDICINE

## 2022-02-18 PROCEDURE — G8484 FLU IMMUNIZE NO ADMIN: HCPCS | Performed by: FAMILY MEDICINE

## 2022-02-18 PROCEDURE — G8399 PT W/DXA RESULTS DOCUMENT: HCPCS | Performed by: FAMILY MEDICINE

## 2022-02-18 PROCEDURE — 99214 OFFICE O/P EST MOD 30 MIN: CPT | Performed by: FAMILY MEDICINE

## 2022-02-18 RX ORDER — ZOSTER VACCINE RECOMBINANT, ADJUVANTED 50 MCG/0.5
0.5 KIT INTRAMUSCULAR ONCE
Qty: 0.5 ML | Refills: 1 | Status: SHIPPED | OUTPATIENT
Start: 2022-02-18 | End: 2022-02-18

## 2022-02-18 RX ORDER — CALCIUM CARBONATE 500(1250)
500 TABLET ORAL DAILY
Qty: 180 TABLET | Refills: 2 | Status: SHIPPED | OUTPATIENT
Start: 2022-02-18 | End: 2022-03-30

## 2022-02-18 RX ORDER — ALENDRONATE SODIUM 70 MG/1
70 TABLET ORAL
Qty: 12 TABLET | Refills: 1 | Status: SHIPPED | OUTPATIENT
Start: 2022-02-18 | End: 2022-08-08

## 2022-02-18 RX ORDER — ESCITALOPRAM OXALATE 20 MG/1
TABLET ORAL
Qty: 30 TABLET | Refills: 1 | Status: SHIPPED | OUTPATIENT
Start: 2022-02-18 | End: 2022-04-18

## 2022-02-18 ASSESSMENT — PATIENT HEALTH QUESTIONNAIRE - PHQ9
1. LITTLE INTEREST OR PLEASURE IN DOING THINGS: 1
SUM OF ALL RESPONSES TO PHQ QUESTIONS 1-9: 2
SUM OF ALL RESPONSES TO PHQ QUESTIONS 1-9: 2
2. FEELING DOWN, DEPRESSED OR HOPELESS: 1
SUM OF ALL RESPONSES TO PHQ QUESTIONS 1-9: 2
SUM OF ALL RESPONSES TO PHQ9 QUESTIONS 1 & 2: 2
SUM OF ALL RESPONSES TO PHQ QUESTIONS 1-9: 2

## 2022-02-18 ASSESSMENT — ENCOUNTER SYMPTOMS
VOMITING: 0
WHEEZING: 0

## 2022-02-18 NOTE — PATIENT INSTRUCTIONS
okay.   Diet    · You can eat your normal diet when you feel well. If your stomach is upset, try bland, low-fat foods like plain rice, broiled chicken, toast, and yogurt. Medicines    · Your doctor will tell you if and when you can restart your medicines. He or she will also give you instructions about taking any new medicines.     · If you take aspirin or some other blood thinner, be sure to talk to your doctor. He or she will tell you if and when to start taking this medicine again. Make sure that you understand exactly what your doctor wants you to do.     · Be safe with medicines. Read and follow all instructions on the label. ? If the doctor gave you a prescription medicine for pain, take it as prescribed. ? If you are not taking a prescription pain medicine, ask your doctor if you can take an over-the-counter medicine. Incision care    · Leave the bandage on your hand until the doctor says it is okay to remove it. This is usually 2 or 3 days after surgery.     · After your doctor says you can take off your bandage, wash the area daily with clean water, and pat it dry. Don't use hydrogen peroxide or alcohol. They can slow healing. You may cover the area with a gauze bandage if it oozes fluid or rubs against clothing. Change the bandage every day.     · Keep the area clean and dry.     · If you have a splint, do not take it off unless your doctor tells you to. Follow the splint care instructions your doctor gives you. Be careful not to put the splint on too tight. Exercise    · Follow your doctor's directions on when and how to move the area to keep it flexible and help reduce swelling. Your doctor may have you wear a splint or brace for a short time after the surgery.     · If the ganglion is on your wrist or hand, you may need therapy after you heal. This can help you regain movement, strength, and  in your wrist and hand.  To get the best results, you need to do the exercises correctly and as often and as long as your doctor or your physical or occupational therapist tells you to. Ice and elevation    · If you have swelling, put ice or a cold pack on the area for 10 to 20 minutes at a time. Try to do this every 1 to 2 hours for the next 3 days (when you are awake) or until the swelling goes down. Put a thin cloth between the ice and your skin or splint.     · Prop up the area on a pillow anytime you sit or lie down for the first 2 or 3 days. Try to keep it above the level of your heart. This will help reduce swelling. Follow-up care is a key part of your treatment and safety. Be sure to make and go to all appointments, and call your doctor if you are having problems. It's also a good idea to know your test results and keep a list of the medicines you take. When should you call for help? Call 911  anytime you think you may need emergency care. For example, call if:    · You passed out (lost consciousness).     · You have chest pain, are short of breath, or cough up blood. Call your doctor now or seek immediate medical care if:    · You have pain that does not get better after you take pain medicine.     · You have loose stitches, or your incision comes open.     · Bright red blood has soaked through the bandage over your incision.     · You have symptoms of infection, such as:  ? Increased pain, swelling, warmth, or redness. ? Red streaks leading from the area. ? Pus draining from the area. ? A fever.     · The area is cool or pale or changes color.     · The area is tingly, weak, or numb.     · You can't move the area.     · Your splint feels too tight. Watch closely for any changes in your health, and be sure to contact your doctor if:    · You do not get better as expected. Where can you learn more? Go to https://[employer]pepiceweb.[employer]. org and sign in to your [employer] account.  Enter G125 in the [employer] box to learn more about \"Ganglion Cyst Removal: What to Expect at Home.\"     If you do not have an account, please click on the \"Sign Up Now\" link. Current as of: July 1, 2021               Content Version: 13.1  © 2006-2021 [employer] Inc[employer]. Care instructions adapted under license by [facility] ([facility]). If you have questions about a medical condition or this instruction, always ask your healthcare professional. [address] any warranty or liability for your use of this information.

## 2022-03-30 ENCOUNTER — OFFICE VISIT (OUTPATIENT)
Dept: OBGYN CLINIC | Age: 77
End: 2022-03-30
Payer: MEDICARE

## 2022-03-30 VITALS
SYSTOLIC BLOOD PRESSURE: 124 MMHG | BODY MASS INDEX: 28.28 KG/M2 | DIASTOLIC BLOOD PRESSURE: 70 MMHG | HEIGHT: 66 IN | WEIGHT: 176 LBS

## 2022-03-30 DIAGNOSIS — N81.11 MIDLINE CYSTOCELE: ICD-10-CM

## 2022-03-30 DIAGNOSIS — Z96.0 PRESENCE OF PESSARY: ICD-10-CM

## 2022-03-30 DIAGNOSIS — Z46.89 PESSARY MAINTENANCE: Primary | ICD-10-CM

## 2022-03-30 DIAGNOSIS — Z90.710 H/O TOTAL VAGINAL HYSTERECTOMY: ICD-10-CM

## 2022-03-30 PROCEDURE — 1123F ACP DISCUSS/DSCN MKR DOCD: CPT | Performed by: NURSE PRACTITIONER

## 2022-03-30 PROCEDURE — 99213 OFFICE O/P EST LOW 20 MIN: CPT | Performed by: NURSE PRACTITIONER

## 2022-03-30 PROCEDURE — G8484 FLU IMMUNIZE NO ADMIN: HCPCS | Performed by: NURSE PRACTITIONER

## 2022-03-30 PROCEDURE — 1090F PRES/ABSN URINE INCON ASSESS: CPT | Performed by: NURSE PRACTITIONER

## 2022-03-30 PROCEDURE — G8427 DOCREV CUR MEDS BY ELIG CLIN: HCPCS | Performed by: NURSE PRACTITIONER

## 2022-03-30 PROCEDURE — G8417 CALC BMI ABV UP PARAM F/U: HCPCS | Performed by: NURSE PRACTITIONER

## 2022-03-30 PROCEDURE — 1036F TOBACCO NON-USER: CPT | Performed by: NURSE PRACTITIONER

## 2022-03-30 PROCEDURE — G8399 PT W/DXA RESULTS DOCUMENT: HCPCS | Performed by: NURSE PRACTITIONER

## 2022-03-30 PROCEDURE — 4040F PNEUMOC VAC/ADMIN/RCVD: CPT | Performed by: NURSE PRACTITIONER

## 2022-03-30 RX ORDER — CALCIUM CARBONATE 500(1250)
TABLET ORAL
COMMUNITY
Start: 2022-02-21

## 2022-03-30 NOTE — PROGRESS NOTES
Lakia Valdovinos  3/30/2022  5:40 PM          Lakia Valdovinos is a 68 y.o. female [id number]      The patient was seen. She has no chief complaints today. She has been fitted for a # 4; ring with support type pessary. She states all of her symptoms that she had prior to the pessary have been relieved with its use. She denies any vaginal bleeding. She last had her pessary cleaned 12 weeks ago. She denies to any vaginal discharge or odor. Her bowels are regular and her voiding pattern is normal.     Blood pressure 124/70, height 5' 6\" (1.676 m), weight 176 lb (79.8 kg), not currently breastfeeding. Chaperone for Intimate Exam   Chaperone was offered and accepted as part of the rooming process.  Chaperone: Kristie        Abdomen: Soft and non-tender; good bowel sounds; no guarding, rebound or rigidity; no CVA tenderness bilaterally. Extremities: No calf tenderness bilaterally. DTR 2/4 bilaterally. No edema. Perineum/Speculum: There is not any signs of infection; The vaginal vault is without any signs of erythema or erosion. There is no vaginal discharge or odor appreciated. The pessary was cleansed and replaced without any problems and the patient tolerated the procedure well. T.O.S. Ointment was placed with the pessary to decrease discharge/odor. Assessment:   Diagnosis Orders   1. Pessary maintenance     2. Midline cystocele     3. H/O total vaginal hysterectomy     4.  Presence of pessary-#4 Ring with support       Chief Complaint   Patient presents with    Follow-up     pessary cleaning      Patient Active Problem List    Diagnosis Date Noted    Benign essential microscopic hematuria 05/25/2021     Priority: High    H/O total vaginal hysterectomy 05/25/2021     Priority: Medium    H/O bilateral salpingo-oophorectomy S/P vag hyst (6 weeks) 05/25/2021     Priority: Medium    Presence of pessary-#4 Ring with support 05/25/2021     Priority: Medium    Cystocele, midline 02/18/2022    Vitamin D deficiency 02/18/2022    Prediabetes 06/11/2021    Eczema intertrigo 06/11/2021    ACP (advance care planning) 06/11/2021    PMB (postmenopausal bleeding) 05/25/2021    Age-related osteoporosis without current pathological fracture 03/09/2021     Actonel RX Managed by PCP      At high risk for falls 12/23/2020    Pruritic rash 12/23/2020    Bacterial vaginosis 12/22/2020    Closed fracture of right hip ([facility] [address] 12/21/2020    Adjustment disorder with mixed anxiety and depressed mood 09/21/2020    Mass of left parotid gland 17 mm 09/14/2020    Acute urinary retention 09/14/2020    Other fracture of right femur, initial encounter for closed fracture ([facility] [address] 09/13/2020    Allergic rhinitis due to allergen 06/10/2020    Candidiasis of breast 06/10/2020    Anxiety and depression 12/12/2019    Essential hypertension 12/17/2018    Diverticulosis of large intestine without hemorrhage 05/15/2017    Hyperlipidemia 01/19/2017    Toenail fungus 08/03/2016    Fatty liver          Plan:  1. Return to the office 6 weeks  2. Report any vaginal bleeding or discharge  3. Abstinence  4. Annual Follow-up reviewed with patient. They will schedule appointment    The patient, Josse Guillen is a 68 y.o. female, was seen with a total time spent of 20 minutes for the visit on this date of service by the E/M provider. The time component had both face to face and non face to face time spent in determining the total time component. Counseling and education regarding her diagnosis listed below and her options regarding those diagnoses were also included in determining her time component. Diagnosis Orders   1. Pessary maintenance     2. Midline cystocele     3. H/O total vaginal hysterectomy     4. Presence of pessary-#4 Ring with support          The patient had her preventative health maintenance recommendations and follow-up reviewed with her at the completion of her visit.

## 2022-04-16 DIAGNOSIS — F43.23 ADJUSTMENT DISORDER WITH MIXED ANXIETY AND DEPRESSED MOOD: ICD-10-CM

## 2022-04-18 RX ORDER — ESCITALOPRAM OXALATE 20 MG/1
TABLET ORAL
Qty: 30 TABLET | Refills: 2 | Status: SHIPPED | OUTPATIENT
Start: 2022-04-18 | End: 2022-04-19 | Stop reason: SDUPTHER

## 2022-04-19 ENCOUNTER — TELEPHONE (OUTPATIENT)
Dept: FAMILY MEDICINE CLINIC | Age: 77
End: 2022-04-19

## 2022-04-19 DIAGNOSIS — F43.23 ADJUSTMENT DISORDER WITH MIXED ANXIETY AND DEPRESSED MOOD: ICD-10-CM

## 2022-04-19 RX ORDER — ESCITALOPRAM OXALATE 20 MG/1
TABLET ORAL
Qty: 90 TABLET | Refills: 2 | Status: SHIPPED | OUTPATIENT
Start: 2022-04-19 | End: 2022-05-11

## 2022-04-19 NOTE — TELEPHONE ENCOUNTER
----- Message from Mira Barlowshyann sent at 4/19/2022 11:30 AM EDT -----  Subject: Refill Request    QUESTIONS  Name of Medication? escitalopram (LEXAPRO) 20 MG tablet  Patient-reported dosage and instructions? 20mg take 1 tablet by mouth once   daily  How many days do you have left? 4  Preferred Pharmacy? [facility] [place] [address] phone number (if available)? [phone]  ---------------------------------------------------------------------------  --------------  CALL BACK INFO  What is the best way for the office to contact you? OK to leave message on   voicemail  Preferred Call Back Phone Number? [phone]  ---------------------------------------------------------------------------  --------------  SCRIPT ANSWERS  Relationship to Patient?  Self

## 2022-05-11 ENCOUNTER — OFFICE VISIT (OUTPATIENT)
Dept: OBGYN CLINIC | Age: 77
End: 2022-05-11
Payer: MEDICARE

## 2022-05-11 VITALS
DIASTOLIC BLOOD PRESSURE: 80 MMHG | BODY MASS INDEX: 28.77 KG/M2 | WEIGHT: 179 LBS | SYSTOLIC BLOOD PRESSURE: 122 MMHG | HEIGHT: 66 IN

## 2022-05-11 DIAGNOSIS — Z90.710 H/O TOTAL VAGINAL HYSTERECTOMY: ICD-10-CM

## 2022-05-11 DIAGNOSIS — Z46.89 PESSARY MAINTENANCE: Primary | ICD-10-CM

## 2022-05-11 DIAGNOSIS — Z96.0 PRESENCE OF PESSARY: ICD-10-CM

## 2022-05-11 DIAGNOSIS — N81.11 MIDLINE CYSTOCELE: ICD-10-CM

## 2022-05-11 PROCEDURE — 1090F PRES/ABSN URINE INCON ASSESS: CPT | Performed by: NURSE PRACTITIONER

## 2022-05-11 PROCEDURE — 4040F PNEUMOC VAC/ADMIN/RCVD: CPT | Performed by: NURSE PRACTITIONER

## 2022-05-11 PROCEDURE — 99213 OFFICE O/P EST LOW 20 MIN: CPT | Performed by: NURSE PRACTITIONER

## 2022-05-11 PROCEDURE — G8417 CALC BMI ABV UP PARAM F/U: HCPCS | Performed by: NURSE PRACTITIONER

## 2022-05-11 PROCEDURE — G8399 PT W/DXA RESULTS DOCUMENT: HCPCS | Performed by: NURSE PRACTITIONER

## 2022-05-11 PROCEDURE — 1123F ACP DISCUSS/DSCN MKR DOCD: CPT | Performed by: NURSE PRACTITIONER

## 2022-05-11 PROCEDURE — G8427 DOCREV CUR MEDS BY ELIG CLIN: HCPCS | Performed by: NURSE PRACTITIONER

## 2022-05-11 PROCEDURE — 1036F TOBACCO NON-USER: CPT | Performed by: NURSE PRACTITIONER

## 2022-05-11 RX ORDER — ESCITALOPRAM OXALATE 10 MG/1
TABLET ORAL
COMMUNITY
Start: 2022-05-05 | End: 2022-05-23 | Stop reason: ALTCHOICE

## 2022-05-11 NOTE — PROGRESS NOTES
Monisha Shipman  5/11/2022  2:42 PM          Monisha Shipman is a 68 y.o. female [id number]      The patient was seen. She has no chief complaints today. She has been fitted for a # 4; ring with support type pessary. She states all of her symptoms that she had prior to the pessary have been relieved with its use. She denies any vaginal bleeding. She last had her pessary cleaned 6 weeks ago. She denies to any vaginal discharge or odor. Her bowels are regular and her voiding pattern is normal.     Blood pressure 122/80, height 5' 6\" (1.676 m), weight 179 lb (81.2 kg), not currently breastfeeding. Chaperone for Intimate Exam   Chaperone was offered and accepted as part of the rooming process.  Chaperone: Kristie        Abdomen: Soft and non-tender; good bowel sounds; no guarding, rebound or rigidity; no CVA tenderness bilaterally. Extremities: No calf tenderness bilaterally. DTR 2/4 bilaterally. No edema. Perineum/Speculum: There is not any signs of infection; The vaginal vault is without any signs of erythema or erosion. There is no vaginal discharge or odor appreciated. The pessary was cleansed and replaced without any problems and the patient tolerated the procedure well. T.O.S. Ointment was placed with the pessary to decrease discharge/odor. Assessment:   Diagnosis Orders   1. Pessary maintenance     2. H/O total vaginal hysterectomy     3. Presence of pessary-#4 Ring with support     4.  Midline cystocele       Chief Complaint   Patient presents with    Follow-up     pessary cleaning      Patient Active Problem List    Diagnosis Date Noted    Benign essential microscopic hematuria 05/25/2021     Priority: High    H/O total vaginal hysterectomy 05/25/2021     Priority: Medium    H/O bilateral salpingo-oophorectomy S/P vag hyst (6 weeks) 05/25/2021     Priority: Medium    Presence of pessary-#4 Ring with support 05/25/2021     Priority: Medium    Cystocele, midline 02/18/2022    Vitamin D deficiency 02/18/2022    Prediabetes 06/11/2021    Eczema intertrigo 06/11/2021    ACP (advance care planning) 06/11/2021    PMB (postmenopausal bleeding) 05/25/2021    Age-related osteoporosis without current pathological fracture 03/09/2021     Actonel RX Managed by PCP      At high risk for falls 12/23/2020    Pruritic rash 12/23/2020    Bacterial vaginosis 12/22/2020    Closed fracture of right hip ([facility] [address] 12/21/2020    Adjustment disorder with mixed anxiety and depressed mood 09/21/2020    Mass of left parotid gland 17 mm 09/14/2020    Acute urinary retention 09/14/2020    Other fracture of right femur, initial encounter for closed fracture ([facility] [address] 09/13/2020    Allergic rhinitis due to allergen 06/10/2020    Candidiasis of breast 06/10/2020    Anxiety and depression 12/12/2019    Essential hypertension 12/17/2018    Diverticulosis of large intestine without hemorrhage 05/15/2017    Hyperlipidemia 01/19/2017    Toenail fungus 08/03/2016    Fatty liver          Plan:  1. Return to the office 6 weeks  2. Report any vaginal bleeding or discharge  3. Abstinence  4. Annual Follow-up reviewed with patient. They will schedule appointment    The patient, Jorge Silveira is a 68 y.o. female, was seen with a total time spent of 20 minutes for the visit on this date of service by the E/M provider. The time component had both face to face and non face to face time spent in determining the total time component. Counseling and education regarding her diagnosis listed below and her options regarding those diagnoses were also included in determining her time component. Diagnosis Orders   1. Pessary maintenance     2. H/O total vaginal hysterectomy     3. Presence of pessary-#4 Ring with support     4. Midline cystocele          The patient had her preventative health maintenance recommendations and follow-up reviewed with her at the completion of her visit.

## 2022-05-23 ENCOUNTER — HOSPITAL ENCOUNTER (OUTPATIENT)
Age: 77
Discharge: HOME | End: 2022-05-23
Payer: MEDICARE

## 2022-05-23 ENCOUNTER — TELEPHONE (OUTPATIENT)
Dept: FAMILY MEDICINE CLINIC | Age: 77
End: 2022-05-23

## 2022-05-23 DIAGNOSIS — M81.0 AGE-RELATED OSTEOPOROSIS WITHOUT CURRENT PATHOLOGICAL FRACTURE: ICD-10-CM

## 2022-05-23 DIAGNOSIS — E78.2 MIXED HYPERLIPIDEMIA: ICD-10-CM

## 2022-05-23 DIAGNOSIS — R73.9 HYPERGLYCEMIA: ICD-10-CM

## 2022-05-23 DIAGNOSIS — E55.9 VITAMIN D DEFICIENCY: ICD-10-CM

## 2022-05-23 DIAGNOSIS — I10 ESSENTIAL HYPERTENSION: ICD-10-CM

## 2022-05-23 DIAGNOSIS — E78.5 HYPERLIPIDEMIA WITH TARGET LDL LESS THAN 100: ICD-10-CM

## 2022-05-23 DIAGNOSIS — L65.9 HAIR LOSS: ICD-10-CM

## 2022-05-23 LAB
ABSOLUTE EOS #: 0.3 K/UL (ref 0–0.4)
ABSOLUTE LYMPH #: 1.7 K/UL (ref 1–4.8)
ABSOLUTE MONO #: 0.6 K/UL (ref 0.1–1.3)
ALBUMIN SERPL-MCNC: 4.5 G/DL (ref 3.5–5.2)
ALP BLD-CCNC: 67 U/L (ref 35–104)
ALT SERPL-CCNC: 21 U/L (ref 5–33)
ANION GAP SERPL CALCULATED.3IONS-SCNC: 13 MMOL/L (ref 9–17)
AST SERPL-CCNC: 21 U/L
BACTERIA: NORMAL
BASOPHILS # BLD: 1 % (ref 0–2)
BASOPHILS ABSOLUTE: 0 K/UL (ref 0–0.2)
BILIRUB SERPL-MCNC: 0.71 MG/DL (ref 0.3–1.2)
BILIRUBIN URINE: NEGATIVE
BUN BLDV-MCNC: 12 MG/DL (ref 8–23)
CALCIUM SERPL-MCNC: 9.4 MG/DL (ref 8.6–10.4)
CASTS UA: NORMAL /LPF
CHLORIDE BLD-SCNC: 106 MMOL/L (ref 98–107)
CHOLESTEROL, FASTING: 209 MG/DL
CHOLESTEROL/HDL RATIO: 4.3
CO2: 22 MMOL/L (ref 20–31)
COLOR: YELLOW
CREAT SERPL-MCNC: 0.71 MG/DL (ref 0.5–0.9)
EOSINOPHILS RELATIVE PERCENT: 4 % (ref 0–4)
EPITHELIAL CELLS UA: NORMAL /HPF
FOLATE: >20 NG/ML
GFR AFRICAN AMERICAN: >60 ML/MIN
GFR NON-AFRICAN AMERICAN: >60 ML/MIN
GFR SERPL CREATININE-BSD FRML MDRD: NORMAL ML/MIN/{1.73_M2}
GLUCOSE FASTING: 95 MG/DL (ref 70–99)
GLUCOSE URINE: NEGATIVE
HCT VFR BLD CALC: 45.8 % (ref 36–46)
HDLC SERPL-MCNC: 49 MG/DL
HEMOGLOBIN: 15 G/DL (ref 12–16)
KETONES, URINE: NEGATIVE
LDL CHOLESTEROL: 122 MG/DL (ref 0–130)
LEUKOCYTE ESTERASE, URINE: ABNORMAL
LYMPHOCYTES # BLD: 24 % (ref 24–44)
MCH RBC QN AUTO: 32 PG (ref 26–34)
MCHC RBC AUTO-ENTMCNC: 32.8 G/DL (ref 31–37)
MCV RBC AUTO: 97.7 FL (ref 80–100)
MONOCYTES # BLD: 8 % (ref 1–7)
NITRITE, URINE: NEGATIVE
PDW BLD-RTO: 14 % (ref 11.5–14.9)
PH UA: 6 (ref 5–8)
PLATELET # BLD: 241 K/UL (ref 150–450)
PMV BLD AUTO: 9.2 FL (ref 6–12)
POTASSIUM SERPL-SCNC: 4.4 MMOL/L (ref 3.7–5.3)
PROTEIN UA: NEGATIVE
RBC # BLD: 4.68 M/UL (ref 4–5.2)
RBC UA: NORMAL /HPF
SEG NEUTROPHILS: 63 % (ref 36–66)
SEGMENTED NEUTROPHILS ABSOLUTE COUNT: 4.5 K/UL (ref 1.3–9.1)
SODIUM BLD-SCNC: 141 MMOL/L (ref 135–144)
SPECIFIC GRAVITY UA: 1.01 (ref 1–1.03)
TOTAL PROTEIN: 7 G/DL (ref 6.4–8.3)
TRIGLYCERIDE, FASTING: 189 MG/DL
TURBIDITY: CLEAR
URINE HGB: NEGATIVE
UROBILINOGEN, URINE: NORMAL
VITAMIN B-12: 555 PG/ML (ref 232–1245)
VITAMIN D 25-HYDROXY: 40.9 NG/ML
WBC # BLD: 7.1 K/UL (ref 3.5–11)
WBC UA: NORMAL /HPF

## 2022-05-23 PROCEDURE — 82306 VITAMIN D 25 HYDROXY: CPT

## 2022-05-23 PROCEDURE — 82607 VITAMIN B-12: CPT

## 2022-05-23 PROCEDURE — 36415 COLL VENOUS BLD VENIPUNCTURE: CPT

## 2022-05-23 PROCEDURE — 80061 LIPID PANEL: CPT

## 2022-05-23 PROCEDURE — 87086 URINE CULTURE/COLONY COUNT: CPT

## 2022-05-23 PROCEDURE — 80053 COMPREHEN METABOLIC PANEL: CPT

## 2022-05-23 PROCEDURE — 81001 URINALYSIS AUTO W/SCOPE: CPT

## 2022-05-23 PROCEDURE — 82746 ASSAY OF FOLIC ACID SERUM: CPT

## 2022-05-23 PROCEDURE — 85025 COMPLETE CBC W/AUTO DIFF WBC: CPT

## 2022-05-23 RX ORDER — SIMVASTATIN 40 MG
40 TABLET ORAL NIGHTLY
Qty: 90 TABLET | Refills: 2 | Status: SHIPPED | OUTPATIENT
Start: 2022-05-23

## 2022-05-23 RX ORDER — ESCITALOPRAM OXALATE 20 MG/1
1 TABLET ORAL DAILY
COMMUNITY
Start: 2022-05-20

## 2022-05-24 LAB
CULTURE: NORMAL
SPECIMEN DESCRIPTION: NORMAL

## 2022-05-24 NOTE — TELEPHONE ENCOUNTER
Please let the patient know of the recent results. These can also be discussed further on the future visits. Patient's blood count is WNL    Lab Results   Component Value Date    WBC 7.1 05/23/2022    HGB 15.0 05/23/2022    HCT 45.8 05/23/2022    MCV 97.7 05/23/2022     05/23/2022    LYMPHOPCT 24 05/23/2022    RBC 4.68 05/23/2022    MCH 32.0 05/23/2022    MCHC 32.8 05/23/2022    RDW 14.0 05/23/2022       Patient's thyroid function is WNL    Lab Results   Component Value Date    TSH 2.05 05/28/2013       Patient's kidney function is WNL      Patient's liver function is WNL  Elevate fasting blood sugar. Lab Results   Component Value Date     05/23/2022    K 4.4 05/23/2022     05/23/2022    CO2 22 05/23/2022    BUN 12 05/23/2022    CREATININE 0.71 05/23/2022    GLUCOSE 151 (H) 09/15/2020    CALCIUM 9.4 05/23/2022    PROT 7.0 05/23/2022    LABALBU 4.5 05/23/2022    BILITOT 0.71 05/23/2022    ALKPHOS 67 05/23/2022    AST 21 05/23/2022    ALT 21 05/23/2022    LABGLOM >60 05/23/2022    GFRAA >60 05/23/2022    GLOB NOT REPORTED 01/04/2016       Patient's urinalysis results large white cells. Will wait for cultures  Lab Results   Component Value Date    COLORU Yellow 05/23/2022    NITRU NEGATIVE 05/23/2022    LEUKOCYTESUR LARGE 05/23/2022    GLUCOSEU NEGATIVE 05/23/2022    GLUCOSEU NEGATIVE 12/19/2011    KETUA NEGATIVE 05/23/2022    UROBILINOGEN Normal 05/23/2022    BILIRUBINUR NEGATIVE 05/23/2022    BILIRUBINUR neg 05/30/2018    BILIRUBINUR NEGATIVE 12/19/2011       Vitamin D Screening  Please let the patient know that the patient's recent vitamin d level was insufficient. Continue daily supplements  Lab Results   Component Value Date/Time    VITD25 40.9 05/23/2022 10:33 AM         Patient's lipids test results increasing cholesterol level.  I will increase simvastatin  Lab Results   Component Value Date/Time    CHOLFAST 209 (H) 05/23/2022 10:33 AM    CHOL 201 (H) 12/27/2019 10:27 AM    HDL 49 05/23/2022 10:33 AM    LDLCHOLESTEROL 122 05/23/2022 10:33 AM    TRIG 297 (H) 12/27/2019 10:27 AM    CHOLHDLRATIO 4.3 05/23/2022 10:33 AM    VLDL NOT REPORTED (H) 02/25/2021 10:52 AM

## 2022-06-28 ENCOUNTER — HOSPITAL ENCOUNTER (INPATIENT)
Age: 77
LOS: 2 days | Discharge: NURSING, REHAB OR LONG-TERM CARE | DRG: 536 | End: 2022-07-01
Attending: EMERGENCY MEDICINE | Admitting: ORTHOPAEDIC SURGERY
Payer: MEDICARE

## 2022-06-28 ENCOUNTER — APPOINTMENT (OUTPATIENT)
Dept: GENERAL RADIOLOGY | Age: 77
DRG: 536 | End: 2022-06-28
Payer: MEDICARE

## 2022-06-28 DIAGNOSIS — S32.502A CLOSED FRACTURE OF LEFT PUBIS, UNSPECIFIED PORTION OF PUBIS, INITIAL ENCOUNTER (HCC): Primary | ICD-10-CM
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
## 2022-06-28 DIAGNOSIS — S32.592A OTHER CLOSED FRACTURE OF LEFT PUBIS, INITIAL ENCOUNTER (HCC): ICD-10-CM

## 2022-06-28 PROCEDURE — 99285 EMERGENCY DEPT VISIT HI MDM: CPT

## 2022-06-28 PROCEDURE — 73502 X-RAY EXAM HIP UNI 2-3 VIEWS: CPT

## 2022-06-28 RX ORDER — MORPHINE SULFATE 4 MG/ML
4 INJECTION, SOLUTION INTRAMUSCULAR; INTRAVENOUS ONCE
Status: COMPLETED | OUTPATIENT
Start: 2022-06-28 | End: 2022-06-29

## 2022-06-28 ASSESSMENT — ENCOUNTER SYMPTOMS
NAUSEA: 0
ABDOMINAL PAIN: 0
VISUAL CHANGE: 0

## 2022-06-28 ASSESSMENT — PAIN SCALES - GENERAL: PAINLEVEL_OUTOF10: 5

## 2022-06-28 ASSESSMENT — PAIN - FUNCTIONAL ASSESSMENT: PAIN_FUNCTIONAL_ASSESSMENT: 0-10

## 2022-06-29 ENCOUNTER — APPOINTMENT (OUTPATIENT)
Dept: CT IMAGING | Age: 77
DRG: 536 | End: 2022-06-29
Payer: MEDICARE

## 2022-06-29 PROBLEM — S32.592A CLOSED FRACTURE OF SINGLE PUBIC RAMUS OF PELVIS, LEFT, INITIAL ENCOUNTER (HCC): Status: ACTIVE | Noted: 2022-06-29

## 2022-06-29 LAB
ABSOLUTE EOS #: 0 K/UL (ref 0–0.4)
ABSOLUTE LYMPH #: 1.41 K/UL (ref 1–4.8)
ABSOLUTE MONO #: 0.7 K/UL (ref 0.1–1.3)
ALBUMIN SERPL-MCNC: 4.1 G/DL (ref 3.5–5.2)
ALP BLD-CCNC: 70 U/L (ref 35–104)
ALT SERPL-CCNC: 26 U/L (ref 5–33)
ANION GAP SERPL CALCULATED.3IONS-SCNC: 16 MMOL/L (ref 9–17)
AST SERPL-CCNC: 21 U/L
BASOPHILS # BLD: 0 % (ref 0–2)
BASOPHILS ABSOLUTE: 0 K/UL (ref 0–0.2)
BILIRUB SERPL-MCNC: 0.35 MG/DL (ref 0.3–1.2)
BUN BLDV-MCNC: 11 MG/DL (ref 8–23)
CALCIUM SERPL-MCNC: 9 MG/DL (ref 8.6–10.4)
CHLORIDE BLD-SCNC: 104 MMOL/L (ref 98–107)
CO2: 19 MMOL/L (ref 20–31)
CREAT SERPL-MCNC: 0.55 MG/DL (ref 0.5–0.9)
EOSINOPHILS RELATIVE PERCENT: 0 % (ref 0–4)
GFR AFRICAN AMERICAN: >60 ML/MIN
GFR NON-AFRICAN AMERICAN: >60 ML/MIN
GFR SERPL CREATININE-BSD FRML MDRD: ABNORMAL ML/MIN/{1.73_M2}
GLUCOSE BLD-MCNC: 133 MG/DL (ref 70–99)
HCT VFR BLD CALC: 42.3 % (ref 36–46)
HEMOGLOBIN: 14.1 G/DL (ref 12–16)
LYMPHOCYTES # BLD: 8 % (ref 24–44)
MCH RBC QN AUTO: 31.6 PG (ref 26–34)
MCHC RBC AUTO-ENTMCNC: 33.4 G/DL (ref 31–37)
MCV RBC AUTO: 94.7 FL (ref 80–100)
MONOCYTES # BLD: 4 % (ref 1–7)
MORPHOLOGY: ABNORMAL
PDW BLD-RTO: 13.9 % (ref 11.5–14.9)
PLATELET # BLD: 227 K/UL (ref 150–450)
PMV BLD AUTO: 8.8 FL (ref 6–12)
POTASSIUM SERPL-SCNC: 3.9 MMOL/L (ref 3.7–5.3)
RBC # BLD: 4.47 M/UL (ref 4–5.2)
REASON FOR REJECTION: NORMAL
SEG NEUTROPHILS: 88 % (ref 36–66)
SEGMENTED NEUTROPHILS ABSOLUTE COUNT: 15.49 K/UL (ref 1.3–9.1)
SODIUM BLD-SCNC: 139 MMOL/L (ref 135–144)
TOTAL PROTEIN: 6.4 G/DL (ref 6.4–8.3)
WBC # BLD: 17.6 K/UL (ref 3.5–11)
ZZ NTE CLEAN UP: ORDERED TEST: NORMAL
ZZ NTE WITH NAME CLEAN UP: SPECIMEN SOURCE: NORMAL

## 2022-06-29 PROCEDURE — 1200000000 HC SEMI PRIVATE

## 2022-06-29 PROCEDURE — 36415 COLL VENOUS BLD VENIPUNCTURE: CPT

## 2022-06-29 PROCEDURE — 6370000000 HC RX 637 (ALT 250 FOR IP): Performed by: ORTHOPAEDIC SURGERY

## 2022-06-29 PROCEDURE — 2580000003 HC RX 258: Performed by: ORTHOPAEDIC SURGERY

## 2022-06-29 PROCEDURE — 72192 CT PELVIS W/O DYE: CPT

## 2022-06-29 PROCEDURE — 99222 1ST HOSP IP/OBS MODERATE 55: CPT | Performed by: INTERNAL MEDICINE

## 2022-06-29 PROCEDURE — 6360000002 HC RX W HCPCS: Performed by: EMERGENCY MEDICINE

## 2022-06-29 PROCEDURE — 85025 COMPLETE CBC W/AUTO DIFF WBC: CPT

## 2022-06-29 PROCEDURE — 6370000000 HC RX 637 (ALT 250 FOR IP): Performed by: EMERGENCY MEDICINE

## 2022-06-29 PROCEDURE — 6370000000 HC RX 637 (ALT 250 FOR IP): Performed by: INTERNAL MEDICINE

## 2022-06-29 PROCEDURE — 97166 OT EVAL MOD COMPLEX 45 MIN: CPT

## 2022-06-29 PROCEDURE — 80053 COMPREHEN METABOLIC PANEL: CPT

## 2022-06-29 PROCEDURE — 97530 THERAPEUTIC ACTIVITIES: CPT

## 2022-06-29 PROCEDURE — 6360000002 HC RX W HCPCS: Performed by: ORTHOPAEDIC SURGERY

## 2022-06-29 PROCEDURE — 99221 1ST HOSP IP/OBS SF/LOW 40: CPT | Performed by: ORTHOPAEDIC SURGERY

## 2022-06-29 PROCEDURE — 97162 PT EVAL MOD COMPLEX 30 MIN: CPT

## 2022-06-29 PROCEDURE — 96372 THER/PROPH/DIAG INJ SC/IM: CPT

## 2022-06-29 RX ORDER — ONDANSETRON 4 MG/1
4 TABLET, ORALLY DISINTEGRATING ORAL EVERY 8 HOURS PRN
Status: DISCONTINUED | OUTPATIENT
Start: 2022-06-29 | End: 2022-07-01 | Stop reason: HOSPADM

## 2022-06-29 RX ORDER — CALCIUM CARBONATE 500(1250)
500 TABLET ORAL DAILY
Status: DISCONTINUED | OUTPATIENT
Start: 2022-06-29 | End: 2022-07-01 | Stop reason: HOSPADM

## 2022-06-29 RX ORDER — LOSARTAN POTASSIUM 50 MG/1
50 TABLET ORAL DAILY
Status: DISCONTINUED | OUTPATIENT
Start: 2022-06-29 | End: 2022-07-01 | Stop reason: HOSPADM

## 2022-06-29 RX ORDER — ESCITALOPRAM OXALATE 20 MG/1
20 TABLET ORAL DAILY
Status: DISCONTINUED | OUTPATIENT
Start: 2022-06-29 | End: 2022-07-01 | Stop reason: HOSPADM

## 2022-06-29 RX ORDER — SODIUM CHLORIDE 9 MG/ML
INJECTION, SOLUTION INTRAVENOUS PRN
Status: DISCONTINUED | OUTPATIENT
Start: 2022-06-29 | End: 2022-07-01 | Stop reason: HOSPADM

## 2022-06-29 RX ORDER — ACETAMINOPHEN 325 MG/1
650 TABLET ORAL EVERY 4 HOURS PRN
Status: DISCONTINUED | OUTPATIENT
Start: 2022-06-29 | End: 2022-07-01 | Stop reason: HOSPADM

## 2022-06-29 RX ORDER — ENOXAPARIN SODIUM 100 MG/ML
40 INJECTION SUBCUTANEOUS DAILY
Status: DISCONTINUED | OUTPATIENT
Start: 2022-06-29 | End: 2022-07-01 | Stop reason: HOSPADM

## 2022-06-29 RX ORDER — SODIUM CHLORIDE 0.9 % (FLUSH) 0.9 %
5-40 SYRINGE (ML) INJECTION EVERY 12 HOURS SCHEDULED
Status: DISCONTINUED | OUTPATIENT
Start: 2022-06-29 | End: 2022-07-01 | Stop reason: HOSPADM

## 2022-06-29 RX ORDER — ONDANSETRON 2 MG/ML
4 INJECTION INTRAMUSCULAR; INTRAVENOUS EVERY 6 HOURS PRN
Status: DISCONTINUED | OUTPATIENT
Start: 2022-06-29 | End: 2022-07-01 | Stop reason: HOSPADM

## 2022-06-29 RX ORDER — KETOROLAC TROMETHAMINE 30 MG/ML
30 INJECTION, SOLUTION INTRAMUSCULAR; INTRAVENOUS EVERY 6 HOURS PRN
Status: DISCONTINUED | OUTPATIENT
Start: 2022-06-29 | End: 2022-07-01 | Stop reason: HOSPADM

## 2022-06-29 RX ORDER — OXYCODONE HYDROCHLORIDE AND ACETAMINOPHEN 5; 325 MG/1; MG/1
1 TABLET ORAL ONCE
Status: COMPLETED | OUTPATIENT
Start: 2022-06-29 | End: 2022-06-29

## 2022-06-29 RX ORDER — SODIUM CHLORIDE 0.9 % (FLUSH) 0.9 %
5-40 SYRINGE (ML) INJECTION PRN
Status: DISCONTINUED | OUTPATIENT
Start: 2022-06-29 | End: 2022-07-01 | Stop reason: HOSPADM

## 2022-06-29 RX ORDER — ASPIRIN 81 MG/1
81 TABLET ORAL DAILY
Status: DISCONTINUED | OUTPATIENT
Start: 2022-06-29 | End: 2022-07-01 | Stop reason: HOSPADM

## 2022-06-29 RX ORDER — OXYCODONE HYDROCHLORIDE AND ACETAMINOPHEN 5; 325 MG/1; MG/1
1 TABLET ORAL EVERY 4 HOURS PRN
Status: DISCONTINUED | OUTPATIENT
Start: 2022-06-29 | End: 2022-07-01 | Stop reason: HOSPADM

## 2022-06-29 RX ADMIN — ASPIRIN 81 MG: 81 TABLET, COATED ORAL at 14:08

## 2022-06-29 RX ADMIN — MORPHINE SULFATE 4 MG: 4 INJECTION, SOLUTION INTRAMUSCULAR; INTRAVENOUS at 00:40

## 2022-06-29 RX ADMIN — ACETAMINOPHEN 650 MG: 325 TABLET ORAL at 07:52

## 2022-06-29 RX ADMIN — OXYCODONE HYDROCHLORIDE AND ACETAMINOPHEN 1 TABLET: 5; 325 TABLET ORAL at 01:53

## 2022-06-29 RX ADMIN — ENOXAPARIN SODIUM 40 MG: 100 INJECTION SUBCUTANEOUS at 07:52

## 2022-06-29 RX ADMIN — SODIUM CHLORIDE, PRESERVATIVE FREE 10 ML: 5 INJECTION INTRAVENOUS at 09:00

## 2022-06-29 RX ADMIN — OXYCODONE HYDROCHLORIDE AND ACETAMINOPHEN 1 TABLET: 5; 325 TABLET ORAL at 19:45

## 2022-06-29 RX ADMIN — ESCITALOPRAM OXALATE 20 MG: 20 TABLET ORAL at 14:08

## 2022-06-29 RX ADMIN — LOSARTAN POTASSIUM 50 MG: 50 TABLET, FILM COATED ORAL at 14:08

## 2022-06-29 RX ADMIN — SODIUM CHLORIDE, PRESERVATIVE FREE 10 ML: 5 INJECTION INTRAVENOUS at 19:45

## 2022-06-29 RX ADMIN — ACETAMINOPHEN 650 MG: 325 TABLET ORAL at 18:17

## 2022-06-29 RX ADMIN — CALCIUM 500 MG: 500 TABLET ORAL at 14:08

## 2022-06-29 RX ADMIN — OXYCODONE HYDROCHLORIDE AND ACETAMINOPHEN 1 TABLET: 5; 325 TABLET ORAL at 12:08

## 2022-06-29 ASSESSMENT — PAIN SCALES - GENERAL
PAINLEVEL_OUTOF10: 5
PAINLEVEL_OUTOF10: 3
PAINLEVEL_OUTOF10: 9
PAINLEVEL_OUTOF10: 3
PAINLEVEL_OUTOF10: 5
PAINLEVEL_OUTOF10: 5

## 2022-06-29 NOTE — CONSULTS
05/23/2013        Medications Prior to Admission:     Prior to Admission medications    Medication Sig Start Date End Date Taking? Authorizing Provider   escitalopram (LEXAPRO) 20 MG tablet Take 1 tablet by mouth daily 5/20/22   Historical Provider, MD   simvastatin (ZOCOR) 40 MG tablet Take 1 tablet by mouth nightly 5/23/22   MIGUEL Patel CNP   calcium elemental (OSCAL) 500 MG TABS tablet take 1 tablet by mouth once daily 2/21/22   Historical Provider, MD   alendronate (FOSAMAX) 70 MG tablet Take 1 tablet by mouth every 7 days 2/18/22   MIGUEL Patel CNP   vitamin D (CHOLECALCIFEROL) 25 MCG (1000 UT) TABS tablet Take 1 tablet by mouth daily 2/18/22 5/19/22  MIGUEL Patel CNP   clotrimazole-betamethasone (LOTRISONE) 1-0.05 % cream Apply to affected area bid externally x 4 days then daily for 3 days 2/16/22   MIGUEL Rojas CNP   losartan (COZAAR) 50 MG tablet take 1 tablet by mouth once daily 10/11/21   MIGUEL Patel CNP   ZINC PO Take by mouth    Historical Provider, MD   alendronate (FOSAMAX) 70 MG tablet take 1 tablet by mouth every 7 days 5/17/21   MIGUEL Patel CNP   diphenhydrAMINE-APAP, sleep, (TYLENOL PM EXTRA STRENGTH PO) Take by mouth    Historical Provider, MD   escitalopram (LEXAPRO) 10 MG tablet take 1 tablet by mouth once daily 1/18/21   MIGUEL Patel CNP   Vitamin Mixture (VITAMIN E COMPLETE PO) Take by mouth    Historical Provider, MD   Cetirizine HCl (ZYRTEC PO) Take by mouth    Historical Provider, MD   aspirin 81 MG tablet Take 81 mg by mouth daily. Historical Provider, MD   Multiple Vitamins-Minerals (THERAPEUTIC MULTIVITAMIN-MINERALS) tablet Take 1 tablet by mouth daily. Historical Provider, MD        Allergies:     Adhesive tape and Lisinopril    Social History:     Tobacco:    reports that she has never smoked. She has never used smokeless tobacco.  Alcohol:      reports current alcohol use.   Drug Use: reports no history of drug use. Family History:     Family History   Problem Relation Age of Onset    Cancer Mother     Cancer Maternal Aunt        Review of Systems:     Positive and Negative as described in HPI. CONSTITUTIONAL:  negative for fevers, chills, sweats, fatigue, weight loss  HEENT:  negative for vision, hearing changes, runny nose, throat pain  RESPIRATORY:  negative for shortness of breath, cough, congestion, wheezing. CARDIOVASCULAR:  negative for chest pain, palpitations. GASTROINTESTINAL:  negative for nausea, vomiting, diarrhea, constipation, change in bowel habits, abdominal pain   GENITOURINARY:  negative for difficulty of urination, burning with urination, frequency   INTEGUMENT:  negative for rash, skin lesions, easy bruising   HEMATOLOGIC/LYMPHATIC:  negative for swelling/edema   ALLERGIC/IMMUNOLOGIC:  negative for urticaria , itching  ENDOCRINE:  negative increase in drinking, increase in urination, hot or cold intolerance  MUSCULOSKELETAL: Left hip, buttock area   NEUROLOGICAL:  negative for headaches, dizziness, lightheadedness, numbness, pain, tingling extremities  BEHAVIOR/PSYCH:      Physical Exam:     /62   Pulse 78   Temp 98.1 °F (36.7 °C) (Oral)   Resp 16   Ht 5' 6\" (1.676 m)   Wt 170 lb (77.1 kg)   LMP  (LMP Unknown)   SpO2 95%   BMI 27.44 kg/m²   Temp (24hrs), Av.2 °F (36.8 °C), Min:98.1 °F (36.7 °C), Max:98.3 °F (36.8 °C)    No results for input(s): POCGLU in the last 72 hours. No intake or output data in the 24 hours ending 22 1405    General Appearance:  alert, well appearing, and in no acute distress  Mental status: oriented to person, place, and time with normal affect  Head:  normocephalic, atraumatic.   Eye: no icterus, redness, pupils equal and reactive, extraocular eye movements intact, conjunctiva clear  Ear: normal external ear, no discharge, hearing intact  Nose:  no drainage noted  Mouth: mucous membranes moist  Neck: supple, no mmol/L    Potassium 3.9 3.7 - 5.3 mmol/L    Chloride 104 98 - 107 mmol/L    CO2 19 (L) 20 - 31 mmol/L    Anion Gap 16 9 - 17 mmol/L    Alkaline Phosphatase 70 35 - 104 U/L    ALT 26 5 - 33 U/L    AST 21 <32 U/L    Total Bilirubin 0.35 0.3 - 1.2 mg/dL    Total Protein 6.4 6.4 - 8.3 g/dL    Albumin 4.1 3.5 - 5.2 g/dL    GFR Non-African American >60 >60 mL/min    GFR African American >60 >60 mL/min    GFR Comment                 Consultations:   IP CONSULT TO INTERNAL MEDICINE  IP CONSULT TO CASE MANAGEMENT  Assessment :      Primary Problem  Closed fracture of single pubic ramus of pelvis, left, initial encounter [facility])    Active Hospital Problems    Diagnosis Date Noted    Closed fracture of single pubic ramus of pelvis, left, initial encounter ([facility] [address] [S32.592A] 06/29/2022     Priority: Medium    Age-related osteoporosis without current pathological fracture [M81.0] 03/09/2021    Closed fracture of right hip ([facility] [address] [S72.001A] 12/21/2020    Essential hypertension [I10] 12/17/2018    Fatty liver [K76.0]        Plan:     1. Pelvic fracture after trauma, getting managed conservatively, orthopedic surgery following  2. Hypertension, restarted home medication of losartan  3. History of depression restarted home dose of Lexapro  4. Slightly elevated white count likely due to trauma  5. On DVT prophylaxis Lovenox  6. Patient refusing SNF placement she is working with PT want to go back home  [facility] Naseem Cotsa MD  6/29/2022  2:05 PM    Copy sent to Dr. Sanjana Burch, APRN - CNP    Please note that this chart was generated using voice recognition Dragon dictation software. Although every effort was made to ensure the accuracy of this automated transcription, some errors in transcription may have occurred.

## 2022-06-29 NOTE — ED PROVIDER NOTES
EMERGENCY DEPARTMENT ENCOUNTER    Pt Name: Ronald Lorenzo  MRN: [id number]  [place] 1945  Date of evaluation: 6/28/22  CHIEF COMPLAINT       Chief Complaint   Patient presents with    Fall    Hip Pain     HISTORY OF PRESENT ILLNESS     Fall  The accident occurred less than 1 hour ago. Fall occurred: when leaning over to change pool filter. She fell from a height of 1 to 2 ft. She landed on concrete. Point of impact: left buttock. Pain location: left buttock. The pain is severe. She was not ambulatory at the scene. Pertinent negatives include no visual change, no fever, no numbness, no abdominal pain, no nausea, no headaches and no loss of consciousness. The symptoms are aggravated by activity. Did not hit head        REVIEW OF SYSTEMS     Review of Systems   Constitutional: Negative for fever. Gastrointestinal: Negative for abdominal pain and nausea. Neurological: Negative for loss of consciousness, numbness and headaches. All other systems reviewed and are negative. PASTMEDICAL HISTORY     Past Medical History:   Diagnosis Date    Diverticulosis     Fatty liver     Hyperlipidemia      Past Problem List  Patient Active Problem List   Diagnosis Code    Fatty liver K76.0    Toenail fungus B35.1    Hyperlipidemia E78.5    Diverticulosis of large intestine without hemorrhage K57.30    Essential hypertension I10    Anxiety and depression F41.9, F32. A    Allergic rhinitis due to allergen J30.9    Candidiasis of breast B37.89    Other fracture of right femur, initial encounter for closed fracture ([facility] [address] S72.8X1A    Mass of left parotid gland 17 mm K11.8    Acute urinary retention R33.8    Adjustment disorder with mixed anxiety and depressed mood F43.23    Closed fracture of right hip ([facility]) S72.001A    Bacterial vaginosis N76.0, B96.89    At high risk for falls Z91.81    Pruritic rash L28.2    Age-related osteoporosis without current pathological fracture M81.0    H/O total vaginal hysterectomy Z90.710    H/O bilateral salpingo-oophorectomy S/P vag hyst (6 weeks) Z90.79, Z90.722    Benign essential microscopic hematuria R31.1    PMB (postmenopausal bleeding) N95.0    Presence of pessary-#4 Ring with support Z96.0    Prediabetes R73.03    Eczema intertrigo L30.4    ACP (advance care planning) Z71.89    Cystocele, midline N81.11    Vitamin D deficiency E55.9     SURGICAL HISTORY       Past Surgical History:   Procedure Laterality Date    APPENDECTOMY      BLADDER SUSPENSION      in situ sling    COLONOSCOPY  05/23/2013    DIVERTICULOSIS     FEMUR FRACTURE SURGERY Right 09/14/2020    FEMUR IM NAIL YARITZA INSERTION performed by Tete Cunningham MD at [address] [place] [address], VAGINAL      SALPINGOOPHORECTOMY      6 wk post vag hyst    SPINE SURGERY      TONSILLECTOMY      TUMOR EXCISION Left 05/18/2021    below left ear- benign    TYMPANOMASTOIDECTOMY Left 2000    UPPER GASTROINTESTINAL ENDOSCOPY  05/23/2013     CURRENT MEDICATIONS       Previous Medications    ALENDRONATE (FOSAMAX) 70 MG TABLET    Take 1 tablet by mouth every 7 days    ASPIRIN 81 MG TABLET    Take 81 mg by mouth daily. CALCIUM ELEMENTAL (OSCAL) 500 MG TABS TABLET    take 1 tablet by mouth once daily    CETIRIZINE HCL (ZYRTEC PO)    Take by mouth    CLOTRIMAZOLE-BETAMETHASONE (LOTRISONE) 1-0.05 % CREAM    Apply to affected area bid externally x 4 days then daily for 3 days    DIPHENHYDRAMINE-APAP, SLEEP, (TYLENOL PM EXTRA STRENGTH PO)    Take by mouth    ESCITALOPRAM (LEXAPRO) 20 MG TABLET    Take 1 tablet by mouth daily    LOSARTAN (COZAAR) 50 MG TABLET    take 1 tablet by mouth once daily    MULTIPLE VITAMINS-MINERALS (THERAPEUTIC MULTIVITAMIN-MINERALS) TABLET    Take 1 tablet by mouth daily.     SIMVASTATIN (ZOCOR) 40 MG TABLET    Take 1 tablet by mouth nightly    VITAMIN D (CHOLECALCIFEROL) 25 MCG (1000 UT) TABS TABLET    Take 1 tablet by mouth daily    VITAMIN MIXTURE (VITAMIN E COMPLETE PO)    Take by mouth    ZINC PO    Take by mouth     ALLERGIES     is allergic to adhesive tape and lisinopril. FAMILY HISTORY     She indicated that her mother is . She indicated that the status of her maternal aunt is unknown. SOCIAL HISTORY       Social History     Tobacco Use    Smoking status: Never Smoker    Smokeless tobacco: Never Used   Substance Use Topics    Alcohol use: Yes     Comment: social     Drug use: No     PHYSICAL EXAM     INITIAL VITALS: /70   Pulse 65   Temp 98.3 °F (36.8 °C) (Oral)   Resp 16   Ht 5' 6\" (1.676 m)   Wt 170 lb (77.1 kg)   LMP  (LMP Unknown)   SpO2 96%   BMI 27.44 kg/m²    Physical Exam  Constitutional:       General: She is not in acute distress. Appearance: Normal appearance. She is well-developed. She is not diaphoretic. HENT:      Head: Normocephalic and atraumatic. Right Ear: External ear normal.      Left Ear: External ear normal.      Nose: Nose normal. No congestion. Mouth/Throat:      Mouth: Mucous membranes are moist.      Pharynx: Oropharynx is clear. Eyes:      General:         Right eye: No discharge. Left eye: No discharge. Conjunctiva/sclera: Conjunctivae normal.      Pupils: Pupils are equal, round, and reactive to light. Neck:      Trachea: No tracheal deviation. Cardiovascular:      Rate and Rhythm: Normal rate and regular rhythm. Pulses: Normal pulses. Heart sounds: Normal heart sounds. Pulmonary:      Effort: Pulmonary effort is normal. No respiratory distress. Breath sounds: Normal breath sounds. No stridor. No wheezing or rales. Abdominal:      Palpations: Abdomen is soft. Tenderness: There is no abdominal tenderness. There is no guarding or rebound. Musculoskeletal:         General: No deformity. Normal range of motion. Cervical back: Normal range of motion and neck supple. Right lower leg: No edema. Left lower leg: No edema.       Comments: Pain left buttock with movement in left hip   Skin:     General: Skin is warm and dry. Capillary Refill: Capillary refill takes less than 2 seconds. Findings: No erythema or rash. Neurological:      General: No focal deficit present. Mental Status: She is alert and oriented to person, place, and time. Coordination: Coordination normal.      Comments: GCS 15  Strength in arms 5/5  Strength in legs 5/5  Sensation intact bl arms and legs  No facial droop  Speech is clear, no dysarthria or aphasia  No ataxia in arms or legs  No gaze preference or nystagums  Visual fields intact     Psychiatric:         Mood and Affect: Mood normal.         Behavior: Behavior normal.         Thought Content: Thought content normal.         Judgment: Judgment normal.         MEDICAL DECISION MAKING:     Patient unable to ambulate  Too much pain  dw with her plan  DW ortho for admit  Checking labs and ct pelvis  Consulting medicine on floor  She may need rehab placement         Procedures    DIAGNOSTIC RESULTS       RADIOLOGY:All plain film, CT, MRI, and formal ultrasound images (except ED bedside ultrasound) are read by the radiologist, see reports below, unless otherwisenoted in MDM or here. XR HIP 2-3 VW W PELVIS LEFT   Final Result   Acute nondisplaced fracture of the left pubic symphysis and adjacent left   superior pubic ramus. No acute left hip fracture or dislocation. CT PELVIS WO CONTRAST Additional Contrast? None    (Results Pending)     LABS: All lab results were reviewed by myself, and all abnormals are listed below.   Labs Reviewed   CBC WITH AUTO DIFFERENTIAL   COMPREHENSIVE METABOLIC PANEL       EMERGENCY DEPARTMENTCOURSE:         Vitals:    Vitals:    06/28/22 2249 06/29/22 0045   BP: (!) 152/83 122/70   Pulse: 65    Resp: 16    Temp: 98.3 °F (36.8 °C)    TempSrc: Oral    SpO2: 97% 96%   Weight: 170 lb (77.1 kg)    Height: 5' 6\" (1.676 m)        The patient was given the following medications while in the emergency department:  Orders Placed This Encounter   Medications    morphine sulfate (PF) injection 4 mg    oxyCODONE-acetaminophen (PERCOCET) 5-325 MG per tablet 1 tablet     CONSULTS:  IP CONSULT TO INTERNAL MEDICINE    FINAL IMPRESSION      1. Closed fracture of left pubis, unspecified portion of pubis, initial encounter ([facility] [address]    2. Fall, initial encounter          DISPOSITION/PLAN   DISPOSITION Decision To Admit 06/29/2022 01:35:28 AM      PATIENT REFERRED TO:  No follow-up provider specified. DISCHARGE MEDICATIONS:  New Prescriptions    No medications on file     The care is provided during an unprecedented national emergency due to the novel coronavirus, COVID 19.   MD Garrett Christy MD  06/29/22 5819

## 2022-06-29 NOTE — H&P
ORTHOPEDIC PATIENT H and P      HPI / Chief Complaint  Donya Anna is a 68 y.o. female who presented to the emergency department after she tripped and fell trying to change her pool filter. She landed on her buttock but had immediate pain and was unable to get up and ambulate. Consequently she was brought to the emergency department where she had x-rays obtained and was diagnosed with a pelvic fracture. They were unable to get her pain under control enough for her to be able to get up and ambulate and so she was admitted for orthopedic care. Her pain is mostly in the buttock area. She denies having any numbness or tingling. Past Medical History  Laurie Melendez  has a past medical history of Diverticulosis, Fatty liver, and Hyperlipidemia. Past Surgical History  Laurie Melendez  has a past surgical history that includes Spine surgery; Tonsillectomy; Appendectomy; Hysterectomy, vaginal; bladder suspension; Colonoscopy (05/23/2013); Upper gastrointestinal endoscopy (05/23/2013); tympanomastoidectomy (Left, 2000); Femur fracture surgery (Right, 09/14/2020); tumor excision (Left, 05/18/2021); and Salpingo-oophorectomy.     Current Medications  Current Facility-Administered Medications   Medication Dose Route Frequency Provider Last Rate Last Admin    sodium chloride flush 0.9 % injection 5-40 mL  5-40 mL IntraVENous 2 times per day Waqar Friedman MD        sodium chloride flush 0.9 % injection 5-40 mL  5-40 mL IntraVENous PRN Teena Hernandez MD        0.9 % sodium chloride infusion   IntraVENous PRN Waqarmiguel Friedman MD        enoxaparin (LOVENOX) injection 40 mg  40 mg SubCUTAneous Daily Waqar Friedman MD   40 mg at 06/29/22 0752    acetaminophen (TYLENOL) tablet 650 mg  650 mg Oral Q4H PRN Teena Hernandez MD   650 mg at 06/29/22 0752    ondansetron (ZOFRAN-ODT) disintegrating tablet 4 mg  4 mg Oral Q8H PRN Waqar Idalia, MD        Or    ondansetron (ZOFRAN) injection 4 mg  4 mg IntraVENous Q6H PRN Waqar Daily, MD Nereida Guillory oxyCODONE-acetaminophen (PERCOCET) 5-325 MG per tablet 1 tablet  1 tablet Oral Q4H PRN Evita Sawyer MD           Allergies  Allergies have been reviewed. Annalise Masterson is allergic to adhesive tape and lisinopril. Social History  Annalise Masterson  reports that she has never smoked. She has never used smokeless tobacco. She reports current alcohol use. She reports that she does not use drugs. Family History  Deana's family history includes Cancer in her maternal aunt and mother. Review of Systems   History obtained from the patient. REVIEW OF SYSTEMS:   Constitution: negative for fever, chills, weight loss or malaise   Musculoskeletal: As noted in the HPI   Neurologic: As noted in the HPI    Physical Exam  /66   Pulse 66   Temp 98.2 °F (36.8 °C) (Oral)   Resp 15   Ht 5' 6\" (1.676 m)   Wt 170 lb (77.1 kg)   LMP  (LMP Unknown)   SpO2 94%   BMI 27.44 kg/m²    General Appearance: alert, well appearing, and in no distress  Mental Status: alert, oriented to person, place, and time  Evaluation of the patient's pelvis and left lower extremity demonstrates intact skin without warmth, erythema, or notable swelling. Sensation is grossly intact light touch in all dermatomes and she has a 2+ pedal pulses. She is tender to palpation in the left inguinal area and has some pain with pelvic compression. She is also mildly tender to palpation along the left sacral region. Imaging Studies  X-rays and a CT scan of the pelvis completed on 6/28/2022 and 6/29/2022 respectively were reviewed independently demonstrating minimally displaced superior and inferior pubic rami fractures on the left. Evidence of a prior proximal femur fracture on the right with cephalomedullary nail fixation and complete healing. Diagnostics and Labs  Relevant diagnostic, laboratory and radiological studies have been reviewed in the Electronic Medical Record.     Assessment and Plan  Joey Travis is a 68 y.o. old female with a closed left

## 2022-06-29 NOTE — PLAN OF CARE
Problem: Discharge Planning  Goal: Discharge to home or other facility with appropriate resources  Outcome: Progressing  Flowsheets (Taken 6/29/2022 4104)  Discharge to home or other facility with appropriate resources: Identify barriers to discharge with patient and caregiver     Problem: Pain  Goal: Verbalizes/displays adequate comfort level or baseline comfort level  Outcome: Progressing     Problem: Safety - Adult  Goal: Free from fall injury  Outcome: Progressing     Problem: ABCDS Injury Assessment  Goal: Absence of physical injury  Outcome: Progressing

## 2022-06-29 NOTE — ED TRIAGE NOTES
Mode of arrival (squad #, walk in, police, etc) : EMS        Chief complaint(s): fall, hip pain        Arrival Note (brief scenario, treatment PTA, etc). : Patient reports falling on left hip after bending over to turn off water pump to pool this evening. Patient denies hitting head and LOC. Patient was able to walk to EMS vehicle with assistance. Patient alert and oriented x4. Patient reports drinking 1 glass of wine this evening. C= \"Have you ever felt that you should Cut down on your drinking? \"  No  A= \"Have people Annoyed you by criticizing your drinking? \"  No  G= \"Have you ever felt bad or Guilty about your drinking? \"  No  E= \"Have you ever had a drink as an Eye-opener first thing in the morning to steady your nerves or to help a hangover? \"  No      Deferred []      Reason for deferring: N/A    *If yes to two or more: probable alcohol abuse. *

## 2022-06-29 NOTE — PROGRESS NOTES
Occupational Therapy  Facility/Department: [facility]  Occupational Therapy Initial Assessment    Name: Neo Moon  : 1945  MRN: [id number]  Date of Service: 2022    Discharge Recommendations:  Home with assist PRN,Home with Home health OT  OT Equipment Recommendations  Other: TBD       Patient Diagnosis(es): The primary encounter diagnosis was Closed fracture of left pubis, unspecified portion of pubis, initial encounter ([facility] [address] A diagnosis of Fall, initial encounter was also pertinent to this visit. Past Medical History:  has a past medical history of Diverticulosis, Fatty liver, and Hyperlipidemia. Past Surgical History:  has a past surgical history that includes Spine surgery; Tonsillectomy; Appendectomy; Hysterectomy, vaginal; bladder suspension; Colonoscopy (2013); Upper gastrointestinal endoscopy (2013); tympanomastoidectomy (Left, ); Femur fracture surgery (Right, 2020); tumor excision (Left, 2021); and Salpingo-oophorectomy. Treatment Diagnosis: Impaired self-care status      Assessment   Performance deficits / Impairments: Decreased functional mobility ; Decreased ADL status; Decreased ROM; Decreased strength;Decreased endurance;Decreased balance;Decreased high-level IADLs  Treatment Diagnosis: Impaired self-care status  Prognosis: Good  Decision Making: Medium Complexity  REQUIRES OT FOLLOW-UP: Yes  Activity Tolerance  Activity Tolerance: Patient Tolerated treatment well;Patient limited by pain        Plan   Plan  Times per Week: 5-7  Current Treatment Recommendations: Strengthening,ROM,Balance training,Functional mobility training,Endurance training,Pain management,Safety education & training,Patient/Caregiver education & training,Equipment evaluation, education, & procurement,Self-Care / ADL,Home management training,Positioning     Restrictions  Restrictions/Precautions  Restrictions/Precautions: Fall Risk,Weight Bearing  Required Braces or Orthoses?: No  Lower Extremity Weight Bearing Restrictions  Left Lower Extremity Weight Bearing: Weight Bearing As Tolerated    Subjective   General  Chart Reviewed: Yes  Patient assessed for rehabilitation services?: Yes  Additional Pertinent Hx:  68 y.o. female who presented to the emergency department after she tripped and fell trying to change her pool filter. She landed on her buttock but had immediate pain and was unable to get up and ambulate. Consequently she was brought to the emergency department where she had x-rays obtained and was diagnosed with a pelvic fracture. They were unable to get her pain under control enough for her to be able to get up and ambulate and so she was admitted for orthopedic care. Her pain is mostly in the buttock area. Family / Caregiver Present: No  Referring Practitioner: Emma Garcia MD  Diagnosis: Closed fracture of pubic ramus s/p fall  General Comment  Comments: Okay for OT/PT evaluation per RN Jose Guadalupe Boyd.    Pt reported pain in L buttock, observed increase in pain with activity  Social/Functional History  Social/Functional History  Lives With: Significant other  Type of Home: House  Home Layout: One level  Home Access: Stairs to enter with rails  Entrance Stairs - Number of Steps: 3 curb/platform steps  Entrance Stairs - Rails: Right  Bathroom Shower/Tub: Walk-in shower  Bathroom Toilet: Standard  Bathroom Equipment: Hand-held shower,Shower chair,Grab bars in shower,Grab bars around toilet  Bathroom Accessibility: Walker accessible  Home Equipment: Freddy Au, 4 wheeled,Cane  Has the patient had two or more falls in the past year or any fall with injury in the past year?: Yes  Receives Help From: Family,Friend(s)  ADL Assistance: [address] [place] [address] Avenue: Independent  Homemaking Responsibilities: Yes  Ambulation Assistance: Independent  Transfer Assistance: Independent  Active : Yes  Mode of Transportation: Car  Occupation: Retired  Type of Occupation:   Leisure & Hobbies: basket weaving  Additional Comments: significant other able to assist with all, home 24/7        Objective   Heart Rate: 79  Heart Rate Source: Monitor  BP: 111/85  MAP (Calculated): 93.67  Resp: 15  SpO2: 96 %  O2 Device: None (Room air)  Comment: no reports of SOB or dizziness          Observation/Palpation  Palpation: pain lateral L hip and buttock  Safety Devices  Type of Devices: All fall risk precautions in place;Call light within reach;Gait belt;Patient at risk for falls;Nurse notified; Left in bed        AROM: Within functional limits  Strength: Within functional limits  Coordination: Within functional limits  Tone: Normal  ADL  Feeding: Setup  Grooming: Setup  UE Bathing: Stand by assistance  LE Bathing: Moderate assistance  UE Dressing: Stand by assistance  LE Dressing: Moderate assistance  Toileting: Moderate assistance  Additional Comments: ADL scores based on skilled observation and clinical reasoning, unless otherwise noted. Assistance required due to significant pain, limited mobility, and weakness, impacting safety and independence with self care  Tone RUE  RUE Tone: Normotonic  Tone LUE  LUE Tone: Normotonic  Coordination  Movements Are Fluid And Coordinated: Yes  Activity Tolerance  Activity Tolerance: Patient tolerated treatment well  Bed mobility  Supine to Sit: Minimal assistance  Sit to Supine: Minimal assistance  Scooting: Minimal assistance  Bed Mobility Comments: Minimal assist for LLE mgmt due to pain. HOB slightly elevated.  Pt resting in bed with ice pack and call light at end of session  Transfers  Sit to stand: Contact guard assistance  Stand to sit: Contact guard assistance  Transfer Comments: Min cues for hand placement for safety     Cognition  Overall Cognitive Status: WFL  Orientation  Overall Orientation Status: Within Functional Limits  Orientation Level: Oriented X4        Sensation  Overall Sensation Status: WFL (denies)         Education Given To: Patient  Education Provided: Role of Therapy;Plan of Care;Precautions  Education Method: Verbal  Barriers to Learning:  (Pain)  Education Outcome: Verbalized understanding;Continued education needed  LUE AROM (degrees)  LUE AROM : WFL  Left Hand AROM (degrees)  Left Hand AROM: WFL  RUE AROM (degrees)  RUE AROM : WFL  Right Hand AROM (degrees)  Right Hand AROM: WFL  LUE Strength  L Shoulder Flex: 4+/5  L Shoulder Ext: 4+/5  L Elbow Flex: 4/5  L Elbow Ext: 4/5  L Hand General: 4/5  RUE Strength  R Shoulder Flex: 4+/5  R Shoulder Ext: 4+/5  R Elbow Flex: 4/5  R Elbow Ext: 4/5  R Hand General: 4/5                  G-Code     OutComes Score                                                  AM-PAC Score        AM-[facility] Inpatient Daily Activity Raw Score: 15 (06/29/22 1049)  AM-PAC Inpatient ADL T-Scale Score : 34.69 (06/29/22 1049)  ADL Inpatient CMS 0-100% Score: 56.46 (06/29/22 1049)  ADL Inpatient CMS G-Code Modifier : CK (06/29/22 1049)    Goals  Short Term Goals  Time Frame for Short term goals: By discharge  Short Term Goal 1: Pt will perform lower body dressing/bathing mod I, using appropriate adaptive equipment/adaptive strategies 100% of the time  Short Term Goal 2: Pt will perform transfers/functional mobility mod I, using least restrictive device, during self care tasks  Short Term Goal 3: Pt will participate in dynamic unsupported sitting tasks with mod I facilitating reaching out of base of support and weight bearing through LLE (as petty) to increase input through extremity and improved core strength to increase ease with LB self-care tasks.    Short Term Goal 4: Pt will V/D fall prevention/home safety techniques that are applicable to her daily routine  Short Term Goal 5: Pt will actively participate in 15+ minutes of therapeutic exercise/functional activity to promote safety and independence with self care and mobility       Therapy Time   Individual Concurrent Group Co-treatment   Time In 0937         Time Out

## 2022-06-29 NOTE — FLOWSHEET NOTE
Patient talked about her medical issues and the circumstances that brought her to the hospital; listening presence and support; welcomed prayer     06/29/22 8435   Encounter Summary   Encounter Overview/Reason  Spiritual/Emotional Needs   Service Provided For: Patient   Referral/Consult From: Luisa   Last Encounter  06/29/22   Complexity of Encounter Moderate   Spiritual/Emotional needs   Type Spiritual Support   Assessment/Intervention/Outcome   Assessment Coping; Hopeful;Powerlessness   Intervention Discussed illness injury and its impact; Explored/Affirmed feelings, thoughts, concerns;Nurtured Hope;Sustaining Presence/Ministry of presence   Outcome Engaged in conversation;Expressed feelings, needs, and concerns;Expressed Gratitude;Receptive

## 2022-06-29 NOTE — ACP (ADVANCE CARE PLANNING)
Advance Care Planning     Advance Care Planning Activator (Inpatient)  Conversation Note      Date of ACP Conversation: 6/29/2022     Conversation Conducted with: Patient with Decision Making Capacity    ACP Activator: Ivania Vyas RN    Health Care Decision Maker:     Current Designated Health Care Decision Maker:     Primary Decision Maker: Martín [facility] - Other - [phone]    Secondary Decision Maker: Lucas Wong Child - [phone]    Care Preferences    Ventilation: \"If you were in your present state of health and suddenly became very ill and were unable to breathe on your own, what would your preference be about the use of a ventilator (breathing machine) if it were available to you? \"      Would the patient desire the use of ventilator (breathing machine)?: yes    \"If your health worsens and it becomes clear that your chance of recovery is unlikely, what would your preference be about the use of a ventilator (breathing machine) if it were available to you? \"     Would the patient desire the use of ventilator (breathing machine)?: No      Resuscitation  \"CPR works best to restart the heart when there is a sudden event, like a heart attack, in someone who is otherwise healthy. Unfortunately, CPR does not typically restart the heart for people who have serious health conditions or who are very sick. \"    \"In the event your heart stopped as a result of an underlying serious health condition, would you want attempts to be made to restart your heart (answer \"yes\" for attempt to resuscitate) or would you prefer a natural death (answer \"no\" for do not attempt to resuscitate)? \" yes       [] Yes   [] No   Educated Patient / Denis Candelaria regarding differences between Advance Directives and portable DNR orders.     Length of ACP Conversation in minutes:      Conversation Outcomes:  [x] ACP discussion completed  [] Existing advance directive reviewed with patient; no changes to patient's previously recorded wishes  [] New Advance Directive completed  [] Portable Do Not Rescitate prepared for Provider review and signature  [] POLST/POST/MOLST/MOST prepared for Provider review and signature      Follow-up plan:    [] Schedule follow-up conversation to continue planning  [] Referred individual to Provider for additional questions/concerns   [] Advised patient/agent/surrogate to review completed ACP document and update if needed with changes in condition, patient preferences or care setting    [x] This note routed to one or more involved healthcare providers

## 2022-06-29 NOTE — ED NOTES
Report given to Laura Mendez RN from [employer]. Report method by phone   The following was reviewed with receiving RN:   Current vital signs:  /80   Pulse 75   Temp 98.3 °F (36.8 °C) (Oral)   Resp 16   Ht 5' 6\" (1.676 m)   Wt 170 lb (77.1 kg)   LMP  (LMP Unknown)   SpO2 92%   BMI 27.44 kg/m²                MEWS Score: 1     Any medication or safety alerts were reviewed. Any pending diagnostics and notifications were also reviewed, as well as any safety concerns or issues, abnormal labs, abnormal imaging, and abnormal assessment findings. Questions were answered.             Kendal Braun RN  06/29/22 3778

## 2022-06-29 NOTE — PROGRESS NOTES
Physical Therapy  Facility/Department: [facility] MED SURG  Physical Therapy Initial Assessment    Name: Damon Jin  : 1945  MRN: [id number]  Date of Service: 2022    Discharge Recommendations:  Continue to assess pending progress   PT Equipment Recommendations  Other: [address] [place] [address]      Patient Diagnosis(es): The primary encounter diagnosis was Closed fracture of left pubis, unspecified portion of pubis, initial encounter ([facility] [address] A diagnosis of Fall, initial encounter was also pertinent to this visit. Past Medical History:  has a past medical history of Diverticulosis, Fatty liver, and Hyperlipidemia. Past Surgical History:  has a past surgical history that includes Spine surgery; Tonsillectomy; Appendectomy; Hysterectomy, vaginal; bladder suspension; Colonoscopy (2013); Upper gastrointestinal endoscopy (2013); tympanomastoidectomy (Left, ); Femur fracture surgery (Right, 2020); tumor excision (Left, 2021); and Salpingo-oophorectomy. Assessment   Assessment: pt presents this date with decreased strength in L hip and is with decreased balance and stability and requires use of WW for stability for ADLs.  Recommend cont therapy in acute setting to address deficits  Therapy Prognosis: Good  Decision Making: Medium Complexity  Exam: strength, ROM, balance, transfers, bed mobility, ambulation  Requires PT Follow-Up: Yes  Activity Tolerance  Activity Tolerance: Patient tolerated treatment well     Plan   Plan  Current Treatment Recommendations: Strengthening,Balance training,Functional mobility training,Transfer training,Gait training,Stair training,Therapeutic activities,Safety education & training,Home exercise program,Patient/Caregiver education & training  Safety Devices  Type of Devices: Gait belt,Bed alarm in place,Call light within reach,Left in bed,Nurse notified     Restrictions  Restrictions/Precautions  Restrictions/Precautions:  (WBAT L LE)  Required Braces or Orthoses?: No Subjective   Pain: pt does not rate but reports \"its okay\" pain is largely felt in L buttock  General  Chart Reviewed: Yes  Patient assessed for rehabilitation services?: Yes  Subjective  Subjective: pt in bed and is agreeable to therapy.  pt reports she has gotten up to use the bathroom c [address]         Social/Functional History  Social/Functional History  Lives With: Significant other  Type of Home: House  Home Layout: Two level (ranch)  Home Access: Stairs to enter with rails  Entrance Stairs - Number of Steps: 3 (curb steps and she can use [address] on)  Entrance Stairs - Rails: Right  Bathroom Shower/Tub: Walk-in shower  Bathroom Toilet: Standard  Bathroom Equipment: Hand-held shower,Shower chair,Grab bars in shower,Grab bars around toilet  Bathroom Accessibility: Walker accessible  Home Equipment: Elnoria Camron, 4 wheeled,Cane  Has the patient had two or more falls in the past year or any fall with injury in the past year?: Yes  Receives Help From: Family,Friend(s)  ADL Assistance: Independent  Homemaking Assistance: Independent  Ambulation Assistance: Independent  Transfer Assistance: Independent  Active : Yes  Mode of Transportation: Car  Occupation: Retired  Type of Occupation: secretaery  Leisure & Hobbies: basket weaving  Additional Comments: significant other able to assist with all, home 24/7   [address]: Impaired  Vision Exceptions: Wears glasses at all times  Hearing  Hearing: Within functional limits    Cognition   Orientation  Overall Orientation Status: Within Normal Limits  Orientation Level: Oriented X4  Cognition  Overall Cognitive Status: WNL     Objective   Heart Rate: 79  Heart Rate Source: Monitor  BP: 111/85  Patient Position: Semi fowlers  MAP (Calculated): 93.67  Resp: 15  SpO2: 96 %  O2 Device: None (Room air)  Comment: no reports of SOB or dizziness     Observation/Palpation  Palpation: pain lateral L hip and buttock  Gross Assessment  AROM:  (WFL R LE, L hip limited due to pain)  PROM: Within functional limits  Strength:  (R LE WFL, L knee and ankle WFL, L hip c pain and some weak)  Coordination: Within functional limits  Tone: Normal  Sensation: Intact        Strength RLE  Strength RLE: WFL  Strength LLE  Comment: L hip flexion/abduction/adduction 3/5, L knee and ankle WFL  Motor Control  Gross Motor?: WFL        Bed mobility  Rolling to Right: Stand by assistance  Supine to Sit: Minimal assistance  Sit to Supine: Minimal assistance  Scooting: Minimal assistance  Bed Mobility Comments: Min A For managment of L LE due to pain, HOB slightly elevated, some increased time to complete. Educated pt on mobility sequencing and she is with good understanding and demonstration  Transfers  Sit to Stand: Contact guard assistance  Stand to sit: Contact guard assistance  Bed to Chair:  (denies chair this date due to wanting to sleep and pain )  Comment: [address] for stability and CGA for safety with cues of [address] use  Ambulation  WB Status: WBAT L LE  Ambulation  Surface: level tile  Device: Rolling Walker  Assistance: Contact guard assistance  Quality of Gait: instructed in step-to technique c [address], slowed jamila and decreased step height L LE  Gait Deviations: Slow Jamila;Decreased step length;Decreased step height  Distance: 30ft  Comments: pt with safe/cautious gait and with good understanding of technique  Stairs/Curb  Stairs?: Yes  Stairs  # Steps : 1  Stairs Height: 4\"  Device: Rolling walker  Comment: pt required Min A For step this date for stability and sequencing.  pt with pain with ambuilation of step but was with no LOB, increased reliance on UE      Balance  Sitting - Static: Good  Sitting - Dynamic: Good  Standing - Static: Fair  Standing - Dynamic: Fair                    AM-PAC Score  AM-PAC Inpatient Mobility Raw Score : 18 (06/29/22 1024)  AM-PAC Inpatient T-Scale Score : 43.63 (06/29/22 1024)  Mobility Inpatient CMS 0-100% Score: 46.58 (06/29/22 1024)  Mobility Inpatient CMS G-Code Modifier : CK (06/29/22 1024)          Goals  Short Term Goals  Time Frame for Short term goals: 5 days  Short term goal 1: pt to perform bed mobility with SBA  Short term goal 2: pt to transfer with SBA and [employer]  Short term goal 3: pt to ambulate 100ft c WW and SBA  Short term goal 4: pt to ambulate 1 step with CGA a [employer]  Short term goal 5: pt to be indep with [employer]  Patient Goals   Patient goals : to get home and back to walking        Education  Patient Education  Education Given To: Patient  Education Provided: Role of Therapy;Plan of Care;Home Exercise Program;Precautions;Transfer Training;Energy Conservation  Education Method: Demonstration;Verbal  Barriers to Learning: None  Education Outcome: Verbalized understanding;Demonstrated understanding      Therapy Time   Individual Concurrent Group Co-treatment   Time In 0920         Time Out 0952         Minutes 32         Timed Code Treatment Minutes: 16 Minutes       Tawny Booth PT

## 2022-06-29 NOTE — DISCHARGE INSTR - COC
E78.5    Diverticulosis of large intestine without hemorrhage K57.30    Essential hypertension I10    Anxiety and depression F41.9, F32. A    Allergic rhinitis due to allergen J30.9    Candidiasis of breast B37.89    Other fracture of right femur, initial encounter for closed fracture [facility]) S72.8X1A    Mass of left parotid gland 17 mm K11.8    Acute urinary retention R33.8    Adjustment disorder with mixed anxiety and depressed mood F43.23    Closed fracture of right hip (HCC) S72.001A    Bacterial vaginosis N76.0, B96.89    At high risk for falls Z91.81    Pruritic rash L28.2    Age-related osteoporosis without current pathological fracture M81.0    H/O total vaginal hysterectomy Z90.710    H/O bilateral salpingo-oophorectomy S/P vag hyst (6 weeks) Z90.79, O06.355    Benign essential microscopic hematuria R31.1    PMB (postmenopausal bleeding) N95.0    Presence of pessary-#4 Ring with support Z96.0    Prediabetes R73.03    Eczema intertrigo L30.4    ACP (advance care planning) Z71.89    Cystocele, midline N81.11    Vitamin D deficiency E55.9    Closed fracture of single pubic ramus of pelvis, left, initial encounter ([facility] [address] S32.592A       Isolation/Infection:   Isolation            C Diff Contact          Patient Infection Status       Infection Onset Added Last Indicated Last Indicated By Review Planned Expiration Resolved Resolved By    C-diff Rule Out 06/29/22 06/29/22 06/29/22 C DIFF TOXIN/ANTIGEN (Ordered)        Resolved    COVID-19 (Rule Out) 09/13/20 09/13/20 09/13/20 COVID-19 (Ordered)   09/14/20 Rule-Out Test Resulted            Nurse Assessment:  Last Vital Signs: /85   Pulse 79   Temp 98.2 °F (36.8 °C) (Oral)   Resp 15   Ht 5' 6\" (1.676 m)   Wt 170 lb (77.1 kg)   LMP  (LMP Unknown)   SpO2 96%   BMI 27.44 kg/m²     Last documented pain score (0-10 scale): Pain Level: 5  Last Weight:   Wt Readings from Last 1 Encounters:   06/28/22 170 lb (77.1 kg)     Mental Status:  oriented, alert, coherent, logical, thought processes intact, and able to concentrate and follow conversation    IV Access:  - None    Nursing Mobility/ADLs:  Walking   Assisted  Transfer  Assisted  Bathing  Assisted  Dressing  Assisted  [facility] 22 Delivery   whole    Wound Care Documentation and Therapy:  Incision 09/14/20 Hip Right (Active)   Number of days: 652        Elimination:  Continence: Bowel: Yes  Bladder: Yes  Urinary Catheter: None   Colostomy/Ileostomy/Ileal Conduit: No       Date of Last BM: 06/30/2022  No intake or output data in the 24 hours ending 06/29/22 1218  No intake/output data recorded. Safety Concerns:     History of Falls (last 30 days) and At Risk for Falls    Impairments/Disabilities:      Glasses    Nutrition Therapy:  Current Nutrition Therapy:   - Oral Diet:  General    Routes of Feeding: Oral  Liquids: Thin Liquids  Daily Fluid Restriction: no  Last Modified Barium Swallow with Video (Video Swallowing Test): not done    Treatments at the Time of Hospital Discharge:   Respiratory Treatments: none  Oxygen Therapy:  is not on home oxygen therapy. Ventilator:    - No ventilator support    Rehab Therapies: Physical Therapy and Occupational Therapy  Weight Bearing Status/Restrictions: No weight bearing restrictions  Other Medical Equipment (for information only, NOT a DME order):     Other Treatments: skilled nursing assessment, medication education and monitoring    Patient's personal belongings (please select all that are sent with patient):  Mulu    RN SIGNATURE:  Electronically signed by Caryle Ally, RN on 7/1/22 at 1:01 PM EDT    CASE MANAGEMENT/SOCIAL WORK SECTION    Inpatient Status Date: ***    Readmission Risk Assessment Score:  Readmission Risk              Risk of Unplanned Readmission:  5           Discharging to Facility/ [address]  [address] [facility] [place]   [place], [address] [facility]  Phone: [phone]  Fax: [phone] 24/7 [phone]  After hours (facility) [address] [facility] [place][note 2]  [address] [address]  Phone [phone]  Fax  [phone]      Dialysis Facility (if applicable)   Name:  Address:  Dialysis Schedule:  Phone:  Fax:    / signature: Electronically signed by Mark Donohue RN on 6/29/22 at 12:18 PM EDT    PHYSICIAN SECTION    Prognosis: Good    Condition at Discharge: Stable    Rehab Potential (if transferring to Rehab): Good    Recommended Labs or Other Treatments After Discharge: ***    Physician Certification: I certify the above information and transfer of Ardeen [employer]  is necessary for the continuing treatment of the diagnosis listed and that she requires [facility] for greater 30 days.      Update Admission H&P: No change in H&P    PHYSICIAN SIGNATURE:  Electronically signed by Kenzie Gao MD on 7/1/22 at 12:28 PM EDT

## 2022-06-29 NOTE — PROGRESS NOTES
Patient admitted into room 2061. VSS and admission complete. Writer contacting provider for further orders.

## 2022-06-30 PROCEDURE — 6370000000 HC RX 637 (ALT 250 FOR IP): Performed by: INTERNAL MEDICINE

## 2022-06-30 PROCEDURE — 2580000003 HC RX 258: Performed by: ORTHOPAEDIC SURGERY

## 2022-06-30 PROCEDURE — 1200000000 HC SEMI PRIVATE

## 2022-06-30 PROCEDURE — 6360000002 HC RX W HCPCS: Performed by: ORTHOPAEDIC SURGERY

## 2022-06-30 PROCEDURE — 6370000000 HC RX 637 (ALT 250 FOR IP): Performed by: ORTHOPAEDIC SURGERY

## 2022-06-30 PROCEDURE — 99232 SBSQ HOSP IP/OBS MODERATE 35: CPT | Performed by: INTERNAL MEDICINE

## 2022-06-30 PROCEDURE — 97110 THERAPEUTIC EXERCISES: CPT

## 2022-06-30 PROCEDURE — 99222 1ST HOSP IP/OBS MODERATE 55: CPT | Performed by: STUDENT IN AN ORGANIZED HEALTH CARE EDUCATION/TRAINING PROGRAM

## 2022-06-30 PROCEDURE — 97535 SELF CARE MNGMENT TRAINING: CPT

## 2022-06-30 RX ADMIN — OXYCODONE HYDROCHLORIDE AND ACETAMINOPHEN 1 TABLET: 5; 325 TABLET ORAL at 03:34

## 2022-06-30 RX ADMIN — OXYCODONE HYDROCHLORIDE AND ACETAMINOPHEN 1 TABLET: 5; 325 TABLET ORAL at 23:25

## 2022-06-30 RX ADMIN — SODIUM CHLORIDE, PRESERVATIVE FREE 10 ML: 5 INJECTION INTRAVENOUS at 23:14

## 2022-06-30 RX ADMIN — SODIUM CHLORIDE, PRESERVATIVE FREE 10 ML: 5 INJECTION INTRAVENOUS at 08:08

## 2022-06-30 RX ADMIN — ESCITALOPRAM OXALATE 20 MG: 20 TABLET ORAL at 08:07

## 2022-06-30 RX ADMIN — OXYCODONE HYDROCHLORIDE AND ACETAMINOPHEN 1 TABLET: 5; 325 TABLET ORAL at 14:45

## 2022-06-30 RX ADMIN — OXYCODONE HYDROCHLORIDE AND ACETAMINOPHEN 1 TABLET: 5; 325 TABLET ORAL at 19:18

## 2022-06-30 RX ADMIN — ASPIRIN 81 MG: 81 TABLET, COATED ORAL at 08:07

## 2022-06-30 RX ADMIN — LOSARTAN POTASSIUM 50 MG: 50 TABLET, FILM COATED ORAL at 08:07

## 2022-06-30 RX ADMIN — OXYCODONE HYDROCHLORIDE AND ACETAMINOPHEN 1 TABLET: 5; 325 TABLET ORAL at 09:17

## 2022-06-30 RX ADMIN — ENOXAPARIN SODIUM 40 MG: 100 INJECTION SUBCUTANEOUS at 08:08

## 2022-06-30 RX ADMIN — CALCIUM 500 MG: 500 TABLET ORAL at 08:07

## 2022-06-30 ASSESSMENT — PAIN - FUNCTIONAL ASSESSMENT
PAIN_FUNCTIONAL_ASSESSMENT: PREVENTS OR INTERFERES SOME ACTIVE ACTIVITIES AND ADLS
PAIN_FUNCTIONAL_ASSESSMENT: ACTIVITIES ARE NOT PREVENTED
PAIN_FUNCTIONAL_ASSESSMENT: PREVENTS OR INTERFERES SOME ACTIVE ACTIVITIES AND ADLS

## 2022-06-30 ASSESSMENT — ENCOUNTER SYMPTOMS
BLURRED VISION: 0
DOUBLE VISION: 0
SHORTNESS OF BREATH: 0
ABDOMINAL PAIN: 0

## 2022-06-30 ASSESSMENT — PAIN SCALES - GENERAL
PAINLEVEL_OUTOF10: 5
PAINLEVEL_OUTOF10: 6

## 2022-06-30 ASSESSMENT — PAIN DESCRIPTION - ORIENTATION
ORIENTATION: LEFT

## 2022-06-30 ASSESSMENT — PAIN DESCRIPTION - LOCATION
LOCATION: BUTTOCKS
LOCATION: PELVIS
LOCATION: BUTTOCKS;PELVIS
LOCATION: PELVIS
LOCATION: BUTTOCKS;PELVIS

## 2022-06-30 ASSESSMENT — PAIN DESCRIPTION - DESCRIPTORS
DESCRIPTORS: ACHING
DESCRIPTORS: SORE
DESCRIPTORS: NAGGING

## 2022-06-30 NOTE — PROGRESS NOTES
Occupational Therapy  Facility/Department: [facility] MED SURG  Daily Treatment Note  NAME: Radha Guerra  : 1945  MRN: [id number]    Date of Service: 2022    Discharge Recommendations:  Patient would benefit from continued therapy after discharge  OT Equipment Recommendations  Other: TBD      Patient Diagnosis(es): The primary encounter diagnosis was Closed fracture of left pubis, unspecified portion of pubis, initial encounter ([facility] [address] A diagnosis of Fall, initial encounter was also pertinent to this visit. Assessment    Activity Tolerance: Patient tolerated treatment well;Patient limited by pain  Discharge Recommendations: Patient would benefit from continued therapy after discharge  Other: TBD      Plan   Plan  Times per Week: 5-7  Current Treatment Recommendations: Strengthening;ROM;Balance training;Functional mobility training; Endurance training;Pain management; Safety education & training;Patient/Caregiver education & training;Equipment evaluation, education, & procurement;Self-Care / ADL; Home management training;Positioning     Subjective   Subjective  Subjective: \"I am worse today than I have been the last two days\". Pt pleasant and cooperative for therapy. Orientation  Overall Orientation Status: Within Functional Limits  Orientation Level: Oriented X4  Cognition  Overall Cognitive Status: WFL         Bed Mobility Training  Bed Mobility Training: Yes  Overall Level of Assistance: Minimum assistance  Rolling: Stand-by assistance  Supine to Sit: Minimum assistance (Assist with trunk support)  Sit to Supine:  Moderate assistance (Assist with LE's over EOB)  Scooting: Minimum assistance (increased time. )  Balance  Sitting: Without support (~25 min EOB )  Standing: With support (0-2 UE support with RW. )     ADL  Equipment Provided: Reacher;Sock aid (good recall from previous therapies ~2 years ago. )  Feeding: Setup  Grooming: Setup  UE Bathing: Stand by assistance  LE Bathing: Minimal assistance  UE Dressing: Stand by assistance  LE Dressing: Minimal assistance; Adaptive equipment  LE Dressing Skilled Clinical Factors: Pt donned/doffed B footie socks with use of adaptive equipment. Observed good standing balance for clinical reasoning to pull up pants. Toileting: Stand by assistance  Toileting Skilled Clinical Factors: per pt report. Additional Comments: ADL scores based on skilled observation and clinical reasoning, unless otherwise noted. Assistance required due to pain, limited mobility, and weakness, impacting safety and independence with self care  OT Exercises  Exercise Treatment: Pt engaged in seated UB ex's with 3# weight to address overall strength and endurance for functional care tasks and transfers. (x15 reps; all planes noted. Good tolerance. )  Static Sitting Balance Exercises: ~25 min tolerated EOB   Dynamic Sitting Balance Exercises: good balance with some reaching outside JOHNATHON   Static Standing Balance Exercises: good posture and balance noted. 2-3 min noted standing. Dynamic Standing Balance Exercises: no LOB          Patient Education  Education Given To: Patient  Education Provided: Role of Therapy;Plan of Care;Precautions; ADL Adaptive Strategies  Education Method: Verbal;Demonstration  Barriers to Learning: None  Education Outcome: Verbalized understanding;Demonstrated understanding    Goals  Short Term Goals  Time Frame for Short term goals: By discharge  Short Term Goal 1: Pt will perform lower body dressing/bathing mod I, using appropriate adaptive equipment/adaptive strategies 100% of the time  Short Term Goal 2: Pt will perform transfers/functional mobility mod I, using least restrictive device, during self care tasks  Short Term Goal 3: Pt will participate in dynamic unsupported sitting tasks with mod I facilitating reaching out of base of support and weight bearing through LLE (as petty) to increase input through extremity and improved core strength to increase ease with LB

## 2022-06-30 NOTE — CARE COORDINATION
ONGOING DISCHARGE PLAN:    Patient is alert and oriented x4. Spoke with patient regarding discharge plan and patient states she is \"feeling worse today. \"  She is now agreeable to rehab. She would like [facility]. PM&R consult has already been placed. Notified LSW of this. Also notified Carol Ann from [place] as previous plan was home with [facility]. Will continue to follow for additional discharge needs.     Electronically signed by Pierre Echeverria RN on 6/30/2022 at 10:44 AM

## 2022-06-30 NOTE — CARE COORDINATION
KALANI was informed by [facility] that they are able to start the precert today, 7/74/77. Electronically signed by WANG Jeffrey on 6/30/2022 at 2:29 PM     WANG Holcomb spoke with Ellis Hale about ARU referral and PM&R and told KALANI that pt would be more appropriate for SNF.     Electronically signed by WANG Jeffrey on 6/30/2022 at 3:16 PM

## 2022-06-30 NOTE — PLAN OF CARE
Problem: Discharge Planning  Goal: Discharge to home or other facility with appropriate resources  Outcome: Progressing  Note: Plan is for patient to be discharged back home. Problem: Pain  Goal: Verbalizes/displays adequate comfort level or baseline comfort level  Outcome: Progressing     Problem: Safety - Adult  Goal: Free from fall injury  Outcome: Progressing  Note: Patient is alert and oriented and able to make needs known.      Problem: ABCDS Injury Assessment  Goal: Absence of physical injury  Outcome: Progressing

## 2022-06-30 NOTE — PROGRESS NOTES
[facility] Internal Medicine    CONSULTATION / HISTORY AND PHYSICAL EXAMINATION            Date:   6/30/2022  Patient name:  Clinton Severin  Date of admission:  6/28/2022 10:49 PM  MRN:   [id number]  Account:  [de-identified]  YOB: 1945  PCP:    MIGUEL Mccullough CNP  Room:   2061/2061-01  Code Status:    Full Code    Physician Requesting Consult: Roni Frazier MD    Reason for Consult:  medical management    Chief Complaint:     Chief Complaint   Patient presents with   [place]    Hip Pain       History Obtained From:     Patient medical record nursing staff    History of Present Illness:   Patient with past medical history multiple medical problem, which includes hypertension, depression, chronic back pain, s/p back surgery, hyperlipidemia, fatty liver disease, overweight, patient was admitted to [facility] after a fall, she was trying to change filters of her pool, when she fell down and landed on her buttocks  She was having excruciating pain, came to emergency room, underwent x-ray and CT scan suggestive of pelvic fracture, patient was evaluated by orthopedic surgery, getting managed conservatively  She has worked with physical therapy  Mention that pain is controlled  6/30   Patient had pain yesterday night, which is much improved today  Resting comfortably  Past Medical History:     Past Medical History:   Diagnosis Date    Diverticulosis     Fatty liver     Hyperlipidemia         Past Surgical History:     Past Surgical History:   Procedure Laterality Date    APPENDECTOMY      BLADDER SUSPENSION      in situ sling    COLONOSCOPY  05/23/2013    DIVERTICULOSIS     FEMUR FRACTURE SURGERY Right 09/14/2020    FEMUR IM NAIL YARITZA INSERTION performed by Alcides Judge MD at [address], VAGINAL      SALPINGOOPHORECTOMY      6 wk post vag hyst    SPINE SURGERY      TONSILLECTOMY      TUMOR EXCISION Left 05/18/2021    below left ear- benign    TYMPANOMASTOIDECTOMY Left 2000    UPPER GASTROINTESTINAL ENDOSCOPY  05/23/2013        Medications Prior to Admission:     Prior to Admission medications    Medication Sig Start Date End Date Taking? Authorizing Provider   escitalopram (LEXAPRO) 20 MG tablet Take 1 tablet by mouth daily 5/20/22   Historical Provider, MD   simvastatin (ZOCOR) 40 MG tablet Take 1 tablet by mouth nightly 5/23/22   MIGUEL Patel CNP   calcium elemental (OSCAL) 500 MG TABS tablet take 1 tablet by mouth once daily 2/21/22   Historical Provider, MD   alendronate (FOSAMAX) 70 MG tablet Take 1 tablet by mouth every 7 days 2/18/22   MIGUEL Patel CNP   vitamin D (CHOLECALCIFEROL) 25 MCG (1000 UT) TABS tablet Take 1 tablet by mouth daily 2/18/22 5/19/22  MIGUEL Patel CNP   clotrimazole-betamethasone (LOTRISONE) 1-0.05 % cream Apply to affected area bid externally x 4 days then daily for 3 days 2/16/22   Doreen Hebron Beam, APRN - CNP   losartan (COZAAR) 50 MG tablet take 1 tablet by mouth once daily 10/11/21   MIGUEL Patel CNP   ZINC PO Take by mouth    Historical Provider, MD   alendronate (FOSAMAX) 70 MG tablet take 1 tablet by mouth every 7 days 5/17/21   MIGUEL Patel CNP   diphenhydrAMINE-APAP, sleep, (TYLENOL PM EXTRA STRENGTH PO) Take by mouth    Historical Provider, MD   escitalopram (LEXAPRO) 10 MG tablet take 1 tablet by mouth once daily 1/18/21   MIGUEL Patel CNP   Vitamin Mixture (VITAMIN E COMPLETE PO) Take by mouth    Historical Provider, MD   Cetirizine HCl (ZYRTEC PO) Take by mouth    Historical Provider, MD   aspirin 81 MG tablet Take 81 mg by mouth daily. Historical Provider, MD   Multiple Vitamins-Minerals (THERAPEUTIC MULTIVITAMIN-MINERALS) tablet Take 1 tablet by mouth daily. Historical Provider, MD        Allergies:     Adhesive tape and Lisinopril    Social History:     Tobacco:    reports that she has never smoked.  She has never used smokeless tobacco.  Alcohol:      reports current alcohol use. Drug Use:  reports no history of drug use. Family History:     Family History   Problem Relation Age of Onset    Cancer Mother     Cancer Maternal Aunt        Review of Systems:     Positive and Negative as described in HPI. CONSTITUTIONAL:  negative for fevers, chills, sweats, fatigue, weight loss  HEENT:  negative for vision, hearing changes, runny nose, throat pain  RESPIRATORY:  negative for shortness of breath, cough, congestion, wheezing. CARDIOVASCULAR:  negative for chest pain, palpitations. GASTROINTESTINAL:  negative for nausea, vomiting, diarrhea, constipation, change in bowel habits, abdominal pain   GENITOURINARY:  negative for difficulty of urination, burning with urination, frequency   INTEGUMENT:  negative for rash, skin lesions, easy bruising   HEMATOLOGIC/LYMPHATIC:  negative for swelling/edema   ALLERGIC/IMMUNOLOGIC:  negative for urticaria , itching  ENDOCRINE:  negative increase in drinking, increase in urination, hot or cold intolerance  MUSCULOSKELETAL: Left hip, buttock area   NEUROLOGICAL:  negative for headaches, dizziness, lightheadedness, numbness, pain, tingling extremities  BEHAVIOR/PSYCH:      Physical Exam:     /62   Pulse 67   Temp 98.1 °F (36.7 °C) (Oral)   Resp 16   Ht 5' 6\" (1.676 m)   Wt 170 lb (77.1 kg)   LMP  (LMP Unknown)   SpO2 93%   BMI 27.44 kg/m²   Temp (24hrs), Av.2 °F (36.8 °C), Min:97.9 °F (36.6 °C), Max:98.6 °F (37 °C)    No results for input(s): POCGLU in the last 72 hours. Intake/Output Summary (Last 24 hours) at 2022 1316  Last data filed at 2022 9453  Gross per 24 hour   Intake --   Output 360 ml   Net -360 ml       General Appearance:  alert, well appearing, and in no acute distress  Mental status: oriented to person, place, and time with normal affect  Head:  normocephalic, atraumatic.   Eye: no icterus, redness, pupils equal and reactive, extraocular eye movements intact, conjunctiva clear  Ear: normal external ear, no discharge, hearing intact  Nose:  no drainage noted  Mouth: mucous membranes moist  Neck: supple, no carotid bruits, thyroid not palpable  Lungs: Bilateral equal air entry, clear to ausculation, no wheezing, rales or rhonchi, normal effort  Cardiovascular: normal rate, regular rhythm, no murmur, gallop, rub. Abdomen: Soft, nontender, nondistended, normal bowel sounds, no hepatomegaly or splenomegaly  Neurologic: There are no new focal motor or sensory deficits, normal muscle tone and bulk, no abnormal sensation, normal speech, cranial nerves II through XII grossly intact  Skin: No gross lesions, rashes, bruising or bleeding on exposed skin area  Extremities: Tenderness in left hip, patient is using ice pack, could not check range of motion of left leg  Psych: Investigations:      Laboratory Testing:  No results found for this or any previous visit (from the past 24 hour(s)). Consultations:   IP CONSULT TO INTERNAL MEDICINE  IP CONSULT TO CASE MANAGEMENT  IP CONSULT TO PHYSICAL MEDICINE REHAB  Assessment :      Primary Problem  Closed fracture of single pubic ramus of pelvis, left, initial encounter [facility])    Active Hospital Problems    Diagnosis Date Noted    Closed fracture of single pubic ramus of pelvis, left, initial encounter [facility]) [S32.592A] 06/29/2022     Priority: Medium    Closed fracture of left pubis ([address] [S32.502A]      Priority: Medium    Age-related osteoporosis without current pathological fracture [M81.0] 03/09/2021    Closed fracture of right hip ([address] [S72.001A] 12/21/2020    Essential hypertension [I10] 12/17/2018    Fatty liver [K76.0]        Plan:     1. Pelvic fracture after trauma, getting managed conservatively, orthopedic surgery following  2. Hypertension, restarted home medication of losartan  3. History of depression restarted home dose of Lexapro  4.  Slightly elevated white count likely due to trauma  5. On DVT prophylaxis Lovenox  6. Patient refusing SNF placement she is working with PT want to go back home  7. DC planning    6/30  PMNR consulted  Patient pain is better controlled    Sirisha Resendiz MD  6/30/2022  1:16 PM    Copy sent to Dr. Merrick Squires, APRN - CNP    Please note that this chart was generated using voice recognition Dragon dictation software. Although every effort was made to ensure the accuracy of this automated transcription, some errors in transcription may have occurred.

## 2022-06-30 NOTE — CONSULTS
Physical Medicine & Rehabilitation  Consult Note      Admitting Physician:  Susan Bishop MD    Primary Care Provider:  MIGUEL Swanson CNP     Reason for Consult:  Acute Inpatient Rehabilitation    Chief Complaint:  Fall    History of Present Illness:  Referring Provider is requesting an evaluation for appropriate placement upon discharge from acute care. History from chart review and patient. Ronald Lorenzo is a 68 y.o. female with history of HLD admitted to [facility] on 6/28/2022. She initially presented after a mechanical fall from standing. She was unable to ambulate after the fall. She was found to have a closed left anterior pelvic ring fracture. Orthopedic surgery recommended non-operative management and WBAT to the left lower limb. She reports ongoing pain in the left buttock with some radiation to the left lower limb. She also notes weakness. She otherwise denies any acute concerns. Review of Systems:  Review of Systems   Constitutional: Negative for fever. HENT: Negative for hearing loss. Eyes: Negative for blurred vision and double vision. Respiratory: Negative for shortness of breath. Cardiovascular: Negative for chest pain. Gastrointestinal: Negative for abdominal pain. No change in bowel control   Genitourinary:        No change in bladder control   Musculoskeletal:        +buttock pain   Skin: Negative for rash. Neurological: Positive for weakness. Negative for sensory change and headaches. Premorbid function:  Independent    Current function:    PT    Restrictions/Precautions: Fall Risk,Weight Bearing  Left Lower Extremity Weight Bearing: Weight Bearing As Tolerated    Bed mobility  Rolling to Right: Stand by assistance  Supine to Sit: Minimal assistance  Sit to Supine: Minimal assistance  Scooting: Minimal assistance  Bed Mobility Comments: Minimal assist for LLE mgmt due to pain. HOB slightly elevated.  Pt resting in bed with ice pack and call Contact guard assistance  Stand to sit: Contact guard assistance  Transfer Comments: Min cues for hand placement for safety                  SLP:      Past Medical History:        Diagnosis Date    Diverticulosis     Fatty liver     Hyperlipidemia        Past Surgical History:        Procedure Laterality Date    APPENDECTOMY      BLADDER SUSPENSION      in situ sling    COLONOSCOPY  05/23/2013    DIVERTICULOSIS     FEMUR FRACTURE SURGERY Right 09/14/2020    FEMUR IM NAIL YARITZA INSERTION performed by Concetta Redding MD at [address], VAGINAL      SALPINGOOPHORECTOMY      6 wk post vag hyst    SPINE SURGERY      TONSILLECTOMY      TUMOR EXCISION Left 05/18/2021    below left ear- benign    TYMPANOMASTOIDECTOMY Left 2000    UPPER GASTROINTESTINAL ENDOSCOPY  05/23/2013       Allergies:     Allergies   Allergen Reactions    Adhesive Tape Itching    Lisinopril Other (See Comments)     cough        Current Medications:   Current Facility-Administered Medications: sodium chloride flush 0.9 % injection 5-40 mL, 5-40 mL, IntraVENous, 2 times per day  sodium chloride flush 0.9 % injection 5-40 mL, 5-40 mL, IntraVENous, PRN  0.9 % sodium chloride infusion, , IntraVENous, PRN  enoxaparin (LOVENOX) injection 40 mg, 40 mg, SubCUTAneous, Daily  acetaminophen (TYLENOL) tablet 650 mg, 650 mg, Oral, Q4H PRN  ondansetron (ZOFRAN-ODT) disintegrating tablet 4 mg, 4 mg, Oral, Q8H PRN **OR** ondansetron (ZOFRAN) injection 4 mg, 4 mg, IntraVENous, Q6H PRN  oxyCODONE-acetaminophen (PERCOCET) 5-325 MG per tablet 1 tablet, 1 tablet, Oral, Q4H PRN  ketorolac (TORADOL) injection 30 mg, 30 mg, IntraVENous, Q6H PRN  aspirin EC tablet 81 mg, 81 mg, Oral, Daily  calcium elemental (OSCAL) tablet 500 mg, 500 mg, Oral, Daily  escitalopram (LEXAPRO) tablet 20 mg, 20 mg, Oral, Daily  losartan (COZAAR) tablet 50 mg, 50 mg, Oral, Daily    Family History:       Problem Relation Age of Onset    Cancer Mother     Cancer Maternal Activity:     Days of Exercise per Week: Not on file    Minutes of Exercise per Session: Not on file   Stress:     Feeling of Stress : Not on file   Social Connections:     Frequency of Communication with Friends and Family: Not on file    Frequency of Social Gatherings with Friends and Family: Not on file    Attends [employer] Services: Not on file    Active Member of [address] [place] [address] or Organizations: Not on file    Attends Club or Organization Meetings: Not on file    Marital Status: Not on file   Intimate Partner Violence:     Fear of Current or Ex-Partner: Not on file    Emotionally Abused: Not on file    Physically Abused: Not on file    Sexually Abused: Not on file   Housing Stability:     Unable to Pay for Housing in the Last Year: Not on file    Number of [place] in the Last Year: Not on file    Unstable Housing in the Last Year: Not on file       Physical Exam:  BP (!) 123/56   Pulse 73   Temp 97.9 °F (36.6 °C)   Resp 16   Ht 5' 6\" (1.676 m)   Wt 170 lb (77.1 kg)   LMP  (LMP Unknown)   SpO2 92%   BMI 27.44 kg/m²     GEN: Well developed, well nourished, no acute distress  HEENT: NCAT. EOMI. Hearing grossly intact. Mucous membranes pink and moist.  RESP: Normal breath sounds with no wheezing, rales, or rhonchi. Respirations WNL and unlabored. CV: Regular rate and rhythm. No murmurs, rubs, or gallops. ABD: Soft, non-distended, BS+ and equal.  NEURO: Alert. Speech fluent. Sensation to light touch intact. MSK:  Muscle tone and bulk are normal bilaterally. Strength 4+/5 in bilateral lower limbs. Strength 4+/5 with bilateral ankle dorsiflexion and plantarflexion. LIMBS: No edema in bilateral lower limbs. SKIN: Warm and dry with good turgor. PSYCH: Mood WNL. Affect WNL. Appropriately interactive.     Diagnostics:    CBC:   Recent Labs     06/29/22  0157   WBC 17.6*   RBC 4.47   HGB 14.1   HCT 42.3   MCV 94.7   RDW 13.9        BMP:   Recent Labs     06/29/22  0213      K 3.9    CO2 19*   BUN 11   CREATININE 0.55   GLUCOSE 133*      HbA1c:   Lab Results   Component Value Date    LABA1C 5.3 02/18/2022     BNP: No results for input(s): BNP in the last 72 hours. PT/INR: No results for input(s): PROTIME, INR in the last 72 hours. APTT: No results for input(s): APTT in the last 72 hours. CARDIAC ENZYMES: No results for input(s): CKMB, CKMBINDEX, TROPONINT in the last 72 hours. Invalid input(s): CKTOTAL;3  FASTING LIPID PANEL:  Lab Results   Component Value Date    CHOL 201 (H) 12/27/2019    HDL 49 05/23/2022    TRIG 297 (H) 12/27/2019     LIVER PROFILE:   Recent Labs     06/29/22  0213   AST 21   ALT 26   BILITOT 0.35   ALKPHOS 70       Radiology:  CT PELVIS WO CONTRAST Additional Contrast? None   Final Result   1. Traumatic comminuted minimally displaced left superior pubic ramus   fracture. 2. Traumatic nondisplaced right inferior pubic ramus fracture. 3. Thickening of the left obturator externus, likely secondary to hematoma. XR HIP 2-3 VW W PELVIS LEFT   Final Result   Acute nondisplaced fracture of the left pubic symphysis and adjacent left   superior pubic ramus. No acute left hip fracture or dislocation. Impression:    1. Left anterior pelvic ring fracture  2. Pain  3. Leukocytosis  4. HLD    Recommendations:    1. Diagnosis:  Left anterior pelvic ring fracture  2. Therapy: Has PT/OT needs  3. Medical Necessity: As above  4. Support: Lives with significant other  5. Rehab Recommendation:  The patient would benefit from additional therapies after discharge. She does not currently meet criteria for acute inpatient rehabilitation. Subacute rehabilitation at a skilled nursing facility would be more appropriate for her at this time. 6. DVT Prophylaxis: Lovenox    It was my pleasure to evaluate Radha Guerra today. Please call with questions.     Alejandro Francisco MD

## 2022-07-01 VITALS
HEIGHT: 66 IN | WEIGHT: 170 LBS | HEART RATE: 72 BPM | DIASTOLIC BLOOD PRESSURE: 60 MMHG | RESPIRATION RATE: 18 BRPM | SYSTOLIC BLOOD PRESSURE: 132 MMHG | BODY MASS INDEX: 27.32 KG/M2 | TEMPERATURE: 98.2 F | OXYGEN SATURATION: 92 %

## 2022-07-01 PROCEDURE — 6360000002 HC RX W HCPCS: Performed by: ORTHOPAEDIC SURGERY

## 2022-07-01 PROCEDURE — 97535 SELF CARE MNGMENT TRAINING: CPT

## 2022-07-01 PROCEDURE — 97116 GAIT TRAINING THERAPY: CPT

## 2022-07-01 PROCEDURE — 97530 THERAPEUTIC ACTIVITIES: CPT

## 2022-07-01 PROCEDURE — 2580000003 HC RX 258: Performed by: ORTHOPAEDIC SURGERY

## 2022-07-01 PROCEDURE — 99232 SBSQ HOSP IP/OBS MODERATE 35: CPT | Performed by: INTERNAL MEDICINE

## 2022-07-01 PROCEDURE — 6370000000 HC RX 637 (ALT 250 FOR IP): Performed by: ORTHOPAEDIC SURGERY

## 2022-07-01 PROCEDURE — 6370000000 HC RX 637 (ALT 250 FOR IP): Performed by: INTERNAL MEDICINE

## 2022-07-01 RX ORDER — HYDROCODONE BITARTRATE AND ACETAMINOPHEN 5; 325 MG/1; MG/1
1 TABLET ORAL EVERY 4 HOURS PRN
Qty: 42 TABLET | Refills: 0 | Status: SHIPPED | OUTPATIENT
Start: 2022-07-01 | End: 2022-07-08

## 2022-07-01 RX ADMIN — ESCITALOPRAM OXALATE 20 MG: 20 TABLET ORAL at 10:21

## 2022-07-01 RX ADMIN — ENOXAPARIN SODIUM 40 MG: 100 INJECTION SUBCUTANEOUS at 10:22

## 2022-07-01 RX ADMIN — ASPIRIN 81 MG: 81 TABLET, COATED ORAL at 10:21

## 2022-07-01 RX ADMIN — OXYCODONE HYDROCHLORIDE AND ACETAMINOPHEN 1 TABLET: 5; 325 TABLET ORAL at 15:44

## 2022-07-01 RX ADMIN — KETOROLAC TROMETHAMINE 30 MG: 30 INJECTION, SOLUTION INTRAMUSCULAR; INTRAVENOUS at 01:49

## 2022-07-01 RX ADMIN — OXYCODONE HYDROCHLORIDE AND ACETAMINOPHEN 1 TABLET: 5; 325 TABLET ORAL at 10:22

## 2022-07-01 RX ADMIN — LOSARTAN POTASSIUM 50 MG: 50 TABLET, FILM COATED ORAL at 10:21

## 2022-07-01 RX ADMIN — CALCIUM 500 MG: 500 TABLET ORAL at 10:21

## 2022-07-01 RX ADMIN — SODIUM CHLORIDE, PRESERVATIVE FREE 10 ML: 5 INJECTION INTRAVENOUS at 10:22

## 2022-07-01 ASSESSMENT — PAIN DESCRIPTION - ORIENTATION
ORIENTATION: LEFT

## 2022-07-01 ASSESSMENT — PAIN DESCRIPTION - DESCRIPTORS
DESCRIPTORS: THROBBING
DESCRIPTORS: DISCOMFORT;TENDER
DESCRIPTORS: TENDER

## 2022-07-01 ASSESSMENT — PAIN DESCRIPTION - LOCATION
LOCATION: PELVIS;BUTTOCKS
LOCATION: BUTTOCKS
LOCATION: BUTTOCKS
LOCATION: PELVIS;BUTTOCKS
LOCATION: BUTTOCKS

## 2022-07-01 ASSESSMENT — PAIN - FUNCTIONAL ASSESSMENT
PAIN_FUNCTIONAL_ASSESSMENT: ACTIVITIES ARE NOT PREVENTED
PAIN_FUNCTIONAL_ASSESSMENT: ACTIVITIES ARE NOT PREVENTED

## 2022-07-01 ASSESSMENT — PAIN SCALES - GENERAL
PAINLEVEL_OUTOF10: 4
PAINLEVEL_OUTOF10: 4
PAINLEVEL_OUTOF10: 7
PAINLEVEL_OUTOF10: 5
PAINLEVEL_OUTOF10: 6

## 2022-07-01 NOTE — PLAN OF CARE
Problem: Discharge Planning  Goal: Discharge to home or other facility with appropriate resources  7/1/2022 0456 by Dario Eisenberg RN  Outcome: Progressing  6/30/2022 1640 by Abiola Burnham RN  Outcome: Progressing  Note: Plan is for patient to be discharged back home. Problem: Pain  Goal: Verbalizes/displays adequate comfort level or baseline comfort level  7/1/2022 0456 by Dario Eisenberg RN  Outcome: Progressing  6/30/2022 1640 by Abiola Burnham RN  Outcome: Progressing     Problem: Safety - Adult  Goal: Free from fall injury  7/1/2022 0456 by Dario Eisenberg RN  Outcome: Progressing  6/30/2022 1640 by Abiola Burnham RN  Outcome: Progressing  Note: Patient is alert and oriented and able to make needs known.      Problem: ABCDS Injury Assessment  Goal: Absence of physical injury  7/1/2022 0456 by Dario Eisenberg RN  Outcome: Progressing  6/30/2022 1640 by Abiola Burnham RN  Outcome: Progressing

## 2022-07-01 NOTE — DISCHARGE INSTR - DIET

## 2022-07-01 NOTE — CARE COORDINATION
SW received authorization from [facility]. Pt was informed and Transport was setup for 7:30pm via [id number] [place] [address]. KALANI informed nursing staff and pt.     Electronically signed by WANG Latham on 7/1/2022 at 11:59 AM

## 2022-07-01 NOTE — DISCHARGE SUMMARY
Discharge Summary    Attending Physician: Rajendra Taylor MD  Admit Date: 6/28/2022  Discharge Date: 7/1/2022   Primary Care Physician: MIGUEL Lee - CNP    Admitting Diagnosis:  Pelvis fracture    Discharge Diagnoses:  Pelvis fracture    Past Medical History:   Diagnosis Date    Diverticulosis     Fatty liver     Hyperlipidemia        Procedures Performed and Findings  * No surgery found *     Consultations Obtained  IP CONSULT TO INTERNAL MEDICINE  IP CONSULT TO CASE MANAGEMENT  IP CONSULT TO [place] [address] Course  Patient was admitted to [facility] after she fell and sustained a pelvis fracture. She was unable to get up and ambulate due to significant pain that wasn't controlled in the ED. Once admitted  She was able to achieve adequate pain relief with a combination of IV and oral pain medications and eventually just oral analgesics. She was mobilized with physical therapy. Being medically stable and demonstrating appropriate progress with physical therapy, on 7/1/2022 the patient was discharged to [facility] with a follow up appointment with Dr. Zoey Appiah in 1 month. Discharge Medications       Medication List      START taking these medications    HYDROcodone-acetaminophen 5-325 MG per tablet  Commonly known as: Norco  Take 1 tablet by mouth every 4 hours as needed for Pain for up to 7 days. Intended supply: 7 days.  Take lowest dose possible to manage pain        CONTINUE taking these medications    alendronate 70 MG tablet  Commonly known as: FOSAMAX  Take 1 tablet by mouth every 7 days     aspirin 81 MG tablet     calcium elemental 500 MG Tabs tablet  Commonly known as: OSCAL     clotrimazole-betamethasone 1-0.05 % cream  Commonly known as: LOTRISONE  Apply to affected area bid externally x 4 days then daily for 3 days     escitalopram 20 MG tablet  Commonly known as: LEXAPRO     losartan 50 MG tablet  Commonly known as: COZAAR  take 1 tablet by mouth once daily     simvastatin 40 MG tablet  Commonly known as: ZOCOR  Take 1 tablet by mouth nightly     therapeutic multivitamin-minerals tablet     TYLENOL PM EXTRA STRENGTH PO     vitamin D 25 MCG (1000 UT) Tabs tablet  Commonly known as: CHOLECALCIFEROL  Take 1 tablet by mouth daily     VITAMIN E COMPLETE PO     ZINC PO     ZYRTEC PO           Where to Get Your Medications      You can get these medications from any pharmacy    Bring a paper prescription for each of these medications  · HYDROcodone-acetaminophen 5-325 MG per tablet          Discharge Disposition  [facility]    Activity on Discharge  Wbat    Discharge Instructions  wbat    Follow-Up Scheduled    [facility] [address]  [address] [phone]    they will call you to setup a time to come out to your house        Schedule an appointment as soon as possible for a visit in 1 month

## 2022-07-01 NOTE — PLAN OF CARE
Problem: Discharge Planning  Goal: Discharge to home or other facility with appropriate resources  7/1/2022 1749 by Bob Fleischer, RN  Outcome: Completed  Note: Plan is for patient to discharge to [facility]. 7/1/2022 0456 by Tanner Weller RN  Outcome: Progressing     Problem: Pain  Goal: Verbalizes/displays adequate comfort level or baseline comfort level  7/1/2022 1749 by Bob Fleischer, RN  Outcome: Completed  7/1/2022 0456 by Tanner Weller RN  Outcome: Progressing     Problem: Safety - Adult  Goal: Free from fall injury  7/1/2022 1749 by Bob Fleischer, RN  Outcome: Completed  Note: Patient is alert and oriented and able to make needs known.   7/1/2022 0456 by Tanner Weller RN  Outcome: Progressing     Problem: ABCDS Injury Assessment  Goal: Absence of physical injury  7/1/2022 1749 by Bob Fleischer, RN  Outcome: Completed  7/1/2022 0456 by Tanner Weller RN  Outcome: Progressing

## 2022-07-01 NOTE — PROGRESS NOTES
Occupational Therapy  Facility/Department: [facility] MED SURG  Daily Treatment Note  NAME: Gianluca Lechuga  : 1945  MRN: [id number]    Date of Service: 2022    Discharge Recommendations:  Patient would benefit from continued therapy after discharge  OT Equipment Recommendations  Equipment Needed: Yes  Mobility Devices: ADL Assistive Devices; Roberts Gayer: Rolling  ADL Assistive Devices: Grab Bars - shower;Grab Bars - toilet; Shower Chair with back; Reacher; Toileting - Raised Toilet Seat with arms;Long-handled Sponge;Sock-Aid Hard      Patient Diagnosis(es): The primary encounter diagnosis was Closed fracture of left pubis, unspecified portion of pubis, initial encounter ([facility] [address] A diagnosis of Fall, initial encounter was also pertinent to this visit. Assessment    Activity Tolerance: Patient tolerated treatment well;Patient limited by pain  Discharge Recommendations: Patient would benefit from continued therapy after discharge  Equipment Needed: Yes  Mobility Devices: ADL Assistive Devices; [address]  Times per Week: 5-7  Current Treatment Recommendations: Strengthening;ROM;Balance training;Functional mobility training; Endurance training;Pain management; Safety education & training;Patient/Caregiver education & training;Equipment evaluation, education, & procurement;Self-Care / ADL; Home management training;Positioning     Subjective   Subjective  Subjective: Pt reports feeling well today, requesting a shower. Pt very pleasant and motivated for treatment. Pain: Pt with very low pain initially; 2/10, however increased to 4/10 during session. Pt initially wanted to retire in bedside chair however due to increased pain and fatigue following shower, pt requested to lie back down. Call light within reach, pt stated she was going to request pain medication.    Orientation  Overall Orientation Status: Within Functional Limits  Cognition  Overall Cognitive Status: WFL        Objective       Bed Mobility

## 2022-07-01 NOTE — PROGRESS NOTES
[place] [address] [facility] Medicine    CONSULTATION / HISTORY AND PHYSICAL EXAMINATION            Date:   7/1/2022  Patient name:  Storm Browne  Date of admission:  6/28/2022 10:49 PM  MRN:   [id number]  Account:  [de-identified]  YOB: 1945  PCP:    MIGUEL Cano CNP  Room:   2061/2061-01  Code Status:    Full Code    Physician Requesting Consult: Christal Owens MD    Reason for Consult:  medical management    Chief Complaint:     Chief Complaint   Patient presents with   Ardie Runner    Hip Pain       History Obtained From:     Patient medical record nursing staff    History of Present Illness:   Patient with past medical history multiple medical problem, which includes hypertension, depression, chronic back pain, s/p back surgery, hyperlipidemia, fatty liver disease, overweight, patient was admitted to [facility] after a fall, she was trying to change filters of her pool, when she fell down and landed on her buttocks  She was having excruciating pain, came to emergency room, underwent x-ray and CT scan suggestive of pelvic fracture, patient was evaluated by orthopedic surgery, getting managed conservatively  She has worked with physical therapy  Mention that pain is controlled  6/30   Patient had pain yesterday night, which is much improved today  Resting comfortably  Past Medical History:     Past Medical History:   Diagnosis Date    Diverticulosis     Fatty liver     Hyperlipidemia         Past Surgical History:     Past Surgical History:   Procedure Laterality Date    APPENDECTOMY      BLADDER SUSPENSION      in situ sling    COLONOSCOPY  05/23/2013    DIVERTICULOSIS     FEMUR FRACTURE SURGERY Right 09/14/2020    FEMUR IM NAIL YARITZA INSERTION performed by Brien Dyer MD at [place] [address], VAGINAL      SALPINGOOPHORECTOMY      6 wk post vag hyst    SPINE SURGERY      TONSILLECTOMY      TUMOR EXCISION Left 05/18/2021    below left ear- benign    TYMPANOMASTOIDECTOMY Left 2000    UPPER GASTROINTESTINAL ENDOSCOPY  05/23/2013        Medications Prior to Admission:     Prior to Admission medications    Medication Sig Start Date End Date Taking? Authorizing Provider   escitalopram (LEXAPRO) 20 MG tablet Take 1 tablet by mouth daily 5/20/22   Historical Provider, MD   simvastatin (ZOCOR) 40 MG tablet Take 1 tablet by mouth nightly 5/23/22   MIGUEL Patel CNP   calcium elemental (OSCAL) 500 MG TABS tablet take 1 tablet by mouth once daily 2/21/22   Historical Provider, MD   alendronate (FOSAMAX) 70 MG tablet Take 1 tablet by mouth every 7 days 2/18/22   MIGUEL Patel CNP   vitamin D (CHOLECALCIFEROL) 25 MCG (1000 UT) TABS tablet Take 1 tablet by mouth daily 2/18/22 5/19/22  MIGUEL Patel CNP   clotrimazole-betamethasone (LOTRISONE) 1-0.05 % cream Apply to affected area bid externally x 4 days then daily for 3 days 2/16/22   MIGUEL Samson CNP   losartan (COZAAR) 50 MG tablet take 1 tablet by mouth once daily 10/11/21   MIGUEL Patel CNP   ZINC PO Take by mouth    Historical Provider, MD   alendronate (FOSAMAX) 70 MG tablet take 1 tablet by mouth every 7 days 5/17/21   MIGUEL Patel CNP   diphenhydrAMINE-APAP, sleep, (TYLENOL PM EXTRA STRENGTH PO) Take by mouth    Historical Provider, MD   escitalopram (LEXAPRO) 10 MG tablet take 1 tablet by mouth once daily 1/18/21   MIGUEL Patel CNP   Vitamin Mixture (VITAMIN E COMPLETE PO) Take by mouth    Historical Provider, MD   Cetirizine HCl (ZYRTEC PO) Take by mouth    Historical Provider, MD   aspirin 81 MG tablet Take 81 mg by mouth daily. Historical Provider, MD   Multiple Vitamins-Minerals (THERAPEUTIC MULTIVITAMIN-MINERALS) tablet Take 1 tablet by mouth daily. Historical Provider, MD        Allergies:     Adhesive tape and Lisinopril    Social History:     Tobacco:    reports that she has never smoked.  She has never used smokeless tobacco.  Alcohol:      reports current alcohol use. Drug Use:  reports no history of drug use. Family History:     Family History   Problem Relation Age of Onset    Cancer Mother     Cancer Maternal Aunt        Review of Systems:     Positive and Negative as described in HPI. CONSTITUTIONAL:  negative for fevers, chills, sweats, fatigue, weight loss  HEENT:  negative for vision, hearing changes, runny nose, throat pain  RESPIRATORY:  negative for shortness of breath, cough, congestion, wheezing. CARDIOVASCULAR:  negative for chest pain, palpitations. GASTROINTESTINAL:  negative for nausea, vomiting, diarrhea, constipation, change in bowel habits, abdominal pain   GENITOURINARY:  negative for difficulty of urination, burning with urination, frequency   INTEGUMENT:  negative for rash, skin lesions, easy bruising   HEMATOLOGIC/LYMPHATIC:  negative for swelling/edema   ALLERGIC/IMMUNOLOGIC:  negative for urticaria , itching  ENDOCRINE:  negative increase in drinking, increase in urination, hot or cold intolerance  MUSCULOSKELETAL: Left hip, buttock area   NEUROLOGICAL:  negative for headaches, dizziness, lightheadedness, numbness, pain, tingling extremities  BEHAVIOR/PSYCH:      Physical Exam:     BP (!) 123/56   Pulse 73   Temp 97.9 °F (36.6 °C)   Resp 18   Ht 5' 6\" (1.676 m)   Wt 170 lb (77.1 kg)   LMP  (LMP Unknown)   SpO2 92%   BMI 27.44 kg/m²   Temp (24hrs), Av °F (36.7 °C), Min:97.9 °F (36.6 °C), Max:98.1 °F (36.7 °C)    No results for input(s): POCGLU in the last 72 hours. No intake or output data in the 24 hours ending 22 1115    General Appearance:  alert, well appearing, and in no acute distress  Mental status: oriented to person, place, and time with normal affect  Head:  normocephalic, atraumatic.   Eye: no icterus, redness, pupils equal and reactive, extraocular eye movements intact, conjunctiva clear  Ear: normal external ear, no discharge, hearing intact  Nose:  no drainage noted  Mouth: mucous membranes moist  Neck: supple, no carotid bruits, thyroid not palpable  Lungs: Bilateral equal air entry, clear to ausculation, no wheezing, rales or rhonchi, normal effort  Cardiovascular: normal rate, regular rhythm, no murmur, gallop, rub. Abdomen: Soft, nontender, nondistended, normal bowel sounds, no hepatomegaly or splenomegaly  Neurologic: There are no new focal motor or sensory deficits, normal muscle tone and bulk, no abnormal sensation, normal speech, cranial nerves II through XII grossly intact  Skin: No gross lesions, rashes, bruising or bleeding on exposed skin area  Extremities: Tenderness in left hip, patient is using ice pack, could not check range of motion of left leg  Psych: Investigations:      Laboratory Testing:  No results found for this or any previous visit (from the past 24 hour(s)). Consultations:   IP CONSULT TO INTERNAL MEDICINE  IP CONSULT TO CASE MANAGEMENT  IP CONSULT TO PHYSICAL MEDICINE REHAB  Assessment :      Primary Problem  Closed fracture of single pubic ramus of pelvis, left, initial encounter [facility])    Active Hospital Problems    Diagnosis Date Noted    Closed fracture of single pubic ramus of pelvis, left, initial encounter [facility]) [S32.592A] 06/29/2022     Priority: Medium    Closed fracture of left pubis ([address] [S32.502A]      Priority: Medium    Age-related osteoporosis without current pathological fracture [M81.0] 03/09/2021    Closed fracture of right hip ([address] [S72.001A] 12/21/2020    Essential hypertension [I10] 12/17/2018    Fatty liver [K76.0]        Plan:     1. Pelvic fracture after trauma, getting managed conservatively, orthopedic surgery following  2. Hypertension, restarted home medication of losartan  3. History of depression restarted home dose of Lexapro  4. Slightly elevated white count likely due to trauma  5. On DVT prophylaxis Lovenox  6.  Patient refusing SNF placement she is working with PT want to go back home  [address] Aysha Puente MD  7/1/2022  11:15 AM    Copy sent to MIGUEL Chery - CNP    Please note that this chart was generated using voice recognition Dragon dictation software. Although every effort was made to ensure the accuracy of this automated transcription, some errors in transcription may have occurred.

## 2022-07-01 NOTE — PROGRESS NOTES
Physical Therapy  Facility/Department: [facility] MED SURG  Daily Treatment Note  NAME: Ingrid Metz  : 1945  MRN: [id number]    Date of Service: 2022    Discharge Recommendations:  Continue to assess pending progress   PT Equipment Recommendations  Other: RW    Patient Diagnosis(es): The primary encounter diagnosis was Closed fracture of left pubis, unspecified portion of pubis, initial encounter ([facility] Diagnoses of Fall, initial encounter and Other closed fracture of left pubis, initial encounter [facility]) were also pertinent to this visit. Assessment   Activity Tolerance: Patient tolerated treatment well;Patient limited by pain (Pt with increased pain with amb, required multiple rest breaks.)      Plan    Plan  Current Treatment Recommendations: Strengthening;Balance training;Functional mobility training;Transfer training;Gait training;Stair training; Therapeutic activities; Safety education & training;Home exercise program;Patient/Caregiver education & training     Restrictions  Restrictions/Precautions  Restrictions/Precautions: Fall Risk,Weight Bearing  Required Braces or Orthoses?: No  Lower Extremity Weight Bearing Restrictions  Left Lower Extremity Weight Bearing: Weight Bearing As Tolerated     Subjective    Subjective  Subjective: Pt agreeable to PT. Pt call light on requesting to use toilet upon entry. Pain: Pt reported pain in L buttock, observed increase in pain with activity  Orientation  Overall Orientation Status: Within Functional Limits  Orientation Level: Oriented X4  Cognition  Overall Cognitive Status: WFL     Objective      Bed Mobility Training  Bed Mobility Training: Yes  Supine to Sit: Minimum assistance (some trunk support assist. )  Sit to Supine:  Moderate assistance (Assist with LEs over EOB)  Scooting: Contact-guard assistance (increased time towards EOB )  Balance  Sitting: Intact  Standing: Impaired (1-2 UE support. )  Standing - Static: Fair;Occasional  Standing - Dynamic: Constant support; Fair  Transfer Training  Transfer Training: Yes  Overall Level of Assistance: Minimum assistance  Sit to Stand: Minimum assistance (Maris from toilet.)  Stand to Sit: Minimum assistance (for safe and controlled descent)  Toilet Transfer: Minimum assistance (due to lower height of toilet; B toilet rails utilized. )  Gait Training  Gait Training: Yes  Gait  Overall Level of Assistance: Contact-guard assistance  Interventions: Safety awareness training;Verbal cues; Tactile cues  Speed/Hyacinth: Slow  Step Length: Right shortened  Gait Abnormalities: Antalgic;Decreased step clearance;Shuffling gait; Step to gait  Distance (ft): 48 Feet  Assistive Device: Walker, rolling     PT Exercises  Dynamic Standing Balance Exercises: Reaching out of JOHNATHON to clean after using toilet and to wash hands. Goals  Short Term Goals  Time Frame for Short term goals: 5 days  Short term goal 1: pt to perform bed mobility with SBA  Short term goal 2: pt to transfer with SBA and [employer]  Short term goal 3: pt to ambulate 100ft c WW and SBA  Short term goal 4: pt to ambulate 1 step with CGA a [employer]  Short term goal 5: pt to be indep with [employer]  Patient Goals   Patient goals : to get home and back to walking     Education  Patient Education  Education Given To: Patient  Education Provided: Role of Therapy;Plan of Care;Home Exercise Program;Precautions;Transfer Training;Energy Conservation  Education Method: Demonstration;Verbal  Barriers to Learning: None known to me.   Education Outcome: Verbalized understanding;Demonstrated understanding    Therapy Time   Individual Concurrent Group Co-treatment   Time In 6045         Time Out [address] [place] [address]

## 2022-07-01 NOTE — PROGRESS NOTES
Pt seen earlier on today. Pain appropriately controlled    Blood pressure (!) 123/56, pulse 73, temperature 97.9 °F (36.6 °C), resp. rate 16, height 5' 6\" (1.676 m), weight 170 lb (77.1 kg), SpO2 92 %, not currently breastfeeding. Left LE in good alignment. 2+ pedal pulses. Sensation is grossly intact to light touch diffusely. +eriberto DF/PF of toes and foot. Impression and plan 67 yo with pelvic ring fracture  - pain control  - mobilize with PT; WBAT   - DVT ppx  - DC planning.  DC to SNF once arrangements are finalized

## 2022-07-05 ENCOUNTER — OUTSIDE SERVICES (OUTPATIENT)
Dept: PRIMARY CARE CLINIC | Age: 77
End: 2022-07-05

## 2022-07-05 DIAGNOSIS — S32.592A CLOSED FRACTURE OF SINGLE PUBIC RAMUS OF PELVIS, LEFT, INITIAL ENCOUNTER (HCC): ICD-10-CM

## 2022-07-05 DIAGNOSIS — I10 ESSENTIAL HYPERTENSION: ICD-10-CM

## 2022-07-05 DIAGNOSIS — Z91.81 AT HIGH RISK FOR FALLS: ICD-10-CM

## 2022-07-05 ASSESSMENT — ENCOUNTER SYMPTOMS
VOMITING: 0
DIARRHEA: 0
COUGH: 0
SHORTNESS OF BREATH: 0
NAUSEA: 0

## 2022-07-05 NOTE — PROGRESS NOTES
Chantelle Colmenares is a 68 y.o. female being seen for her weekly follow up. Location of visit: [facility]    HPI:  New today:Pt here for therapy after a fall with pelvic fracture. Uncomfortable with bed        Review of Systems   Constitutional: Negative for activity change, appetite change, chills, diaphoresis and fever. HENT: Negative for congestion. Respiratory: Negative for cough and shortness of breath. Cardiovascular: Negative for chest pain and palpitations. Gastrointestinal: Negative for diarrhea, nausea and vomiting. Genitourinary: Negative for hematuria. Musculoskeletal: Negative for arthralgias and myalgias. Skin: Negative for rash and wound. Neurological: Negative for weakness and headaches. Psychiatric/Behavioral: Negative for agitation, dysphoric mood and sleep disturbance. The patient is not nervous/anxious. Physical Exam  Vitals reviewed. Constitutional:       General: She is not in acute distress. HENT:      Head: Normocephalic and atraumatic. Nose: No congestion or rhinorrhea. Eyes:      General:         Right eye: No discharge. Left eye: No discharge. Cardiovascular:      Rate and Rhythm: Normal rate and regular rhythm. Pulmonary:      Effort: Pulmonary effort is normal. No respiratory distress. Breath sounds: Normal breath sounds. Abdominal:      Palpations: Abdomen is soft. Tenderness: There is no abdominal tenderness. Musculoskeletal:      Right lower leg: No edema. Left lower leg: No edema. Skin:     General: Skin is warm and dry. Neurological:      Mental Status: She is alert. Mental status is at baseline. ASSESSMENT:   Diagnosis Orders   1. Closed fracture of single pubic ramus of pelvis, left, initial encounter ([address]     2. Essential hypertension     3. At high risk for falls         PLAN:    1.  Closed fracture of single pubic ramus of pelvis, left, initial encounter [facility])  Assessment & Plan:   continue meds and therapy  2. Essential hypertension  Assessment & Plan:   Well-controlled, continue current medications  3.  At high risk for falls  Assessment & Plan:   cntinue therapy

## 2022-07-15 ENCOUNTER — TELEPHONE (OUTPATIENT)
Dept: FAMILY MEDICINE CLINIC | Age: 77
End: 2022-07-15

## 2022-07-15 ENCOUNTER — OUTSIDE SERVICES (OUTPATIENT)
Dept: PRIMARY CARE CLINIC | Age: 77
End: 2022-07-15

## 2022-07-15 DIAGNOSIS — I10 ESSENTIAL HYPERTENSION: ICD-10-CM

## 2022-07-15 DIAGNOSIS — S32.592A CLOSED FRACTURE OF SINGLE PUBIC RAMUS OF PELVIS, LEFT, INITIAL ENCOUNTER (HCC): Primary | ICD-10-CM

## 2022-07-15 DIAGNOSIS — Z91.81 AT HIGH RISK FOR FALLS: ICD-10-CM

## 2022-07-15 NOTE — TELEPHONE ENCOUNTER
Called and spoke with Poonam Mcfarland at [facility] and gave verbal that PCP will follow for home care orders. I also called and scheduled patient for follow up.

## 2022-07-15 NOTE — TELEPHONE ENCOUNTER
[facility] called to request if valentínella will sign off on home orders. They will like a call back with a verbal answer at [phone] ext:[phone].

## 2022-07-16 VITALS
TEMPERATURE: 97.5 F | SYSTOLIC BLOOD PRESSURE: 125 MMHG | HEART RATE: 64 BPM | OXYGEN SATURATION: 95 % | DIASTOLIC BLOOD PRESSURE: 69 MMHG | RESPIRATION RATE: 18 BRPM

## 2022-07-16 ASSESSMENT — ENCOUNTER SYMPTOMS
DIARRHEA: 0
ABDOMINAL PAIN: 0
EYE DISCHARGE: 0
NAUSEA: 0
WHEEZING: 0
VOMITING: 0
SORE THROAT: 0
COUGH: 0
EYE REDNESS: 0
RHINORRHEA: 0
SHORTNESS OF BREATH: 0

## 2022-07-16 NOTE — PROGRESS NOTES
Gianluca Lechuga is a 68 y.o. female being seen for her weekly follow up. Location of visit: [facility]    Visit Date:  07/15/2022    Reason for Visit:    Chief Complaint   Patient presents with    Pelvic Pain        Pelvic Pain  The patient's primary symptoms include pelvic pain. Pertinent negatives include no abdominal pain, chills, diarrhea, dysuria, fever, frequency, headaches, nausea, rash, sore throat or vomiting. Patient fell backward on to cement and fractured pelvic. She is up walking with walker and able to dress self. She relates no concerns and plans to go home with home health on discharge. Review of Systems   Constitutional:  Negative for chills and fever. HENT:  Negative for rhinorrhea and sore throat. Eyes:  Negative for discharge and redness. Respiratory:  Negative for cough, shortness of breath and wheezing. Cardiovascular:  Negative for chest pain and palpitations. Gastrointestinal:  Negative for abdominal pain, diarrhea, nausea and vomiting. Genitourinary:  Positive for pelvic pain. Negative for dysuria and frequency. Musculoskeletal:  Negative for arthralgias and myalgias. Skin:  Negative for rash and wound. Neurological:  Negative for dizziness, light-headedness and headaches. Psychiatric/Behavioral:  Negative for sleep disturbance. Physical Exam  Vitals and nursing note reviewed. Constitutional:       General: She is not in acute distress. Appearance: Normal appearance. She is well-developed. She is not ill-appearing. HENT:      Head: Normocephalic and atraumatic. Right Ear: External ear normal.      Left Ear: External ear normal.   Eyes:      Conjunctiva/sclera: Conjunctivae normal.   Neck:      Thyroid: No thyromegaly. Trachea: No tracheal deviation. Cardiovascular:      Rate and Rhythm: Normal rate and regular rhythm. Heart sounds: Normal heart sounds.    Pulmonary:      Effort: Pulmonary effort is normal. No respiratory distress. Breath sounds: Normal breath sounds. No wheezing. Abdominal:      General: Bowel sounds are normal.      Palpations: Abdomen is soft. Lymphadenopathy:      Cervical: No cervical adenopathy. Skin:     General: Skin is warm. Findings: No rash. Neurological:      Mental Status: She is alert and oriented to person, place, and time. Psychiatric:         Mood and Affect: Mood normal.         Behavior: Behavior normal.         Thought Content: Thought content normal.        Allergies   Allergen Reactions    Adhesive Tape Itching    Lisinopril Other (See Comments)     cough        Past Medical History:   Diagnosis Date    Diverticulosis     Fatty liver     Hyperlipidemia         ASSESSMENT:  /69   Pulse 64   Temp 97.5 °F (36.4 °C)   Resp 18   LMP  (LMP Unknown)   SpO2 95%       Diagnosis Orders   1. Closed fracture of single pubic ramus of pelvis, left, initial encounter ([facility]        2. At high risk for falls        3. Essential hypertension             Plan:  Home health nurse, PT, OT, and aide when discharged home to continue with strength, balance and endurance and pain control related to pubis fracture.  Home health will help with the safe and timely completion of ADLs

## 2022-08-07 DIAGNOSIS — M81.0 AGE-RELATED OSTEOPOROSIS WITHOUT CURRENT PATHOLOGICAL FRACTURE: ICD-10-CM

## 2022-08-08 RX ORDER — ALENDRONATE SODIUM 70 MG/1
70 TABLET ORAL
Qty: 12 TABLET | Refills: 1 | Status: SHIPPED | OUTPATIENT
Start: 2022-08-08

## 2022-08-10 NOTE — PROGRESS NOTES
Medicare Annual Wellness Visit    Oliver Mcclellan is a 68 y.o. female patient who had a fall on 6/28/22. She fell while bending over to clean the pool filter. She sustained a nondisplaced fracture to the left pubis symphysis and pubic ramus. She was admitted to the hospital and discharged to [facility] on 7/1/22. She was in the nursing facility for 2.5 weeks and received some PT/OT. She was discharged home with her significant other. She has good pain relief with tylenol. Her significant other helps her with her ADLs like cooking and cleaning and she gets around the house well with the help of a cane or walker. She drove to this appointment and used the cane to ambulate. She needs to follow up with Dr. Yoli Kuhn. Impression   1. Traumatic comminuted minimally displaced left superior pubic ramus   fracture. 2. Traumatic nondisplaced right inferior pubic ramus fracture. 3. Thickening of the left obturator externus, likely secondary to hematoma. FINDINGS:   Remote left hip fracture status post ORIF with intramedullary victorina and   traversing pin. Fusion hardware at L4-L5. There is an acute nondisplaced   fracture of the left pubic symphysis and adjacent left superior pubic ramus. There is no other acute fracture of the pelvis or left hip. No left hip   dislocation. Impression   Acute nondisplaced fracture of the left pubic symphysis and adjacent left   superior pubic ramus. No acute left hip fracture or dislocation. HYPERTENSION  Shruti Sin has a well controlled hypertension. she is currently on Cozaar, ASA. Patient's most recent BP in the office was stable. she reports stable BP readings at home. Patient denies any adverse reaction to this therapy. she denies any CP, SOB, HA, or palpitations. Patient admits to exercising and adheres to low salt diet. No history of organ damage due to condition noted.   Lab Results   Component Value Date/Time CREATININE 0.55 06/29/2022 02:13 AM     HYPERLIPIDEMIA  Gianluca Lechuga is currently on simvastatin (Zocor). Patient denies adverse reaction to this medication. Compliance with treatment thus far has been good. The patient is not known to have coexisting coronary artery disease. The CVD Risk score (YENIFER'Natiino, et al., 2008) failed to calculate for the following reasons: The 2008 CVD risk score is only valid for ages 27 to 76  Lab Results   Component Value Date/Time    CHOLFAST 209 (H) 05/23/2022 10:33 AM    CHOL 201 (H) 12/27/2019 10:27 AM    HDL 49 05/23/2022 10:33 AM    LDLCHOLESTEROL 122 05/23/2022 10:33 AM    TRIG 297 (H) 12/27/2019 10:27 AM    CHOLHDLRATIO 4.3 05/23/2022 10:33 AM    VLDL NOT REPORTED (H) 02/25/2021 10:52 AM     LFT's, FATTY LIVER, Vitamin E  Lab Results   Component Value Date/Time    ALKPHOS 70 06/29/2022 02:13 AM    ALT 26 06/29/2022 02:13 AM    AST 21 06/29/2022 02:13 AM    BILITOT 0.35 06/29/2022 02:13 AM    PROT 6.4 06/29/2022 02:13 AM    LABALBU 4.1 06/29/2022 02:13 AM    LABALBU 4.3 01/27/2012 08:57 AM      PREDIABETES  Patient's recent hemoglobin A1c below. Patient admits to health problems. Patient denies any polyphagia, polydipsia, polyuria. We discussed the importance lifestyle changes such as cutting down food/drinks high in carbohydrates and regular exercise to avoid any progression on this condition. We are going to continue to monitor the [facility]. Treatment diet control. Lab Results   Component Value Date    LABA1C 5.3 02/18/2022    LABA1C 5.3 10/11/2021        COCHLEAR IMPLANT  Gianluca Lechuga is a 68 y.o. female patient had left cochlear implant surgery at [place] [address] on June 14 2022 with Dr. Jan Currie. She reports great success with hearing quality since procedure. She denies issues with healing since then and had a follow up appointment with the office after the surgery.     HISTORY OF FRACTURED HIP/FEMUR  Patient Pleasant Click in September, was sent to the nursing home for a few weeks. She did well. Eventually went home in October. She had hip repair in December 2020. Surgery done by Dr. Luis Carlos Brewer. Patient was very happy wih the surgeon. RASH  Patient also has recurrent intertrigo under breasts and have not had any success with Clotrimazole and Nystatin. We had some challenges with medications from her insurance companies. We will change medication to Triamcinolone- did not work. Declined to see the dermatologist for now. OSTEOPOROSIS- still taking vitamin d and calcium and fosamax.  screening       FINDINGS:   Left       FOREARM:       The BMD of the middle third of the radius of the distal forearm equals 0.52   g/cm2 corresponding to a T-score of -2.7 and Z-score of -0.5. This is within   the osteoporosis range by WHO criteria. LEFT HIP:       The bone mineral density in the total hip is measured at 0.65 g/cm2   corresponding to a T-score of -2.8 and a Z-score of -1.3. This is within the   osteoporosis range by WHO criteria. The bone mineral density of the femoral neck is measured at 0.56 g/cm2   corresponding to a T-score of -3.4 and a Z-score of -1.7. This is within the   osteoporosis range by WHO criteria. Impression   Osteoporosis by WHO criteria. WEIGHT  Patient's BMI is Body mass index is 27.7 kg/m². kg/m2. BMI is stable. Patient understands that this condition increases the patient's risk for chronic conditions. Wt Readings from Last 3 Encounters:   08/11/22 171 lb 9.6 oz (77.8 kg)   06/28/22 170 lb (77.1 kg)   05/11/22 179 lb (81.2 kg)     ADJUSTMENT DISORDER/DEPRESSION AND ANXIETY  Ingrid Metz reported some ongoing issues with depression and anxiety. Symptoms includes irritable, psychomotor agitation, racing thoughts and anhedonia. Current therapy includes Lexapro, will add buspirone. , which is working well for her. she denies adverse reaction to current therapy. she also denies suicidal/homicidal ideation, plan or intent.  .  PHQ-2 Over the past 2 weeks, how often have you been bothered by any of the following problems? Little interest or pleasure in doing things: Not at all  Feeling down, depressed, or hopeless: Several days  PHQ-2 Score: 1`  PHQ-9 Over the past 2 weeks, how often have you been bothered by any of the following problems? Trouble falling or staying asleep, or sleeping too much: More than half the days  Feeling tired or having little energy: More than half the days  Poor appetite or overeating: More than half the days  Feeling bad about yourself - or that you are a failure or have let yourself or your family down: More than half the days  Trouble concentrating on things, such as reading the newspaper or watching television: More than half the days  Moving or speaking so slowly that other people could have noticed. Or the opposite - being so fidgety or restless that you have been moving around a lot more than usual: More than half the days  Thoughts that you would be better off dead, or of hurting yourself in some way: Not at all  If you checked off any problems, how difficult have these problems made it for you to do your work, take care of things at home, or get along with other people?: Very difficult  PHQ-9 Total Score: 13  PHQ-9 Total Score: 13   ANALI-7 SCREENING 3/9/2021   Feeling nervous, anxious, or on edge 1-Several days   Not able to stop or control worrying 0-Not at all   Worrying too much about different things 0-Not at all   Trouble relaxing 0-Not at all   Being so restless that it's hard to sit still 0-Not at all   Becoming easily annoyed or irritable 0-Not at all   Feeling afraid as if something awful might happen 1-Several days   ANALI-7 Total Score 2       Amb Fall Risk Assessment and TUG Test 8/11/2022   Do you feel unsteady or are you worried about falling?  yes   2 or more falls in past year? yes   Fall with injury in past year? yes   Timed Up and Go Test > 12 seconds?  -       Cyndy Rhodes is here for Medicare AWV and Hip Pain (LEFT HIP PAIN RADIATING TO LEFT BUTTOCK, )    Assessment & Plan   Medicare annual wellness visit, subsequent  -     Denis Tejeda MD, Orthopedic Surgery, [facility], subsequent encounter  -     Handicap Placard MISC; Starting Thu 8/11/2022, Disp-1 each, R-0, PrintAugust 11, 2027  -     Denis Tejeda MD, Orthopedic Surgery, [place]  Closed nondisplaced fracture of left pubis, sequela  -     Handicap Placard MISC; Starting Thu 8/11/2022, Disp-1 each, R-0, PrintAugust 11, 2027  -     Denis Tejeda MD, Orthopedic Surgery, [place]  Closed fracture of right hip, sequela  -     Handicap Placard MISC; Starting Thu 8/11/2022, Disp-1 each, R-0, PrintAugust 11, 2027  -     Denis Tejeda MD, Orthopedic Surgery, [place]  Other fracture of right femur, initial encounter for closed fracture [facility])  -     Handicap Placard MISC; Starting Thu 8/11/2022, Disp-1 each, R-0, PrintAugust 11, 2027  -     [address], [place], MD, Orthopedic Surgery, [place]  Essential hypertension  Mixed hyperlipidemia  Prediabetes  Adjustment disorder with mixed anxiety and depressed mood  -     busPIRone (BUSPAR) 10 MG tablet; Take 1 tablet by mouth in the morning and 1 tablet at noon and 1 tablet before bedtime. , Disp-90 tablet, R-1Normal  Age-related osteoporosis without current pathological fracture  Cochlear implant in place  At moderate risk for fall  -     Handicap Placard MISC; Starting Thu 8/11/2022, Disp-1 each, R-0, PrintAugust 11, 2027  Hyperglycemia    Recommendations for Preventive Services Due: see orders and patient instructions/AVS.  Recommended screening schedule for the next 5-10 years is provided to the patient in written form: see Patient Instructions/AVS.     Return for Medicare Annual Wellness Visit in 1 year.      Subjective   The following acute and/or chronic problems were also addressed today:      Patient's complete Health Risk Assessment and screening values have been reviewed and are found in [facility]. The following problems were reviewed today and where indicated follow up appointments were made and/or referrals ordered. Positive Risk Factor Screenings with Interventions:    Fall Risk:  Do you feel unsteady or are you worried about falling? : (!) yes  2 or more falls in past year?: (!) yes (IN [facility] 06/25)  Fall with injury in past year?: (!) yes (06/25)   Fall Risk Interventions:    Home safety tips provided  Home exercises provided to promote strength and balance     Depression:  PHQ-2 Score: 1  PHQ-9 Total Score: 13    Severity:1-4 = minimal depression, 5-9 = mild depression, 10-14 = moderate depression, 15-19 = moderately severe depression, 20-27 = severe depression  Depression Interventions:   Will send PT order for [facility] when we receive the PT adress  Regular exercise recommended- 3-5 times per week, 30-45 minutes per session  --------------------------------------------------------------------------------          General Health and ACP:  General  In general, how would you say your health is?: Good  In the past 7 days, have you experienced any of the following: New or Increased Pain, New or Increased Fatigue, Loneliness, Social Isolation, Stress or Anger?: (!) Yes  Select all that apply: (!) Stress, New or Increased Pain  Do you get the social and emotional support that you need?: Yes  Do you have a Living Will?: Yes    Advance Directives       Power of  Living Will ACP-Advance Directive ACP-Power of     Not on File Not on File Not on File Not on File          General Health Risk Interventions:  No Living Will: will need to finish ACP    Health Habits/Nutrition:  Physical Activity: Sufficiently Active    Days of Exercise per Week: 7 days    Minutes of Exercise per Session: 30 min     Have you lost any weight without trying in the past 3 months?: No  Body mass index: (!) 27.69  Have you seen the dentist within the past year?: (!) No  Health Habits/Nutrition Interventions:  Inadequate physical activity:  patient is not ready to increase his/her physical activity level at this time    Hearing/Vision:  Do you or your family notice any trouble with your hearing that hasn't been managed with hearing aids?: No  Do you have difficulty driving, watching TV, or doing any of your daily activities because of your eyesight?: No  Have you had an eye exam within the past year?: Yes  Vision Screening    Right eye Left eye Both eyes   Without correction      With correction 20/20 20/15 20/13     Hearing/Vision Interventions:  none     ADLs:  In the past 7 days, did you need help from others to perform any of the following everyday activities: Eating, dressing, grooming, bathing, toileting, or walking/balance?: No  Select all that apply: (!) Eating, Dressing, Grooming, Bathing, Toileting, Walking/Balance  In the past 7 days, did you need help from others to take care of any of the following: Laundry, housekeeping, banking/finances, shopping, telephone use, food preparation, transportation, or taking medications?: (!) Yes  Select all that apply: [employer], Housekeeping, Banking/Finances, Shopping, Telephone Use, Food Preparation, Transportation, Taking Medications  ADL Interventions:  Patient declines any further evaluation/treatment for this issue          Objective   Vitals:    08/11/22 1347   BP: 112/78   Site: Left Upper Arm   Position: Sitting   Cuff Size: Large Adult   Pulse: 71   Resp: 20   Temp: 97.3 °F (36.3 °C)   TempSrc: Temporal   SpO2: 99%   Weight: 171 lb 9.6 oz (77.8 kg)   Height: 5' 6\" (1.676 m)      Body mass index is 27.7 kg/m². Allergies   Allergen Reactions    Adhesive Tape Itching    Lisinopril Other (See Comments)     cough     Prior to Visit Medications    Medication Sig Taking?  Authorizing Provider   Handicap Placard MISC by Does not apply route August 11, 2027 Yes MIGUEL Patel - CNP   busPIRone (BUSPAR) 10 MG tablet Take 1 tablet by mouth in the morning and 1 tablet at noon and 1 tablet before bedtime. Yes MIGUEL Patel CNP   alendronate (FOSAMAX) 70 MG tablet take 1 tablet by mouth every 7 days Yes MIGUEL Patel CNP   escitalopram (LEXAPRO) 20 MG tablet Take 1 tablet by mouth daily Yes Historical Provider, MD   simvastatin (ZOCOR) 40 MG tablet Take 1 tablet by mouth nightly Yes MIGUEL Patel CNP   calcium elemental (OSCAL) 500 MG TABS tablet take 1 tablet by mouth once daily Yes Historical Provider, MD   vitamin D (CHOLECALCIFEROL) 25 MCG (1000 UT) TABS tablet Take 1 tablet by mouth daily Yes MIGUEL Patel CNP   clotrimazole-betamethasone (LOTRISONE) 1-0.05 % cream Apply to affected area bid externally x 4 days then daily for 3 days Yes MIGUEL Rivera CNP   losartan (COZAAR) 50 MG tablet take 1 tablet by mouth once daily Yes MIGUEL Patel CNP   ZINC PO Take by mouth Yes Historical Provider, MD   diphenhydrAMINE-APAP, sleep, (TYLENOL PM EXTRA STRENGTH PO) Take by mouth Yes Historical Provider, MD   Vitamin Mixture (VITAMIN E COMPLETE PO) Take by mouth Yes Historical Provider, MD   Cetirizine HCl (ZYRTEC PO) Take by mouth Yes Historical Provider, MD   aspirin 81 MG tablet Take 81 mg by mouth daily. Yes Historical Provider, MD   Multiple Vitamins-Minerals (THERAPEUTIC MULTIVITAMIN-MINERALS) tablet Take 1 tablet by mouth daily. Yes Historical Provider, MD   alendronate (FOSAMAX) 70 MG tablet take 1 tablet by mouth every 7 days  MIGUEL Patel CNP   escitalopram (LEXAPRO) 10 MG tablet take 1 tablet by mouth once daily  MIGUEL Patel CNP     Review of Systems   Constitutional:  Positive for activity change. Negative for fatigue. HENT:  Negative for ear pain and trouble swallowing. Respiratory:  Negative for cough and shortness of breath. Cardiovascular:  Negative for chest pain and leg swelling.    Gastrointestinal:  Negative for constipation, diarrhea, nausea and vomiting. Genitourinary:  Negative for dysuria and urgency. Musculoskeletal:  Positive for arthralgias, gait problem and myalgias. Skin:  Positive for rash. Neurological:  Negative for syncope and numbness. Psychiatric/Behavioral:  Negative for self-injury and sleep disturbance. The patient is nervous/anxious. Physical Exam  Vitals and nursing note reviewed. Constitutional:       Appearance: She is normal weight. HENT:      Head: Normocephalic. Ears:      Comments: Left cochlear implant  Right hearing aid  Eyes:      Pupils: Pupils are equal, round, and reactive to light. Cardiovascular:      Rate and Rhythm: Normal rate and regular rhythm. Pulses: Normal pulses. Heart sounds: Normal heart sounds. Pulmonary:      Effort: Pulmonary effort is normal.      Breath sounds: Normal breath sounds and air entry. Abdominal:      General: Bowel sounds are normal.      Palpations: Abdomen is soft. Musculoskeletal:      Left hip: Tenderness present. Decreased strength. Right lower leg: No edema. Left lower leg: No edema. Skin:     Findings: Rash present. Neurological:      Mental Status: She is alert and oriented to person, place, and time. Psychiatric:         Mood and Affect: Mood is anxious and depressed. Behavior: Behavior is cooperative. 1. Medicare annual wellness visit, subsequent  Annual visit. - Alcides Mansfield MD, Orthopedic Surgery, [place]    2. Fall, subsequent encounter  Stable. DISCUSSED AND ADVISED TO:  Exercise to improve muscle strength and balance  Limit alcohol intake  Report numbness to your feet/legs  Wear well fitting shoes. Report for worsening symptoms    - Handicap Placard MISC; by Does not apply route August 11, 2027  Dispense: 1 each; Refill: 0  - Alcides Mansfield MD, Orthopedic Surgery, [place]    3. Closed nondisplaced fracture of left pubis, sequela  Stable. Continue PT.   Follow up with  Mary.  Keep using cane or walker for ambulation. Tylenol for pain relief. - Handicap Placard MISC; by Does not apply route August 11, 2027  Dispense: 1 each; Refill: 0  - [address], MD, Orthopedic Surgery, [place]    4. Closed fracture of right hip, sequela  Stable. Continue current treatment.     - Handicap Placard MISC; by Does not apply route August 11, 2027  Dispense: 1 each; Refill: 0  - [address], MD, Orthopedic [place], [place]    5. Other fracture of right femur, initial encounter for closed fracture ([facility] [address]  Stable. Continue therapy. - Handicap Placard MISC; by Does not apply route August 11, 2027  Dispense: 1 each; Refill: 0  - [address], MD, Orthopedic [place], [place]    6. Essential hypertension  Stable. Continue current therapy. DISCUSSED AND ADVISED TO:  Cut down on your salt intake. Cut down on caffeinated drinks, sports drinks. Instructed to check BP at home regularly. Report for any chest pains, shortness of breath, headaches, and lightheadedness. Call the office if your blood pressure continue to be higher than 140/90 or 90/50.    7. Mixed hyperlipidemia  Stable  Continue therapy. Advised to decrease the consumption of red meats, fried foods, trans fats, sweets, sugary beverages. Advised to increase fish, vegetables, and fruits consumption. Advised to add fiber or OTC supplements in diet. Discussed weight loss which will result in improvement of lipids levels. Advised to increase daily physical activities and add regular exercises. 8. Prediabetes  Stable  Continue therapy. We will continue to monitor Hemoglobin A1c   DISCUSSED and ADVISED TO:  Continue to check Glucose levels at home. Report increased and low levels as discussed. Decrease carbohydrates, sugary drinks, desserts in your diet. Exercise regularly, as tolerated. Try to lose weight. 9. Adjustment disorder with mixed anxiety and depressed mood  Ongoing.   Continue current therapy and start buspirone. DISCUSSED and ADVISED TO:  Not stopping medication suddenly. Report for feelings of SI, HI, and hallucinations. Go to the ER for increasing urge to hurt yourself. Discussed how to recognize anxiety. Advised to get enough rest.  Advised to avoid alcohol, caffeine, nicotine, and illegal drugs, which can increase anxiety level and cause sleep problems. - busPIRone (BUSPAR) 10 MG tablet; Take 1 tablet by mouth in the morning and 1 tablet at noon and 1 tablet before bedtime. Dispense: 90 tablet; Refill: 1    10. Age-related osteoporosis without current pathological fracture  Stable  DISCUSSED AND ADVISED TO:  Avoid smoking  Limit alcohol consumption to 2 drinks a day or less. Weight-bearing exercise is important in helping to maintain and even increase bone mass. Take calcium 5237-2873 mg per day. Vitamin D (400-800 IU per day) is necessary to assist in the absorption of calcium. Eat foods that are rich in Calcium. It is important that you take all medications as directed. Bone loss can only be prevented and progression can be avoided with above therapy. 11. Cochlear implant in place  Stable. Follow with with ENT/audiologist.    12. At moderate risk for fall  Ongoing. DISCUSSED AND ADVISED TO:  Exercise to improve muscle strength and balance  Limit alcohol intake  Report numbness to your feet/legs  Report for worsening symptoms    - Handicap Placard MISC; by Does not apply route August 11, 2027  Dispense: 1 each; Refill: 0    13. Hyperglycemia  Stable. DISCUSSED and ADVISED TO:  Decrease carbohydrates, sugary drinks, desserts   Exercise regularly, as tolerated.         CareTeam (Including outside providers/suppliers regularly involved in providing care):   Patient Care Team:  MIGUEL Patel CNP as PCP - General (Family Medicine)  MIGUEL Larsen CNP as PCP - [facility]  Army Renan MD as Consulting Physician (Gastroenterology) Reviewed and updated this visit:  Tobacco  Allergies  Meds  Problems  Med Hx  Surg Hx  Soc Hx  Fam Hx            This note was completed by using the assistance of a speech-recognition program. However, inadvertent computerized transcription errors may be present. Although every effort was made to ensure accuracy, no guarantees can be provided that every mistake has been identified and corrected by editing.   Electronically signed by MIGUEL Santillan CNP on 8/11/22 at 1:50 PM EDT

## 2022-08-11 ENCOUNTER — OFFICE VISIT (OUTPATIENT)
Dept: FAMILY MEDICINE CLINIC | Age: 77
End: 2022-08-11
Payer: MEDICARE

## 2022-08-11 VITALS
BODY MASS INDEX: 27.58 KG/M2 | OXYGEN SATURATION: 99 % | HEIGHT: 66 IN | SYSTOLIC BLOOD PRESSURE: 112 MMHG | WEIGHT: 171.6 LBS | DIASTOLIC BLOOD PRESSURE: 78 MMHG | TEMPERATURE: 97.3 F | RESPIRATION RATE: 20 BRPM | HEART RATE: 71 BPM

## 2022-08-11 DIAGNOSIS — I10 ESSENTIAL HYPERTENSION: ICD-10-CM
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
## 2022-08-11 DIAGNOSIS — E78.2 MIXED HYPERLIPIDEMIA: ICD-10-CM

## 2022-08-11 DIAGNOSIS — Z96.21 COCHLEAR IMPLANT IN PLACE: ICD-10-CM

## 2022-08-11 DIAGNOSIS — S72.001S CLOSED FRACTURE OF RIGHT HIP, SEQUELA: ICD-10-CM

## 2022-08-11 DIAGNOSIS — R73.03 PREDIABETES: ICD-10-CM

## 2022-08-11 DIAGNOSIS — Z00.00 MEDICARE ANNUAL WELLNESS VISIT, SUBSEQUENT: Primary | ICD-10-CM

## 2022-08-11 DIAGNOSIS — F43.23 ADJUSTMENT DISORDER WITH MIXED ANXIETY AND DEPRESSED MOOD: ICD-10-CM

## 2022-08-11 DIAGNOSIS — S32.502S CLOSED NONDISPLACED FRACTURE OF LEFT PUBIS, SEQUELA: ICD-10-CM

## 2022-08-11 DIAGNOSIS — Z91.81 AT MODERATE RISK FOR FALL: ICD-10-CM

## 2022-08-11 DIAGNOSIS — M81.0 AGE-RELATED OSTEOPOROSIS WITHOUT CURRENT PATHOLOGICAL FRACTURE: ICD-10-CM

## 2022-08-11 DIAGNOSIS — R73.9 HYPERGLYCEMIA: ICD-10-CM

## 2022-08-11 DIAGNOSIS — S72.8X1A OTHER FRACTURE OF RIGHT FEMUR, INITIAL ENCOUNTER FOR CLOSED FRACTURE (HCC): ICD-10-CM

## 2022-08-11 PROCEDURE — G8427 DOCREV CUR MEDS BY ELIG CLIN: HCPCS | Performed by: FAMILY MEDICINE

## 2022-08-11 PROCEDURE — G0439 PPPS, SUBSEQ VISIT: HCPCS | Performed by: FAMILY MEDICINE

## 2022-08-11 PROCEDURE — 99214 OFFICE O/P EST MOD 30 MIN: CPT | Performed by: FAMILY MEDICINE

## 2022-08-11 PROCEDURE — G8399 PT W/DXA RESULTS DOCUMENT: HCPCS | Performed by: FAMILY MEDICINE

## 2022-08-11 PROCEDURE — 1123F ACP DISCUSS/DSCN MKR DOCD: CPT | Performed by: FAMILY MEDICINE

## 2022-08-11 PROCEDURE — 1090F PRES/ABSN URINE INCON ASSESS: CPT | Performed by: FAMILY MEDICINE

## 2022-08-11 PROCEDURE — G8417 CALC BMI ABV UP PARAM F/U: HCPCS | Performed by: FAMILY MEDICINE

## 2022-08-11 PROCEDURE — 1036F TOBACCO NON-USER: CPT | Performed by: FAMILY MEDICINE

## 2022-08-11 RX ORDER — BUSPIRONE HYDROCHLORIDE 10 MG/1
10 TABLET ORAL 3 TIMES DAILY
Qty: 90 TABLET | Refills: 1 | Status: SHIPPED | OUTPATIENT
Start: 2022-08-11 | End: 2022-09-10

## 2022-08-11 ASSESSMENT — ENCOUNTER SYMPTOMS
NAUSEA: 0
COUGH: 0
SHORTNESS OF BREATH: 0
DIARRHEA: 0
VOMITING: 0
TROUBLE SWALLOWING: 0
CONSTIPATION: 0

## 2022-08-11 ASSESSMENT — PATIENT HEALTH QUESTIONNAIRE - PHQ9
SUM OF ALL RESPONSES TO PHQ QUESTIONS 1-9: 13
8. MOVING OR SPEAKING SO SLOWLY THAT OTHER PEOPLE COULD HAVE NOTICED. OR THE OPPOSITE, BEING SO FIGETY OR RESTLESS THAT YOU HAVE BEEN MOVING AROUND A LOT MORE THAN USUAL: 2
9. THOUGHTS THAT YOU WOULD BE BETTER OFF DEAD, OR OF HURTING YOURSELF: 0
1. LITTLE INTEREST OR PLEASURE IN DOING THINGS: 0
3. TROUBLE FALLING OR STAYING ASLEEP: 2
7. TROUBLE CONCENTRATING ON THINGS, SUCH AS READING THE NEWSPAPER OR WATCHING TELEVISION: 2
SUM OF ALL RESPONSES TO PHQ QUESTIONS 1-9: 13
SUM OF ALL RESPONSES TO PHQ QUESTIONS 1-9: 13
2. FEELING DOWN, DEPRESSED OR HOPELESS: 1
SUM OF ALL RESPONSES TO PHQ9 QUESTIONS 1 & 2: 1
6. FEELING BAD ABOUT YOURSELF - OR THAT YOU ARE A FAILURE OR HAVE LET YOURSELF OR YOUR FAMILY DOWN: 2
SUM OF ALL RESPONSES TO PHQ QUESTIONS 1-9: 13
10. IF YOU CHECKED OFF ANY PROBLEMS, HOW DIFFICULT HAVE THESE PROBLEMS MADE IT FOR YOU TO DO YOUR WORK, TAKE CARE OF THINGS AT HOME, OR GET ALONG WITH OTHER PEOPLE: 2
5. POOR APPETITE OR OVEREATING: 2
4. FEELING TIRED OR HAVING LITTLE ENERGY: 2

## 2022-08-11 ASSESSMENT — VISUAL ACUITY
OD_CC: 20/20
OS_CC: 20/15

## 2022-08-11 ASSESSMENT — LIFESTYLE VARIABLES
HOW OFTEN DO YOU HAVE A DRINK CONTAINING ALCOHOL: 2-3 TIMES A WEEK
HOW MANY STANDARD DRINKS CONTAINING ALCOHOL DO YOU HAVE ON A TYPICAL DAY: 1 OR 2

## 2022-08-11 NOTE — PROGRESS NOTES
Visit Information    Have you changed or started any medications since your last visit including any over-the-counter medicines, vitamins, or herbal medicines? YES  Have you stopped taking any of your medications? Is so, why? -  no  Are you having any side effects from any of your medications? - no    Have you seen any other physician or provider since your last visit? yes - ORTHOPAEDIC,    Have you had any other diagnostic tests since your last visit? yes - CT-HIP   Have you been seen in the emergency room and/or had an admission in a hospital since we last saw you?  yes - [facility] 06/25/22   Have you had your routine dental cleaning in the past 6 months?  no     Do you have an active MyChart account? If no, what is the barrier?   Yes    Patient Care Team:  MIGUEL Calloway CNP as PCP - General (Family Medicine)  MIGUEL Calloway CNP as PCP - [facility] Provider  Jeanetta Pallas, MD as Consulting Physician (Gastroenterology)    Medical History Review  Past Medical, Family, and Social History reviewed and does not contribute to the patient presenting condition    Health Maintenance   Topic Date Due    Hepatitis C screen  Never done    DTaP/Tdap/Td vaccine (1 - Tdap) Never done    Shingles vaccine (1 of 2) Never done    COVID-19 Vaccine (4 - Booster for Camargo Peter series) 04/10/2022    Annual Wellness Visit (AWV)  06/12/2022    Flu vaccine (1) 09/01/2022    Depression Monitoring  02/18/2023    Lipids  05/23/2023    DEXA (modify frequency per FRAX score)  Completed    Pneumococcal 65+ years Vaccine  Completed    Hepatitis A vaccine  Aged Out    Hepatitis B vaccine  Aged Out    Hib vaccine  Aged Out    Meningococcal (ACWY) vaccine  Aged Out

## 2022-08-11 NOTE — PATIENT INSTRUCTIONS
Personalized Preventive Plan for [employer] - 8/11/2022  Medicare offers a range of preventive health benefits. Some of the tests and screenings are paid in full while other may be subject to a deductible, co-insurance, and/or copay. Some of these benefits include a comprehensive review of your medical history including lifestyle, illnesses that may run in your family, and various assessments and screenings as appropriate. After reviewing your medical record and screening and assessments performed today your provider may have ordered immunizations, labs, imaging, and/or referrals for you. A list of these orders (if applicable) as well as your Preventive Care list are included within your After Visit Summary for your review. Other Preventive Recommendations:    A preventive eye exam performed by an eye specialist is recommended every 1-2 years to screen for glaucoma; cataracts, macular degeneration, and other eye disorders. A preventive dental visit is recommended every 6 months. Try to get at least 150 minutes of exercise per week or 10,000 steps per day on a pedometer . Order or download the FREE \"Exercise & Physical Activity: Your Everyday Guide\" from [employer]. Call 0-189.655.3375 or search [employer] online. You need 8356-2382 mg of calcium and 9146-3976 IU of vitamin D per day. It is possible to meet your calcium requirement with diet alone, but a vitamin D supplement is usually necessary to meet this goal.  When exposed to the sun, use a sunscreen that protects against both UVA and UVB radiation with an SPF of 30 or greater. Reapply every 2 to 3 hours or after sweating, drying off with a towel, or swimming. Always wear a seat belt when traveling in a car. Always wear a helmet when riding a bicycle or motorcycle.

## 2022-08-15 ENCOUNTER — OFFICE VISIT (OUTPATIENT)
Dept: OBGYN CLINIC | Age: 77
End: 2022-08-15
Payer: MEDICARE

## 2022-08-15 VITALS
DIASTOLIC BLOOD PRESSURE: 78 MMHG | SYSTOLIC BLOOD PRESSURE: 122 MMHG | HEIGHT: 66 IN | HEART RATE: 73 BPM | BODY MASS INDEX: 27.16 KG/M2 | WEIGHT: 169 LBS

## 2022-08-15 DIAGNOSIS — Z90.710 H/O TOTAL VAGINAL HYSTERECTOMY: ICD-10-CM

## 2022-08-15 DIAGNOSIS — Z46.89 PESSARY MAINTENANCE: Primary | ICD-10-CM

## 2022-08-15 DIAGNOSIS — N81.11 MIDLINE CYSTOCELE: ICD-10-CM

## 2022-08-15 PROCEDURE — G8427 DOCREV CUR MEDS BY ELIG CLIN: HCPCS | Performed by: NURSE PRACTITIONER

## 2022-08-15 PROCEDURE — 1123F ACP DISCUSS/DSCN MKR DOCD: CPT | Performed by: NURSE PRACTITIONER

## 2022-08-15 PROCEDURE — G8417 CALC BMI ABV UP PARAM F/U: HCPCS | Performed by: NURSE PRACTITIONER

## 2022-08-15 PROCEDURE — 99213 OFFICE O/P EST LOW 20 MIN: CPT | Performed by: NURSE PRACTITIONER

## 2022-08-15 PROCEDURE — 1090F PRES/ABSN URINE INCON ASSESS: CPT | Performed by: NURSE PRACTITIONER

## 2022-08-15 PROCEDURE — 1036F TOBACCO NON-USER: CPT | Performed by: NURSE PRACTITIONER

## 2022-08-15 PROCEDURE — G8399 PT W/DXA RESULTS DOCUMENT: HCPCS | Performed by: NURSE PRACTITIONER

## 2022-08-15 NOTE — PROGRESS NOTES
Reena Lau  8/15/2022  3:16 PM          Reena Lau is a 68 y.o. female [id number]      The patient was seen. She has no chief complaints today. She has been fitted for a # 4; ring with support type pessary. She states all of her symptoms that she had prior to the pessary have been relieved with its use. She denies any vaginal bleeding. She denies to any vaginal discharge or odor. Her bowels are regular and her voiding pattern is normal.     Blood pressure 122/78, pulse 73, height 5' 6\" (1.676 m), weight 169 lb (76.7 kg), not currently breastfeeding. Chaperone for Intimate Exam  Chaperone was offered and accepted as part of the rooming process. Chaperone: Caren        Abdomen: Soft and non-tender; good bowel sounds; no guarding, rebound or rigidity; no CVA tenderness bilaterally. Extremities: No calf tenderness bilaterally. DTR 2/4 bilaterally. No edema. Perineum/Speculum: There is not any signs of infection; The vaginal vault is without any signs of erythema or erosion. There is no vaginal discharge or odor appreciated. The pessary was cleansed and replaced without any problems and the patient tolerated the procedure well. T.O.S. Ointment was placed with the pessary to decrease discharge/odor. Assessment:   Diagnosis Orders   1. Pessary maintenance        2. H/O total vaginal hysterectomy        3.  Midline cystocele          Chief Complaint   Patient presents with    Follow-up     Pessary cleaning     Patient Active Problem List    Diagnosis Date Noted    Benign essential microscopic hematuria 05/25/2021     Priority: High    Cochlear implant in place 08/11/2022     Priority: Medium    Closed fracture of single pubic ramus of pelvis, left, initial encounter ([address] 06/29/2022     Priority: Medium    Closed fracture of left pubis ([address]      Priority: Medium    H/O total vaginal hysterectomy 05/25/2021     Priority: Medium    H/O bilateral salpingo-oophorectomy S/P vag hyst (6 weeks) 05/25/2021     Priority: Medium    Presence of pessary-#4 Ring with support 05/25/2021     Priority: Medium    Cystocele, midline 02/18/2022    Vitamin D deficiency 02/18/2022    Prediabetes 06/11/2021    Eczema intertrigo 06/11/2021    ACP (advance care planning) 06/11/2021    PMB (postmenopausal bleeding) 05/25/2021    Age-related osteoporosis without current pathological fracture 03/09/2021     Actonel RX Managed by PCP      At moderate risk for fall 12/23/2020    Pruritic rash 12/23/2020    Bacterial vaginosis 12/22/2020    Closed fracture of right hip ([facility] [address] 12/21/2020    Adjustment disorder with mixed anxiety and depressed mood 09/21/2020    Mass of left parotid gland 17 mm 09/14/2020    Acute urinary retention 09/14/2020    Other fracture of right femur, initial encounter for closed fracture ([facility] [address] 09/13/2020    Allergic rhinitis due to allergen 06/10/2020    Candidiasis of breast 06/10/2020    Anxiety and depression 12/12/2019    Essential hypertension 12/17/2018    Diverticulosis of large intestine without hemorrhage 05/15/2017    Mixed hyperlipidemia 01/19/2017    Toenail fungus 08/03/2016    Fatty liver          Plan:  1. Return to the office 10-12 weeks  2. Report any vaginal bleeding or discharge  3. Abstinence  4. Annual Follow-up reviewed with patient. They will schedule appointment    The patient, Ingrid Metz is a 68 y.o. female, was seen with a total time spent of 20 minutes for the visit on this date of service by the E/M provider. The time component had both face to face and non face to face time spent in determining the total time component. Counseling and education regarding her diagnosis listed below and her options regarding those diagnoses were also included in determining her time component. Diagnosis Orders   1. Pessary maintenance        2. H/O total vaginal hysterectomy        3.  Midline cystocele             The patient had her preventative health maintenance recommendations and follow-up reviewed with her at the completion of her visit.

## 2022-08-26 ENCOUNTER — OFFICE VISIT (OUTPATIENT)
Dept: ORTHOPEDIC SURGERY | Age: 77
End: 2022-08-26

## 2022-08-26 VITALS — RESPIRATION RATE: 14 BRPM | BODY MASS INDEX: 27.16 KG/M2 | WEIGHT: 169 LBS | HEIGHT: 66 IN

## 2022-08-26 DIAGNOSIS — S32.89XA CLOSED FRACTURE OF OTHER PARTS OF PELVIS, INITIAL ENCOUNTER (HCC): Primary | ICD-10-CM

## 2022-08-26 PROCEDURE — 99024 POSTOP FOLLOW-UP VISIT: CPT | Performed by: ORTHOPAEDIC SURGERY

## 2022-08-27 DIAGNOSIS — I10 ESSENTIAL HYPERTENSION: ICD-10-CM

## 2022-08-29 RX ORDER — LOSARTAN POTASSIUM 50 MG/1
TABLET ORAL
Qty: 90 TABLET | Refills: 1 | Status: SHIPPED | OUTPATIENT
Start: 2022-08-29

## 2022-08-30 NOTE — PROGRESS NOTES
HPI: Ms. Lis Lai is a 63-year-old approximately 2 months out from a closed left superior and inferior pubic ramus fracture. She states that she is doing fairly well. Her pain is minimal at this time rating it as a 1/10 when present. She is currently ambulating with the aid of a cane. Physical examination:  Evaluation of the patient's left hip and lower extremity demonstrates intact skin without warmth, erythema, or notable swelling. Sensation is grossly intact light touch in all dermatomes and she has good pedal pulses. She has good range of motion to the hip without pain. She is nontender to palpation. Imaging studies: 3 views of the pelvis completed on 8/26/2022 reviewed independently demonstrating left superior and inferior pubic rami fractures with interval consolidation across the fracture site. No dislocation or subluxation appreciated. Impression and plan: Ms. Lis Lai is a 63-year-old female 2 months out from a closed left pelvic fracture. She appears to be healing well clinically and radiographically. At this time she was encouraged to keep up with her exercises from physical therapy and will continue weightbearing as tolerated using her cane for stability. I will see her back for reevaluation in 4 months but she may return or call earlier with any questions or concerns.

## 2022-09-26 ENCOUNTER — OFFICE VISIT (OUTPATIENT)
Dept: OBGYN CLINIC | Age: 77
End: 2022-09-26
Payer: MEDICARE

## 2022-09-26 VITALS
BODY MASS INDEX: 27 KG/M2 | DIASTOLIC BLOOD PRESSURE: 64 MMHG | HEIGHT: 66 IN | SYSTOLIC BLOOD PRESSURE: 118 MMHG | WEIGHT: 168 LBS

## 2022-09-26 DIAGNOSIS — Z90.710 H/O TOTAL VAGINAL HYSTERECTOMY: ICD-10-CM

## 2022-09-26 DIAGNOSIS — N81.11 MIDLINE CYSTOCELE: ICD-10-CM

## 2022-09-26 DIAGNOSIS — Z46.89 PESSARY MAINTENANCE: Primary | ICD-10-CM

## 2022-09-26 DIAGNOSIS — N76.0 ACUTE VAGINITIS: ICD-10-CM

## 2022-09-26 DIAGNOSIS — Z96.0 PRESENCE OF PESSARY: ICD-10-CM

## 2022-09-26 PROCEDURE — 1090F PRES/ABSN URINE INCON ASSESS: CPT | Performed by: NURSE PRACTITIONER

## 2022-09-26 PROCEDURE — G8399 PT W/DXA RESULTS DOCUMENT: HCPCS | Performed by: NURSE PRACTITIONER

## 2022-09-26 PROCEDURE — 1036F TOBACCO NON-USER: CPT | Performed by: NURSE PRACTITIONER

## 2022-09-26 PROCEDURE — G8417 CALC BMI ABV UP PARAM F/U: HCPCS | Performed by: NURSE PRACTITIONER

## 2022-09-26 PROCEDURE — 1123F ACP DISCUSS/DSCN MKR DOCD: CPT | Performed by: NURSE PRACTITIONER

## 2022-09-26 PROCEDURE — G8427 DOCREV CUR MEDS BY ELIG CLIN: HCPCS | Performed by: NURSE PRACTITIONER

## 2022-09-26 PROCEDURE — 99213 OFFICE O/P EST LOW 20 MIN: CPT | Performed by: NURSE PRACTITIONER

## 2022-09-26 RX ORDER — METRONIDAZOLE 500 MG/1
500 TABLET ORAL 2 TIMES DAILY
Qty: 14 TABLET | Refills: 0 | Status: SHIPPED | OUTPATIENT
Start: 2022-09-26 | End: 2022-10-03

## 2022-09-26 RX ORDER — BUSPIRONE HYDROCHLORIDE 10 MG/1
TABLET ORAL
COMMUNITY
Start: 2022-09-25 | End: 2022-10-20

## 2022-09-26 NOTE — PROGRESS NOTES
Parnell Shone  9/26/2022  3:37 PM          Parnell Shone is a 68 y.o. female [id number]      The patient was seen. She has no chief complaints today. She has been fitted for a # 4; ring with support type pessary. She states all of her symptoms that she had prior to the pessary have been relieved with its use. She denies any vaginal bleeding. She denies to any vaginal discharge or odor. Her bowels are regular and her voiding pattern is normal.     Blood pressure 118/64, height 5' 6\" (1.676 m), weight 168 lb (76.2 kg), not currently breastfeeding. Chaperone for Intimate Exam  Chaperone was offered and accepted as part of the rooming process. Chaperone: Gladis        Abdomen: Soft and non-tender; good bowel sounds; no guarding, rebound or rigidity; no CVA tenderness bilaterally. Extremities: No calf tenderness bilaterally. DTR 2/4 bilaterally. No edema. Perineum/Speculum: There is not any signs of infection; The vaginal vault is without any signs of erythema or erosion. There is no vaginal discharge or odor appreciated. The pessary was cleansed and replaced without any problems and the patient tolerated the procedure well. T.O.S. Ointment was placed with the pessary to decrease discharge/odor. Assessment:   Diagnosis Orders   1. Pessary maintenance        2. H/O total vaginal hysterectomy        3. Midline cystocele        4. Presence of pessary-#4 Ring with support        5.  Acute vaginitis  metroNIDAZOLE (FLAGYL) 500 MG tablet        Chief Complaint   Patient presents with    Follow-up     Patient Active Problem List    Diagnosis Date Noted    Benign essential microscopic hematuria 05/25/2021     Priority: High    Cochlear implant in place 08/11/2022     Priority: Medium    Closed fracture of single pubic ramus of pelvis, left, initial encounter ([facility] [address] 06/29/2022     Priority: Medium    Closed fracture of left pubis ([facility] [address]      Priority: Medium    H/O total vaginal hysterectomy 05/25/2021     Priority: Medium    H/O bilateral salpingo-oophorectomy S/P vag hyst (6 weeks) 05/25/2021     Priority: Medium    Presence of pessary-#4 Ring with support 05/25/2021     Priority: Medium    Cystocele, midline 02/18/2022    Vitamin D deficiency 02/18/2022    Prediabetes 06/11/2021    Eczema intertrigo 06/11/2021    ACP (advance care planning) 06/11/2021    PMB (postmenopausal bleeding) 05/25/2021    Age-related osteoporosis without current pathological fracture 03/09/2021     Actonel RX Managed by PCP      At moderate risk for fall 12/23/2020    Pruritic rash 12/23/2020    Bacterial vaginosis 12/22/2020    Closed fracture of right hip ([facility] [address] 12/21/2020    Adjustment disorder with mixed anxiety and depressed mood 09/21/2020    Mass of left parotid gland 17 mm 09/14/2020    Acute urinary retention 09/14/2020    Other fracture of right femur, initial encounter for closed fracture ([facility] [address] 09/13/2020    Allergic rhinitis due to allergen 06/10/2020    Candidiasis of breast 06/10/2020    Anxiety and depression 12/12/2019    Essential hypertension 12/17/2018    Diverticulosis of large intestine without hemorrhage 05/15/2017    Mixed hyperlipidemia 01/19/2017    Toenail fungus 08/03/2016    Fatty liver          Plan:  1. Return to the office 6 weeks  2. Report any vaginal bleeding or discharge  3. Abstinence  4. Annual Follow-up reviewed with patient. They will schedule appointment  Patient complaining of increase in vaginal discharge. Script for Flagyl sent in. Call office if no relief. The patient, Elijah Huber is a 68 y.o. female, was seen with a total time spent of 20 minutes for the visit on this date of service by the E/M provider. The time component had both face to face and non face to face time spent in determining the total time component. Counseling and education regarding her diagnosis listed below and her options regarding those diagnoses were also included in determining her time component. Diagnosis Orders   1. Pessary maintenance        2. H/O total vaginal hysterectomy        3. Midline cystocele        4. Presence of pessary-#4 Ring with support        5. Acute vaginitis  metroNIDAZOLE (FLAGYL) 500 MG tablet           The patient had her preventative health maintenance recommendations and follow-up reviewed with her at the completion of her visit.

## 2022-10-20 RX ORDER — BUSPIRONE HYDROCHLORIDE 10 MG/1
TABLET ORAL
Qty: 90 TABLET | Refills: 2 | Status: SHIPPED | OUTPATIENT
Start: 2022-10-20 | End: 2022-11-11 | Stop reason: SDUPTHER

## 2022-10-26 ENCOUNTER — TELEPHONE (OUTPATIENT)
Dept: FAMILY MEDICINE CLINIC | Age: 77
End: 2022-10-26
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
## 2022-10-26 NOTE — TELEPHONE ENCOUNTER
Incoming call came from home health care facilty [facility]. Nurse KIM is calling to see, if provider Kavon Pearl will be able to PLACE PHYSICAL THERAPY order's for patient. Please give a call back at [phone] . HE WILL LIKE PHYSICAL THERAPY ORDERS FAXED OVER .[phone]    Please advise, Thank you!

## 2022-11-07 ASSESSMENT — ENCOUNTER SYMPTOMS
ABDOMINAL PAIN: 0
NAUSEA: 0
SORE THROAT: 0
SHORTNESS OF BREATH: 0

## 2022-11-08 NOTE — PROGRESS NOTES
[facility] PHYSICIANS  [facility] PHYSICIANS [facility] [address].  [address] [address]  Dept: [phone]     2022   Chief Complaint   Patient presents with    Hypertension    Hyperlipidemia     HPI  Philly Officer (:  1945) is a 68 y.o. female is an established patient . Patient has a history of hypertension, hyperlipidemia, fatty liver, adjustment disorder, hip pain/surgery, bacterial vaginosis    GRIEF  Philly Officer is a 68 y.o. female patient is suffering grief due to several deaths in her family. Recently Lost 16year old grandson.  passed away  Lives with the significant other. Cochlear implant- balance issues  House empty  Grandson messed up the house. She has a lot of things to clean up    [address] has a well controlled hypertension. she is currently on Cozaar. Patient's most recent BP in the office was stable. she reports stable BP readings at home. Patient denies any adverse reaction to this therapy. she denies any CP, SOB, HA, or palpitations. Patient admits to exercising and adheres to low salt diet. No history of organ damage due to condition noted. Lab Results   Component Value Date/Time    CREATININE 0.55 2022 02:13 AM     BP Readings from Last 3 Encounters:   22 118/70   22 112/68   22 118/64      HYPERLIPIDEMIA  Philly Officer is currently on simvastatin (Zocor). Patient denies adverse reaction to this medication. Compliance with treatment thus far has been good. The patient is not known to have coexisting coronary artery disease. The CVD Risk score (D'Agostino, et al., 2008) failed to calculate for the following reasons:     The 2008 CVD risk score is only valid for ages 27 to 76  Lab Results   Component Value Date/Time    CHOLFAST 209 (H) 2022 10:33 AM    CHOL 201 (H) 2019 10:27 AM    HDL 49 2022 10:33 AM    LDLCHOLESTEROL 122 2022 10:33 AM    TRIG 297 (H) 2019 10:27 AM CHOLHDLRATIO 4.3 05/23/2022 10:33 AM    VLDL NOT REPORTED (H) 02/25/2021 10:52 AM     PREDIABETES- stable  Patient's recent hemoglobin A1c below. Patient admits to health problems. Patient denies any polyphagia, polydipsia, polyuria. We discussed the importance lifestyle changes such as cutting down food/drinks high in carbohydrates and regular exercise to avoid any progression on this condition. We are going to continue to monitor the [facility]. Treatment diet control. Lab Results   Component Value Date    LABA1C 5.3 02/18/2022    LABA1C 5.3 10/11/2021       FRACTURED HIP  Patient had fractured hip/femur. Patient Lucrecia [place] in September, was sent to the nursing home for a few weeks. Physical therapy ongoing had some balance issues after cohlear implant. She did well. Eventually went home in October. She had hip repair in December 2020. Surgery done by Dr. Dr. Dina Price. Patient was very happy wih the surgeon. Had continues to do PT close to home, recently finished. Continues to use cane. Takes Tylenol for pain. Hydrocodone  Was given after surgery. DOes not require this anymore. Advised to stop tylenol due to liver enzyme elevation. Tylenol before therapy. NOTES DR Hernandez  Imaging studies: 3 views of the pelvis completed on 8/26/2022 reviewed independently demonstrating left superior and inferior pubic rami fractures with interval consolidation across the fracture site. No dislocation or subluxation appreciated. Impression and plan: Ms. Ty Haq is a 40-year-old female 2 months out from a closed left pelvic fracture. She appears to be healing well clinically and radiographically. At this time she was encouraged to keep up with her exercises from physical therapy and will continue weightbearing as tolerated using her cane for stability. I will see her back for reevaluation in 4 months but she may return or call earlier with any questions or concerns.      RASH  Patient also has recurrent intertrigo under breasts and have not had any success with gold bond medicated powder, Clotrimazole and Nystatin. We had some challenges with medications from her insurance companies. We will change medication to Triamcinolone. some relief. Declined to see the dermatologist for now. ADJUSTMENT DISORDER/DEPRESSION AND ANXIETY  Cricket Vigil reported some ongoing issues with depression and anxiety. Symptoms includes irritable, psychomotor agitation, racing thoughts and anhedonia. Today she presented with worsening depression and grief due to   Recently Lost 16year old grandson.  passed away  Lives with the significant other. Cochlear implant- balance issues  House empty  Grandson messed up the house. She has a lot of things to clean up  Current therapy includes Lexapro, which is working well for her. she denies adverse reaction to current therapy. she also denies suicidal/homicidal ideation, plan or intent. .  PHQ-2 Over the past 2 weeks, how often have you been bothered by any of the following problems? Little interest or pleasure in doing things: Not at all  Feeling down, depressed, or hopeless: More than half the days  PHQ-2 Score: 2  PHQ-9 Over the past 2 weeks, how often have you been bothered by any of the following problems? Trouble falling or staying asleep, or sleeping too much: Not at all  Feeling tired or having little energy: Nearly every day  Poor appetite or overeating: Not at all  Feeling bad about yourself - or that you are a failure or have let yourself or your family down: More than half the days  Trouble concentrating on things, such as reading the newspaper or watching television: More than half the days  Moving or speaking so slowly that other people could have noticed.  Or the opposite - being so fidgety or restless that you have been moving around a lot more than usual: More than half the days  Thoughts that you would be better off dead, or of hurting yourself in some way: Not at all  If you checked off any problems, how difficult have these problems made it for you to do your work, take care of things at home, or get along with other people?: Not difficult at all  PHQ-9 Total Score: 11  PHQ-9 Total Score: 11   ANALI-7 SCREENING 3/9/2021   Feeling nervous, anxious, or on edge 1-Several days   Not able to stop or control worrying 0-Not at all   Worrying too much about different things 0-Not at all   Trouble relaxing 0-Not at all   Being so restless that it's hard to sit still 0-Not at all   Becoming easily annoyed or irritable 0-Not at all   Feeling afraid as if something awful might happen 1-Several days   ANALI-7 Total Score 2      PHQ-9 Total Score: 11 (11/11/2022  1:28 PM)  Thoughts that you would be better off dead, or of hurting yourself in some way: 0 (11/11/2022  1:28 PM)     Counseling given: Not Answered    Patient Active Problem List   Diagnosis    Fatty liver    Toenail fungus    Mixed hyperlipidemia    Diverticulosis of large intestine without hemorrhage    Essential hypertension    Anxiety and depression    Allergic rhinitis due to allergen    Candidiasis of breast    Other fracture of right femur, initial encounter for closed fracture ([address]    Mass of left parotid gland 17 mm    Acute urinary retention    Adjustment disorder with mixed anxiety and depressed mood    Closed fracture of right hip (HCC)    Bacterial vaginosis    At moderate risk for fall    Pruritic rash    Age-related osteoporosis without current pathological fracture    H/O total vaginal hysterectomy    H/O bilateral salpingo-oophorectomy S/P vag hyst (6 weeks)    Benign essential microscopic hematuria    PMB (postmenopausal bleeding)    Presence of pessary-#4 Ring with support    Prediabetes    Eczema intertrigo    ACP (advance care planning)    Cystocele, midline    Vitamin D deficiency    Closed fracture of single pubic ramus of pelvis, left, initial encounter ([address]    Closed fracture of left pubis ([address]    Cochlear implant in place      Past Medical History:   Diagnosis Date    Diverticulosis     Fatty liver     Fractured pelvis ([address] 06/26/2022    Hyperlipidemia       Family History   Problem Relation Age of Onset    Cancer Mother     Cancer Maternal Aunt        Review of Systems   Constitutional:  Positive for activity change. Negative for appetite change, chills and fever. HENT:  Negative for ear pain, sneezing and sore throat. Eyes:         Wears glasses   Respiratory:  Negative for shortness of breath and wheezing. Cardiovascular:  Negative for chest pain and palpitations. Gastrointestinal:  Negative for abdominal pain and nausea. Endocrine: Negative. Genitourinary:  Negative for difficulty urinating, dyspareunia, frequency and pelvic pain. Musculoskeletal:  Negative for arthralgias. Skin:  Positive for rash (Pruritic). Negative for color change. Allergic/Immunologic: Positive for environmental allergies. Allergy to dust grass pollen   Neurological:  Positive for weakness. Negative for dizziness, syncope and headaches. Psychiatric/Behavioral:  Positive for dysphoric mood. Negative for sleep disturbance and suicidal ideas. The patient is nervous/anxious. Social History     Tobacco Use    Smoking status: Never    Smokeless tobacco: Never   Substance Use Topics    Alcohol use: Yes     Comment: social       Vitals:    11/11/22 1330   BP: 118/70   Pulse: 66   Temp: 97.5 °F (36.4 °C)   SpO2: 96%   Weight: 171 lb (77.6 kg)   Height: 5' 6\" (1.676 m)     Estimated body mass index is 27.6 kg/m² as calculated from the following:    Height as of this encounter: 5' 6\" (1.676 m). Weight as of this encounter: 171 lb (77.6 kg). Physical Exam  Vitals and nursing note reviewed. Constitutional:       Appearance: She is well-developed. She is not ill-appearing. HENT:      Right Ear: Tympanic membrane and ear canal normal. No tenderness. No middle ear effusion. Left Ear: No mastoid tenderness. Nose: Rhinorrhea present. No mucosal edema. Right Sinus: No maxillary sinus tenderness or frontal sinus tenderness. Left Sinus: No maxillary sinus tenderness or frontal sinus tenderness. Mouth/Throat:      Mouth: Mucous membranes are pale. Pharynx: Uvula midline. No oropharyngeal exudate. Eyes:      General: Lids are normal. No scleral icterus. Right eye: No discharge. Pupils: Pupils are equal, round, and reactive to light. Neck:      Trachea: No tracheal tenderness. Cardiovascular:      Rate and Rhythm: Normal rate and regular rhythm. Pulmonary:      Effort: Pulmonary effort is normal.      Breath sounds: Normal breath sounds. No stridor. Abdominal:      General: Bowel sounds are normal.      Palpations: Abdomen is soft. Musculoskeletal:         General: Normal range of motion. Right hip: No tenderness or bony tenderness. Normal range of motion. Lymphadenopathy:      Cervical: No cervical adenopathy. Skin:     General: Skin is dry. Neurological:      Mental Status: She is alert and oriented to person, place, and time. Psychiatric:         Mood and Affect: Mood is anxious. Affect is tearful. Speech: Speech is not rapid and pressured. Behavior: Behavior normal.         Thought Content: Thought content does not include suicidal ideation. ASSESSMENT/PLAN:  1. Grief  Worsening   Buspirone tid  Continue current therapy. Discussed how to recognize anxiety. Advised to relieve tension with exercise or a massage. Advised to get enough rest.  Advised to avoid alcohol, caffeine, nicotine, and illegal drugs. Which can increase anxiety level and cause sleep problems. - busPIRone (BUSPAR) 10 MG tablet; take 1 tablet by mouth IN THE MORNING and take 1 tablet by mouth AT NOON and take 1 tablet by mouth BEFORE BEDTIME  Dispense: 90 tablet; Refill: 1    2. Essential hypertension  Stable  Continue current therapy.   DISCUSSED AND ADVISED TO:  Cut down on your salt intake. Cut down on caffeinated drinks, sports drinks. Instructed to check BP at home regularly. Report for any chest pains, shortness of breath, headaches, and lightheadedness. Call the office if your blood pressure continue to be higher than 140/90 or 90/50.     3. Mixed hyperlipidemia  Stable  Continue therapy. Advised to decrease the consumption of red meats, fried foods, trans fats, sweets, sugary beverages. Advised to increase fish, vegetables, and fruits consumption. Advised to add fiber or OTC supplements in diet. Discussed weight loss which will result in improvement of lipids levels. Advised to increase daily physical activities and add regular exercises. 4. Prediabetes  Stable  DISCUSSED and ADVISED TO:  Decrease carbohydrates, sugary drinks, desserts   Exercise regularly, as tolerated. Try to lose weight. 5. Adjustment disorder with mixed anxiety and depressed mood  Failure to Improve  Buspirone tid  Continue current therapy. Discussed how to recognize anxiety. Advised to relieve tension with exercise or a massage. Advised to get enough rest.  Advised to avoid alcohol, caffeine, nicotine, and illegal drugs. Which can increase anxiety level and cause sleep problems. - busPIRone (BUSPAR) 10 MG tablet; take 1 tablet by mouth IN THE MORNING and take 1 tablet by mouth AT NOON and take 1 tablet by mouth BEFORE BEDTIME  Dispense: 90 tablet; Refill: 1    6. Closed nondisplaced fracture of left pubis, sequela  Failure to Improve  Continue current therapy. DISCUSSED and ADVISED TO:  Stay at a healthy weight. Continue exercises/PT  Stretch to help prevent stiffness and to prevent injury before exercise. Gentle forms of yoga help keep joints and muscles flexible. Walk instead of jog, ride a bike, swim, and water exercise. Lift weights as tolerated. strong muscles help reduce stress on joints. Take pain medicines exactly as directed and only as needed.      7. Closed fracture of right hip, sequela  Failure to Improve  Continue current therapy. DISCUSSED and ADVISED TO:  Stay at a healthy weight. Continue exercises/PT  Stretch to help prevent stiffness and to prevent injury before exercise. Gentle forms of yoga help keep joints and muscles flexible. Walk instead of jog, ride a bike, swim, and water exercise. Lift weights as tolerated. strong muscles help reduce stress on joints. Take pain medicines exactly as directed and only as needed. 8. Pruritic rash  Improving  Continue therapy   DISCUSSED AND ADVISED TO:  Use hypoallergenic creams, soaps, lotions,   Report for worsening symptoms      9. Cochlear implant in place  Stable  Continue to monitor      10. Need for influenza vaccination  Stable  Recommended by CDC. No current infection. Denies previous adverse reaction to vaccination.   - Influenza, FLUAD, (age 72 y+), IM, Preservative Free, 0.5 mL      This note was completed by using the assistance of a speech-recognition program. However, inadvertent computerized transcription errors may be present. Although every effort was made to ensure accuracy, no guarantees can be provided that every mistake has been identified and corrected by editing   An electronic signature was used to authenticate this note.   --MIGUEL Alvarado - CNP on 11/11/2022 at 5:44 PM

## 2022-11-09 ENCOUNTER — HOSPITAL ENCOUNTER (OUTPATIENT)
Age: 77
Setting detail: SPECIMEN
Discharge: HOME | End: 2022-11-09

## 2022-11-09 ENCOUNTER — OFFICE VISIT (OUTPATIENT)
Dept: OBGYN CLINIC | Age: 77
End: 2022-11-09
Payer: MEDICARE

## 2022-11-09 VITALS
BODY MASS INDEX: 27.16 KG/M2 | SYSTOLIC BLOOD PRESSURE: 112 MMHG | WEIGHT: 169 LBS | DIASTOLIC BLOOD PRESSURE: 68 MMHG | HEIGHT: 66 IN

## 2022-11-09 DIAGNOSIS — R39.9 UTI SYMPTOMS: ICD-10-CM

## 2022-11-09 DIAGNOSIS — N95.0 PMB (POSTMENOPAUSAL BLEEDING): ICD-10-CM

## 2022-11-09 DIAGNOSIS — N76.0 ACUTE VAGINITIS: ICD-10-CM

## 2022-11-09 DIAGNOSIS — Z96.0 PRESENCE OF PESSARY: ICD-10-CM

## 2022-11-09 DIAGNOSIS — N81.11 MIDLINE CYSTOCELE: ICD-10-CM

## 2022-11-09 DIAGNOSIS — Z00.00 PREVENTATIVE HEALTH CARE: ICD-10-CM

## 2022-11-09 DIAGNOSIS — Z46.89 PESSARY MAINTENANCE: Primary | ICD-10-CM

## 2022-11-09 DIAGNOSIS — Z11.51 SCREENING FOR HPV (HUMAN PAPILLOMAVIRUS): ICD-10-CM

## 2022-11-09 DIAGNOSIS — Z12.72 ENCOUNTER FOR SCREENING FOR MALIGNANT NEOPLASM OF VAGINA: ICD-10-CM

## 2022-11-09 PROCEDURE — 3078F DIAST BP <80 MM HG: CPT | Performed by: NURSE PRACTITIONER

## 2022-11-09 PROCEDURE — G8399 PT W/DXA RESULTS DOCUMENT: HCPCS | Performed by: NURSE PRACTITIONER

## 2022-11-09 PROCEDURE — 1036F TOBACCO NON-USER: CPT | Performed by: NURSE PRACTITIONER

## 2022-11-09 PROCEDURE — G8417 CALC BMI ABV UP PARAM F/U: HCPCS | Performed by: NURSE PRACTITIONER

## 2022-11-09 PROCEDURE — 3074F SYST BP LT 130 MM HG: CPT | Performed by: NURSE PRACTITIONER

## 2022-11-09 PROCEDURE — G8427 DOCREV CUR MEDS BY ELIG CLIN: HCPCS | Performed by: NURSE PRACTITIONER

## 2022-11-09 PROCEDURE — 99213 OFFICE O/P EST LOW 20 MIN: CPT | Performed by: NURSE PRACTITIONER

## 2022-11-09 PROCEDURE — G8484 FLU IMMUNIZE NO ADMIN: HCPCS | Performed by: NURSE PRACTITIONER

## 2022-11-09 PROCEDURE — 1123F ACP DISCUSS/DSCN MKR DOCD: CPT | Performed by: NURSE PRACTITIONER

## 2022-11-09 PROCEDURE — 1090F PRES/ABSN URINE INCON ASSESS: CPT | Performed by: NURSE PRACTITIONER

## 2022-11-09 RX ORDER — ESCITALOPRAM OXALATE 10 MG/1
TABLET ORAL
Qty: 90 TABLET | Refills: 2 | OUTPATIENT
Start: 2022-11-09

## 2022-11-09 NOTE — PROGRESS NOTES
Willie Keating  11/9/2022  3:43 PM          Willie Keating is a 68 y.o. female [id number]      The patient was seen. She has no chief complaints today. She has been fitted for a # 4; ring with support type pessary. She states all of her symptoms that she had prior to the pessary have been relieved with its use. She denies any vaginal bleeding. She denies to any vaginal discharge or odor. Her bowels are regular and her voiding pattern is abnormal.  Complaining of difficulty voiding the past 1-2 weeks. Blood pressure 112/68, height 5' 6\" (1.676 m), weight 169 lb (76.7 kg), not currently breastfeeding. Abdomen: Soft and non-tender; good bowel sounds; no guarding, rebound or rigidity; no CVA tenderness bilaterally. Extremities: No calf tenderness bilaterally. DTR 2/4 bilaterally. No edema. Perineum/Speculum: There is not any signs of infection; The vaginal vault is with signs of erythema or erosion. There is no vaginal discharge or odor appreciated. Erythema and minimal bleeding noted to back of vaginal wall. Pessary removed and given back to patient. The pessary was cleansed and replaced given back to patient. The patient tolerated the procedure well. T.O.S. Ointment was placed with the pessary to decrease discharge/odor. Assessment:   Diagnosis Orders   1. Pessary maintenance        2. Midline cystocele        3. Presence of pessary-#4 Ring with support        4. UTI symptoms  Urinalysis with Reflex to Culture      5. Acute vaginitis  Culture, Genital      6. Preventative health care  PAP SMEAR      7. Screening for HPV (human papillomavirus)  PAP SMEAR      8.  Encounter for screening for malignant neoplasm of vagina   PAP SMEAR      9. PMB (postmenopausal bleeding)  Urinalysis with Reflex to Culture    Culture, Genital    PAP SMEAR        Chief Complaint   Patient presents with    Follow-up     Patient Active Problem List    Diagnosis Date Noted    Benign essential microscopic hematuria 05/25/2021     Priority: High    Cochlear implant in place 08/11/2022     Priority: Medium    Closed fracture of single pubic ramus of pelvis, left, initial encounter ([facility] [address] 06/29/2022     Priority: Medium    Closed fracture of left pubis (HCC)      Priority: Medium    H/O total vaginal hysterectomy 05/25/2021     Priority: Medium    H/O bilateral salpingo-oophorectomy S/P vag hyst (6 weeks) 05/25/2021     Priority: Medium    Presence of pessary-#4 Ring with support 05/25/2021     Priority: Medium    Cystocele, midline 02/18/2022    Vitamin D deficiency 02/18/2022    Prediabetes 06/11/2021    Eczema intertrigo 06/11/2021    ACP (advance care planning) 06/11/2021    PMB (postmenopausal bleeding) 05/25/2021    Age-related osteoporosis without current pathological fracture 03/09/2021     Actonel RX Managed by PCP      At moderate risk for fall 12/23/2020    Pruritic rash 12/23/2020    Bacterial vaginosis 12/22/2020    Closed fracture of right hip ([facility] [address] 12/21/2020    Adjustment disorder with mixed anxiety and depressed mood 09/21/2020    Mass of left parotid gland 17 mm 09/14/2020    Acute urinary retention 09/14/2020    Other fracture of right femur, initial encounter for closed fracture ([facility] [address] 09/13/2020    Allergic rhinitis due to allergen 06/10/2020    Candidiasis of breast 06/10/2020    Anxiety and depression 12/12/2019    Essential hypertension 12/17/2018    Diverticulosis of large intestine without hemorrhage 05/15/2017    Mixed hyperlipidemia 01/19/2017    Toenail fungus 08/03/2016    Fatty liver          Plan:  1. Return to the office 1-2 weeks  2. Report any vaginal bleeding or discharge  3. Abstinence  4. Annual Follow-up reviewed with patient. They will schedule appointment  Pessary removed and given back to patient to bring to next visit. The patient, Renata Zendejas is a 68 y.o. female, was seen with a total time spent of 20 minutes for the visit on this date of service by the E/M provider.  The time component had both face to face and non face to face time spent in determining the total time component. Counseling and education regarding her diagnosis listed below and her options regarding those diagnoses were also included in determining her time component. Diagnosis Orders   1. Pessary maintenance        2. Midline cystocele        3. Presence of pessary-#4 Ring with support        4. UTI symptoms  Urinalysis with Reflex to Culture      5. Acute vaginitis  Culture, Genital      6. Preventative health care  PAP SMEAR      7. Screening for HPV (human papillomavirus)  PAP SMEAR      8. Encounter for screening for malignant neoplasm of vagina   PAP SMEAR      9. PMB (postmenopausal bleeding)  Urinalysis with Reflex to Culture    Culture, Genital    PAP SMEAR           The patient had her preventative health maintenance recommendations and follow-up reviewed with her at the completion of her visit.

## 2022-11-09 NOTE — TELEPHONE ENCOUNTER
The original prescription was discontinued on 2/18/2022 by MIGUEL Ghotra CNP for the following reason: REORDER. Renewing this prescription may not be appropriate.

## 2022-11-11 ENCOUNTER — OFFICE VISIT (OUTPATIENT)
Dept: FAMILY MEDICINE CLINIC | Age: 77
End: 2022-11-11
Payer: MEDICARE

## 2022-11-11 VITALS
WEIGHT: 171 LBS | BODY MASS INDEX: 27.48 KG/M2 | TEMPERATURE: 97.5 F | DIASTOLIC BLOOD PRESSURE: 70 MMHG | HEART RATE: 66 BPM | HEIGHT: 66 IN | SYSTOLIC BLOOD PRESSURE: 118 MMHG | OXYGEN SATURATION: 96 %

## 2022-11-11 DIAGNOSIS — S72.001S CLOSED FRACTURE OF RIGHT HIP, SEQUELA: ICD-10-CM

## 2022-11-11 DIAGNOSIS — I10 ESSENTIAL HYPERTENSION: ICD-10-CM

## 2022-11-11 DIAGNOSIS — R73.03 PREDIABETES: ICD-10-CM

## 2022-11-11 DIAGNOSIS — E78.2 MIXED HYPERLIPIDEMIA: ICD-10-CM

## 2022-11-11 DIAGNOSIS — F43.21 GRIEF: Primary | ICD-10-CM

## 2022-11-11 DIAGNOSIS — Z23 NEED FOR INFLUENZA VACCINATION: ICD-10-CM

## 2022-11-11 DIAGNOSIS — L28.2 PRURITIC RASH: ICD-10-CM

## 2022-11-11 DIAGNOSIS — F43.23 ADJUSTMENT DISORDER WITH MIXED ANXIETY AND DEPRESSED MOOD: ICD-10-CM

## 2022-11-11 DIAGNOSIS — Z96.21 COCHLEAR IMPLANT IN PLACE: ICD-10-CM

## 2022-11-11 DIAGNOSIS — S32.502S CLOSED NONDISPLACED FRACTURE OF LEFT PUBIS, SEQUELA: ICD-10-CM

## 2022-11-11 PROCEDURE — 90694 VACC AIIV4 NO PRSRV 0.5ML IM: CPT | Performed by: FAMILY MEDICINE

## 2022-11-11 PROCEDURE — 3078F DIAST BP <80 MM HG: CPT | Performed by: FAMILY MEDICINE

## 2022-11-11 PROCEDURE — G8427 DOCREV CUR MEDS BY ELIG CLIN: HCPCS | Performed by: FAMILY MEDICINE

## 2022-11-11 PROCEDURE — 3074F SYST BP LT 130 MM HG: CPT | Performed by: FAMILY MEDICINE

## 2022-11-11 PROCEDURE — G8484 FLU IMMUNIZE NO ADMIN: HCPCS | Performed by: FAMILY MEDICINE

## 2022-11-11 PROCEDURE — 1123F ACP DISCUSS/DSCN MKR DOCD: CPT | Performed by: FAMILY MEDICINE

## 2022-11-11 PROCEDURE — 1090F PRES/ABSN URINE INCON ASSESS: CPT | Performed by: FAMILY MEDICINE

## 2022-11-11 PROCEDURE — G8399 PT W/DXA RESULTS DOCUMENT: HCPCS | Performed by: FAMILY MEDICINE

## 2022-11-11 PROCEDURE — 99214 OFFICE O/P EST MOD 30 MIN: CPT | Performed by: FAMILY MEDICINE

## 2022-11-11 PROCEDURE — 1036F TOBACCO NON-USER: CPT | Performed by: FAMILY MEDICINE

## 2022-11-11 PROCEDURE — G0008 ADMIN INFLUENZA VIRUS VAC: HCPCS | Performed by: FAMILY MEDICINE

## 2022-11-11 PROCEDURE — G8417 CALC BMI ABV UP PARAM F/U: HCPCS | Performed by: FAMILY MEDICINE

## 2022-11-11 RX ORDER — BUSPIRONE HYDROCHLORIDE 10 MG/1
TABLET ORAL
Qty: 90 TABLET | Refills: 1 | Status: SHIPPED | OUTPATIENT
Start: 2022-11-11

## 2022-11-11 SDOH — ECONOMIC STABILITY: FOOD INSECURITY: WITHIN THE PAST 12 MONTHS, YOU WORRIED THAT YOUR FOOD WOULD RUN OUT BEFORE YOU GOT MONEY TO BUY MORE.: NEVER TRUE

## 2022-11-11 SDOH — ECONOMIC STABILITY: FOOD INSECURITY: WITHIN THE PAST 12 MONTHS, THE FOOD YOU BOUGHT JUST DIDN'T LAST AND YOU DIDN'T HAVE MONEY TO GET MORE.: NEVER TRUE

## 2022-11-11 ASSESSMENT — PATIENT HEALTH QUESTIONNAIRE - PHQ9
2. FEELING DOWN, DEPRESSED OR HOPELESS: 2
7. TROUBLE CONCENTRATING ON THINGS, SUCH AS READING THE NEWSPAPER OR WATCHING TELEVISION: 2
5. POOR APPETITE OR OVEREATING: 0
SUM OF ALL RESPONSES TO PHQ QUESTIONS 1-9: 11
4. FEELING TIRED OR HAVING LITTLE ENERGY: 3
10. IF YOU CHECKED OFF ANY PROBLEMS, HOW DIFFICULT HAVE THESE PROBLEMS MADE IT FOR YOU TO DO YOUR WORK, TAKE CARE OF THINGS AT HOME, OR GET ALONG WITH OTHER PEOPLE: 0
SUM OF ALL RESPONSES TO PHQ9 QUESTIONS 1 & 2: 2
SUM OF ALL RESPONSES TO PHQ QUESTIONS 1-9: 11
1. LITTLE INTEREST OR PLEASURE IN DOING THINGS: 0
6. FEELING BAD ABOUT YOURSELF - OR THAT YOU ARE A FAILURE OR HAVE LET YOURSELF OR YOUR FAMILY DOWN: 2
3. TROUBLE FALLING OR STAYING ASLEEP: 0
9. THOUGHTS THAT YOU WOULD BE BETTER OFF DEAD, OR OF HURTING YOURSELF: 0
8. MOVING OR SPEAKING SO SLOWLY THAT OTHER PEOPLE COULD HAVE NOTICED. OR THE OPPOSITE, BEING SO FIGETY OR RESTLESS THAT YOU HAVE BEEN MOVING AROUND A LOT MORE THAN USUAL: 2

## 2022-11-11 ASSESSMENT — ENCOUNTER SYMPTOMS
WHEEZING: 0
COLOR CHANGE: 0

## 2022-11-11 ASSESSMENT — SOCIAL DETERMINANTS OF HEALTH (SDOH): HOW HARD IS IT FOR YOU TO PAY FOR THE VERY BASICS LIKE FOOD, HOUSING, MEDICAL CARE, AND HEATING?: NOT HARD AT ALL

## 2022-11-11 NOTE — PATIENT INSTRUCTIONS
New Updates for [employer]/ [facility] SILAS    Thank you for choosing US to give you the best care! [facility] is always trying to think of new ways to help their patients. We are asking all patients to try out the new digital registration that is now available through your [employer] account or the new SILAS, [employer] [facility]. Via the silas you're now able to update your personal and registration information prior to your upcoming appointment. This will save you time once you arrive at the office to check-in, not to mention your information remains safe!! Many other perks come from signing up for an account, such as:  Requesting refills  Scheduling an appointment  Completing an E-Visit  Sending a message to the office/provider  Having access to your medication list  Paying your bill/copay prior to your appointment  Scheduling your yearly mammogram  Review your test results    If you are not familiar with [employer] or the [facility] SILAS, please ask one of us and we will be happy to answer any questions or help you set-up your account.       Your [id number] office,  [place]

## 2022-11-11 NOTE — PROGRESS NOTES
Visit Information    Have you changed or started any medications since your last visit including any over-the-counter medicines, vitamins, or herbal medicines? no   Have you stopped taking any of your medications? Is so, why? -  no  Are you having any side effects from any of your medications? - no    Have you seen any other physician or provider since your last visit? YES  Have you had any other diagnostic tests since your last visit?  no   Have you been seen in the emergency room and/or had an admission in a hospital since we last saw you?  no   Have you had your routine dental cleaning in the past 6 months?  no     Do you have an active MyChart account? If no, what is the barrier?   Yes    Patient Care Team:  MIGUEL Hall CNP as PCP - General (Family Medicine)  MIGUEL Hall CNP as PCP - [facility]  Ludwig Dumont MD as Consulting Physician (Gastroenterology)    Medical History Review  Past Medical, Family, and Social History reviewed and does contribute to the patient presenting condition    Health Maintenance   Topic Date Due    Hepatitis C screen  Never done    DTaP/Tdap/Td vaccine (1 - Tdap) Never done    Shingles vaccine (1 of 2) Never done    COVID-19 Vaccine (4 - Booster for [place] Peter series) 02/04/2022    Flu vaccine (1) 08/01/2022    Lipids  05/23/2023    Depression Monitoring  08/11/2023    Annual Wellness Visit (AWV)  08/12/2023    DEXA (modify frequency per FRAX score)  Completed    Pneumococcal 65+ years Vaccine  Completed    Hepatitis A vaccine  Aged Out    Hib vaccine  Aged Out    Meningococcal (ACWY) vaccine  Aged Out

## 2022-11-12 LAB
CULTURE: NORMAL
CULTURE: NORMAL
HPV SAMPLE: NORMAL
HPV, GENOTYPE 16: NOT DETECTED
HPV, GENOTYPE 18: NOT DETECTED
HPV, HIGH RISK OTHER: NOT DETECTED
HPV, INTERPRETATION: NORMAL
SPECIMEN DESCRIPTION: NORMAL
SPECIMEN DESCRIPTION: NORMAL

## 2022-11-18 LAB — CYTOLOGY REPORT: NORMAL

## 2022-11-23 ENCOUNTER — HOSPITAL ENCOUNTER (OUTPATIENT)
Dept: WOMENS IMAGING | Age: 77
Discharge: HOME | End: 2022-11-25
Payer: MEDICARE

## 2022-11-23 DIAGNOSIS — R92.8 ABNORMAL MAMMOGRAM OF LEFT BREAST: ICD-10-CM

## 2022-11-23 DIAGNOSIS — N64.89 BREAST ASYMMETRY: Primary | ICD-10-CM

## 2022-11-23 DIAGNOSIS — Z12.31 ENCOUNTER FOR SCREENING MAMMOGRAM FOR BREAST CANCER: ICD-10-CM

## 2022-11-23 PROCEDURE — 77067 SCR MAMMO BI INCL CAD: CPT

## 2022-11-25 ENCOUNTER — HOSPITAL ENCOUNTER (OUTPATIENT)
Age: 77
Discharge: HOME | End: 2022-11-25
Payer: MEDICARE

## 2022-11-25 DIAGNOSIS — R39.9 UTI SYMPTOMS: ICD-10-CM

## 2022-11-25 DIAGNOSIS — N95.0 PMB (POSTMENOPAUSAL BLEEDING): ICD-10-CM

## 2022-11-25 LAB
BACTERIA: NORMAL
BILIRUBIN URINE: NEGATIVE
CASTS UA: NORMAL /LPF
COLOR: YELLOW
EPITHELIAL CELLS UA: NORMAL /HPF
GLUCOSE URINE: NEGATIVE
KETONES, URINE: NEGATIVE
LEUKOCYTE ESTERASE, URINE: ABNORMAL
NITRITE, URINE: NEGATIVE
PH UA: 6.5 (ref 5–8)
PROTEIN UA: NEGATIVE
RBC UA: NORMAL /HPF
SPECIFIC GRAVITY UA: 1.01 (ref 1–1.03)
TURBIDITY: CLEAR
URINE HGB: NEGATIVE
UROBILINOGEN, URINE: NORMAL
WBC UA: NORMAL /HPF

## 2022-11-25 PROCEDURE — 81001 URINALYSIS AUTO W/SCOPE: CPT

## 2022-11-28 ENCOUNTER — OFFICE VISIT (OUTPATIENT)
Dept: ORTHOPEDIC SURGERY | Age: 77
End: 2022-11-28
Payer: MEDICARE

## 2022-11-28 VITALS — BODY MASS INDEX: 27.48 KG/M2 | RESPIRATION RATE: 16 BRPM | WEIGHT: 171 LBS | HEIGHT: 66 IN

## 2022-11-28 DIAGNOSIS — S32.89XD CLOSED FRACTURE OF OTHER PART OF PELVIS WITH ROUTINE HEALING, SUBSEQUENT ENCOUNTER: Primary | ICD-10-CM

## 2022-11-28 PROCEDURE — G8427 DOCREV CUR MEDS BY ELIG CLIN: HCPCS | Performed by: ORTHOPAEDIC SURGERY

## 2022-11-28 PROCEDURE — 1036F TOBACCO NON-USER: CPT | Performed by: ORTHOPAEDIC SURGERY

## 2022-11-28 PROCEDURE — 1090F PRES/ABSN URINE INCON ASSESS: CPT | Performed by: ORTHOPAEDIC SURGERY

## 2022-11-28 PROCEDURE — 99212 OFFICE O/P EST SF 10 MIN: CPT | Performed by: ORTHOPAEDIC SURGERY

## 2022-11-28 PROCEDURE — G8484 FLU IMMUNIZE NO ADMIN: HCPCS | Performed by: ORTHOPAEDIC SURGERY

## 2022-11-28 PROCEDURE — G8399 PT W/DXA RESULTS DOCUMENT: HCPCS | Performed by: ORTHOPAEDIC SURGERY

## 2022-11-28 PROCEDURE — 1123F ACP DISCUSS/DSCN MKR DOCD: CPT | Performed by: ORTHOPAEDIC SURGERY

## 2022-11-28 PROCEDURE — G8417 CALC BMI ABV UP PARAM F/U: HCPCS | Performed by: ORTHOPAEDIC SURGERY

## 2022-11-28 NOTE — PROGRESS NOTES
HPI: Ms. Trina Leone is a 68-year-old who is approximately 5 months out from a left superior and inferior pubic ramus fracture. She is doing fairly well at this time indicating that she has no pain. She is going to physical therapy working on her balance and gait. Overall she is happy. Physical examination:  Evaluation patient's left hip and lower extremity demonstrates her to have good range of motion without crepitation or pain. She is nontender to palpation. Imaging studies:  3 x-ray views of the pelvis completed on 11/28/2022 were reviewed independently demonstrating complete consolidation across superior and inferior left pubic ramus fracture. There is also healed right intertrochanteric femur fracture with cephalomedullary nail construct without evidence of loosening. Impression and plan: Ms. Trina Leone is a 68-year-old approximately 5 months out from a closed left superior and inferior pubic ramus fracture. She appears to be healed appropriately both clinically and radiographically. At this time she was encouraged to keep up with therapy and her home exercise program.  She may continue weightbearing as tolerated using her cane. I will see her back in my clinic as needed but she may return or call at anytime with persistent or worsening symptoms and with any questions or concerns.

## 2022-12-14 ENCOUNTER — TELEPHONE (OUTPATIENT)
Dept: FAMILY MEDICINE CLINIC | Age: 77
End: 2022-12-14

## 2022-12-14 NOTE — PROGRESS NOTES
Visit Information    Have you changed or started any medications since your last visit including any over-the-counter medicines, vitamins, or herbal medicines? no   Are you having any side effects from any of your medications? -  no  Have you stopped taking any of your medications? Is so, why? -  no    Have you seen any other physician or provider since your last visit? No  Have you had any other diagnostic tests since your last visit? No  Have you been seen in the emergency room and/or had an admission to a hospital since we last saw you? No  Have you had your routine dental cleaning in the past 6 months? yes     Have you activated your [employer] account? If not, what are your barriers?  Yes     Patient Care Team:  MIGUEL Espinoza CNP as PCP - General (Family Medicine)  MIGUEL Espinoza CNP as PCP - [facility]  Monica Martinez MD as Consulting Physician (Gastroenterology)    Medical History Review  Past Medical, Family, and Social History reviewed and does contribute to the patient presenting condition    Health Maintenance   Topic Date Due    Hepatitis C screen  Never done    DTaP/Tdap/Td vaccine (1 - Tdap) Never done    Shingles vaccine (1 of 2) Never done    COVID-19 Vaccine (4 - Booster for Camargo Peter series) 02/04/2022    Lipids  05/23/2023    Annual Wellness Visit (AWV)  08/12/2023    Depression Monitoring  11/11/2023    DEXA (modify frequency per FRAX score)  Completed    Flu vaccine  Completed    Pneumococcal 65+ years Vaccine  Completed    Hepatitis A vaccine  Aged Out    Hib vaccine  Aged Out    Meningococcal (ACWY) vaccine  Aged Out

## 2022-12-14 NOTE — TELEPHONE ENCOUNTER
Pt called in stating she tested positive for Covid today via home test, pt is having horrible headache,coughing, sore throat, body aches, tiered. Pt doesn't have My Chart and cannot do an E-visit but would like to know if something could be called in for her.  Did check

## 2022-12-15 ENCOUNTER — TELEMEDICINE (OUTPATIENT)
Dept: FAMILY MEDICINE CLINIC | Age: 77
End: 2022-12-15

## 2022-12-15 DIAGNOSIS — U07.1 COVID-19 VIRUS INFECTION: Primary | ICD-10-CM

## 2022-12-15 DIAGNOSIS — I10 ESSENTIAL HYPERTENSION: ICD-10-CM

## 2022-12-15 DIAGNOSIS — E78.5 HYPERLIPIDEMIA WITH TARGET LDL LESS THAN 100: ICD-10-CM

## 2022-12-15 ASSESSMENT — ENCOUNTER SYMPTOMS
DIARRHEA: 0
NAUSEA: 0
SHORTNESS OF BREATH: 0
WHEEZING: 0
SORE THROAT: 1
CHEST TIGHTNESS: 0
ABDOMINAL PAIN: 0
CONSTIPATION: 0
ABDOMINAL DISTENTION: 0
VOMITING: 0
COUGH: 1

## 2022-12-15 NOTE — PROGRESS NOTES
12/15/2022    TELEHEALTH EVALUATION -- Audio/Visual (During QFYBE-09 public health emergency)      Dexter Leung (:  1945) is a 68 y.o. female,Established patient, here for evaluation of the following chief complaint(s): Cough (TESTED POSITIVE FOR COVID 2022), Congestion, Generalized Body Aches, Pharyngitis, and Otalgia        ASSESSMENT/PLAN:    1. COVID-19 virus infection  Ongoing, worsening  Patient is not boosted against COVID-19 infection, she is at high risk due to her age  -     nirmatrelvir/ritonavir (PAXLOVID) 20 x 150 MG & 10 x 100MG TBPK; Take 3 tablets (two 150 mg nirmatrelvir and one 100 mg ritonavir tablets) by mouth every 12 hours for 5 days. STOP SIMVASTATIN FOR 1 WEEK, Disp-30 tablet, R-0Normal  Rest, increase fluids, vitamin C, zinc  Call or return if symptoms persist or fail to improve or yellow mucus persists, patient was advised to call us including over the weekend when I am covering for call, and we will add an antibiotic at that time  The patient verbalizes understanding and agrees with the plan. 2. Essential hypertension  Well controlled per patient's report, 112/68 mmHg  Continue current treatment. Losartan 50 Mg  Will recheck labs at the next appointment    3. Hyperlipidemia with target LDL less than 100  Inadequately controlled  She needs to stop the Zocor for 1 week due to interaction with antiviral treatment  I plan to recheck her lipid panel and change the statin at the next appointment        Hazel Kay received counseling on the following healthy behaviors: nutrition, exercise, and medication adherence  Reviewed prior labs and health maintenance  Discussed use, benefit, and side effects of prescribed medications. Barriers to medication compliance addressed. Patient given educational materials - see patient instructions  All patient questions answered. Patient voiced understanding.   The patient's past medical,surgical, social, and family history as well as her edema, near-syncope, orthopnea, palpitations, paroxysmal nocturnal dyspnea, syncope, and tachypnea. Cardiovascular risk factors: advanced age (older than 54 for men, 72 for women), dyslipidemia, and hypertension. Use of agents associated with hypertension: none. History of target organ damage: none. BP controlled. Emiliana Shields reports compliance with BP medications, and tolerates them well, denies side effects. Patient reports blood pressure 112/68 mmHg at this time during the encounter  BP Readings from Last 3 Encounters:   12/01/22 (!) 140/64   11/11/22 118/70   11/09/22 112/68        Pulse is Normal.    Pulse Readings from Last 3 Encounters:   11/11/22 66   08/15/22 73   08/11/22 71           Hyperlipidemia:  No GI upset on simvastatin (Zocor). Has been having muscle aches since she got sick. Medication compliance: compliant all of the time. Patient is  following a low fat, low cholesterol diet. LDL is high with high triglycerides  Lab Results   Component Value Date    LDLCHOLESTEROL 122 05/23/2022     Lab Results   Component Value Date    TRIG 297 (H) 12/27/2019    TRIG 124 12/10/2018    TRIG 246 (H) 02/05/2018               Review of Systems   Constitutional:  Positive for chills, fatigue and fever. Negative for activity change, appetite change, diaphoresis and unexpected weight change. HENT:  Positive for congestion, ear pain (better), postnasal drip, rhinorrhea, sinus pressure and sore throat. Negative for sinus pain. Respiratory:  Positive for cough. Negative for chest tightness, shortness of breath and wheezing. Cardiovascular:  Negative for chest pain, palpitations and leg swelling. Gastrointestinal:  Negative for abdominal distention, abdominal pain, constipation, diarrhea, nausea and vomiting. Musculoskeletal:  Positive for arthralgias and myalgias. Neurological:  Positive for headaches. Prior to Visit Medications    Medication Sig Taking?  Authorizing Provider   [employer] ([employer]) 12/14/2022   Patient-Reported Weight 170 lbs   Patient-Reported Height 5 ft 6 in   Patient-Reported Systolic 066   Patient-Reported Diastolic 68   Patient-Reported Temperature 98 F           Intensity of pain is: 3 out of 10      Constitutional: [x] Appears well-developed and well-nourished [x] No apparent distress      [] Abnormal      Mental status  [x] Alert and awake  [x] Oriented to person/place/time [x]Able to follow commands      Eyes:  EOM    [x]  Normal  [] Abnormal-  Sclera  [x]  Normal  [] Abnormal -         Discharge [x]  None visible  [] Abnormal -    HENT:   [x] Normocephalic, atraumatic. [] Abnormal   [x] Mouth/Throat: Mucous membranes are moist.     External Ears [x] Normal  [] Abnormal-     Neck: [x] No visualized mass     Pulmonary/Chest: [x] Respiratory effort normal.  [x] No visualized signs of difficulty breathing or respiratory distress        [x] Abnormal -cough during the encounter       Musculoskeletal:   [x] Normal gait with no signs of ataxia         [x] Normal range of motion of neck        [] Abnormal-       Neurological:        [x] No Facial Asymmetry (Cranial nerve 7 motor function) (limited exam to video visit)          [x] No gaze palsy        [] Abnormal-            Skin:        [x] No significant exanthematous lesions or discoloration noted on facial skin         [] Abnormal-            Psychiatric:      [x] No Hallucinations       [x]Mood is normal      [x]Behavior is normal      [x]Judgment is normal      [x]Thought content is normal       [] Abnormal-     Other pertinent observable physical exam findings-does look tired, and sounds congested    Due to this being a TeleHealth encounter, evaluation of the following organ systems is limited: Vitals/Constitutional/EENT/Resp/CV/GI//MS/Neuro/Skin/Heme-Lymph-Imm.   Orders Placed This Encounter   Medications    nirmatrelvir/ritonavir (PAXLOVID) 20 x 150 MG & 10 x 100MG TBPK     Sig: Take 3 tablets (two 150 mg nirmatrelvir and one 100 mg ritonavir tablets) by mouth every 12 hours for 5 days. STOP SIMVASTATIN FOR 1 WEEK     Dispense:  30 tablet     Refill:  0     Order Specific Question:   Does this patient qualify for COVID-19 antIviral therapy based on criteria for treatment? Answer:   Yes       No orders of the defined types were placed in this encounter. There are no discontinued medications. Alexandra Lockhart, was evaluated through a synchronous (real-time) audio-video encounter. The patient (or guardian if applicable) is aware that this is a billable service, which includes applicable co-pays. The virtual visit was conducted with patient's (and/or legal guardian consent). Verbal consent to proceed has been obtained within the past 12 months. The visit was conducted pursuant to the emergency declaration under the [facility], [phone] waiver authority and the [employer] and [employer] General Act. Patient identification was verified    Patient was alone   Provider was located at primary practice location. The patient was located at home, in a state where the provider was licensed to provide care. On this date 12/15/2022 I have spent 35 minutes reviewing previous notes, test results and face to face with the patient discussing the diagnosis and importance of compliance with the treatment plan as well as documenting on the day of the visit. --Susy Han MD on 12/16/2022 at 8:09 AM    An electronic signature was used to authenticate this note.

## 2022-12-16 PROBLEM — R33.8 ACUTE URINARY RETENTION: Status: RESOLVED | Noted: 2020-09-14 | Resolved: 2022-12-16

## 2022-12-16 PROBLEM — N76.0 BACTERIAL VAGINOSIS: Status: RESOLVED | Noted: 2020-12-22 | Resolved: 2022-12-16

## 2022-12-16 PROBLEM — B37.89 CANDIDIASIS OF BREAST: Status: RESOLVED | Noted: 2020-06-10 | Resolved: 2022-12-16

## 2022-12-16 PROBLEM — S72.8X1A OTHER FRACTURE OF RIGHT FEMUR, INITIAL ENCOUNTER FOR CLOSED FRACTURE (HCC): Status: RESOLVED | Noted: 2020-09-13 | Resolved: 2022-12-16

## 2022-12-16 PROBLEM — S32.592A CLOSED FRACTURE OF SINGLE PUBIC RAMUS OF PELVIS, LEFT, INITIAL ENCOUNTER (HCC): Status: RESOLVED | Noted: 2022-06-29 | Resolved: 2022-12-16

## 2022-12-16 PROBLEM — U07.1 COVID-19 VIRUS INFECTION: Status: ACTIVE | Noted: 2022-12-16

## 2022-12-16 PROBLEM — N95.0 PMB (POSTMENOPAUSAL BLEEDING): Status: RESOLVED | Noted: 2021-05-25 | Resolved: 2022-12-16

## 2022-12-16 PROBLEM — R31.1 BENIGN ESSENTIAL MICROSCOPIC HEMATURIA: Status: RESOLVED | Noted: 2021-05-25 | Resolved: 2022-12-16

## 2022-12-16 PROBLEM — S72.001A CLOSED FRACTURE OF RIGHT HIP (HCC): Status: RESOLVED | Noted: 2020-12-21 | Resolved: 2022-12-16

## 2022-12-16 PROBLEM — K11.8 MASS OF LEFT PAROTID GLAND: Status: RESOLVED | Noted: 2020-09-14 | Resolved: 2022-12-16

## 2022-12-16 PROBLEM — Z71.89 ACP (ADVANCE CARE PLANNING): Status: RESOLVED | Noted: 2021-06-11 | Resolved: 2022-12-16

## 2022-12-16 PROBLEM — L28.2 PRURITIC RASH: Status: RESOLVED | Noted: 2020-12-23 | Resolved: 2022-12-16

## 2022-12-16 PROBLEM — B96.89 BACTERIAL VAGINOSIS: Status: RESOLVED | Noted: 2020-12-22 | Resolved: 2022-12-16

## 2022-12-16 PROBLEM — F43.23 ADJUSTMENT DISORDER WITH MIXED ANXIETY AND DEPRESSED MOOD: Status: RESOLVED | Noted: 2020-09-21 | Resolved: 2022-12-16

## 2022-12-16 PROBLEM — L30.4 ECZEMA INTERTRIGO: Status: RESOLVED | Noted: 2021-06-11 | Resolved: 2022-12-16

## 2022-12-16 ASSESSMENT — ENCOUNTER SYMPTOMS
SINUS PAIN: 0
SINUS PRESSURE: 1
RHINORRHEA: 1

## 2022-12-30 ENCOUNTER — HOSPITAL ENCOUNTER (OUTPATIENT)
Dept: WOMENS IMAGING | Age: 77
End: 2022-12-30
Payer: MEDICARE

## 2022-12-30 DIAGNOSIS — N64.89 BREAST ASYMMETRY: ICD-10-CM

## 2022-12-30 DIAGNOSIS — R92.8 ABNORMAL MAMMOGRAM OF LEFT BREAST: ICD-10-CM

## 2022-12-30 PROCEDURE — 77065 DX MAMMO INCL CAD UNI: CPT

## 2023-01-30 DIAGNOSIS — M81.0 AGE-RELATED OSTEOPOROSIS WITHOUT CURRENT PATHOLOGICAL FRACTURE: ICD-10-CM

## 2023-01-30 RX ORDER — ALENDRONATE SODIUM 70 MG/1
70 TABLET ORAL
Qty: 12 TABLET | Refills: 1 | Status: SHIPPED | OUTPATIENT
Start: 2023-01-30

## 2023-01-30 NOTE — TELEPHONE ENCOUNTER
Please Approve or Refuse.   Send to Pharmacy per Pt's Request:      Next Visit Date:  2/17/2023   Last Visit Date: 12/15/2022    Hemoglobin A1C (%)   Date Value   02/18/2022 5.3   10/11/2021 5.3   02/25/2021 5.7             ( goal A1C is < 7)   BP Readings from Last 3 Encounters:   12/01/22 (!) 140/64   11/11/22 118/70   11/09/22 112/68          (goal 120/80)  BUN   Date Value Ref Range Status   06/29/2022 11 8 - 23 mg/dL Final     Creatinine   Date Value Ref Range Status   06/29/2022 0.55 0.50 - 0.90 mg/dL Final     Potassium   Date Value Ref Range Status   06/29/2022 3.9 3.7 - 5.3 mmol/L Final

## 2023-04-18 DIAGNOSIS — E55.9 VITAMIN D DEFICIENCY: ICD-10-CM

## 2023-04-18 DIAGNOSIS — U07.1 COVID-19 VIRUS INFECTION: ICD-10-CM

## 2023-04-18 DIAGNOSIS — M81.0 AGE-RELATED OSTEOPOROSIS WITHOUT CURRENT PATHOLOGICAL FRACTURE: ICD-10-CM

## 2023-04-18 DIAGNOSIS — E78.5 HYPERLIPIDEMIA WITH TARGET LDL LESS THAN 100: ICD-10-CM

## 2023-04-18 RX ORDER — SIMVASTATIN 40 MG
40 TABLET ORAL NIGHTLY
Qty: 90 TABLET | Refills: 3 | Status: SHIPPED | OUTPATIENT
Start: 2023-04-18

## 2023-04-18 NOTE — TELEPHONE ENCOUNTER
Please Approve or Refuse.   Send to Pharmacy per Pt's Request: [facility]      Next Visit Date:  4/27/2023   Last Visit Date: 12/15/2022    Hemoglobin A1C (%)   Date Value   02/18/2022 5.3   10/11/2021 5.3   02/25/2021 5.7             ( goal A1C is < 7)   BP Readings from Last 3 Encounters:   12/01/22 (!) 140/64   11/11/22 118/70   11/09/22 112/68          (goal 120/80)  BUN   Date Value Ref Range Status   06/29/2022 11 8 - 23 mg/dL Final     Creatinine   Date Value Ref Range Status   06/29/2022 0.55 0.50 - 0.90 mg/dL Final     Potassium   Date Value Ref Range Status   06/29/2022 3.9 3.7 - 5.3 mmol/L Final

## 2023-05-08 DIAGNOSIS — F43.21 GRIEF: ICD-10-CM

## 2023-05-08 DIAGNOSIS — F43.23 ADJUSTMENT DISORDER WITH MIXED ANXIETY AND DEPRESSED MOOD: ICD-10-CM

## 2023-05-08 RX ORDER — CALCIUM CARBONATE 500(1250)
1 TABLET ORAL DAILY
Qty: 30 TABLET | OUTPATIENT
Start: 2023-05-08

## 2023-05-08 RX ORDER — BUSPIRONE HYDROCHLORIDE 10 MG/1
TABLET ORAL
Qty: 90 TABLET | Refills: 5 | Status: SHIPPED | OUTPATIENT
Start: 2023-05-08

## 2023-05-08 NOTE — TELEPHONE ENCOUNTER
Notify to stop calcium supplement  Continue only vitamin D supplement  After 61years old calcium deposits in the joints, kidneys, blood vessels including blood vessels in the heart, and it is not indicated to have supplemental calcium, the vitamin D will hold onto the calcium from food      Lab Results   Component Value Date/Time     06/29/2022 02:13 AM    K 3.9 06/29/2022 02:13 AM     06/29/2022 02:13 AM    CO2 19 06/29/2022 02:13 AM    BUN 11 06/29/2022 02:13 AM    CREATININE 0.55 06/29/2022 02:13 AM    GLUCOSE 133 06/29/2022 02:13 AM    GLUCOSE 96 01/27/2012 08:57 AM    CALCIUM 9.0 06/29/2022 02:13 AM          Future Appointments   Date Time Provider Delmy Sal   6/6/2023  2:00 PM MD clyde Dewitt [facility]   8/16/2023  3:30 PM [facility]

## 2023-05-08 NOTE — TELEPHONE ENCOUNTER
Please Approve or Refuse.   Send to Pharmacy per Pt's Request:      Next Visit Date:  6/6/2023   Last Visit Date: 12/15/2022    Hemoglobin A1C (%)   Date Value   02/18/2022 5.3   10/11/2021 5.3   02/25/2021 5.7             ( goal A1C is < 7)   BP Readings from Last 3 Encounters:   12/01/22 (!) 140/64   11/11/22 118/70   11/09/22 112/68          (goal 120/80)  BUN   Date Value Ref Range Status   06/29/2022 11 8 - 23 mg/dL Final     Creatinine   Date Value Ref Range Status   06/29/2022 0.55 0.50 - 0.90 mg/dL Final     Potassium   Date Value Ref Range Status   06/29/2022 3.9 3.7 - 5.3 mmol/L Final

## 2023-05-31 DIAGNOSIS — I10 ESSENTIAL HYPERTENSION: ICD-10-CM

## 2023-05-31 RX ORDER — LOSARTAN POTASSIUM 50 MG/1
50 TABLET ORAL DAILY
Qty: 90 TABLET | Refills: 1 | Status: SHIPPED | OUTPATIENT
Start: 2023-05-31

## 2023-05-31 NOTE — TELEPHONE ENCOUNTER
Please Approve or Refuse.   Send to Pharmacy per Pt's Request:      Next Visit Date:  6/6/2023   Last Visit Date: 12/15/2022    Hemoglobin A1C (%)   Date Value   02/18/2022 5.3   10/11/2021 5.3   02/25/2021 5.7             ( goal A1C is < 7)   BP Readings from Last 3 Encounters:   12/01/22 (!) 140/64   11/11/22 118/70   11/09/22 112/68          (goal 120/80)  BUN   Date Value Ref Range Status   06/29/2022 11 8 - 23 mg/dL Final     Creatinine   Date Value Ref Range Status   06/29/2022 0.55 0.50 - 0.90 mg/dL Final     Potassium   Date Value Ref Range Status   06/29/2022 3.9 3.7 - 5.3 mmol/L Final Yes - the patient is able to be screened

## 2023-06-02 DIAGNOSIS — S32.89XD CLOSED FRACTURE OF OTHER PART OF PELVIS WITH ROUTINE HEALING, SUBSEQUENT ENCOUNTER: Primary | ICD-10-CM

## 2023-06-05 ENCOUNTER — OFFICE VISIT (OUTPATIENT)
Dept: ORTHOPEDIC SURGERY | Age: 78
End: 2023-06-05

## 2023-06-05 VITALS — WEIGHT: 170 LBS | RESPIRATION RATE: 16 BRPM | HEIGHT: 66 IN | OXYGEN SATURATION: 100 % | BODY MASS INDEX: 27.32 KG/M2

## 2023-06-05 DIAGNOSIS — M70.61 GREATER TROCHANTERIC BURSITIS OF RIGHT HIP: Primary | ICD-10-CM

## 2023-06-05 DIAGNOSIS — M89.8X9 HETEROTOPIC OSSIFICATION: ICD-10-CM

## 2023-06-05 DIAGNOSIS — S72.009D HIP FRACTURE, UNSPECIFIED LATERALITY, CLOSED, WITH ROUTINE HEALING, SUBSEQUENT ENCOUNTER: ICD-10-CM

## 2023-06-05 DIAGNOSIS — Z96.9 RETAINED ORTHOPEDIC HARDWARE: ICD-10-CM

## 2023-06-05 RX ORDER — TRIAMCINOLONE ACETONIDE 40 MG/ML
40 INJECTION, SUSPENSION INTRA-ARTICULAR; INTRAMUSCULAR ONCE
Status: SHIPPED | OUTPATIENT
Start: 2023-06-05

## 2023-06-05 RX ORDER — LIDOCAINE HYDROCHLORIDE 10 MG/ML
4 INJECTION, SOLUTION INFILTRATION; PERINEURAL ONCE
Status: SHIPPED | OUTPATIENT
Start: 2023-06-05

## 2023-06-05 NOTE — PROGRESS NOTES
[address]  [address] [address]  Dept: [phone]    Ambulatory Orthopedic Follow Up Visit    Preoperative Diagnosis:   Right closed peritrochanteric hip fracture  Body mass index is 29.05 kg/m². Postoperative Diagnosis:   same     Procedures Performed:  (9/14/2020)  Open treatment of Right hip intertrochanteric femur fracture with cephalomedullary nail        CHIEF COMPLAINT:    Chief Complaint   Patient presents with    Hip Pain     Right         HISTORY OF PRESENT ILLNESS:       She is a 68 y.o. female, seen again today in the office for follow up of the above problem with a history of pain at the above location. The pain is described mainly with mechanical terms (dull/sharp/throbbing). The pain is worse with activity and better with rest. Since being seen last, the patient is doing better. At today's visit, she is using no brace or assistive device. At today's visit, she reports that she has no pain in her hip, and she is happy with her outcome. She also reports pain in her left greater than right plantar heels, which began atraumatically years ago. INTERVAL HISTORY 4/19/2021:  She is seen again today in the office for follow up of a previous issue (as above). Since being seen last, the patient is doing better. At today's visit, she is using a cane. She reports that her left plantar heel hurts more than her right plantar heel, and reports pain with her first steps in the morning. INTERVAL HISTORY 7/19/2021:  She is seen again today in the office for follow up of a previous issue (as above). Since being seen last, the patient is doing better. At today's visit, she is not using a brace or assistive device.     History is obtained today from:   [x]  the patient     []  EMR     [x]  one family member/friend--    []  multiple family members/friends    []  other:        INTERVAL HISTORY 6/5/2023:

## 2023-06-13 PROBLEM — Z80.3 FAMILY HISTORY OF BREAST CANCER IN MOTHER: Status: ACTIVE | Noted: 2023-06-13

## 2023-06-13 PROBLEM — F33.0 MILD EPISODE OF RECURRENT MAJOR DEPRESSIVE DISORDER (HCC): Status: ACTIVE | Noted: 2023-06-13

## 2023-06-13 PROBLEM — M77.8 TENDINITIS OF THUMB: Status: ACTIVE | Noted: 2023-06-13

## 2023-06-13 PROBLEM — U07.1 COVID-19 VIRUS INFECTION: Status: RESOLVED | Noted: 2022-12-16 | Resolved: 2023-06-13

## 2023-06-13 PROBLEM — R73.9 HYPERGLYCEMIA: Status: ACTIVE | Noted: 2021-06-11

## 2023-06-30 ENCOUNTER — HOSPITAL ENCOUNTER (OUTPATIENT)
Dept: WOMENS IMAGING | Age: 78
End: 2023-06-30
Attending: FAMILY MEDICINE
Payer: MEDICARE

## 2023-06-30 DIAGNOSIS — Z78.0 POSTMENOPAUSAL: ICD-10-CM

## 2023-06-30 PROCEDURE — 77080 DXA BONE DENSITY AXIAL: CPT

## 2023-08-16 ENCOUNTER — OFFICE VISIT (OUTPATIENT)
Dept: FAMILY MEDICINE CLINIC | Age: 78
End: 2023-08-16
Payer: MEDICARE

## 2023-08-16 VITALS
OXYGEN SATURATION: 94 % | DIASTOLIC BLOOD PRESSURE: 74 MMHG | BODY MASS INDEX: 28.99 KG/M2 | HEART RATE: 71 BPM | HEIGHT: 66 IN | WEIGHT: 180.4 LBS | TEMPERATURE: 97.4 F | SYSTOLIC BLOOD PRESSURE: 132 MMHG

## 2023-08-16 DIAGNOSIS — H91.93 BILATERAL HEARING LOSS, UNSPECIFIED HEARING LOSS TYPE: ICD-10-CM

## 2023-08-16 DIAGNOSIS — M81.0 AGE-RELATED OSTEOPOROSIS WITHOUT CURRENT PATHOLOGICAL FRACTURE: ICD-10-CM

## 2023-08-16 DIAGNOSIS — S20.211A CONTUSION OF RIGHT CHEST WALL, INITIAL ENCOUNTER: ICD-10-CM

## 2023-08-16 DIAGNOSIS — I10 ESSENTIAL HYPERTENSION: ICD-10-CM

## 2023-08-16 DIAGNOSIS — R73.9 HYPERGLYCEMIA: ICD-10-CM

## 2023-08-16 DIAGNOSIS — Z23 ENCOUNTER FOR IMMUNIZATION: ICD-10-CM

## 2023-08-16 DIAGNOSIS — F43.21 GRIEF: ICD-10-CM

## 2023-08-16 DIAGNOSIS — F33.0 MILD EPISODE OF RECURRENT MAJOR DEPRESSIVE DISORDER (HCC): ICD-10-CM

## 2023-08-16 DIAGNOSIS — Z11.59 ENCOUNTER FOR SCREENING FOR OTHER VIRAL DISEASES: ICD-10-CM

## 2023-08-16 DIAGNOSIS — Z00.00 MEDICARE ANNUAL WELLNESS VISIT, SUBSEQUENT: Primary | ICD-10-CM
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
## 2023-08-16 DIAGNOSIS — E78.5 HYPERLIPIDEMIA WITH TARGET LDL LESS THAN 100: ICD-10-CM

## 2023-08-16 DIAGNOSIS — Z96.21 COCHLEAR IMPLANT IN PLACE: ICD-10-CM

## 2023-08-16 PROCEDURE — 3075F SYST BP GE 130 - 139MM HG: CPT | Performed by: FAMILY MEDICINE

## 2023-08-16 PROCEDURE — G0439 PPPS, SUBSEQ VISIT: HCPCS | Performed by: FAMILY MEDICINE

## 2023-08-16 PROCEDURE — 1123F ACP DISCUSS/DSCN MKR DOCD: CPT | Performed by: FAMILY MEDICINE

## 2023-08-16 PROCEDURE — 3078F DIAST BP <80 MM HG: CPT | Performed by: FAMILY MEDICINE

## 2023-08-16 RX ORDER — ZOSTER VACCINE RECOMBINANT, ADJUVANTED 50 MCG/0.5
0.5 KIT INTRAMUSCULAR SEE ADMIN INSTRUCTIONS
Qty: 0.5 ML | Refills: 0 | Status: SHIPPED | OUTPATIENT
Start: 2023-08-16 | End: 2024-02-12

## 2023-08-16 RX ORDER — ESCITALOPRAM OXALATE 20 MG/1
20 TABLET ORAL DAILY
Qty: 90 TABLET | Refills: 3 | Status: SHIPPED | OUTPATIENT
Start: 2023-08-16

## 2023-08-16 ASSESSMENT — LIFESTYLE VARIABLES
HOW OFTEN DO YOU HAVE A DRINK CONTAINING ALCOHOL: 2-4 TIMES A MONTH
HOW MANY STANDARD DRINKS CONTAINING ALCOHOL DO YOU HAVE ON A TYPICAL DAY: 1 OR 2

## 2023-08-16 ASSESSMENT — PATIENT HEALTH QUESTIONNAIRE - PHQ9
3. TROUBLE FALLING OR STAYING ASLEEP: 1
SUM OF ALL RESPONSES TO PHQ9 QUESTIONS 1 & 2: 0
7. TROUBLE CONCENTRATING ON THINGS, SUCH AS READING THE NEWSPAPER OR WATCHING TELEVISION: 0
9. THOUGHTS THAT YOU WOULD BE BETTER OFF DEAD, OR OF HURTING YOURSELF: 0
1. LITTLE INTEREST OR PLEASURE IN DOING THINGS: 0
2. FEELING DOWN, DEPRESSED OR HOPELESS: 0
SUM OF ALL RESPONSES TO PHQ QUESTIONS 1-9: 4
8. MOVING OR SPEAKING SO SLOWLY THAT OTHER PEOPLE COULD HAVE NOTICED. OR THE OPPOSITE, BEING SO FIGETY OR RESTLESS THAT YOU HAVE BEEN MOVING AROUND A LOT MORE THAN USUAL: 0
6. FEELING BAD ABOUT YOURSELF - OR THAT YOU ARE A FAILURE OR HAVE LET YOURSELF OR YOUR FAMILY DOWN: 1
SUM OF ALL RESPONSES TO PHQ QUESTIONS 1-9: 4
5. POOR APPETITE OR OVEREATING: 1
10. IF YOU CHECKED OFF ANY PROBLEMS, HOW DIFFICULT HAVE THESE PROBLEMS MADE IT FOR YOU TO DO YOUR WORK, TAKE CARE OF THINGS AT HOME, OR GET ALONG WITH OTHER PEOPLE: 0
4. FEELING TIRED OR HAVING LITTLE ENERGY: 1

## 2023-08-16 ASSESSMENT — VISUAL ACUITY
OD_CC: 20/15
OS_CC: 20/15

## 2023-09-20 ENCOUNTER — OFFICE VISIT (OUTPATIENT)
Dept: BEHAVIORAL/MENTAL HEALTH CLINIC | Age: 78
End: 2023-09-20
Payer: MEDICARE

## 2023-09-20 DIAGNOSIS — F34.1 PERSISTENT DEPRESSIVE DISORDER, MILD: ICD-10-CM

## 2023-09-20 DIAGNOSIS — F41.1 GENERALIZED ANXIETY DISORDER: Primary | ICD-10-CM

## 2023-09-20 PROCEDURE — 1123F ACP DISCUSS/DSCN MKR DOCD: CPT | Performed by: PSYCHOLOGIST

## 2023-09-20 PROCEDURE — 1036F TOBACCO NON-USER: CPT | Performed by: PSYCHOLOGIST

## 2023-09-20 PROCEDURE — 90791 PSYCH DIAGNOSTIC EVALUATION: CPT | Performed by: PSYCHOLOGIST

## 2023-09-20 NOTE — PROGRESS NOTES
Amena Mondragon,  Ph.D.   Licensed Clinical Psychologist ([phone])    Visit Date: 9/20/2023   Time of appointment:  9:15a - 10:20a   Time spent with Patient: 65 minutes. This is patient's first appointment. Reason for Consult:  Depression (Grief) and Anxiety     Referring Provider/PCP:    MD Sandor Li MD      Pt provided informed consent for the behavioral health program. Discussed with patient model of service to include the limits of confidentiality (i.e. abuse reporting, suicide intervention, etc.) and short-term intervention focused approach. Pt indicated understanding. PRESENTING PROBLEM AND HISTORY  Xiao Solomon is a 68 y.o. female who presents for new evaluation and treatment of anxiety and depression. She has the following symptoms: depressed mood, fatigue/lack of energy, lack of motivation, feeling nervous, anxious, or on edge, racing worry thoughts, excessive worry about a number of events or activities , and inability to stop or control worry. Onset of symptoms was approximately several years ago. Symptoms have been gradually worsening over the past few years. Xiao Solomon reported that she has always been a \"worrier\" since she was young, but it has gotten worse as she has gotten older and her health has been declining. She reported that she has fallen previously and broke a femur and her pelvis, so she has been undergoing physical therapy for the past 6 weeks. She stated that she also had a cochlear implant put in 2 years ago and she has a hearing aid in her other, but she still struggles with hearing in crowded places. In regards to the depression, she reported that this worsened after the death of her second  10 years ago. She stated that he was already sick with cancer when she met him and he struggled with his health during the entire 25 years of their marriage, with her serving as his primary caretaker.   She reported that

## 2023-11-02 ENCOUNTER — HOSPITAL ENCOUNTER (OUTPATIENT)
Age: 78
Discharge: HOME | End: 2023-11-02
Payer: MEDICARE

## 2023-11-02 DIAGNOSIS — R73.9 HYPERGLYCEMIA: ICD-10-CM

## 2023-11-02 DIAGNOSIS — E78.5 HYPERLIPIDEMIA WITH TARGET LDL LESS THAN 100: ICD-10-CM

## 2023-11-02 DIAGNOSIS — Z11.59 ENCOUNTER FOR SCREENING FOR OTHER VIRAL DISEASES: ICD-10-CM

## 2023-11-02 LAB
25(OH)D3 SERPL-MCNC: 32 NG/ML (ref 30–100)
ALBUMIN SERPL-MCNC: 4.3 G/DL (ref 3.5–5.2)
ALP SERPL-CCNC: 70 U/L (ref 35–104)
ALT SERPL-CCNC: 24 U/L (ref 5–33)
ANION GAP SERPL CALCULATED.3IONS-SCNC: 9 MMOL/L (ref 9–17)
AST SERPL-CCNC: 20 U/L
BACTERIA URNS QL MICRO: ABNORMAL
BILIRUB SERPL-MCNC: 0.7 MG/DL (ref 0.3–1.2)
BILIRUB UR QL STRIP: NEGATIVE
BUN SERPL-MCNC: 10 MG/DL (ref 8–23)
CALCIUM SERPL-MCNC: 9.4 MG/DL (ref 8.6–10.4)
CASTS #/AREA URNS LPF: ABNORMAL /LPF
CHLORIDE SERPL-SCNC: 106 MMOL/L (ref 98–107)
CHOLEST SERPL-MCNC: 215 MG/DL
CHOLESTEROL/HDL RATIO: 4.7
CLARITY UR: CLEAR
CO2 SERPL-SCNC: 25 MMOL/L (ref 20–31)
COLOR UR: YELLOW
CREAT SERPL-MCNC: 0.7 MG/DL (ref 0.5–0.9)
EPI CELLS #/AREA URNS HPF: ABNORMAL /HPF
ERYTHROCYTE [DISTWIDTH] IN BLOOD BY AUTOMATED COUNT: 14.2 % (ref 11.5–14.9)
EST. AVERAGE GLUCOSE BLD GHB EST-MCNC: 114 MG/DL
GFR SERPL CREATININE-BSD FRML MDRD: >60 ML/MIN/1.73M2
GLUCOSE SERPL-MCNC: 104 MG/DL (ref 70–99)
GLUCOSE UR STRIP-MCNC: NEGATIVE MG/DL
HBA1C MFR BLD: 5.6 % (ref 4–6)
HCT VFR BLD AUTO: 44.7 % (ref 36–46)
HCV AB SERPL QL IA: NONREACTIVE
HDLC SERPL-MCNC: 46 MG/DL
HGB BLD-MCNC: 14.4 G/DL (ref 12–16)
HGB UR QL STRIP.AUTO: NEGATIVE
KETONES UR STRIP-MCNC: NEGATIVE MG/DL
LDLC SERPL CALC-MCNC: 116 MG/DL (ref 0–130)
LEUKOCYTE ESTERASE UR QL STRIP: ABNORMAL
MAGNESIUM SERPL-MCNC: 2.3 MG/DL (ref 1.6–2.6)
MCH RBC QN AUTO: 31.9 PG (ref 26–34)
MCHC RBC AUTO-ENTMCNC: 32.2 G/DL (ref 31–37)
MCV RBC AUTO: 99.3 FL (ref 80–100)
NITRITE UR QL STRIP: NEGATIVE
PH UR STRIP: 6 [PH] (ref 5–8)
PLATELET # BLD AUTO: 240 K/UL (ref 150–450)
PMV BLD AUTO: 9 FL (ref 6–12)
POTASSIUM SERPL-SCNC: 4.8 MMOL/L (ref 3.7–5.3)
PROT SERPL-MCNC: 6.9 G/DL (ref 6.4–8.3)
PROT UR STRIP-MCNC: NEGATIVE MG/DL
RBC # BLD AUTO: 4.5 M/UL (ref 4–5.2)
RBC #/AREA URNS HPF: ABNORMAL /HPF
SODIUM SERPL-SCNC: 140 MMOL/L (ref 135–144)
SP GR UR STRIP: 1.01 (ref 1–1.03)
TRIGL SERPL-MCNC: 264 MG/DL
TSH SERPL DL<=0.05 MIU/L-ACNC: 2.29 UIU/ML (ref 0.3–5)
URATE SERPL-MCNC: 6.7 MG/DL (ref 2.4–5.7)
UROBILINOGEN UR STRIP-ACNC: NORMAL EU/DL (ref 0–1)
WBC #/AREA URNS HPF: ABNORMAL /HPF
WBC OTHER # BLD: 9 K/UL (ref 3.5–11)

## 2023-11-02 PROCEDURE — 83036 HEMOGLOBIN GLYCOSYLATED A1C: CPT

## 2023-11-02 PROCEDURE — 36415 COLL VENOUS BLD VENIPUNCTURE: CPT

## 2023-11-02 PROCEDURE — 81001 URINALYSIS AUTO W/SCOPE: CPT

## 2023-11-02 PROCEDURE — 85027 COMPLETE CBC AUTOMATED: CPT

## 2023-11-02 PROCEDURE — 80061 LIPID PANEL: CPT

## 2023-11-02 PROCEDURE — 83735 ASSAY OF MAGNESIUM: CPT

## 2023-11-02 PROCEDURE — 86803 HEPATITIS C AB TEST: CPT

## 2023-11-02 PROCEDURE — 84550 ASSAY OF BLOOD/URIC ACID: CPT

## 2023-11-02 PROCEDURE — 84443 ASSAY THYROID STIM HORMONE: CPT

## 2023-11-02 PROCEDURE — 80053 COMPREHEN METABOLIC PANEL: CPT

## 2023-11-02 PROCEDURE — 82306 VITAMIN D 25 HYDROXY: CPT

## 2023-11-02 NOTE — RESULT ENCOUNTER NOTE
Please notify patient: High cholesterol we should change Zocor to Crestor, it seems like it does not work for her, please let me know and I can do that    Uric acid is high can lead to gout and kidney stones, I suggest to start allopurinol together with low purine diet--let me know if she agrees and I can send    Blood glucose mildly high but improved  Hemoglobin A1c is normal but borderline high towards prediabetes  Otherwise labs within normal limits  continue current treatment      GOUT  DIET SHORT COUNSELING:  Stop drinking any pop or alcohol if she drinks any. Avoid eating too much high purine foods like:dried beans and dried peas, Asparagus, cauliflower, spinach, mushrooms, and green peas, seafood, Oatmeal, wheat bran, and wheat germ, Organ meats, such as liver, kidneys, sweetbreads, and brains, Meats, including srivastava, beef, pork, and lamb, Game meats and any other meats in large amounts, Anchovies, sardines, herring, mackerel, and scallops, Gravy, Beer, high fructose syrup and high carbs food.     Future Appointments  11/8/2023  2:00 PM    Jose Uriarte, PhD  [facility]  12/19/2023 1:00 PM    Rachel Valdivia MD    [facility]  8/20/2024  1:00 PM    Rachel Valdivia MD     nevin Padilla

## 2023-11-06 DIAGNOSIS — E78.5 HYPERLIPIDEMIA WITH TARGET LDL LESS THAN 100: Primary | ICD-10-CM

## 2023-11-06 DIAGNOSIS — E79.0 HYPERURICEMIA: Primary | ICD-10-CM

## 2023-11-06 RX ORDER — ALLOPURINOL 100 MG/1
100 TABLET ORAL DAILY
Qty: 90 TABLET | Refills: 1 | Status: SHIPPED | OUTPATIENT
Start: 2023-11-06

## 2023-11-06 RX ORDER — ROSUVASTATIN CALCIUM 10 MG/1
10 TABLET, COATED ORAL
Qty: 90 TABLET | Refills: 3 | Status: SHIPPED | OUTPATIENT
Start: 2023-11-06

## 2023-11-06 NOTE — PROGRESS NOTES
Diagnosis Orders   1.  Hyperuricemia  allopurinol (ZYLOPRIM) 100 MG tablet         Lab Results   Component Value Date    URICACID 6.7 (H) 11/02/2023         Future Appointments   Date Time Provider [facility] [address]   11/8/2023  2:00 PM Sara Avalos, PhD [address]   12/19/2023  1:00 PM MD clyde Bhatt [facility]   8/20/2024  1:00 PM MD clyde Bhatt

## 2023-11-09 ENCOUNTER — TELEPHONE (OUTPATIENT)
Dept: FAMILY MEDICINE CLINIC | Age: 78
End: 2023-11-09

## 2023-11-09 NOTE — TELEPHONE ENCOUNTER
Patient called in c/o diarrhea since she started the allopurinol and Crestor she wants to know if it could be the meds or if it is a viral infection?

## 2023-11-09 NOTE — TELEPHONE ENCOUNTER
To try this      Stop both allopurinol and Crestor for a week--- if diarrhea goes away, then restart one of them at that time and see what happens    To stay hydrated, drink 8 x 8 ounce glasses of water every day, can eat cheese, which can make her constipated    Future Appointments   Date Time Provider [address] [place] [address]   11/22/2023  2:00 PM Dearaleshia Hennessy, PhD [address]   12/19/2023  1:00 PM Cris Cooper MD [facility]   8/20/2024  1:00 PM Cris Cooper MD fp nevin Triplett

## 2023-11-15 ENCOUNTER — HOSPITAL ENCOUNTER (OUTPATIENT)
Dept: CT IMAGING | Age: 78
Discharge: HOME | End: 2023-11-17
Payer: MEDICARE

## 2023-11-15 DIAGNOSIS — H74.02 TYMPANOSCLEROSIS OF LEFT EAR: ICD-10-CM

## 2023-11-15 DIAGNOSIS — H72.92 UNSPECIFIED PERFORATION OF TYMPANIC MEMBRANE, LEFT EAR: ICD-10-CM

## 2023-11-15 DIAGNOSIS — H92.12 OTORRHEA, LEFT: ICD-10-CM

## 2023-11-15 PROCEDURE — 70480 CT ORBIT/EAR/FOSSA W/O DYE: CPT

## 2023-11-22 ENCOUNTER — OFFICE VISIT (OUTPATIENT)
Dept: BEHAVIORAL/MENTAL HEALTH CLINIC | Age: 78
End: 2023-11-22
Payer: MEDICARE

## 2023-11-22 DIAGNOSIS — F41.1 GENERALIZED ANXIETY DISORDER: Primary | ICD-10-CM

## 2023-11-22 DIAGNOSIS — F34.1 PERSISTENT DEPRESSIVE DISORDER, MILD: ICD-10-CM

## 2023-11-22 PROCEDURE — 1036F TOBACCO NON-USER: CPT | Performed by: PSYCHOLOGIST

## 2023-11-22 PROCEDURE — 90837 PSYTX W PT 60 MINUTES: CPT | Performed by: PSYCHOLOGIST

## 2023-11-22 PROCEDURE — 1123F ACP DISCUSS/DSCN MKR DOCD: CPT | Performed by: PSYCHOLOGIST

## 2023-11-22 NOTE — PROGRESS NOTES
LM for patient to call back to schedule appointment with Dr Bautista   span and concentration appear within functional limits  Language use and knowledge base appear within functional limits    [address] presented to the appointment today for evaluation and treatment of symptoms of anxiety and depression related to grief. She is currently deemed no risk to herself or others and meets criteria for Generalized Anxiety Disorder and Persistent Depressive Disorder. She appears to be doing somewhat better and has been engaging in more enjoyable activities and accessing her support system. She responded well to interventions, including providing emotional support and validation. She will [facility] Gamaliel Irizarry was in agreement with recommendations. 8/16/2023     3:17 PM 6/6/2023     2:06 PM 11/11/2022     1:28 PM 8/11/2022     1:42 PM 2/18/2022     1:01 PM 10/11/2021     7:49 PM 6/11/2021     1:04 PM   PHQ Scores   PHQ2 Score 0 4 2 1 2 0 0   PHQ9 Score 4 8 11 13 2 0 0     Interpretation of Total Score Depression Severity: 1-4 = Minimal depression, 5-9 = Mild depression, 10-14 = Moderate depression, 15-19 = Moderately severe depression, 20-27 = Severe depression    How often pt has had thoughts of death or hurting self (if PHQ positive for depression):           6/6/2023     2:08 PM 3/9/2021     7:00 PM   ANALI 7 SCORE   ANALI-7 Total Score 3    ANALI-7 Total Score  2     Interpretation of ANALI-7 score: 5-9 = mild anxiety, 10-14 = moderate anxiety, 15+ = severe anxiety. Recommend referral to behavioral health for scores 10 or greater. DIAGNOSIS  Gamaliel Irizarry was seen today for depression and anxiety.     Diagnoses and all orders for this visit:    Generalized anxiety disorder    Persistent depressive disorder, mild          INTERVENTION  Trained in strategies for increasing balanced thinking, Discussed self-care (sleep, nutrition, rewarding activities, social support, exercise), Supportive techniques, Emphasized self-care as important

## 2023-11-28 DIAGNOSIS — I10 ESSENTIAL HYPERTENSION: ICD-10-CM

## 2023-11-28 RX ORDER — LOSARTAN POTASSIUM 50 MG/1
50 TABLET ORAL DAILY
Qty: 90 TABLET | Refills: 1 | Status: SHIPPED | OUTPATIENT
Start: 2023-11-28

## 2023-11-28 NOTE — TELEPHONE ENCOUNTER
Please Approve or Refuse.   Send to Pharmacy per Pt's Request:      Next Visit Date:  12/19/2023   Last Visit Date: 8/16/2023    Hemoglobin A1C (%)   Date Value   11/02/2023 5.6   02/18/2022 5.3   10/11/2021 5.3             ( goal A1C is < 7)   BP Readings from Last 3 Encounters:   08/16/23 132/74   06/06/23 120/84   12/01/22 (!) 140/64          (goal 120/80)  BUN   Date Value Ref Range Status   11/02/2023 10 8 - 23 mg/dL Final     Creatinine   Date Value Ref Range Status   11/02/2023 0.7 0.5 - 0.9 mg/dL Final     Potassium   Date Value Ref Range Status   11/02/2023 4.8 3.7 - 5.3 mmol/L Final

## 2023-12-13 RX ORDER — BUSPIRONE HYDROCHLORIDE 10 MG/1
10 TABLET ORAL 3 TIMES DAILY
Qty: 90 TABLET | Refills: 5 | Status: SHIPPED | OUTPATIENT
Start: 2023-12-13

## 2023-12-21 PROBLEM — G89.29 CHRONIC PAIN OF RIGHT HIP: Status: ACTIVE | Noted: 2023-12-21

## 2023-12-21 PROBLEM — F33.1 MODERATE EPISODE OF RECURRENT MAJOR DEPRESSIVE DISORDER (HCC): Status: ACTIVE | Noted: 2023-06-13

## 2023-12-21 PROBLEM — F32.A ANXIETY AND DEPRESSION: Status: RESOLVED | Noted: 2019-12-12 | Resolved: 2023-12-21

## 2023-12-21 PROBLEM — M25.551 CHRONIC PAIN OF RIGHT HIP: Status: ACTIVE | Noted: 2023-12-21

## 2023-12-21 PROBLEM — S20.211A CONTUSION OF RIGHT CHEST WALL: Status: RESOLVED | Noted: 2023-08-16 | Resolved: 2023-12-21

## 2023-12-21 PROBLEM — F41.9 ANXIETY AND DEPRESSION: Status: RESOLVED | Noted: 2019-12-12 | Resolved: 2023-12-21

## 2023-12-31 DIAGNOSIS — M81.0 AGE-RELATED OSTEOPOROSIS WITHOUT CURRENT PATHOLOGICAL FRACTURE: ICD-10-CM

## 2024-01-02 RX ORDER — ALENDRONATE SODIUM 70 MG/1
TABLET ORAL
Qty: 12 TABLET | Refills: 3 | Status: SHIPPED | OUTPATIENT
Start: 2024-01-02

## 2024-01-03 ENCOUNTER — HOSPITAL ENCOUNTER (OUTPATIENT)
Dept: WOMENS IMAGING | Age: 79
Discharge: HOME | End: 2024-01-05
Attending: FAMILY MEDICINE
Payer: MEDICARE

## 2024-01-03 ENCOUNTER — OFFICE VISIT (OUTPATIENT)
Dept: BEHAVIORAL/MENTAL HEALTH CLINIC | Age: 79
End: 2024-01-03
Payer: MEDICARE

## 2024-01-03 DIAGNOSIS — F34.1 PERSISTENT DEPRESSIVE DISORDER, MILD: ICD-10-CM

## 2024-01-03 DIAGNOSIS — Z12.31 ENCOUNTER FOR SCREENING MAMMOGRAM FOR BREAST CANCER: ICD-10-CM

## 2024-01-03 DIAGNOSIS — F41.1 GENERALIZED ANXIETY DISORDER: Primary | ICD-10-CM

## 2024-01-03 PROCEDURE — 77063 BREAST TOMOSYNTHESIS BI: CPT

## 2024-01-03 PROCEDURE — 90837 PSYTX W PT 60 MINUTES: CPT | Performed by: PSYCHOLOGIST

## 2024-01-03 PROCEDURE — 1036F TOBACCO NON-USER: CPT | Performed by: PSYCHOLOGIST

## 2024-01-03 PROCEDURE — 1123F ACP DISCUSS/DSCN MKR DOCD: CPT | Performed by: PSYCHOLOGIST

## 2024-01-03 NOTE — PROGRESS NOTES
ADULT BEHAVIORAL HEALTH ASSESSMENT  Lynette Roberts,  Ph.D.   Licensed Clinical Psychologist ([place] [id number])    Visit Date: 1/3/2024   Time of appointment:  1:02p - 2:07p   Time spent with Patient: 65 minutes.   This is patient's third appointment.    Reason for Consult:  Depression (Grief) and Anxiety     Referring Provider/PCP:    No ref. provider found  May Krishnamurthy MD      Pt provided informed consent for the behavioral health program. Discussed with patient model of service to include the limits of confidentiality (i.e. abuse reporting, suicide intervention, etc.) and short-term intervention focused approach.  Pt indicated understanding.    REID Leblanc is a 78 y.o. female who presents for follow up of anxiety and depression related to grief.  She was last seen several weeks ago on 23.  She reported that glory was very emotionally difficult this year and she ended up not putting up a glory tree or really decorating.  She stated that this is the second glory without her grandson since his death by suicide on 22.  She reported that her grandson's birthday is also on , the day before her own birthday on [birth year].  She reported that December had already been a difficult month before losing her grandson because this is the month her sister and her mother .  She reported that she did get to do some glory shopping with her daughter (mother of her  grandson), which they both greatly enjoyed.  However, during the shopping trip, an employee of one of the stores recognized her daughter's last name and commented that he knew her son and recalled hanging out with him at the \"party house\" (which was Deana's old house that her grandson had been using to throw parties).  She reported that they both cried when they got back to their car, but they were still able to enjoy the day together despite this.  She stated that she did attend glory yolanda services with her daughter,

## 2024-01-08 ENCOUNTER — OFFICE VISIT (OUTPATIENT)
Dept: ORTHOPEDIC SURGERY | Age: 79
End: 2024-01-08
Payer: MEDICARE

## 2024-01-08 VITALS — HEIGHT: 66 IN | WEIGHT: 186 LBS | OXYGEN SATURATION: 100 % | BODY MASS INDEX: 29.89 KG/M2 | RESPIRATION RATE: 16 BRPM

## 2024-01-08 DIAGNOSIS — S72.009D HIP FRACTURE, UNSPECIFIED LATERALITY, CLOSED, WITH ROUTINE HEALING, SUBSEQUENT ENCOUNTER: ICD-10-CM

## 2024-01-08 DIAGNOSIS — Z96.9 RETAINED ORTHOPEDIC HARDWARE: ICD-10-CM

## 2024-01-08 DIAGNOSIS — M70.61 GREATER TROCHANTERIC BURSITIS OF RIGHT HIP: Primary | ICD-10-CM

## 2024-01-08 PROCEDURE — G8427 DOCREV CUR MEDS BY ELIG CLIN: HCPCS | Performed by: ORTHOPAEDIC SURGERY

## 2024-01-08 PROCEDURE — 1036F TOBACCO NON-USER: CPT | Performed by: ORTHOPAEDIC SURGERY

## 2024-01-08 PROCEDURE — G8484 FLU IMMUNIZE NO ADMIN: HCPCS | Performed by: ORTHOPAEDIC SURGERY

## 2024-01-08 PROCEDURE — 1090F PRES/ABSN URINE INCON ASSESS: CPT | Performed by: ORTHOPAEDIC SURGERY

## 2024-01-08 PROCEDURE — 20610 DRAIN/INJ JOINT/BURSA W/O US: CPT | Performed by: ORTHOPAEDIC SURGERY

## 2024-01-08 PROCEDURE — G8399 PT W/DXA RESULTS DOCUMENT: HCPCS | Performed by: ORTHOPAEDIC SURGERY

## 2024-01-08 PROCEDURE — G8417 CALC BMI ABV UP PARAM F/U: HCPCS | Performed by: ORTHOPAEDIC SURGERY

## 2024-01-08 PROCEDURE — 1123F ACP DISCUSS/DSCN MKR DOCD: CPT | Performed by: ORTHOPAEDIC SURGERY

## 2024-01-08 PROCEDURE — 99214 OFFICE O/P EST MOD 30 MIN: CPT | Performed by: ORTHOPAEDIC SURGERY

## 2024-01-08 RX ORDER — LIDOCAINE HYDROCHLORIDE 10 MG/ML
4 INJECTION, SOLUTION INFILTRATION; PERINEURAL ONCE
Status: COMPLETED | OUTPATIENT
Start: 2024-01-08 | End: 2024-01-08

## 2024-01-08 RX ORDER — TRIAMCINOLONE ACETONIDE 40 MG/ML
40 INJECTION, SUSPENSION INTRA-ARTICULAR; INTRAMUSCULAR ONCE
Status: COMPLETED | OUTPATIENT
Start: 2024-01-08 | End: 2024-01-08

## 2024-01-08 RX ADMIN — TRIAMCINOLONE ACETONIDE 40 MG: 40 INJECTION, SUSPENSION INTRA-ARTICULAR; INTRAMUSCULAR at 11:45

## 2024-01-08 RX ADMIN — LIDOCAINE HYDROCHLORIDE 4 ML: 10 INJECTION, SOLUTION INFILTRATION; PERINEURAL at 11:45

## 2024-01-08 NOTE — PROGRESS NOTES
[facility] ORTHOPEDICS  [address] [place] [address]  Dept: [phone]    Ambulatory Orthopedic Follow Up Visit    Preoperative Diagnosis:   Right closed peritrochanteric hip fracture  Body mass index is 29.05 kg/m².     Postoperative Diagnosis:   same     Procedures Performed:  (9/14/2020)  Open treatment of Right hip intertrochanteric femur fracture with cephalomedullary nail        CHIEF COMPLAINT:    Chief Complaint   Patient presents with    Hip Pain     Right         HISTORY OF PRESENT ILLNESS:       She is a 78 y.o. female, seen again today in the office for follow up of the above problem with a history of pain at the above location. The pain is described mainly with mechanical terms (dull/sharp/throbbing). The pain is worse with activity and better with rest. Since being seen last, the patient is doing better. At today's visit, she is using no brace or assistive device.  At today's visit, she reports that she has no pain in her hip, and she is happy with her outcome.    She also reports pain in her left greater than right plantar heels, which began atraumatically years ago.    INTERVAL HISTORY 4/19/2021:  She is seen again today in the office for follow up of a previous issue (as above). Since being seen last, the patient is doing better. At today's visit, she is using a cane.  She reports that her left plantar heel hurts more than her right plantar heel, and reports pain with her first steps in the morning.    INTERVAL HISTORY 7/19/2021:  She is seen again today in the office for follow up of a previous issue (as above). Since being seen last, the patient is doing better. At today's visit, she is not using a brace or assistive device.    History is obtained today from:   [x]  the patient     []  EMR     [x]  one family member/friend--    []  multiple family members/friends    []  other:        INTERVAL HISTORY 6/5/2023:

## 2024-01-30 ENCOUNTER — OFFICE VISIT (OUTPATIENT)
Age: 79
End: 2024-01-30
Payer: MEDICARE

## 2024-01-30 VITALS — WEIGHT: 186 LBS | BODY MASS INDEX: 29.89 KG/M2 | HEIGHT: 66 IN

## 2024-01-30 DIAGNOSIS — M18.11 ARTHRITIS OF CARPOMETACARPAL (CMC) JOINT OF RIGHT THUMB: Primary | ICD-10-CM

## 2024-01-30 DIAGNOSIS — M79.644 THUMB PAIN, RIGHT: ICD-10-CM

## 2024-01-30 PROCEDURE — 20600 DRAIN/INJ JOINT/BURSA W/O US: CPT | Performed by: NURSE PRACTITIONER

## 2024-01-30 PROCEDURE — 1036F TOBACCO NON-USER: CPT | Performed by: NURSE PRACTITIONER

## 2024-01-30 RX ADMIN — METHYLPREDNISOLONE ACETATE 40 MG: 40 INJECTION, SUSPENSION INTRA-ARTICULAR; INTRALESIONAL; INTRAMUSCULAR; SOFT TISSUE at 08:09

## 2024-01-30 RX ADMIN — LIDOCAINE HYDROCHLORIDE 0.5 ML: 10 INJECTION, SOLUTION INFILTRATION; PERINEURAL at 08:09

## 2024-01-30 NOTE — PROGRESS NOTES
[facility] Orthopedics & Sports Medicine      [facility] PHYSICIANS [facility], [facility]  [facility] ORTHOPAEDICS AND SPORTS MEDICINE  [address]  [place] [address]  Dept: [phone]  Dept Fax: [phone]    Chief Compliant:  Chief Complaint   Patient presents with    Pain     R thumb         History of Present Illness:  This is a pleasant 78 y.o. female who is here for evaluation of right hand pain. Patient is right hand dominant and has been a  for 30 years and also makes leather bracelets. She has had pain in the right thumb for a few years but has been bothering her more often the past few months. She has tried a few different braces for the thumb which have helped. Denies any injury to the thumb. Denies any numbness or tingling. She occasionally takes Tylenol as needed for pain.     Physical Exam: Skin intact. No swelling, ecchymosis or erythema. TTP right first CMC joint. Able to adduct the thumb to the base of the 5th digit. Pain with active thumb extension. Negative Finkelstein test. No radial styloid tenderness. Negative Tinel's at the carpal tunnel.     Imaging: 3 views of the right hand show no acute fractures or dislocations. 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 There is some cortical irregularity at the first proximal phalanx which appears chronic.       Assessment and Plan:    This is a pleasant 78 y.o. female who has right wrist first CMC joint arthritis. Reviewed imaging and discussed etiology of symptoms as well as treatment options. Recommend continuing conservative treatment in the form of a cortisone injection today, continue use of the CMC brace at night and with activity as needed for support, activity modification, ice, NSAIDs, Tylenol for pain. After the skin was cleansed with alcohol I injected 40 mg of Depo-Medrol and local anesthetic into right wrist first CMC joint.  Cortisone injection risks include infection, hyperglycemia, post

## 2024-01-31 RX ORDER — LIDOCAINE HYDROCHLORIDE 10 MG/ML
0.5 INJECTION, SOLUTION INFILTRATION; PERINEURAL ONCE
Status: COMPLETED | OUTPATIENT
Start: 2024-01-31 | End: 2024-01-30

## 2024-01-31 RX ORDER — METHYLPREDNISOLONE ACETATE 40 MG/ML
40 INJECTION, SUSPENSION INTRA-ARTICULAR; INTRALESIONAL; INTRAMUSCULAR; SOFT TISSUE ONCE
Status: COMPLETED | OUTPATIENT
Start: 2024-01-31 | End: 2024-01-30

## 2024-02-07 ENCOUNTER — OFFICE VISIT (OUTPATIENT)
Dept: BEHAVIORAL/MENTAL HEALTH CLINIC | Age: 79
End: 2024-02-07
Payer: MEDICARE

## 2024-02-07 DIAGNOSIS — F34.1 PERSISTENT DEPRESSIVE DISORDER, MILD: ICD-10-CM

## 2024-02-07 DIAGNOSIS — F41.1 GENERALIZED ANXIETY DISORDER: Primary | ICD-10-CM

## 2024-02-07 PROCEDURE — 90837 PSYTX W PT 60 MINUTES: CPT | Performed by: PSYCHOLOGIST

## 2024-02-07 PROCEDURE — 1123F ACP DISCUSS/DSCN MKR DOCD: CPT | Performed by: PSYCHOLOGIST

## 2024-02-07 PROCEDURE — 1036F TOBACCO NON-USER: CPT | Performed by: PSYCHOLOGIST

## 2024-02-07 NOTE — PROGRESS NOTES
symptoms.  She responded well to interventions, including challenging maladaptive thoughts/statements (e.g. \"should\" statements), modeling more self-compassionate, balanced coping talk/thinking, and discussing ways to overcome low motivation.  She will benefit from continued therapy with the [facility] to address anxiety and depression.  Deana was in agreement with recommendations.        12/19/2023     1:14 PM 8/16/2023     3:17 PM 6/6/2023     2:06 PM 11/11/2022     1:28 PM 8/11/2022     1:42 PM 2/18/2022     1:01 PM 10/11/2021     7:49 PM   PHQ Scores   PHQ2 Score 4 0 4 2 1 2 0   PHQ9 Score 16 4 8 11 13 2 0     Interpretation of Total Score Depression Severity: 1-4 = Minimal depression, 5-9 = Mild depression, 10-14 = Moderate depression, 15-19 = Moderately severe depression, 20-27 = Severe depression    How often pt has had thoughts of death or hurting self (if PHQ positive for depression):           6/6/2023     2:08 PM 3/9/2021     7:00 PM   ANALI 7 SCORE   ANALI-7 Total Score 3    ANALI-7 Total Score  2     Interpretation of ANALI-7 score: 5-9 = mild anxiety, 10-14 = moderate anxiety, 15+ = severe anxiety. Recommend referral to behavioral health for scores 10 or greater.      DIAGNOSIS  Deana was seen today for depression and anxiety.    Diagnoses and all orders for this visit:    Generalized anxiety disorder    Persistent depressive disorder, mild          INTERVENTION  Trained in strategies for increasing balanced thinking, Discussed self-care (sleep, nutrition, rewarding activities, social support, exercise), Supportive techniques, Emphasized self-care as important for managing overall health, CBT to target anxiety, depression, and low motivation, and Identified maladaptive thoughts      PLAN  1)  Pt will continue to work on practicing better self-care, including engaging in enjoyable activities, accessing her support system, and allowing herself time to grieve.    2)  Pt will return for a follow-up appointment with the

## 2024-03-06 ENCOUNTER — OFFICE VISIT (OUTPATIENT)
Dept: BEHAVIORAL/MENTAL HEALTH CLINIC | Age: 79
End: 2024-03-06
Payer: MEDICARE

## 2024-03-06 DIAGNOSIS — F41.1 GENERALIZED ANXIETY DISORDER: Primary | ICD-10-CM

## 2024-03-06 DIAGNOSIS — F34.1 PERSISTENT DEPRESSIVE DISORDER, MILD: ICD-10-CM

## 2024-03-06 PROCEDURE — 1123F ACP DISCUSS/DSCN MKR DOCD: CPT | Performed by: PSYCHOLOGIST

## 2024-03-06 PROCEDURE — 1036F TOBACCO NON-USER: CPT | Performed by: PSYCHOLOGIST

## 2024-03-06 PROCEDURE — 90837 PSYTX W PT 60 MINUTES: CPT | Performed by: PSYCHOLOGIST

## 2024-03-06 ASSESSMENT — PATIENT HEALTH QUESTIONNAIRE - PHQ9
2. FEELING DOWN, DEPRESSED OR HOPELESS: 2
9. THOUGHTS THAT YOU WOULD BE BETTER OFF DEAD, OR OF HURTING YOURSELF: 0
8. MOVING OR SPEAKING SO SLOWLY THAT OTHER PEOPLE COULD HAVE NOTICED. OR THE OPPOSITE, BEING SO FIGETY OR RESTLESS THAT YOU HAVE BEEN MOVING AROUND A LOT MORE THAN USUAL: 0
4. FEELING TIRED OR HAVING LITTLE ENERGY: 1
SUM OF ALL RESPONSES TO PHQ QUESTIONS 1-9: 10
1. LITTLE INTEREST OR PLEASURE IN DOING THINGS: 1
5. POOR APPETITE OR OVEREATING: 3
SUM OF ALL RESPONSES TO PHQ QUESTIONS 1-9: 10
SUM OF ALL RESPONSES TO PHQ9 QUESTIONS 1 & 2: 3
10. IF YOU CHECKED OFF ANY PROBLEMS, HOW DIFFICULT HAVE THESE PROBLEMS MADE IT FOR YOU TO DO YOUR WORK, TAKE CARE OF THINGS AT HOME, OR GET ALONG WITH OTHER PEOPLE: 1
7. TROUBLE CONCENTRATING ON THINGS, SUCH AS READING THE NEWSPAPER OR WATCHING TELEVISION: 0
SUM OF ALL RESPONSES TO PHQ QUESTIONS 1-9: 10
6. FEELING BAD ABOUT YOURSELF - OR THAT YOU ARE A FAILURE OR HAVE LET YOURSELF OR YOUR FAMILY DOWN: 2
3. TROUBLE FALLING OR STAYING ASLEEP: 1
SUM OF ALL RESPONSES TO PHQ QUESTIONS 1-9: 10

## 2024-03-06 NOTE — PROGRESS NOTES
ADULT BEHAVIORAL HEALTH ASSESSMENT  Lynette Roberts,  Ph.D.   Licensed Clinical Psychologist ([place] [id number])    Visit Date: 3/6/2024   Time of appointment:  2:15p - 3:08p  Time spent with Patient: 53 minutes.   This is patient's fifth appointment.    Reason for Consult:  Depression (Grief) and Anxiety     Referring Provider/PCP:    No ref. provider found  May Krishnamurthy MD      Pt provided informed consent for the behavioral health program. Discussed with patient model of service to include the limits of confidentiality (i.e. abuse reporting, suicide intervention, etc.) and short-term intervention focused approach.  Pt indicated understanding.    REID Leblanc is a 78 y.o. female who presents for follow up of anxiety and depression, mostly related to grief.  She reported that the past month has \"not been too bad\" with \"some pretty good days and some bad days.\"  She reported that a couple weekends ago, she went on a basket weaving trip with some friends and they had a great time.  She showed pictures of the baskets she weaved on this trip.  She reported that she has been working on doing some cleaning and breaking the tasks down into smaller, manageable pieces as suggested by the [facility].  She reported that doing this has been helpful.  She stated that she has been trying to do \"a little bit each day.\"  She reported that this has been helping her feel a little more effective, though she continues to struggle with low motivation and mood at times.        Previous Recommendations:   1)  Pt will continue to work on practicing better self-care, including engaging in enjoyable activities, accessing her support system, and allowing herself time to grieve.    2)  Pt will return for a follow-up appointment with the [facility] in 4 weeks on 3/6/24 at 2PM.         MENTAL STATUS EXAM  Mood was anxious and dysthymic with brighter and calm affect.   Suicidal ideation was denied.   Homicidal ideation was denied.   Hygiene was good .  Dress

## 2024-04-03 ENCOUNTER — OFFICE VISIT (OUTPATIENT)
Dept: BEHAVIORAL/MENTAL HEALTH CLINIC | Age: 79
End: 2024-04-03
Payer: MEDICARE

## 2024-04-03 DIAGNOSIS — F34.1 PERSISTENT DEPRESSIVE DISORDER, MILD: ICD-10-CM

## 2024-04-03 DIAGNOSIS — F41.1 GENERALIZED ANXIETY DISORDER: Primary | ICD-10-CM

## 2024-04-03 PROCEDURE — 1036F TOBACCO NON-USER: CPT | Performed by: PSYCHOLOGIST

## 2024-04-03 PROCEDURE — 90837 PSYTX W PT 60 MINUTES: CPT | Performed by: PSYCHOLOGIST

## 2024-04-03 PROCEDURE — 1123F ACP DISCUSS/DSCN MKR DOCD: CPT | Performed by: PSYCHOLOGIST

## 2024-04-03 ASSESSMENT — ANXIETY QUESTIONNAIRES
3. WORRYING TOO MUCH ABOUT DIFFERENT THINGS: 1-SEVERAL DAYS
6. BECOMING EASILY ANNOYED OR IRRITABLE: 0-NOT AT ALL
GAD7 TOTAL SCORE: 3
5. BEING SO RESTLESS THAT IT IS HARD TO SIT STILL: 0-NOT AT ALL
4. TROUBLE RELAXING: 0-NOT AT ALL
2. NOT BEING ABLE TO STOP OR CONTROL WORRYING: 1-SEVERAL DAYS
1. FEELING NERVOUS, ANXIOUS, OR ON EDGE: SEVERAL DAYS
7. FEELING AFRAID AS IF SOMETHING AWFUL MIGHT HAPPEN: 0-NOT AT ALL

## 2024-04-03 ASSESSMENT — PATIENT HEALTH QUESTIONNAIRE - PHQ9
SUM OF ALL RESPONSES TO PHQ QUESTIONS 1-9: 7
5. POOR APPETITE OR OVEREATING: SEVERAL DAYS
SUM OF ALL RESPONSES TO PHQ QUESTIONS 1-9: 7
8. MOVING OR SPEAKING SO SLOWLY THAT OTHER PEOPLE COULD HAVE NOTICED. OR THE OPPOSITE, BEING SO FIGETY OR RESTLESS THAT YOU HAVE BEEN MOVING AROUND A LOT MORE THAN USUAL: NOT AT ALL
7. TROUBLE CONCENTRATING ON THINGS, SUCH AS READING THE NEWSPAPER OR WATCHING TELEVISION: SEVERAL DAYS
9. THOUGHTS THAT YOU WOULD BE BETTER OFF DEAD, OR OF HURTING YOURSELF: NOT AT ALL
2. FEELING DOWN, DEPRESSED OR HOPELESS: SEVERAL DAYS
1. LITTLE INTEREST OR PLEASURE IN DOING THINGS: SEVERAL DAYS
SUM OF ALL RESPONSES TO PHQ QUESTIONS 1-9: 7
SUM OF ALL RESPONSES TO PHQ9 QUESTIONS 1 & 2: 2
SUM OF ALL RESPONSES TO PHQ QUESTIONS 1-9: 7
4. FEELING TIRED OR HAVING LITTLE ENERGY: SEVERAL DAYS
6. FEELING BAD ABOUT YOURSELF - OR THAT YOU ARE A FAILURE OR HAVE LET YOURSELF OR YOUR FAMILY DOWN: SEVERAL DAYS
3. TROUBLE FALLING OR STAYING ASLEEP: SEVERAL DAYS
10. IF YOU CHECKED OFF ANY PROBLEMS, HOW DIFFICULT HAVE THESE PROBLEMS MADE IT FOR YOU TO DO YOUR WORK, TAKE CARE OF THINGS AT HOME, OR GET ALONG WITH OTHER PEOPLE: SOMEWHAT DIFFICULT

## 2024-04-03 NOTE — PROGRESS NOTES
recent:  good.  Insight and judgment were estimated to be good, AEB, a good  understanding of cyclical maladaptive patterns, and the ability to use insight to inform behavior change.   Attention span and concentration appear within functional limits  Language use and knowledge base appear within functional limits    ASSESSMENT  Deana Dalton presented to the appointment today for evaluation and treatment of symptoms of anxiety and depression, mostly related to grief.  She is currently deemed no risk to herself or others and meets criteria for ANALI and PDD.  She has continued to cope more effectively over the past month, despite some recent deaths in her life.  She responded well to interventions, including challenging and reframing maladaptive thoughts/statements, modeling acceptance, and modeling more self-compassionate and balanced coping talk/thinking.  She will benefit from continued therapy with the [facility] to address anxiety and depression as well as grief.  Deana was in agreement with recommendations.        4/3/2024     1:33 PM 3/6/2024     5:47 PM 12/19/2023     1:14 PM 8/16/2023     3:17 PM 6/6/2023     2:06 PM 11/11/2022     1:28 PM 8/11/2022     1:42 PM   PHQ Scores   PHQ2 Score 2 3 4 0 4 2 1   PHQ9 Score 7 10 16 4 8 11 13     Interpretation of Total Score Depression Severity: 1-4 = Minimal depression, 5-9 = Mild depression, 10-14 = Moderate depression, 15-19 = Moderately severe depression, 20-27 = Severe depression    How often pt has had thoughts of death or hurting self (if PHQ positive for depression):  Not at all        4/3/2024     1:00 PM 6/6/2023     2:08 PM 3/9/2021     7:00 PM   ANALI 7 SCORE   ANALI-7 Total Score  3    ANALI-7 Total Score 3  2     Interpretation of ANALI-7 score: 5-9 = mild anxiety, 10-14 = moderate anxiety, 15+ = severe anxiety. Recommend referral to behavioral health for scores 10 or greater.      DIAGNOSIS  Deana was seen today for depression and anxiety.    Diagnoses and all orders for

## 2024-04-19 ENCOUNTER — OFFICE VISIT (OUTPATIENT)
Dept: FAMILY MEDICINE CLINIC | Age: 79
End: 2024-04-19
Payer: MEDICARE

## 2024-04-19 VITALS
OXYGEN SATURATION: 97 % | DIASTOLIC BLOOD PRESSURE: 72 MMHG | WEIGHT: 186.8 LBS | HEIGHT: 66 IN | HEART RATE: 69 BPM | BODY MASS INDEX: 30.02 KG/M2 | SYSTOLIC BLOOD PRESSURE: 124 MMHG | TEMPERATURE: 97.6 F

## 2024-04-19 DIAGNOSIS — R01.1 MURMUR, CARDIAC: ICD-10-CM

## 2024-04-19 DIAGNOSIS — Z00.00 MEDICARE ANNUAL WELLNESS VISIT, SUBSEQUENT: Primary | ICD-10-CM

## 2024-04-19 DIAGNOSIS — E78.5 HYPERLIPIDEMIA WITH TARGET LDL LESS THAN 100: ICD-10-CM

## 2024-04-19 DIAGNOSIS — I10 ESSENTIAL HYPERTENSION: ICD-10-CM

## 2024-04-19 DIAGNOSIS — M81.0 AGE-RELATED OSTEOPOROSIS WITHOUT CURRENT PATHOLOGICAL FRACTURE: ICD-10-CM

## 2024-04-19 DIAGNOSIS — K52.9 CHRONIC DIARRHEA: ICD-10-CM

## 2024-04-19 DIAGNOSIS — R73.9 HYPERGLYCEMIA: ICD-10-CM

## 2024-04-19 PROCEDURE — 1090F PRES/ABSN URINE INCON ASSESS: CPT | Performed by: FAMILY MEDICINE

## 2024-04-19 PROCEDURE — 3074F SYST BP LT 130 MM HG: CPT | Performed by: FAMILY MEDICINE

## 2024-04-19 PROCEDURE — G8427 DOCREV CUR MEDS BY ELIG CLIN: HCPCS | Performed by: FAMILY MEDICINE

## 2024-04-19 PROCEDURE — 1123F ACP DISCUSS/DSCN MKR DOCD: CPT | Performed by: FAMILY MEDICINE

## 2024-04-19 PROCEDURE — G8399 PT W/DXA RESULTS DOCUMENT: HCPCS | Performed by: FAMILY MEDICINE

## 2024-04-19 PROCEDURE — G0439 PPPS, SUBSEQ VISIT: HCPCS | Performed by: FAMILY MEDICINE

## 2024-04-19 PROCEDURE — 99214 OFFICE O/P EST MOD 30 MIN: CPT | Performed by: FAMILY MEDICINE

## 2024-04-19 PROCEDURE — 3078F DIAST BP <80 MM HG: CPT | Performed by: FAMILY MEDICINE

## 2024-04-19 PROCEDURE — 1036F TOBACCO NON-USER: CPT | Performed by: FAMILY MEDICINE

## 2024-04-19 PROCEDURE — G8417 CALC BMI ABV UP PARAM F/U: HCPCS | Performed by: FAMILY MEDICINE

## 2024-04-19 RX ORDER — TETANUS AND DIPHTHERIA TOXOIDS ADSORBED 2; 2 [LF]/.5ML; [LF]/.5ML
0.5 INJECTION INTRAMUSCULAR ONCE
Qty: 0.5 ML | Refills: 0 | Status: CANCELLED | OUTPATIENT
Start: 2024-04-19 | End: 2024-04-19

## 2024-04-19 RX ORDER — CETIRIZINE HYDROCHLORIDE 10 MG/1
10 TABLET ORAL DAILY
COMMUNITY

## 2024-04-19 SDOH — ECONOMIC STABILITY: FOOD INSECURITY: WITHIN THE PAST 12 MONTHS, YOU WORRIED THAT YOUR FOOD WOULD RUN OUT BEFORE YOU GOT MONEY TO BUY MORE.: NEVER TRUE

## 2024-04-19 SDOH — ECONOMIC STABILITY: FOOD INSECURITY: WITHIN THE PAST 12 MONTHS, THE FOOD YOU BOUGHT JUST DIDN'T LAST AND YOU DIDN'T HAVE MONEY TO GET MORE.: NEVER TRUE

## 2024-04-19 SDOH — ECONOMIC STABILITY: INCOME INSECURITY: HOW HARD IS IT FOR YOU TO PAY FOR THE VERY BASICS LIKE FOOD, HOUSING, MEDICAL CARE, AND HEATING?: NOT HARD AT ALL

## 2024-04-19 ASSESSMENT — PATIENT HEALTH QUESTIONNAIRE - PHQ9
10. IF YOU CHECKED OFF ANY PROBLEMS, HOW DIFFICULT HAVE THESE PROBLEMS MADE IT FOR YOU TO DO YOUR WORK, TAKE CARE OF THINGS AT HOME, OR GET ALONG WITH OTHER PEOPLE: VERY DIFFICULT
SUM OF ALL RESPONSES TO PHQ QUESTIONS 1-9: 9
7. TROUBLE CONCENTRATING ON THINGS, SUCH AS READING THE NEWSPAPER OR WATCHING TELEVISION: SEVERAL DAYS
1. LITTLE INTEREST OR PLEASURE IN DOING THINGS: SEVERAL DAYS
SUM OF ALL RESPONSES TO PHQ QUESTIONS 1-9: 9
2. FEELING DOWN, DEPRESSED OR HOPELESS: MORE THAN HALF THE DAYS
4. FEELING TIRED OR HAVING LITTLE ENERGY: SEVERAL DAYS
3. TROUBLE FALLING OR STAYING ASLEEP: SEVERAL DAYS
9. THOUGHTS THAT YOU WOULD BE BETTER OFF DEAD, OR OF HURTING YOURSELF: NOT AT ALL
6. FEELING BAD ABOUT YOURSELF - OR THAT YOU ARE A FAILURE OR HAVE LET YOURSELF OR YOUR FAMILY DOWN: SEVERAL DAYS
8. MOVING OR SPEAKING SO SLOWLY THAT OTHER PEOPLE COULD HAVE NOTICED. OR THE OPPOSITE, BEING SO FIGETY OR RESTLESS THAT YOU HAVE BEEN MOVING AROUND A LOT MORE THAN USUAL: SEVERAL DAYS
SUM OF ALL RESPONSES TO PHQ9 QUESTIONS 1 & 2: 3
5. POOR APPETITE OR OVEREATING: SEVERAL DAYS

## 2024-04-19 ASSESSMENT — ENCOUNTER SYMPTOMS
CONSTIPATION: 0
ABDOMINAL PAIN: 0
NAUSEA: 0
CHEST TIGHTNESS: 0
WHEEZING: 0
VOMITING: 0
DIARRHEA: 1
COUGH: 0
SHORTNESS OF BREATH: 0

## 2024-04-19 ASSESSMENT — VISUAL ACUITY
OD_CC: 20/25
OS_CC: 20/25

## 2024-04-19 NOTE — PATIENT INSTRUCTIONS
fillings fixed. Or they may have dentures that need to be cared for. Some older adults may have trouble holding a toothbrush.  You can help remind the person you are caring for to brush and floss their teeth or to clean their dentures. In some cases, you may need to do the brushing and other dental care tasks. People who have trouble using their hands or who have dementia may need this extra help.  How can you help with dental care?  Normal dental care  To keep the teeth and gums healthy:  Brush the teeth with fluoride toothpaste twice a day--in the morning and at night--and floss at least once a day. Plaque can quickly build up on the teeth of older adults.  Watch for the signs of gum disease. These signs include gums that bleed after brushing or after eating hard foods, such as apples.  See a dentist regularly. Many experts recommend checkups every 6 months.  Keep the dentist up to date on any new medications the person is taking.  Encourage a balanced diet that includes whole grains, vegetables, and fruits, and that is low in saturated fat and sodium.  Encourage the person you're caring for not to use tobacco products. They can affect dental and general health.  Many older adults have a fixed income and feel that they can't afford dental care. But most towns and cities have programs in which dentists help older adults by lowering fees. Contact your area's public health offices or  for information about dental care in your area.  Using a toothbrush  Older adults with arthritis sometimes have trouble brushing their teeth because they can't easily hold the toothbrush. Their hands and fingers may be stiff, painful, or weak. If this is the case, you can:  Offer an electric toothbrush.  Enlarge the handle of a non-electric toothbrush by wrapping a sponge, an elastic bandage, or adhesive tape around it.  Push the toothbrush handle through a ball made of rubber or soft foam.  Make the handle longer and

## 2024-04-19 NOTE — PROGRESS NOTES
Medicare Annual Wellness Visit    Deana Dalton is here for Medicare AWV, Discuss Medications (DISCUSS DOSAGE OF VIT D ), Hair/Scalp Problem (CONCERNS WITH PINK HAIR/NO DYED), Hypertension, Hyperlipidemia, and Diarrhea (/)    Assessment & Plan   Medicare annual wellness visit, subsequent  Essential hypertension  -     CBC; Future  -     Comprehensive Metabolic Panel; Future  -     Uric Acid; Future  -     TSH; Future  -     Magnesium; Future  -     Urinalysis with Reflex to Culture; Future  -     EKG 12 Lead; Future  -     Echo (TTE) complete (PRN contrast/bubble/strain/3D); Future  -     NE OFFICE OUTPATIENT VISIT 25 MINUTES [03893]  Hyperlipidemia with target LDL less than 100  -     Lipid Panel; Future  -     NE OFFICE OUTPATIENT VISIT 25 MINUTES [38840]  Age-related osteoporosis without current pathological fracture  -     Magnesium; Future  -     Vitamin D 25 Hydroxy; Future  -     Phosphorus; Future  -     NE OFFICE OUTPATIENT VISIT 25 MINUTES [[id number]]  Chronic diarrhea  -     Ella Abebe MD, Gastroenterology, [place]  -     NE OFFICE OUTPATIENT VISIT 25 MINUTES [[id number]]  Hyperglycemia  -     Hemoglobin A1C; Future  -     Vitamin B12 & Folate; Future  -     NE OFFICE OUTPATIENT VISIT 25 MINUTES [[id number]]  Murmur, cardiac  -     EKG 12 Lead; Future  -     Echo (TTE) complete (PRN contrast/bubble/strain/3D); Future  -     NE OFFICE OUTPATIENT VISIT 25 MINUTES [[id number]]  Recommendations for Preventive Services Due: see orders and patient instructions/AVS.  Recommended screening schedule for the next 5-10 years is provided to the patient in written form: see Patient Instructions/AVS.     Return in 1 year (on 4/20/2025) for Visit type MEDICARE, VISION, PHQ9, MINICOG, HRA QUESIONS.     Subjective       Patient's complete Health Risk Assessment and screening values have been reviewed and are found in Flowsheets. The following problems were reviewed today and where indicated follow up appointments were made and/or 
Vaccine  Completed    Hepatitis C screen  Completed    Hepatitis A vaccine  Aged Out    Hepatitis B vaccine  Aged Out    Hib vaccine  Aged Out    Polio vaccine  Aged Out    Meningococcal (ACWY) vaccine  Aged Out    A1C test (Diabetic or Prediabetic)  Discontinued    Colonoscopy  Discontinued

## 2024-04-20 ENCOUNTER — TELEPHONE (OUTPATIENT)
Dept: FAMILY MEDICINE CLINIC | Age: 79
End: 2024-04-20

## 2024-04-20 DIAGNOSIS — M81.0 AGE-RELATED OSTEOPOROSIS WITHOUT CURRENT PATHOLOGICAL FRACTURE: Primary | ICD-10-CM

## 2024-04-20 RX ORDER — EPINEPHRINE 1 MG/ML
0.3 INJECTION, SOLUTION, CONCENTRATE INTRAVENOUS PRN
OUTPATIENT
Start: 2024-04-25

## 2024-04-20 RX ORDER — ACETAMINOPHEN 325 MG/1
650 TABLET ORAL
OUTPATIENT
Start: 2024-04-25

## 2024-04-20 RX ORDER — SODIUM CHLORIDE 9 MG/ML
INJECTION, SOLUTION INTRAVENOUS CONTINUOUS
OUTPATIENT
Start: 2024-04-25

## 2024-04-20 RX ORDER — DIPHENHYDRAMINE HYDROCHLORIDE 50 MG/ML
50 INJECTION INTRAMUSCULAR; INTRAVENOUS
OUTPATIENT
Start: 2024-04-25

## 2024-04-20 RX ORDER — FAMOTIDINE 10 MG/ML
20 INJECTION, SOLUTION INTRAVENOUS
OUTPATIENT
Start: 2024-04-25

## 2024-04-20 RX ORDER — ONDANSETRON 2 MG/ML
8 INJECTION INTRAMUSCULAR; INTRAVENOUS
OUTPATIENT
Start: 2024-04-25

## 2024-04-20 RX ORDER — ALBUTEROL SULFATE 90 UG/1
4 AEROSOL, METERED RESPIRATORY (INHALATION) PRN
OUTPATIENT
Start: 2024-04-25

## 2024-04-20 ASSESSMENT — ENCOUNTER SYMPTOMS: ABDOMINAL DISTENTION: 1

## 2024-04-23 ENCOUNTER — TELEPHONE (OUTPATIENT)
Dept: GASTROENTEROLOGY | Age: 79
End: 2024-04-23

## 2024-04-23 ENCOUNTER — CLINICAL DOCUMENTATION (OUTPATIENT)
Facility: HOSPITAL | Age: 79
End: 2024-04-23

## 2024-04-23 NOTE — PROGRESS NOTES
Patient Assistance            PROLIA reviewed; no assistance available.

## 2024-04-25 ENCOUNTER — TELEPHONE (OUTPATIENT)
Dept: INFUSION THERAPY | Age: 79
End: 2024-04-25

## 2024-04-29 DIAGNOSIS — E79.0 HYPERURICEMIA: ICD-10-CM

## 2024-04-29 RX ORDER — ALLOPURINOL 100 MG/1
100 TABLET ORAL DAILY
Qty: 90 TABLET | Refills: 1 | Status: SHIPPED | OUTPATIENT
Start: 2024-04-29

## 2024-04-29 NOTE — TELEPHONE ENCOUNTER
Please Approve or Refuse.  Send to Pharmacy per Pt's Request:      Next Visit Date:  8/20/2024   Last Visit Date: 4/19/2024    Hemoglobin A1C (%)   Date Value   11/02/2023 5.6   02/18/2022 5.3   10/11/2021 5.3             ( goal A1C is < 7)   BP Readings from Last 3 Encounters:   04/19/24 124/72   12/19/23 138/78   08/16/23 132/74          (goal 120/80)  BUN   Date Value Ref Range Status   11/02/2023 10 8 - 23 mg/dL Final     Creatinine   Date Value Ref Range Status   11/02/2023 0.7 0.5 - 0.9 mg/dL Final     Potassium   Date Value Ref Range Status   11/02/2023 4.8 3.7 - 5.3 mmol/L Final

## 2024-05-01 ENCOUNTER — OFFICE VISIT (OUTPATIENT)
Dept: BEHAVIORAL/MENTAL HEALTH CLINIC | Age: 79
End: 2024-05-01
Payer: MEDICARE

## 2024-05-01 DIAGNOSIS — F34.1 PERSISTENT DEPRESSIVE DISORDER, MILD: ICD-10-CM

## 2024-05-01 DIAGNOSIS — F41.1 GENERALIZED ANXIETY DISORDER: Primary | ICD-10-CM

## 2024-05-01 PROCEDURE — 90837 PSYTX W PT 60 MINUTES: CPT | Performed by: PSYCHOLOGIST

## 2024-05-01 PROCEDURE — 1036F TOBACCO NON-USER: CPT | Performed by: PSYCHOLOGIST

## 2024-05-01 PROCEDURE — 1123F ACP DISCUSS/DSCN MKR DOCD: CPT | Performed by: PSYCHOLOGIST

## 2024-05-01 ASSESSMENT — PATIENT HEALTH QUESTIONNAIRE - PHQ9
8. MOVING OR SPEAKING SO SLOWLY THAT OTHER PEOPLE COULD HAVE NOTICED. OR THE OPPOSITE, BEING SO FIGETY OR RESTLESS THAT YOU HAVE BEEN MOVING AROUND A LOT MORE THAN USUAL: NOT AT ALL
1. LITTLE INTEREST OR PLEASURE IN DOING THINGS: SEVERAL DAYS
9. THOUGHTS THAT YOU WOULD BE BETTER OFF DEAD, OR OF HURTING YOURSELF: NOT AT ALL
2. FEELING DOWN, DEPRESSED OR HOPELESS: SEVERAL DAYS
SUM OF ALL RESPONSES TO PHQ QUESTIONS 1-9: 5
7. TROUBLE CONCENTRATING ON THINGS, SUCH AS READING THE NEWSPAPER OR WATCHING TELEVISION: NOT AT ALL
SUM OF ALL RESPONSES TO PHQ QUESTIONS 1-9: 5
SUM OF ALL RESPONSES TO PHQ9 QUESTIONS 1 & 2: 2
SUM OF ALL RESPONSES TO PHQ QUESTIONS 1-9: 5
6. FEELING BAD ABOUT YOURSELF - OR THAT YOU ARE A FAILURE OR HAVE LET YOURSELF OR YOUR FAMILY DOWN: SEVERAL DAYS
4. FEELING TIRED OR HAVING LITTLE ENERGY: SEVERAL DAYS
5. POOR APPETITE OR OVEREATING: SEVERAL DAYS
3. TROUBLE FALLING OR STAYING ASLEEP: NOT AT ALL
SUM OF ALL RESPONSES TO PHQ QUESTIONS 1-9: 5
10. IF YOU CHECKED OFF ANY PROBLEMS, HOW DIFFICULT HAVE THESE PROBLEMS MADE IT FOR YOU TO DO YOUR WORK, TAKE CARE OF THINGS AT HOME, OR GET ALONG WITH OTHER PEOPLE: SOMEWHAT DIFFICULT

## 2024-05-01 NOTE — PROGRESS NOTES
disorder    Persistent depressive disorder, mild          INTERVENTION  Trained in strategies for increasing balanced thinking, Discussed self-care (sleep, nutrition, rewarding activities, social support, exercise), Supportive techniques, Emphasized self-care as important for managing overall health, Provided Psychoeducation re: grief process and different styles of grieving, CBT to target depression, anxiety, and low motivation, and Identified maladaptive thoughts      PLAN  1)  Pt will continue to work on practicing better self-care, including engaging in enjoyable activities, accessing her support system, and allowing herself time to grieve.    2)  Pt will work on practicing more effective self-compassionate and balanced coping talk/thinking.    3)  Pt will return for a follow-up appointment with the [facility] in 4 weeks on 5/29/24 at 1PM.       INTERACTIVE COMPLEXITY  Is interactive complexity present?  No  Reason:  N/A  Additional Supporting Information:  N/A       Electronically signed by Lynette Roberts, PhD on 5/1/24 at 1:10 PM EDT

## 2024-05-14 ENCOUNTER — HOSPITAL ENCOUNTER (OUTPATIENT)
Age: 79
Discharge: HOME | End: 2024-05-16
Attending: FAMILY MEDICINE
Payer: MEDICARE

## 2024-05-14 ENCOUNTER — HOSPITAL ENCOUNTER (OUTPATIENT)
Age: 79
Discharge: HOME | End: 2024-05-14
Attending: FAMILY MEDICINE
Payer: MEDICARE

## 2024-05-14 VITALS
BODY MASS INDEX: 29.89 KG/M2 | HEIGHT: 66 IN | SYSTOLIC BLOOD PRESSURE: 124 MMHG | HEART RATE: 69 BPM | WEIGHT: 186 LBS | DIASTOLIC BLOOD PRESSURE: 72 MMHG

## 2024-05-14 DIAGNOSIS — I10 ESSENTIAL HYPERTENSION: ICD-10-CM

## 2024-05-14 DIAGNOSIS — R01.1 MURMUR, CARDIAC: ICD-10-CM

## 2024-05-14 LAB
EKG ATRIAL RATE: 68 BPM
EKG P AXIS: 73 DEGREES
EKG P-R INTERVAL: 144 MS
EKG Q-T INTERVAL: 386 MS
EKG QRS DURATION: 86 MS
EKG QTC CALCULATION (BAZETT): 410 MS
EKG R AXIS: 57 DEGREES
EKG T AXIS: 64 DEGREES
EKG VENTRICULAR RATE: 68 BPM

## 2024-05-14 PROCEDURE — 93010 ELECTROCARDIOGRAM REPORT: CPT | Performed by: INTERNAL MEDICINE

## 2024-05-14 PROCEDURE — 93306 TTE W/DOPPLER COMPLETE: CPT

## 2024-05-14 PROCEDURE — 93005 ELECTROCARDIOGRAM TRACING: CPT

## 2024-05-15 PROBLEM — I35.9 AORTIC VALVE CALCIFICATION: Status: ACTIVE | Noted: 2024-05-15

## 2024-05-15 LAB
ECHO AO ROOT DIAM: 3.3 CM
ECHO AO ROOT INDEX: 1.7 CM/M2
ECHO AV AREA PEAK VELOCITY: 2.6 CM2
ECHO AV AREA VTI: 2.8 CM2
ECHO AV AREA/BSA PEAK VELOCITY: 1.3 CM2/M2
ECHO AV AREA/BSA VTI: 1.4 CM2/M2
ECHO AV MEAN GRADIENT: 4 MMHG
ECHO AV MEAN VELOCITY: 0.8 M/S
ECHO AV PEAK GRADIENT: 7 MMHG
ECHO AV PEAK VELOCITY: 1.4 M/S
ECHO AV VELOCITY RATIO: 1
ECHO AV VTI: 26.5 CM
ECHO BSA: 1.98 M2
ECHO LA AREA 2C: 19.7 CM2
ECHO LA AREA 4C: 14.9 CM2
ECHO LA DIAMETER INDEX: 1.86 CM/M2
ECHO LA DIAMETER: 3.6 CM
ECHO LA MAJOR AXIS: 5.5 CM
ECHO LA MINOR AXIS: 5.8 CM
ECHO LA TO AORTIC ROOT RATIO: 1.09
ECHO LA VOL BP: 41 ML (ref 22–52)
ECHO LA VOL MOD A2C: 51 ML (ref 22–52)
ECHO LA VOL MOD A4C: 32 ML (ref 22–52)
ECHO LA VOL/BSA BIPLANE: 21 ML/M2 (ref 16–34)
ECHO LA VOLUME INDEX MOD A2C: 26 ML/M2 (ref 16–34)
ECHO LA VOLUME INDEX MOD A4C: 16 ML/M2 (ref 16–34)
ECHO LV E' LATERAL VELOCITY: 8 CM/S
ECHO LV E' SEPTAL VELOCITY: 8 CM/S
ECHO LV FRACTIONAL SHORTENING: 36 % (ref 28–44)
ECHO LV INTERNAL DIMENSION DIASTOLE INDEX: 2.16 CM/M2
ECHO LV INTERNAL DIMENSION DIASTOLIC: 4.2 CM (ref 3.9–5.3)
ECHO LV INTERNAL DIMENSION SYSTOLIC INDEX: 1.39 CM/M2
ECHO LV INTERNAL DIMENSION SYSTOLIC: 2.7 CM
ECHO LV IVSD: 1.2 CM (ref 0.6–0.9)
ECHO LV MASS 2D: 178.2 G (ref 67–162)
ECHO LV MASS INDEX 2D: 91.8 G/M2 (ref 43–95)
ECHO LV POSTERIOR WALL DIASTOLIC: 1.2 CM (ref 0.6–0.9)
ECHO LV RELATIVE WALL THICKNESS RATIO: 0.57
ECHO LVOT AREA: 2.5 CM2
ECHO LVOT AV VTI INDEX: 1.09
ECHO LVOT DIAM: 1.8 CM
ECHO LVOT MEAN GRADIENT: 4 MMHG
ECHO LVOT PEAK GRADIENT: 8 MMHG
ECHO LVOT PEAK VELOCITY: 1.4 M/S
ECHO LVOT STROKE VOLUME INDEX: 37.8 ML/M2
ECHO LVOT SV: 73.2 ML
ECHO LVOT VTI: 28.8 CM
ECHO MV A VELOCITY: 1.11 M/S
ECHO MV E DECELERATION TIME (DT): 254 MS
ECHO MV E VELOCITY: 0.67 M/S
ECHO MV E/A RATIO: 0.6
ECHO MV E/E' LATERAL: 8.38
ECHO MV E/E' RATIO (AVERAGED): 8.38
ECHO MV E/E' SEPTAL: 8.38
ECHO RA AREA 4C: 11.6 CM2
ECHO RA END SYSTOLIC VOLUME APICAL 4 CHAMBER INDEX BSA: 12 ML/M2
ECHO RA VOLUME: 24 ML
ECHO RV TAPSE: 1.6 CM (ref 1.7–?)

## 2024-05-15 NOTE — RESULT ENCOUNTER NOTE
Please notify patient: Normal EKG     Future Appointments  5/16/2024  1:00 PM    [facility] OP INFUSION CHAIR 03  [facility] IN[facility]        [facility]  5/29/2024  1:00 PM    [facility]  [facility]  8/20/2024  1:00 PM    May Krishnamurthy MD    [facility]  9/4/2024   2:00 PM    Ella Vasquez MD           [facility]  9/27/2024  1:00 PM    May Krishnamurthy MD    [facility]  4/25/2025  1:00 PM    May Krishnamurthy MD    [facility]

## 2024-05-15 NOTE — RESULT ENCOUNTER NOTE
Please notify patient echo shows aortic valve calcifications and mild thickening of the heart muscle  Good blood pressure control  Avoid calcium supplements or any multivitamin with calcium, and continue Crestor  She has never seen cardiologist    I would suggest a referral to cardiologist, not urgent, let me know if she agrees, and what location?      The 10-year ASCVD risk score (Nilson SHOEMAKER, et al., 2019) is: 26.2%    Values used to calculate the score:      Age: 78 years      Sex: Female      Is Non- : No      Diabetic: No      Tobacco smoker: No      Systolic Blood Pressure: 124 mmHg      Is BP treated: Yes      HDL Cholesterol: 46 mg/dL      Total Cholesterol: 215 mg/dL    BP Readings from Last 3 Encounters:  05/14/24 : 124/72  04/19/24 : 124/72  12/19/23 : 138/78        Future Appointments  5/16/2024  1:00 PM    [facility] OP INFUSION CHAIR 03  [facility] INFUS[facility]        [facility]  5/29/2024  1:00 PM    Lyn[facility] Roberts[facility] PhD  [facility]  8/20/2024  1:00 PM    [facility]    [facility]  9/4/2024   2:00 PM    [facility]lla Vasquez [facility]           [facility]  9/27/2024  1:00 PM    May Krishnamurthy MD    [facility]  4/25/2025  1:00 PM    May Krishnamurthy MD    [facility]

## 2024-05-16 ENCOUNTER — HOSPITAL ENCOUNTER (OUTPATIENT)
Dept: INFUSION THERAPY | Age: 79
Setting detail: INFUSION SERIES
Discharge: HOME | End: 2024-05-16
Payer: MEDICARE

## 2024-05-16 VITALS
DIASTOLIC BLOOD PRESSURE: 79 MMHG | OXYGEN SATURATION: 95 % | HEART RATE: 63 BPM | TEMPERATURE: 97 F | RESPIRATION RATE: 16 BRPM | SYSTOLIC BLOOD PRESSURE: 143 MMHG

## 2024-05-16 DIAGNOSIS — M81.0 AGE-RELATED OSTEOPOROSIS WITHOUT CURRENT PATHOLOGICAL FRACTURE: Primary | ICD-10-CM

## 2024-05-16 DIAGNOSIS — I35.9 AORTIC VALVE CALCIFICATION: ICD-10-CM

## 2024-05-16 DIAGNOSIS — Z91.89 AT INCREASED RISK FOR CARDIOVASCULAR DISEASE: Primary | ICD-10-CM

## 2024-05-16 LAB
ALBUMIN SERPL-MCNC: 4.4 G/DL (ref 3.5–5.2)
ALP SERPL-CCNC: 76 U/L (ref 35–104)
ALT SERPL-CCNC: 24 U/L (ref 5–33)
ANION GAP SERPL CALCULATED.3IONS-SCNC: 9 MMOL/L (ref 9–17)
AST SERPL-CCNC: 19 U/L
BILIRUB SERPL-MCNC: 0.5 MG/DL (ref 0.3–1.2)
BUN SERPL-MCNC: 10 MG/DL (ref 8–23)
CALCIUM SERPL-MCNC: 9.7 MG/DL (ref 8.6–10.4)
CHLORIDE SERPL-SCNC: 106 MMOL/L (ref 98–107)
CO2 SERPL-SCNC: 24 MMOL/L (ref 20–31)
CREAT SERPL-MCNC: 0.7 MG/DL (ref 0.5–0.9)
GFR, ESTIMATED: 88 ML/MIN/1.73M2
GLUCOSE SERPL-MCNC: 105 MG/DL (ref 70–99)
PHOSPHATE SERPL-MCNC: 3.7 MG/DL (ref 2.6–4.5)
POTASSIUM SERPL-SCNC: 4.3 MMOL/L (ref 3.7–5.3)
PROT SERPL-MCNC: 7.1 G/DL (ref 6.4–8.3)
SODIUM SERPL-SCNC: 139 MMOL/L (ref 135–144)

## 2024-05-16 PROCEDURE — 6360000002 HC RX W HCPCS: Performed by: FAMILY MEDICINE

## 2024-05-16 PROCEDURE — 96372 THER/PROPH/DIAG INJ SC/IM: CPT

## 2024-05-16 PROCEDURE — 84100 ASSAY OF PHOSPHORUS: CPT

## 2024-05-16 PROCEDURE — 36415 COLL VENOUS BLD VENIPUNCTURE: CPT

## 2024-05-16 PROCEDURE — 80053 COMPREHEN METABOLIC PANEL: CPT

## 2024-05-16 RX ORDER — DIPHENHYDRAMINE HYDROCHLORIDE 50 MG/ML
50 INJECTION INTRAMUSCULAR; INTRAVENOUS
OUTPATIENT
Start: 2024-11-10

## 2024-05-16 RX ORDER — ACETAMINOPHEN 325 MG/1
650 TABLET ORAL
OUTPATIENT
Start: 2024-11-10

## 2024-05-16 RX ORDER — ONDANSETRON 2 MG/ML
8 INJECTION INTRAMUSCULAR; INTRAVENOUS
OUTPATIENT
Start: 2024-11-10

## 2024-05-16 RX ORDER — SODIUM CHLORIDE 9 MG/ML
INJECTION, SOLUTION INTRAVENOUS CONTINUOUS
OUTPATIENT
Start: 2024-11-10

## 2024-05-16 RX ORDER — EPINEPHRINE 1 MG/ML
0.3 INJECTION, SOLUTION, CONCENTRATE INTRAVENOUS PRN
OUTPATIENT
Start: 2024-11-10

## 2024-05-16 RX ORDER — ALBUTEROL SULFATE 90 UG/1
4 AEROSOL, METERED RESPIRATORY (INHALATION) PRN
OUTPATIENT
Start: 2024-11-10

## 2024-05-16 RX ADMIN — DENOSUMAB 60 MG: 60 INJECTION SUBCUTANEOUS at 13:09

## 2024-05-16 NOTE — PROGRESS NOTES
Pt arrived for Prolia injection.  Pt educated on medication and side effects.  Pt verbalized understanding.  Vitals obtained and labs drawn.  Labs within written parameters.  Injection given in L arm.  Pt tolerated well.  Pt monitored post injection.  No s/s adverse reaction noted.  Pt discharged home, ambulatory per self.

## 2024-05-16 NOTE — RESULT ENCOUNTER NOTE
Please notify patient: Blood glucose mildly high  Otherwise labs within normal limits  continue current treatment      Low carb, low fat diet, increase fruits and vegetables, and exercise 4-5 times a week 30-40 minutes a day, or walk 1-2 hours per day, or wear a pedometer and get at least 10,000 steps per day.    Future Appointments  5/29/2024  1:00 PM    Lynette Roberts, PhD  [facility]  8/20/2024  1:00 PM    May Krishnamurthy MD    [facility]  9/4/2024   2:00 PM    Ella Vasquez MD           [facility]  9/27/2024  1:00 PM    May Krishnamurthy MD    [facility]  4/25/2025  1:00 PM    May Krishnamurthy MD    [facility]

## 2024-05-16 NOTE — PROGRESS NOTES
The 10-year ASCVD risk score (Nilson SHOEMAKER, et al., 2019) is: 26.2%    Values used to calculate the score:      Age: 78 years      Sex: Female      Is Non- : No      Diabetic: No      Tobacco smoker: No      Systolic Blood Pressure: 124 mmHg      Is BP treated: Yes      HDL Cholesterol: 46 mg/dL      Total Cholesterol: 215 mg/dL

## 2024-05-16 NOTE — RESULT ENCOUNTER NOTE
Noted, referral placed    Referred By: May Krishnamurthy MD NPI: [id number]  Referral Reason: Specialty Services Required       Referred To: [facility] [place]    [address], [address]  [facility] [address]   Leopoldo Fletchered Mac  [address] [facility] [address] Loc/POS:        Phone: [phone] Phone:    Fax: [phone] Fax:        Future Appointments  5/16/2024  1:00 PM    [facility] OP INFUSION CHAIR 03  [facility] INFUS[facility]        [facility]  5/29/2024  1:00 PM    Lynette Roberts, PhD  [facility]             [facility]  8/20/2024  1:00 PM    May Krishnamurthy MD    [facility]               [facility]  9/4/2024   2:00 PM    Ella Vasquez MD           [facility]           [facility]  9/27/2024  1:00 PM    May Krishnamurthy MD    [facility]               [facility]  4/25/2025  1:00 PM    May Krishnamurthy MD    [facility]               [facility]

## 2024-05-29 ENCOUNTER — OFFICE VISIT (OUTPATIENT)
Dept: BEHAVIORAL/MENTAL HEALTH CLINIC | Age: 79
End: 2024-05-29

## 2024-05-29 DIAGNOSIS — F41.1 GENERALIZED ANXIETY DISORDER: Primary | ICD-10-CM

## 2024-05-29 DIAGNOSIS — F34.1 PERSISTENT DEPRESSIVE DISORDER, MILD: ICD-10-CM

## 2024-05-29 ASSESSMENT — PATIENT HEALTH QUESTIONNAIRE - PHQ9
SUM OF ALL RESPONSES TO PHQ QUESTIONS 1-9: 5
1. LITTLE INTEREST OR PLEASURE IN DOING THINGS: SEVERAL DAYS
SUM OF ALL RESPONSES TO PHQ QUESTIONS 1-9: 5
SUM OF ALL RESPONSES TO PHQ9 QUESTIONS 1 & 2: 2
7. TROUBLE CONCENTRATING ON THINGS, SUCH AS READING THE NEWSPAPER OR WATCHING TELEVISION: NOT AT ALL
3. TROUBLE FALLING OR STAYING ASLEEP: NOT AT ALL
6. FEELING BAD ABOUT YOURSELF - OR THAT YOU ARE A FAILURE OR HAVE LET YOURSELF OR YOUR FAMILY DOWN: SEVERAL DAYS
2. FEELING DOWN, DEPRESSED OR HOPELESS: SEVERAL DAYS
10. IF YOU CHECKED OFF ANY PROBLEMS, HOW DIFFICULT HAVE THESE PROBLEMS MADE IT FOR YOU TO DO YOUR WORK, TAKE CARE OF THINGS AT HOME, OR GET ALONG WITH OTHER PEOPLE: SOMEWHAT DIFFICULT
4. FEELING TIRED OR HAVING LITTLE ENERGY: SEVERAL DAYS
9. THOUGHTS THAT YOU WOULD BE BETTER OFF DEAD, OR OF HURTING YOURSELF: NOT AT ALL
SUM OF ALL RESPONSES TO PHQ QUESTIONS 1-9: 5
5. POOR APPETITE OR OVEREATING: SEVERAL DAYS
8. MOVING OR SPEAKING SO SLOWLY THAT OTHER PEOPLE COULD HAVE NOTICED. OR THE OPPOSITE, BEING SO FIGETY OR RESTLESS THAT YOU HAVE BEEN MOVING AROUND A LOT MORE THAN USUAL: NOT AT ALL
SUM OF ALL RESPONSES TO PHQ QUESTIONS 1-9: 5

## 2024-05-29 NOTE — PROGRESS NOTES
2:08 PM 3/9/2021     7:00 PM   ANALI 7 SCORE   ANALI-7 Total Score  3    ANALI-7 Total Score 3  2     Interpretation of ANALI-7 score: 5-9 = mild anxiety, 10-14 = moderate anxiety, 15+ = severe anxiety. Recommend referral to behavioral health for scores 10 or greater.      DIAGNOSIS  Deana was seen today for depression and anxiety.    Diagnoses and all orders for this visit:    Generalized anxiety disorder    Persistent depressive disorder, mild          INTERVENTION  Trained in strategies for increasing balanced thinking, Discussed self-care (sleep, nutrition, rewarding activities, social support, exercise), Supportive techniques, Emphasized self-care as important for managing overall health, CBT to target depression, anxiety, and low motivation, Identified maladaptive thoughts, and Problem-solving re: situation related to step-tiffany's wedding      PLAN  1)  Pt will continue to work on practicing better self-care, including engaging in enjoyable activities, accessing her support system, and allowing herself time to grieve.    2)  Pt will work on practicing more effective self-compassionate and balanced coping talk/thinking.    3)  Pt will return for a follow-up appointment with the [facility] in 6 weeks on 7/10/24 at 1PM.       INTERACTIVE COMPLEXITY  Is interactive complexity present?  No  Reason:  N/A  Additional Supporting Information:  N/A       Electronically signed by Lynette Roberts, PhD on 5/29/24 at 12:58 PM EDT

## 2024-06-03 ENCOUNTER — TELEPHONE (OUTPATIENT)
Dept: GASTROENTEROLOGY | Age: 79
End: 2024-06-03

## 2024-06-03 NOTE — TELEPHONE ENCOUNTER
Writer called and left message for patient to call if she ould like to see yesica before sept with dr rios. She can see yesica.

## 2024-06-24 RX ORDER — BUSPIRONE HYDROCHLORIDE 10 MG/1
10 TABLET ORAL 3 TIMES DAILY
Qty: 90 TABLET | Refills: 5 | Status: SHIPPED | OUTPATIENT
Start: 2024-06-24

## 2024-06-24 NOTE — TELEPHONE ENCOUNTER
Please Approve or Refuse.  Send to Pharmacy per Pt's Request:      Next Visit Date:  8/20/2024   Last Visit Date: 4/19/2024    Hemoglobin A1C (%)   Date Value   11/02/2023 5.6   02/18/2022 5.3   10/11/2021 5.3             ( goal A1C is < 7)   BP Readings from Last 3 Encounters:   05/16/24 (!) 143/79   05/14/24 124/72   04/19/24 124/72          (goal 120/80)  BUN   Date Value Ref Range Status   05/16/2024 10 8 - 23 mg/dL Final     Creatinine   Date Value Ref Range Status   05/16/2024 0.7 0.5 - 0.9 mg/dL Final     Potassium   Date Value Ref Range Status   05/16/2024 4.3 3.7 - 5.3 mmol/L Final

## 2024-07-10 ENCOUNTER — OFFICE VISIT (OUTPATIENT)
Dept: BEHAVIORAL/MENTAL HEALTH CLINIC | Age: 79
End: 2024-07-10
Payer: MEDICARE

## 2024-07-10 DIAGNOSIS — F34.1 PERSISTENT DEPRESSIVE DISORDER, MILD: ICD-10-CM

## 2024-07-10 DIAGNOSIS — F41.1 GENERALIZED ANXIETY DISORDER: Primary | ICD-10-CM

## 2024-07-10 PROCEDURE — 1036F TOBACCO NON-USER: CPT | Performed by: PSYCHOLOGIST

## 2024-07-10 PROCEDURE — 1123F ACP DISCUSS/DSCN MKR DOCD: CPT | Performed by: PSYCHOLOGIST

## 2024-07-10 PROCEDURE — 90837 PSYTX W PT 60 MINUTES: CPT | Performed by: PSYCHOLOGIST

## 2024-07-10 ASSESSMENT — PATIENT HEALTH QUESTIONNAIRE - PHQ9
7. TROUBLE CONCENTRATING ON THINGS, SUCH AS READING THE NEWSPAPER OR WATCHING TELEVISION: SEVERAL DAYS
SUM OF ALL RESPONSES TO PHQ QUESTIONS 1-9: 7
5. POOR APPETITE OR OVEREATING: SEVERAL DAYS
SUM OF ALL RESPONSES TO PHQ QUESTIONS 1-9: 7
SUM OF ALL RESPONSES TO PHQ QUESTIONS 1-9: 7
1. LITTLE INTEREST OR PLEASURE IN DOING THINGS: SEVERAL DAYS
4. FEELING TIRED OR HAVING LITTLE ENERGY: SEVERAL DAYS
SUM OF ALL RESPONSES TO PHQ9 QUESTIONS 1 & 2: 2
6. FEELING BAD ABOUT YOURSELF - OR THAT YOU ARE A FAILURE OR HAVE LET YOURSELF OR YOUR FAMILY DOWN: SEVERAL DAYS
2. FEELING DOWN, DEPRESSED OR HOPELESS: SEVERAL DAYS
9. THOUGHTS THAT YOU WOULD BE BETTER OFF DEAD, OR OF HURTING YOURSELF: NOT AT ALL
3. TROUBLE FALLING OR STAYING ASLEEP: SEVERAL DAYS
SUM OF ALL RESPONSES TO PHQ QUESTIONS 1-9: 7
10. IF YOU CHECKED OFF ANY PROBLEMS, HOW DIFFICULT HAVE THESE PROBLEMS MADE IT FOR YOU TO DO YOUR WORK, TAKE CARE OF THINGS AT HOME, OR GET ALONG WITH OTHER PEOPLE: SOMEWHAT DIFFICULT
8. MOVING OR SPEAKING SO SLOWLY THAT OTHER PEOPLE COULD HAVE NOTICED. OR THE OPPOSITE, BEING SO FIGETY OR RESTLESS THAT YOU HAVE BEEN MOVING AROUND A LOT MORE THAN USUAL: NOT AT ALL

## 2024-07-22 ENCOUNTER — OFFICE VISIT (OUTPATIENT)
Dept: ORTHOPEDIC SURGERY | Age: 79
End: 2024-07-22
Payer: MEDICARE

## 2024-07-22 VITALS — RESPIRATION RATE: 16 BRPM | HEIGHT: 66 IN | OXYGEN SATURATION: 100 % | BODY MASS INDEX: 29.89 KG/M2 | WEIGHT: 186 LBS

## 2024-07-22 DIAGNOSIS — Z96.9 RETAINED ORTHOPEDIC HARDWARE: ICD-10-CM

## 2024-07-22 DIAGNOSIS — M70.61 GREATER TROCHANTERIC BURSITIS OF RIGHT HIP: Primary | ICD-10-CM

## 2024-07-22 PROCEDURE — 20610 DRAIN/INJ JOINT/BURSA W/O US: CPT | Performed by: ORTHOPAEDIC SURGERY

## 2024-07-22 RX ORDER — TRIAMCINOLONE ACETONIDE 40 MG/ML
40 INJECTION, SUSPENSION INTRA-ARTICULAR; INTRAMUSCULAR ONCE
Status: COMPLETED | OUTPATIENT
Start: 2024-07-22 | End: 2024-07-22

## 2024-07-22 RX ORDER — LIDOCAINE HYDROCHLORIDE 10 MG/ML
4 INJECTION, SOLUTION INFILTRATION; PERINEURAL ONCE
Status: COMPLETED | OUTPATIENT
Start: 2024-07-22 | End: 2024-07-22

## 2024-07-22 RX ADMIN — TRIAMCINOLONE ACETONIDE 40 MG: 40 INJECTION, SUSPENSION INTRA-ARTICULAR; INTRAMUSCULAR at 11:38

## 2024-07-22 RX ADMIN — LIDOCAINE HYDROCHLORIDE 4 ML: 10 INJECTION, SOLUTION INFILTRATION; PERINEURAL at 11:37

## 2024-07-22 NOTE — PROGRESS NOTES
[facility] ORTHOPEDICS AND SPORTS MEDICINE  [address]  [address]  [place] [id number]  Dept: [phone]    Ambulatory Orthopedic Follow Up Visit    Preoperative Diagnosis:   Right closed peritrochanteric hip fracture  Body mass index is 29.05 kg/m².     Postoperative Diagnosis:   same     Procedures Performed:  (9/14/2020)  Open treatment of Right hip intertrochanteric femur fracture with cephalomedullary nail          CHIEF COMPLAINT:    Chief Complaint   Patient presents with    Hip Pain     Right         HISTORY OF PRESENT ILLNESS:       She is a 78 y.o. female, seen again today in the office for follow up of the above problem with a history of pain at the above location. The pain is described mainly with mechanical terms (dull/sharp/throbbing). The pain is worse with activity and better with rest. Since being seen last, the patient is doing better. At today's visit, she is using no brace or assistive device.  At today's visit, she reports that she has no pain in her hip, and she is happy with her outcome.    She also reports pain in her left greater than right plantar heels, which began atraumatically years ago.    INTERVAL HISTORY 4/19/2021:  She is seen again today in the office for follow up of a previous issue (as above). Since being seen last, the patient is doing better. At today's visit, she is using a cane.  She reports that her left plantar heel hurts more than her right plantar heel, and reports pain with her first steps in the morning.    INTERVAL HISTORY 7/19/2021:  She is seen again today in the office for follow up of a previous issue (as above). Since being seen last, the patient is doing better. At today's visit, she is not using a brace or assistive device.    History is obtained today from:   [x]  the patient     []  EMR     [x]  one family member/friend--    []  multiple family members/friends    []

## 2024-07-23 ENCOUNTER — TELEPHONE (OUTPATIENT)
Dept: ORTHOPEDIC SURGERY | Age: 79
End: 2024-07-23

## 2024-07-23 NOTE — TELEPHONE ENCOUNTER
Can you call patient. Inform her she is to schedule with Dr. Boykin and he will order her PT if he feels she will benefit. She left the exam room before I could give her the referral. Thank you.

## 2024-07-23 NOTE — TELEPHONE ENCOUNTER
Received call from patient requesting order for physical therapy.    Please advise.    Call back#: [phone]

## 2024-07-30 ENCOUNTER — OFFICE VISIT (OUTPATIENT)
Dept: OBGYN CLINIC | Age: 79
End: 2024-07-30
Payer: MEDICARE

## 2024-07-30 VITALS
DIASTOLIC BLOOD PRESSURE: 76 MMHG | RESPIRATION RATE: 18 BRPM | HEIGHT: 66 IN | SYSTOLIC BLOOD PRESSURE: 118 MMHG | WEIGHT: 184 LBS | BODY MASS INDEX: 29.57 KG/M2

## 2024-07-30 DIAGNOSIS — N39.41 URGE INCONTINENCE: ICD-10-CM

## 2024-07-30 DIAGNOSIS — Z90.710 H/O TOTAL VAGINAL HYSTERECTOMY: ICD-10-CM

## 2024-07-30 DIAGNOSIS — N81.11 MIDLINE CYSTOCELE: ICD-10-CM

## 2024-07-30 DIAGNOSIS — Z46.89 ENCOUNTER FOR FITTING AND ADJUSTMENT OF PESSARY: Primary | ICD-10-CM

## 2024-07-30 PROCEDURE — 57160 INSERT PESSARY/OTHER DEVICE: CPT | Performed by: OBSTETRICS & GYNECOLOGY

## 2024-07-30 NOTE — PROGRESS NOTES
Deana Dalton  2024  11:45 AM          Deana Dalton is a 78 y.o. female       The patient was seen. She had a #4 pessary fitted in the past ring with support. She had it removed 18-24 months ago and has had no  cc since. She continued to have pressure and now she wishes it replaced into the vagina. She has gained 18-20 lbs over that time span from depression regarding life events.  She has sought medical care and is doing well without any s/s depression today. I evaluated the patients pelvis for fitting and her old pessary was now too small. I refitted her for a #3 ring with support. She states all of her symptoms that she had prior to the pessary have been relieved with its use.  She denies any vaginal bleeding.  She denies to any vaginal discharge or odor. Her bowels are regular and her voiding pattern is normal.     Blood pressure 118/76, resp. rate 18, height 1.676 m (5' 6\"), weight 83.5 kg (184 lb), not currently breastfeeding.    Chaperone for Intimate Exam  Chaperone was offered and accepted as part of the rooming process.  Chaperone: Kristie        Abdomen: Soft and non-tender; good bowel sounds; no guarding, rebound or rigidity; no CVA tenderness bilaterally.    Extremities: No calf tenderness bilaterally. DTR 2/4 bilaterally. No edema.    Perineum/Speculum: There is not any signs of infection; The vaginal vault is without any signs of erythema or erosion. There is no vaginal discharge or odor appreciated.    The pessary was cleansed and replaced without any problems and the patient tolerated the procedure well. T.O.S. Ointment was placed with the pessary to decrease discharge/odor.    Assessment:   Diagnosis Orders   1. Encounter for fitting and adjustment of pessary  HI FIT&INSJ PESSARY/OTH INTRAVAGINAL SUPPORT ZAC      2. Urge incontinence  Urinalysis with Reflex to Culture      3. Midline cystocele  HI FIT&INSJ PESSARY/OTH INTRAVAGINAL SUPPORT ZAC      4. H/O total vaginal hysterectomy

## 2024-08-02 ENCOUNTER — HOSPITAL ENCOUNTER (OUTPATIENT)
Age: 79
Discharge: HOME | End: 2024-08-02
Payer: MEDICARE

## 2024-08-02 DIAGNOSIS — I10 ESSENTIAL HYPERTENSION: ICD-10-CM

## 2024-08-02 DIAGNOSIS — M81.0 AGE-RELATED OSTEOPOROSIS WITHOUT CURRENT PATHOLOGICAL FRACTURE: ICD-10-CM

## 2024-08-02 DIAGNOSIS — E78.5 HYPERLIPIDEMIA WITH TARGET LDL LESS THAN 100: ICD-10-CM

## 2024-08-02 DIAGNOSIS — R73.9 HYPERGLYCEMIA: ICD-10-CM

## 2024-08-02 LAB
25(OH)D3 SERPL-MCNC: 39.2 NG/ML (ref 30–100)
ALBUMIN SERPL-MCNC: 4.2 G/DL (ref 3.5–5.2)
ALP SERPL-CCNC: 65 U/L (ref 35–104)
ALT SERPL-CCNC: 35 U/L (ref 5–33)
ANION GAP SERPL CALCULATED.3IONS-SCNC: 12 MMOL/L (ref 9–17)
AST SERPL-CCNC: 20 U/L
BACTERIA URNS QL MICRO: ABNORMAL
BILIRUB SERPL-MCNC: 0.9 MG/DL (ref 0.3–1.2)
BILIRUB UR QL STRIP: NEGATIVE
BUN SERPL-MCNC: 15 MG/DL (ref 8–23)
CALCIUM SERPL-MCNC: 9.3 MG/DL (ref 8.6–10.4)
CASTS #/AREA URNS LPF: ABNORMAL /LPF
CHLORIDE SERPL-SCNC: 104 MMOL/L (ref 98–107)
CHOLEST SERPL-MCNC: 179 MG/DL
CHOLESTEROL/HDL RATIO: 3.4
CLARITY UR: CLEAR
CO2 SERPL-SCNC: 24 MMOL/L (ref 20–31)
COLOR UR: YELLOW
CREAT SERPL-MCNC: 0.8 MG/DL (ref 0.5–0.9)
EPI CELLS #/AREA URNS HPF: ABNORMAL /HPF
ERYTHROCYTE [DISTWIDTH] IN BLOOD BY AUTOMATED COUNT: 14 % (ref 11.5–14.9)
EST. AVERAGE GLUCOSE BLD GHB EST-MCNC: 126 MG/DL
FOLATE SERPL-MCNC: >20 NG/ML (ref 4.8–24.2)
GFR, ESTIMATED: 75 ML/MIN/1.73M2
GLUCOSE SERPL-MCNC: 103 MG/DL (ref 70–99)
GLUCOSE UR STRIP-MCNC: NEGATIVE MG/DL
HBA1C MFR BLD: 6 % (ref 4–6)
HCT VFR BLD AUTO: 45.9 % (ref 36–46)
HDLC SERPL-MCNC: 52 MG/DL
HGB BLD-MCNC: 15.2 G/DL (ref 12–16)
HGB UR QL STRIP.AUTO: NEGATIVE
KETONES UR STRIP-MCNC: NEGATIVE MG/DL
LDLC SERPL CALC-MCNC: 97 MG/DL (ref 0–130)
LEUKOCYTE ESTERASE UR QL STRIP: ABNORMAL
MAGNESIUM SERPL-MCNC: 2.3 MG/DL (ref 1.6–2.6)
MCH RBC QN AUTO: 32.6 PG (ref 26–34)
MCHC RBC AUTO-ENTMCNC: 33.1 G/DL (ref 31–37)
MCV RBC AUTO: 98.6 FL (ref 80–100)
NITRITE UR QL STRIP: POSITIVE
PH UR STRIP: 6.5 [PH] (ref 5–8)
PHOSPHATE SERPL-MCNC: 3.3 MG/DL (ref 2.6–4.5)
PLATELET # BLD AUTO: 241 K/UL (ref 150–450)
PMV BLD AUTO: 9.1 FL (ref 6–12)
POTASSIUM SERPL-SCNC: 4.3 MMOL/L (ref 3.7–5.3)
PROT SERPL-MCNC: 7.3 G/DL (ref 6.4–8.3)
PROT UR STRIP-MCNC: NEGATIVE MG/DL
RBC # BLD AUTO: 4.65 M/UL (ref 4–5.2)
RBC #/AREA URNS HPF: ABNORMAL /HPF
SODIUM SERPL-SCNC: 140 MMOL/L (ref 135–144)
SP GR UR STRIP: 1.01 (ref 1–1.03)
TRIGL SERPL-MCNC: 152 MG/DL
TSH SERPL DL<=0.05 MIU/L-ACNC: 2.02 UIU/ML (ref 0.3–5)
URATE SERPL-MCNC: 4.8 MG/DL (ref 2.4–5.7)
UROBILINOGEN UR STRIP-ACNC: NORMAL EU/DL (ref 0–1)
VIT B12 SERPL-MCNC: 535 PG/ML (ref 232–1245)
WBC #/AREA URNS HPF: ABNORMAL /HPF
WBC OTHER # BLD: 8.5 K/UL (ref 3.5–11)

## 2024-08-02 PROCEDURE — 83036 HEMOGLOBIN GLYCOSYLATED A1C: CPT

## 2024-08-02 PROCEDURE — 87088 URINE BACTERIA CULTURE: CPT

## 2024-08-02 PROCEDURE — 84550 ASSAY OF BLOOD/URIC ACID: CPT

## 2024-08-02 PROCEDURE — 80061 LIPID PANEL: CPT

## 2024-08-02 PROCEDURE — 82306 VITAMIN D 25 HYDROXY: CPT

## 2024-08-02 PROCEDURE — 83735 ASSAY OF MAGNESIUM: CPT

## 2024-08-02 PROCEDURE — 85027 COMPLETE CBC AUTOMATED: CPT

## 2024-08-02 PROCEDURE — 84100 ASSAY OF PHOSPHORUS: CPT

## 2024-08-02 PROCEDURE — 81001 URINALYSIS AUTO W/SCOPE: CPT

## 2024-08-02 PROCEDURE — 36415 COLL VENOUS BLD VENIPUNCTURE: CPT

## 2024-08-02 PROCEDURE — 82607 VITAMIN B-12: CPT

## 2024-08-02 PROCEDURE — 87086 URINE CULTURE/COLONY COUNT: CPT

## 2024-08-02 PROCEDURE — 82746 ASSAY OF FOLIC ACID SERUM: CPT

## 2024-08-02 PROCEDURE — 80053 COMPREHEN METABOLIC PANEL: CPT

## 2024-08-02 PROCEDURE — 84443 ASSAY THYROID STIM HORMONE: CPT

## 2024-08-02 PROCEDURE — 87186 SC STD MICRODIL/AGAR DIL: CPT

## 2024-08-04 LAB
MICROORGANISM SPEC CULT: ABNORMAL
SPECIMEN DESCRIPTION: ABNORMAL

## 2024-08-05 ENCOUNTER — TELEPHONE (OUTPATIENT)
Dept: FAMILY MEDICINE CLINIC | Age: 79
End: 2024-08-05

## 2024-08-05 DIAGNOSIS — N30.01 ACUTE CYSTITIS WITH HEMATURIA: Primary | ICD-10-CM

## 2024-08-05 DIAGNOSIS — N30.01 ACUTE CYSTITIS WITH HEMATURIA: ICD-10-CM

## 2024-08-05 RX ORDER — CEPHALEXIN 500 MG/1
500 CAPSULE ORAL 2 TIMES DAILY
Qty: 20 CAPSULE | Refills: 0 | Status: SHIPPED | OUTPATIENT
Start: 2024-08-05 | End: 2024-08-07 | Stop reason: SDUPTHER

## 2024-08-05 RX ORDER — CEPHALEXIN 500 MG/1
500 CAPSULE ORAL 2 TIMES DAILY
Qty: 20 CAPSULE | Refills: 0 | Status: SHIPPED | OUTPATIENT
Start: 2024-08-05 | End: 2024-08-05 | Stop reason: SDUPTHER

## 2024-08-05 NOTE — TELEPHONE ENCOUNTER
ephALEXin (KEFLEX) 500 MG capsule 20 capsule 0 8/5/2024 --    Sig - Route: Take 1 capsule by mouth 2 times daily - Oral    Sent to pharmacy as: Cephalexin 500 MG Oral Capsule (KEFLEX)    E-Prescribing Status: Transmission to pharmacy failed (8/5/2024  2:28 PM EDT)    Message completed by Kiera Prasad MA (8/5/2024 2:34 PM).    Needs resent  transmission failed

## 2024-08-06 ENCOUNTER — OFFICE VISIT (OUTPATIENT)
Age: 79
End: 2024-08-06
Payer: MEDICARE

## 2024-08-06 VITALS — HEIGHT: 66 IN | WEIGHT: 184 LBS | BODY MASS INDEX: 29.57 KG/M2

## 2024-08-06 DIAGNOSIS — M18.12 ARTHRITIS OF CARPOMETACARPAL (CMC) JOINT OF LEFT THUMB: ICD-10-CM

## 2024-08-06 DIAGNOSIS — M18.11 ARTHRITIS OF CARPOMETACARPAL (CMC) JOINT OF RIGHT THUMB: Primary | ICD-10-CM

## 2024-08-06 PROCEDURE — G8417 CALC BMI ABV UP PARAM F/U: HCPCS | Performed by: ORTHOPAEDIC SURGERY

## 2024-08-06 PROCEDURE — 99213 OFFICE O/P EST LOW 20 MIN: CPT | Performed by: ORTHOPAEDIC SURGERY

## 2024-08-06 PROCEDURE — 1090F PRES/ABSN URINE INCON ASSESS: CPT | Performed by: ORTHOPAEDIC SURGERY

## 2024-08-06 PROCEDURE — 20600 DRAIN/INJ JOINT/BURSA W/O US: CPT | Performed by: ORTHOPAEDIC SURGERY

## 2024-08-06 PROCEDURE — G8427 DOCREV CUR MEDS BY ELIG CLIN: HCPCS | Performed by: ORTHOPAEDIC SURGERY

## 2024-08-06 PROCEDURE — G8399 PT W/DXA RESULTS DOCUMENT: HCPCS | Performed by: ORTHOPAEDIC SURGERY

## 2024-08-06 PROCEDURE — 1123F ACP DISCUSS/DSCN MKR DOCD: CPT | Performed by: ORTHOPAEDIC SURGERY

## 2024-08-06 PROCEDURE — 1036F TOBACCO NON-USER: CPT | Performed by: ORTHOPAEDIC SURGERY

## 2024-08-06 RX ADMIN — LIDOCAINE HYDROCHLORIDE 0.5 ML: 10 INJECTION, SOLUTION INFILTRATION; PERINEURAL at 14:24

## 2024-08-06 RX ADMIN — METHYLPREDNISOLONE ACETATE 40 MG: 80 INJECTION, SUSPENSION INTRA-ARTICULAR; INTRALESIONAL; INTRAMUSCULAR; SOFT TISSUE at 14:25

## 2024-08-06 NOTE — TELEPHONE ENCOUNTER
Patient's pharmacy is [facility], please confirm from patient about the name of the some other local pharmacy.

## 2024-08-06 NOTE — PROGRESS NOTES
(4/19/2024)    Overall Financial Resource Strain (CARDIA)     Difficulty of Paying Living Expenses: Not hard at all   Food Insecurity: No Food Insecurity (4/19/2024)    Hunger Vital Sign     Worried About Running Out of Food in the Last Year: Never true     Ran Out of Food in the Last Year: Never true   Transportation Needs: Unknown (4/19/2024)    PRAPARE - Transportation     Lack of Transportation (Medical): Not on file     Lack of Transportation (Non-Medical): No   Physical Activity: Insufficiently Active (4/19/2024)    Exercise Vital Sign     Days of Exercise per Week: 3 days     Minutes of Exercise per Session: 30 min   Stress: Not on file   Social Connections: Not on file   Intimate Partner Violence: Not on file   Housing Stability: Unknown (4/19/2024)    Housing Stability Vital Sign     Unable to Pay for Housing in the Last Year: Not on file     Number of Places Lived in the Last Year: Not on file     Unstable Housing in the Last Year: No     Past Medical History:   Diagnosis Date    ACP (advance care planning) 06/11/2021    Acute urinary retention 09/14/2020    Adjustment disorder with mixed anxiety and depressed mood 09/21/2020    Anxiety and depression 12/12/2019    Aortic valve calcification 05/15/2024    Closed fracture of left pubis (HCC)     Closed fracture of right hip (HCC) 12/21/2020    Closed fracture of single pubic ramus of pelvis, left, initial encounter ([facility]) 06/29/2022    COVID-19 virus infection 12/16/2022    Diverticulosis     Fatty liver     Fractured pelvis (HCC) 06/26/2022    Hyperglycemia 06/11/2021    Hyperlipidemia     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

## 2024-08-07 RX ORDER — CEPHALEXIN 500 MG/1
500 CAPSULE ORAL 2 TIMES DAILY
Qty: 20 CAPSULE | Refills: 0 | Status: SHIPPED | OUTPATIENT
Start: 2024-08-07

## 2024-08-07 RX ORDER — METHYLPREDNISOLONE ACETATE 80 MG/ML
40 INJECTION, SUSPENSION INTRA-ARTICULAR; INTRALESIONAL; INTRAMUSCULAR; SOFT TISSUE ONCE
Status: COMPLETED | OUTPATIENT
Start: 2024-08-07 | End: 2024-08-06

## 2024-08-07 RX ORDER — LIDOCAINE HYDROCHLORIDE 10 MG/ML
0.5 INJECTION, SOLUTION INFILTRATION; PERINEURAL ONCE
Status: COMPLETED | OUTPATIENT
Start: 2024-08-07 | End: 2024-08-06

## 2024-08-07 NOTE — TELEPHONE ENCOUNTER
1. Acute cystitis with hematuria    - cephALEXin (KEFLEX) 500 MG capsule; Take 1 capsule by mouth 2 times daily  Dispense: 20 capsule; Refill: 0

## 2024-08-20 ENCOUNTER — OFFICE VISIT (OUTPATIENT)
Dept: FAMILY MEDICINE CLINIC | Age: 79
End: 2024-08-20
Payer: MEDICARE

## 2024-08-20 DIAGNOSIS — M15.9 PRIMARY OSTEOARTHRITIS INVOLVING MULTIPLE JOINTS: ICD-10-CM

## 2024-08-20 DIAGNOSIS — F33.0 MILD EPISODE OF RECURRENT MAJOR DEPRESSIVE DISORDER (HCC): ICD-10-CM

## 2024-08-20 DIAGNOSIS — I12.9 BENIGN HYPERTENSION WITH CKD (CHRONIC KIDNEY DISEASE), STAGE II: Primary | ICD-10-CM

## 2024-08-20 DIAGNOSIS — I51.89 DIASTOLIC DYSFUNCTION: ICD-10-CM

## 2024-08-20 DIAGNOSIS — I35.9 AORTIC VALVE CALCIFICATION: ICD-10-CM

## 2024-08-20 DIAGNOSIS — N18.2 BENIGN HYPERTENSION WITH CKD (CHRONIC KIDNEY DISEASE), STAGE II: Primary | ICD-10-CM

## 2024-08-20 DIAGNOSIS — E78.5 HYPERLIPIDEMIA WITH TARGET LDL LESS THAN 100: ICD-10-CM

## 2024-08-20 DIAGNOSIS — R73.03 PREDIABETES: ICD-10-CM

## 2024-08-20 DIAGNOSIS — M81.0 AGE-RELATED OSTEOPOROSIS WITHOUT CURRENT PATHOLOGICAL FRACTURE: ICD-10-CM

## 2024-08-20 PROCEDURE — 99214 OFFICE O/P EST MOD 30 MIN: CPT | Performed by: FAMILY MEDICINE

## 2024-08-20 PROCEDURE — 1123F ACP DISCUSS/DSCN MKR DOCD: CPT | Performed by: FAMILY MEDICINE

## 2024-08-20 PROCEDURE — 1090F PRES/ABSN URINE INCON ASSESS: CPT | Performed by: FAMILY MEDICINE

## 2024-08-20 PROCEDURE — 1036F TOBACCO NON-USER: CPT | Performed by: FAMILY MEDICINE

## 2024-08-20 PROCEDURE — G8427 DOCREV CUR MEDS BY ELIG CLIN: HCPCS | Performed by: FAMILY MEDICINE

## 2024-08-20 PROCEDURE — G8399 PT W/DXA RESULTS DOCUMENT: HCPCS | Performed by: FAMILY MEDICINE

## 2024-08-20 PROCEDURE — G8417 CALC BMI ABV UP PARAM F/U: HCPCS | Performed by: FAMILY MEDICINE

## 2024-08-20 RX ORDER — ESCITALOPRAM OXALATE 10 MG/1
10 TABLET ORAL DAILY
Qty: 90 TABLET | Refills: 1 | Status: SHIPPED | OUTPATIENT
Start: 2024-08-20

## 2024-08-20 RX ORDER — YEAST,DRIED (S. CEREVISIAE)
1 POWDER (GRAM) ORAL DAILY
Qty: 90 TABLET | Refills: 0
Start: 2024-08-20

## 2024-08-20 RX ORDER — TETANUS AND DIPHTHERIA TOXOIDS ADSORBED 2; 2 [LF]/.5ML; [LF]/.5ML
0.5 INJECTION INTRAMUSCULAR ONCE
Qty: 0.5 ML | Refills: 0 | Status: CANCELLED | OUTPATIENT
Start: 2024-08-20 | End: 2024-08-20

## 2024-08-20 SDOH — ECONOMIC STABILITY: INCOME INSECURITY: HOW HARD IS IT FOR YOU TO PAY FOR THE VERY BASICS LIKE FOOD, HOUSING, MEDICAL CARE, AND HEATING?: NOT HARD AT ALL

## 2024-08-20 SDOH — ECONOMIC STABILITY: FOOD INSECURITY: WITHIN THE PAST 12 MONTHS, YOU WORRIED THAT YOUR FOOD WOULD RUN OUT BEFORE YOU GOT MONEY TO BUY MORE.: NEVER TRUE

## 2024-08-20 SDOH — ECONOMIC STABILITY: FOOD INSECURITY: WITHIN THE PAST 12 MONTHS, THE FOOD YOU BOUGHT JUST DIDN'T LAST AND YOU DIDN'T HAVE MONEY TO GET MORE.: NEVER TRUE

## 2024-08-20 ASSESSMENT — PATIENT HEALTH QUESTIONNAIRE - PHQ9
SUM OF ALL RESPONSES TO PHQ9 QUESTIONS 1 & 2: 1
2. FEELING DOWN, DEPRESSED OR HOPELESS: SEVERAL DAYS
10. IF YOU CHECKED OFF ANY PROBLEMS, HOW DIFFICULT HAVE THESE PROBLEMS MADE IT FOR YOU TO DO YOUR WORK, TAKE CARE OF THINGS AT HOME, OR GET ALONG WITH OTHER PEOPLE: NOT DIFFICULT AT ALL
SUM OF ALL RESPONSES TO PHQ QUESTIONS 1-9: 5
8. MOVING OR SPEAKING SO SLOWLY THAT OTHER PEOPLE COULD HAVE NOTICED. OR THE OPPOSITE, BEING SO FIGETY OR RESTLESS THAT YOU HAVE BEEN MOVING AROUND A LOT MORE THAN USUAL: NOT AT ALL
1. LITTLE INTEREST OR PLEASURE IN DOING THINGS: NOT AT ALL
SUM OF ALL RESPONSES TO PHQ QUESTIONS 1-9: 5
3. TROUBLE FALLING OR STAYING ASLEEP: SEVERAL DAYS
9. THOUGHTS THAT YOU WOULD BE BETTER OFF DEAD, OR OF HURTING YOURSELF: NOT AT ALL
SUM OF ALL RESPONSES TO PHQ QUESTIONS 1-9: 5
5. POOR APPETITE OR OVEREATING: SEVERAL DAYS
7. TROUBLE CONCENTRATING ON THINGS, SUCH AS READING THE NEWSPAPER OR WATCHING TELEVISION: NOT AT ALL
6. FEELING BAD ABOUT YOURSELF - OR THAT YOU ARE A FAILURE OR HAVE LET YOURSELF OR YOUR FAMILY DOWN: SEVERAL DAYS
4. FEELING TIRED OR HAVING LITTLE ENERGY: SEVERAL DAYS
SUM OF ALL RESPONSES TO PHQ QUESTIONS 1-9: 5

## 2024-08-20 NOTE — PROGRESS NOTES
Deana Dalton (:  1945) is a 78 y.o. female,Established patient, here for evaluation of the following chief complaint(s): Hypertension, Hyperlipidemia, Immunizations (NO TO ALL VACCINES/WILL TAKE TDAP/DISCUSS RSV), Hyperglycemia, and Depression      ASSESSMENT/PLAN:    1. Benign hypertension with CKD (chronic kidney disease), stage II  Slightly worse chronic kidney disease stage II, GFR 75 on 2024, it was 88 on 2024  Well controlled HTN.  Continue current treatment.  Blood Zartan 50 Mg daily  Will recheck labs.   Discussed low salt diet and BP and pulse monitoring.  She is not taking NSAIDs, advised to absolutely avoid ibuprofen, naproxen, Aleve, Motrin, and dehydration  -     Comprehensive Metabolic Panel; Future  -     Magnesium; Future  -     Phosphorus; Future  -     Urinalysis with Reflex to Culture; Future  -     US RETROPERITONEAL LIMITED; Future  -     KEVEN Screen with Reflex; Future  -     Protein Electrophoresis, Urine; Future  2. Hyperlipidemia with target LDL less than 100  Improved with medications  Continue Crestor 10 Mg daily  3. Mild episode of recurrent major depressive disorder (HCC)  Improved  Will decrease dosage of Lexapro  Will continue to monitor  -     escitalopram (LEXAPRO) 10 MG tablet; Take 1 tablet by mouth daily Dose decreased 2024, Disp-90 tablet, R-1Normal  4. Age-related osteoporosis without current pathological fracture  Newly diagnosed, continue Prolia injection every 6 months  Continue vitamin D supplementation  Will recheck DEXA scan in 2 years    5. Primary osteoarthritis involving multiple joints  Worsening due to wear-and-tear nature of the disease  -  start   Collagen-Boron-Hyaluronic Acid (MOVE FREE [employer] JOINT [employer]) 40-5-3.3 MG TABS; Take 1 tablet by mouth daily, Disp-90 tablet, R-0NO PRINT  Tylenol as needed 600 mg  6. Prediabetes  Worsening  Lab Results   Component Value Date    LABA1C 6.0 2024    LABA1C 5.6 2023    LABA1C 5.3

## 2024-08-20 NOTE — PROGRESS NOTES
Visit Information    Have you changed or started any medications since your last visit including any over-the-counter medicines, vitamins, or herbal medicines? no   Have you stopped taking any of your medications? Is so, why? -  no  Are you having any side effects from any of your medications? - no    Have you seen any other physician or provider since your last visit?  yes -    Have you had any other diagnostic tests since your last visit?  no   Have you been seen in the emergency room and/or had an admission in a hospital since we last saw you?  no   Have you had your routine dental cleaning in the past 6 months?  no     Do you have an active MyChart account? If no, what is the barrier?  NO    Patient Care Team:  May Krishnamurthy MD as PCP - General (Family Medicine)  May Krishnamurthy MD as PCP - Empaneled Provider  Ella Vasquez MD as Consulting Physician (Gastroenterology)  Laila Godinez,  as Consulting Physician (Obstetrics & Gynecology)  Jv Godinez,  as Consulting Physician (Obstetrics & Gynecology)  Viral Lane MD as Consulting Physician (Orthopedic Surgery)  Kimberly Blount, APRN - CNP (Otolaryngology)  Lynette Roberts, PhD (Psychology)  Ronald Ayala MD as Consulting Physician (Orthopedic Surgery)    Medical History Review  Past Medical, Family, and Social History reviewed and does contribute to the patient presenting condition    Health Maintenance   Topic Date Due    DTaP/Tdap/Td vaccine (1 - Tdap) Never done    Shingles vaccine (1 of 2) Never done    Respiratory Syncytial Virus (RSV) Pregnant or age 60 yrs+ (1 - 1-dose 60+ series) Never done    COVID-19 Vaccine (5 - 2023-24 season) 09/01/2023    Flu vaccine (1) 08/01/2024    Breast cancer screen  01/03/2025    DEXA (modify frequency per FRAX score)  06/30/2025    Depression Monitoring  07/10/2025    Lipids  08/02/2025    Annual Wellness Visit (Medicare Advantage)  Completed    Pneumococcal 65+ years

## 2024-08-21 ENCOUNTER — OFFICE VISIT (OUTPATIENT)
Dept: BEHAVIORAL/MENTAL HEALTH CLINIC | Age: 79
End: 2024-08-21
Payer: MEDICARE

## 2024-08-21 DIAGNOSIS — F41.1 GENERALIZED ANXIETY DISORDER: Primary | ICD-10-CM

## 2024-08-21 DIAGNOSIS — F34.1 PERSISTENT DEPRESSIVE DISORDER, MILD: ICD-10-CM

## 2024-08-21 PROCEDURE — 90837 PSYTX W PT 60 MINUTES: CPT | Performed by: PSYCHOLOGIST

## 2024-08-21 PROCEDURE — 1123F ACP DISCUSS/DSCN MKR DOCD: CPT | Performed by: PSYCHOLOGIST

## 2024-08-21 PROCEDURE — 1036F TOBACCO NON-USER: CPT | Performed by: PSYCHOLOGIST

## 2024-08-21 ASSESSMENT — PATIENT HEALTH QUESTIONNAIRE - PHQ9
SUM OF ALL RESPONSES TO PHQ9 QUESTIONS 1 & 2: 2
4. FEELING TIRED OR HAVING LITTLE ENERGY: SEVERAL DAYS
1. LITTLE INTEREST OR PLEASURE IN DOING THINGS: SEVERAL DAYS
8. MOVING OR SPEAKING SO SLOWLY THAT OTHER PEOPLE COULD HAVE NOTICED. OR THE OPPOSITE, BEING SO FIGETY OR RESTLESS THAT YOU HAVE BEEN MOVING AROUND A LOT MORE THAN USUAL: NOT AT ALL
10. IF YOU CHECKED OFF ANY PROBLEMS, HOW DIFFICULT HAVE THESE PROBLEMS MADE IT FOR YOU TO DO YOUR WORK, TAKE CARE OF THINGS AT HOME, OR GET ALONG WITH OTHER PEOPLE: SOMEWHAT DIFFICULT
SUM OF ALL RESPONSES TO PHQ QUESTIONS 1-9: 8
3. TROUBLE FALLING OR STAYING ASLEEP: SEVERAL DAYS
SUM OF ALL RESPONSES TO PHQ QUESTIONS 1-9: 8
5. POOR APPETITE OR OVEREATING: SEVERAL DAYS
SUM OF ALL RESPONSES TO PHQ QUESTIONS 1-9: 8
2. FEELING DOWN, DEPRESSED OR HOPELESS: SEVERAL DAYS
9. THOUGHTS THAT YOU WOULD BE BETTER OFF DEAD, OR OF HURTING YOURSELF: NOT AT ALL
6. FEELING BAD ABOUT YOURSELF - OR THAT YOU ARE A FAILURE OR HAVE LET YOURSELF OR YOUR FAMILY DOWN: MORE THAN HALF THE DAYS
7. TROUBLE CONCENTRATING ON THINGS, SUCH AS READING THE NEWSPAPER OR WATCHING TELEVISION: SEVERAL DAYS
SUM OF ALL RESPONSES TO PHQ QUESTIONS 1-9: 8

## 2024-08-26 ENCOUNTER — TELEPHONE (OUTPATIENT)
Dept: FAMILY MEDICINE CLINIC | Age: 79
End: 2024-08-26

## 2024-08-26 VITALS
OXYGEN SATURATION: 95 % | SYSTOLIC BLOOD PRESSURE: 112 MMHG | HEIGHT: 66 IN | WEIGHT: 184 LBS | HEART RATE: 65 BPM | BODY MASS INDEX: 29.57 KG/M2 | DIASTOLIC BLOOD PRESSURE: 70 MMHG | TEMPERATURE: 98.6 F

## 2024-08-26 PROBLEM — I10 ESSENTIAL HYPERTENSION: Status: RESOLVED | Noted: 2018-12-17 | Resolved: 2024-08-26

## 2024-08-26 PROBLEM — F33.1 MODERATE EPISODE OF RECURRENT MAJOR DEPRESSIVE DISORDER (HCC): Status: RESOLVED | Noted: 2023-06-13 | Resolved: 2024-08-26

## 2024-08-26 PROBLEM — I12.9 BENIGN HYPERTENSION WITH CKD (CHRONIC KIDNEY DISEASE), STAGE II: Status: ACTIVE | Noted: 2024-08-26

## 2024-08-26 PROBLEM — F33.0 MILD EPISODE OF RECURRENT MAJOR DEPRESSIVE DISORDER (HCC): Status: ACTIVE | Noted: 2024-08-26

## 2024-08-26 PROBLEM — F43.21 GRIEF: Status: RESOLVED | Noted: 2023-08-16 | Resolved: 2024-08-26

## 2024-08-26 PROBLEM — R73.03 PREDIABETES: Status: ACTIVE | Noted: 2024-08-26

## 2024-08-26 PROBLEM — I51.89 DIASTOLIC DYSFUNCTION: Status: ACTIVE | Noted: 2024-08-26

## 2024-08-26 PROBLEM — M77.8 TENDINITIS OF THUMB: Status: RESOLVED | Noted: 2023-06-13 | Resolved: 2024-08-26

## 2024-08-26 PROBLEM — M15.0 PRIMARY OSTEOARTHRITIS INVOLVING MULTIPLE JOINTS: Status: ACTIVE | Noted: 2024-08-26

## 2024-08-26 PROBLEM — M15.9 PRIMARY OSTEOARTHRITIS INVOLVING MULTIPLE JOINTS: Status: ACTIVE | Noted: 2024-08-26

## 2024-08-26 PROBLEM — N30.01 ACUTE CYSTITIS WITH HEMATURIA: Status: RESOLVED | Noted: 2024-08-05 | Resolved: 2024-08-26

## 2024-08-26 PROBLEM — N18.2 BENIGN HYPERTENSION WITH CKD (CHRONIC KIDNEY DISEASE), STAGE II: Status: ACTIVE | Noted: 2024-08-26

## 2024-08-27 DIAGNOSIS — I10 ESSENTIAL HYPERTENSION: ICD-10-CM

## 2024-08-27 RX ORDER — LOSARTAN POTASSIUM 50 MG/1
50 TABLET ORAL DAILY
Qty: 90 TABLET | Refills: 1 | Status: SHIPPED | OUTPATIENT
Start: 2024-08-27

## 2024-08-27 NOTE — TELEPHONE ENCOUNTER
Please cancel the appointment for September, patient agreed at the appointment  And please schedule her an appointment in December FYI  Return in about 4 months (around 12/20/2024) for Visit type extended 30 minutes chronic problems, HTN,HLP, LABS F/U.     Cancel Sept appt      Future Appointments   Date Time Provider Department Center   9/4/2024  2:00 PM Ella Vasquez MD [facility]   9/9/2024 11:45 AM [facility]   9/24/2024 12:30 PM Jv Godinez DO [facility] OB/Gyn TOLP   9/27/2024  1:00 PM May Krishnamurthy MD fp [facility] ECC DEP   10/2/2024  1:00 PM Lyn[facility] PhD [facility]   4/25/2025  1:00 PM May Krishnamurthy MD fp [facility] ECC DEP

## 2024-08-27 NOTE — TELEPHONE ENCOUNTER
One tablet left for 8/28      Deana Dalton is calling to request a refill on the following medication(s):    Last Visit Date (If Applicable):  8/20/2024    Next Visit Date:    9/27/2024    Medication Request:  Requested Prescriptions     Pending Prescriptions Disp Refills    losartan (COZAAR) 50 MG tablet 90 tablet 1     Sig: Take 1 tablet by mouth daily

## 2024-08-28 NOTE — TELEPHONE ENCOUNTER
Noted  Future Appointments   Date Time Provider Department Center   9/4/2024  2:00 PM Ella Vasquez MD [facility]   9/9/2024 11:45 AM Aristeo Boykin, DO [facility] ORT SP [facility]   9/30/2024 11:00 AM Jv Godinez, [facility] OB/Gyn M[facility]   10/2/2024  1:00 PM Lynette Roberts, PhD [facility] PSYC TOLPP   12/17/2024  2:30 PM May Krishnamurthy MD fp [facility] ECC DEP   1/8/2025  3:00 PM Gianni Ferreira DO AFL TCC [facility]   4/25/2025  1:00 PM May Krishnamurthy MD fp [facility] E[facility] DEP

## 2024-09-04 ENCOUNTER — OFFICE VISIT (OUTPATIENT)
Dept: GASTROENTEROLOGY | Age: 79
End: 2024-09-04
Payer: MEDICARE

## 2024-09-04 VITALS
OXYGEN SATURATION: 97 % | DIASTOLIC BLOOD PRESSURE: 72 MMHG | RESPIRATION RATE: 16 BRPM | TEMPERATURE: 97 F | SYSTOLIC BLOOD PRESSURE: 153 MMHG | HEART RATE: 64 BPM | BODY MASS INDEX: 30.18 KG/M2 | WEIGHT: 187 LBS

## 2024-09-04 DIAGNOSIS — K76.0 FATTY LIVER: Primary | ICD-10-CM

## 2024-09-04 DIAGNOSIS — R14.0 ABDOMINAL BLOATING: ICD-10-CM

## 2024-09-04 DIAGNOSIS — R19.7 DIARRHEA, UNSPECIFIED TYPE: ICD-10-CM

## 2024-09-04 DIAGNOSIS — K57.30 DIVERTICULOSIS OF LARGE INTESTINE WITHOUT HEMORRHAGE: ICD-10-CM

## 2024-09-04 PROCEDURE — 1090F PRES/ABSN URINE INCON ASSESS: CPT | Performed by: INTERNAL MEDICINE

## 2024-09-04 PROCEDURE — G8427 DOCREV CUR MEDS BY ELIG CLIN: HCPCS | Performed by: INTERNAL MEDICINE

## 2024-09-04 PROCEDURE — 1036F TOBACCO NON-USER: CPT | Performed by: INTERNAL MEDICINE

## 2024-09-04 PROCEDURE — 99204 OFFICE O/P NEW MOD 45 MIN: CPT | Performed by: INTERNAL MEDICINE

## 2024-09-04 PROCEDURE — G8399 PT W/DXA RESULTS DOCUMENT: HCPCS | Performed by: INTERNAL MEDICINE

## 2024-09-04 PROCEDURE — 1123F ACP DISCUSS/DSCN MKR DOCD: CPT | Performed by: INTERNAL MEDICINE

## 2024-09-04 PROCEDURE — G8417 CALC BMI ABV UP PARAM F/U: HCPCS | Performed by: INTERNAL MEDICINE

## 2024-09-04 RX ORDER — BISACODYL 5 MG/1
TABLET, DELAYED RELEASE ORAL
Qty: 4 TABLET | Refills: 0 | Status: SHIPPED | OUTPATIENT
Start: 2024-09-04

## 2024-09-04 RX ORDER — POLYETHYLENE GLYCOL 3350 17 G/17G
POWDER, FOR SOLUTION ORAL
Qty: 238 G | Refills: 0 | Status: SHIPPED | OUTPATIENT
Start: 2024-09-04

## 2024-09-04 ASSESSMENT — ENCOUNTER SYMPTOMS
DIARRHEA: 1
SORE THROAT: 0
ABDOMINAL PAIN: 1
ABDOMINAL DISTENTION: 1
NAUSEA: 0
COUGH: 0
RECTAL PAIN: 0
VOICE CHANGE: 0
ANAL BLEEDING: 0
BLOOD IN STOOL: 0
VOMITING: 0
CONSTIPATION: 0
WHEEZING: 0
CHOKING: 0
TROUBLE SWALLOWING: 0

## 2024-09-04 NOTE — PROGRESS NOTES
GI CLINIC FOLLOW UP    INTERVAL HISTORY:   May Krishnamurthy MD  [address]  [address]  [place] [address]    Chief Complaint   Patient presents with    Diarrhea     Referred for explosive diarrhea that subsided a month ago. Complaints of abdominal bloating and RLQ abdominal pain.       HISTORY OF PRESENT ILLNESS: Ms.Carol AKUA Dalton is a 78 y.o. female , referred for evaluation of fatty liver, diverticulosis.    Here for f/u  She is a new return  Previously seen for fatty liver disease and diverticulosis  Most recent labs 8/2/24 showing elevated ALT 35  No anemia, normal platelets   No recent abd imaging.    Today she reports that she was having \"explosive diarrhea\" with bloating, rlq pain. No epigastric pain. Reports 2-3 bowel movements per day. Caused her to get a UTI. Worse with dairy. No noticeable relationship with bread products. Some improvement since OBGYN re-inserted her pessary. No dumping after meals. Still has gallbladder. No fever/chills. No black/bloody stools.   Endorses high stress levels. Grandson committed suicide a few years ago. Difficulty processing this. Last colonoscopy 5/2013    No NSAIDs  EtOH every other day, 2-3 glasses  No tobacco products  No marijuana, other substances      Past Medical,Family, and Social History reviewed and does contribute to the patient presentingcondition.    I did review all the labs results available for the labs which were ordered by the primary care physician, and the other consultants, we search on [employer] [facility] and all the available care everywhere epic    I did review all the imaging studies of the abdomen available on EMR, ordered by the primary care physician and the other consultant    I did review all the pathology from the biopsies done on the previous endoscopies    Patient's PMH/PSH,SH,PSYCH Hx, MEDs, ALLERGIES, and ROS were all reviewed and updated in the appropriate sections.    PAST MEDICAL HISTORY:  Past Medical History:   Diagnosis

## 2024-09-05 NOTE — TELEPHONE ENCOUNTER
Procedure scheduled/Dr Vasquez  Procedure:Colonoscopy   Dx:Diverticulosis of large intestine without hemorrhage; Diarrhea, unspecified type   Date:12/05/2024  Time:10:00 am / Arrive: 8:00 am   Hospital:[facility] phone call:vera  Bowel Prep instructions given:miralax /dul  In office/via phone:office   Clearance needed:none

## 2024-09-06 ENCOUNTER — HOSPITAL ENCOUNTER (OUTPATIENT)
Dept: ULTRASOUND IMAGING | Age: 79
Discharge: HOME | End: 2024-09-08
Attending: FAMILY MEDICINE
Payer: MEDICARE

## 2024-09-06 DIAGNOSIS — I12.9 BENIGN HYPERTENSION WITH CKD (CHRONIC KIDNEY DISEASE), STAGE II: ICD-10-CM

## 2024-09-06 DIAGNOSIS — N18.2 BENIGN HYPERTENSION WITH CKD (CHRONIC KIDNEY DISEASE), STAGE II: ICD-10-CM

## 2024-09-06 PROCEDURE — 76775 US EXAM ABDO BACK WALL LIM: CPT

## 2024-09-06 NOTE — RESULT ENCOUNTER NOTE
Please notify patient: Correction  2.8 simple cyst in the right kidney, nothing to worry about, it was present before on the CT urogram from 6/24/2021 also described as simple cyst  Future Appointments  9/9/2024   11:45 AM   Aristeo Boykin, DO    [facility]T SP        [facility]  9/30/2024  11:00 AM   Jv Godinez, DO  [facility] OB/Gyn        [facility]  10/2/2024  1:00 PM    Lynette Roberts, PhD  [facility] PSYC             [facility]  11/21/2024 11:00 AM   [facility] PAT RM 5              [facility] PAT            [facility]  12/11/2024 3:00 PM    Ella Vasquez MD           [facility]           [facility]  12/17/2024 2:30 PM    May Krishnamurthy MD    [facility] DEP  1/8/2025   3:00 PM    Gianni Ferreira DO             [facility] TCC [facility]        [facility]  4/25/2025  1:00 PM    May Krishnamurthy MD    [facility] DEP

## 2024-09-06 NOTE — RESULT ENCOUNTER NOTE
Please notify patient: Ultrasound of the kidneys normal       Future Appointments  9/9/2024   11:45 AM   Aristeo Boykin,     [facility] ORT SP        [facility]  9/30/2024  11:00 AM   Jv Godinez, DO  [facility] OB/Gyn        [facility]  10/2/2024  1:00 PM    Lynette Roberts, PhD  SC PSYC             [facility]  11/21/2024 11:00 AM   [facility] PAT RM 5              [facility] PAT            [facility]  12/11/2024 3:00 PM    Ella Vasquez MD           [facility]           [facility]  12/17/2024 2:30 PM    May Krishnamurthy MD    fp [facility] ECC DEP  1/8/2025   3:00 PM    Gianni Ferreira DO             AFL TCC [facility]        [facility]  4/25/2025  1:00 PM    May Krishnamurthy MD    fp [facility] ECC DEP

## 2024-09-09 ENCOUNTER — OFFICE VISIT (OUTPATIENT)
Dept: ORTHOPEDIC SURGERY | Age: 79
End: 2024-09-09
Payer: MEDICARE

## 2024-09-09 VITALS — BODY MASS INDEX: 30.05 KG/M2 | WEIGHT: 187 LBS | HEIGHT: 66 IN | RESPIRATION RATE: 14 BRPM

## 2024-09-09 DIAGNOSIS — M25.551 RIGHT HIP PAIN: ICD-10-CM

## 2024-09-09 DIAGNOSIS — S72.001S FRACTURE OF RIGHT HIP, SEQUELA: Primary | ICD-10-CM

## 2024-09-09 PROCEDURE — 99213 OFFICE O/P EST LOW 20 MIN: CPT | Performed by: STUDENT IN AN ORGANIZED HEALTH CARE EDUCATION/TRAINING PROGRAM

## 2024-09-09 PROCEDURE — G8427 DOCREV CUR MEDS BY ELIG CLIN: HCPCS | Performed by: STUDENT IN AN ORGANIZED HEALTH CARE EDUCATION/TRAINING PROGRAM

## 2024-09-09 PROCEDURE — G8399 PT W/DXA RESULTS DOCUMENT: HCPCS | Performed by: STUDENT IN AN ORGANIZED HEALTH CARE EDUCATION/TRAINING PROGRAM

## 2024-09-09 PROCEDURE — 1090F PRES/ABSN URINE INCON ASSESS: CPT | Performed by: STUDENT IN AN ORGANIZED HEALTH CARE EDUCATION/TRAINING PROGRAM

## 2024-09-09 PROCEDURE — G8417 CALC BMI ABV UP PARAM F/U: HCPCS | Performed by: STUDENT IN AN ORGANIZED HEALTH CARE EDUCATION/TRAINING PROGRAM

## 2024-09-09 PROCEDURE — 1123F ACP DISCUSS/DSCN MKR DOCD: CPT | Performed by: STUDENT IN AN ORGANIZED HEALTH CARE EDUCATION/TRAINING PROGRAM

## 2024-09-09 PROCEDURE — 1036F TOBACCO NON-USER: CPT | Performed by: STUDENT IN AN ORGANIZED HEALTH CARE EDUCATION/TRAINING PROGRAM

## 2024-09-30 ENCOUNTER — HOSPITAL ENCOUNTER (OUTPATIENT)
Age: 79
Discharge: HOME | End: 2024-09-30
Payer: MEDICARE

## 2024-09-30 ENCOUNTER — OFFICE VISIT (OUTPATIENT)
Dept: OBGYN CLINIC | Age: 79
End: 2024-09-30
Payer: MEDICARE

## 2024-09-30 VITALS
BODY MASS INDEX: 31.16 KG/M2 | SYSTOLIC BLOOD PRESSURE: 118 MMHG | HEART RATE: 84 BPM | RESPIRATION RATE: 18 BRPM | HEIGHT: 65 IN | DIASTOLIC BLOOD PRESSURE: 74 MMHG | WEIGHT: 187 LBS

## 2024-09-30 DIAGNOSIS — R14.0 ABDOMINAL BLOATING: ICD-10-CM

## 2024-09-30 DIAGNOSIS — N81.11 MIDLINE CYSTOCELE: ICD-10-CM

## 2024-09-30 DIAGNOSIS — Z46.89 ENCOUNTER FOR PESSARY MAINTENANCE: Primary | ICD-10-CM

## 2024-09-30 DIAGNOSIS — Z90.710 H/O TOTAL VAGINAL HYSTERECTOMY: ICD-10-CM

## 2024-09-30 DIAGNOSIS — N39.41 URGE INCONTINENCE OF URINE: ICD-10-CM

## 2024-09-30 DIAGNOSIS — N18.2 BENIGN HYPERTENSION WITH CKD (CHRONIC KIDNEY DISEASE), STAGE II: ICD-10-CM

## 2024-09-30 DIAGNOSIS — Z96.0 PRESENCE OF PESSARY: ICD-10-CM

## 2024-09-30 DIAGNOSIS — I12.9 BENIGN HYPERTENSION WITH CKD (CHRONIC KIDNEY DISEASE), STAGE II: ICD-10-CM

## 2024-09-30 DIAGNOSIS — K76.0 FATTY LIVER: ICD-10-CM

## 2024-09-30 LAB
ALBUMIN SERPL-MCNC: 4.4 G/DL (ref 3.5–5.2)
ALP SERPL-CCNC: 69 U/L (ref 35–104)
ALT SERPL-CCNC: 19 U/L (ref 5–33)
ANION GAP SERPL CALCULATED.3IONS-SCNC: 12 MMOL/L (ref 9–17)
AST SERPL-CCNC: 20 U/L
BILIRUB SERPL-MCNC: 0.7 MG/DL (ref 0.3–1.2)
BILIRUB UR QL STRIP: NEGATIVE
BUN SERPL-MCNC: 9 MG/DL (ref 8–23)
CALCIUM SERPL-MCNC: 9.7 MG/DL (ref 8.6–10.4)
CHLORIDE SERPL-SCNC: 105 MMOL/L (ref 98–107)
CLARITY UR: CLEAR
CO2 SERPL-SCNC: 25 MMOL/L (ref 20–31)
COLOR UR: YELLOW
CREAT SERPL-MCNC: 0.7 MG/DL (ref 0.5–0.9)
EPI CELLS #/AREA URNS HPF: ABNORMAL /HPF
GFR, ESTIMATED: 88 ML/MIN/1.73M2
GLUCOSE SERPL-MCNC: 99 MG/DL (ref 70–99)
GLUCOSE UR STRIP-MCNC: NEGATIVE MG/DL
HBV SURFACE AB SERPL IA-ACNC: <3.5 MIU/ML
HBV SURFACE AG SERPL QL IA: NONREACTIVE
HGB UR QL STRIP.AUTO: ABNORMAL
KETONES UR STRIP-MCNC: NEGATIVE MG/DL
LEUKOCYTE ESTERASE UR QL STRIP: ABNORMAL
MAGNESIUM SERPL-MCNC: 2.4 MG/DL (ref 1.6–2.6)
NITRITE UR QL STRIP: NEGATIVE
PH UR STRIP: 7.5 [PH] (ref 5–8)
PHOSPHATE SERPL-MCNC: 3.3 MG/DL (ref 2.6–4.5)
POTASSIUM SERPL-SCNC: 4.4 MMOL/L (ref 3.7–5.3)
PROT SERPL-MCNC: 7.1 G/DL (ref 6.4–8.3)
PROT UR STRIP-MCNC: NEGATIVE MG/DL
RBC #/AREA URNS HPF: ABNORMAL /HPF
SODIUM SERPL-SCNC: 142 MMOL/L (ref 135–144)
SP GR UR STRIP: 1.01 (ref 1–1.03)
UROBILINOGEN UR STRIP-ACNC: NORMAL EU/DL (ref 0–1)
WBC #/AREA URNS HPF: ABNORMAL /HPF

## 2024-09-30 PROCEDURE — 86038 ANTINUCLEAR ANTIBODIES: CPT

## 2024-09-30 PROCEDURE — 99213 OFFICE O/P EST LOW 20 MIN: CPT | Performed by: OBSTETRICS & GYNECOLOGY

## 2024-09-30 PROCEDURE — 87340 HEPATITIS B SURFACE AG IA: CPT

## 2024-09-30 PROCEDURE — 0509F URINE INCON PLAN DOCD: CPT | Performed by: OBSTETRICS & GYNECOLOGY

## 2024-09-30 PROCEDURE — 86704 HEP B CORE ANTIBODY TOTAL: CPT

## 2024-09-30 PROCEDURE — 36415 COLL VENOUS BLD VENIPUNCTURE: CPT

## 2024-09-30 PROCEDURE — 86317 IMMUNOASSAY INFECTIOUS AGENT: CPT

## 2024-09-30 PROCEDURE — G8399 PT W/DXA RESULTS DOCUMENT: HCPCS | Performed by: OBSTETRICS & GYNECOLOGY

## 2024-09-30 PROCEDURE — 82104 ALPHA-1-ANTITRYPSIN PHENO: CPT

## 2024-09-30 PROCEDURE — 86225 DNA ANTIBODY NATIVE: CPT

## 2024-09-30 PROCEDURE — G8427 DOCREV CUR MEDS BY ELIG CLIN: HCPCS | Performed by: OBSTETRICS & GYNECOLOGY

## 2024-09-30 PROCEDURE — 83735 ASSAY OF MAGNESIUM: CPT

## 2024-09-30 PROCEDURE — 1123F ACP DISCUSS/DSCN MKR DOCD: CPT | Performed by: OBSTETRICS & GYNECOLOGY

## 2024-09-30 PROCEDURE — 86708 HEPATITIS A ANTIBODY: CPT

## 2024-09-30 PROCEDURE — 1090F PRES/ABSN URINE INCON ASSESS: CPT | Performed by: OBSTETRICS & GYNECOLOGY

## 2024-09-30 PROCEDURE — 84156 ASSAY OF PROTEIN URINE: CPT

## 2024-09-30 PROCEDURE — 1036F TOBACCO NON-USER: CPT | Performed by: OBSTETRICS & GYNECOLOGY

## 2024-09-30 PROCEDURE — 84166 PROTEIN E-PHORESIS/URINE/CSF: CPT

## 2024-09-30 PROCEDURE — 81001 URINALYSIS AUTO W/SCOPE: CPT

## 2024-09-30 PROCEDURE — 80053 COMPREHEN METABOLIC PANEL: CPT

## 2024-09-30 PROCEDURE — 82103 ALPHA-1-ANTITRYPSIN TOTAL: CPT

## 2024-09-30 PROCEDURE — 84100 ASSAY OF PHOSPHORUS: CPT

## 2024-09-30 PROCEDURE — 83516 IMMUNOASSAY NONANTIBODY: CPT

## 2024-09-30 PROCEDURE — G8417 CALC BMI ABV UP PARAM F/U: HCPCS | Performed by: OBSTETRICS & GYNECOLOGY

## 2024-09-30 PROCEDURE — 82784 ASSAY IGA/IGD/IGG/IGM EACH: CPT

## 2024-09-30 PROCEDURE — 86803 HEPATITIS C AB TEST: CPT

## 2024-09-30 NOTE — PROGRESS NOTES
Deana Dalton  2024  11:28 AM          Deana Dalton is a 78 y.o. female       The patient was seen. She states mild urge incontinence. Equesting a UA C&S be ordered. No FCRNV. No dysuria or frequency. Hygiene is appropriate.  She has been fitted for a # 4; ring with support type pessary.  She states all of her symptoms that she had prior to the pessary have been relieved with its use.  She denies any vaginal bleeding.  She denies to any vaginal discharge or odor. Her bowels are regular and her voiding pattern is normal.     Blood pressure 118/74, pulse 84, resp. rate 18, height 1.651 m (5' 5\"), weight 84.8 kg (187 lb), not currently breastfeeding.    Chaperone for Intimate Exam  Chaperone was offered and accepted as part of the rooming process.  Chaperone: Kristie        Abdomen: Soft and non-tender; good bowel sounds; no guarding, rebound or rigidity; no CVA tenderness bilaterally.    Extremities: No calf tenderness bilaterally. DTR 2/4 bilaterally. No edema.    Perineum/Speculum: There is not any signs of infection; The vaginal vault is without any signs of erythema or erosion. There is no vaginal discharge or odor appreciated.    The pessary was cleansed and replaced without any problems and the patient tolerated the procedure well. T.O.S. Ointment was placed with the pessary to decrease discharge/odor.    Assessment:   Diagnosis Orders   1. Encounter for pessary maintenance        2. Midline cystocele        3. H/O total vaginal hysterectomy        4. Presence of pessary-#4 Ring with support        5. Urge incontinence of urine  Urinalysis with Reflex to Culture        Chief Complaint   Patient presents with    Follow-up     Pessary cleaning     Patient Active Problem List    Diagnosis Date Noted    Cochlear implant in place 2022     Priority: Medium    H/O total vaginal hysterectomy 2021     Priority: Medium    H/O bilateral salpingo-oophorectomy S/P vag hyst (6 weeks) 2021

## 2024-10-01 LAB
ANA SER QL IA: NEGATIVE
ANA SER QL IA: NEGATIVE
DSDNA IGG SER QL IA: 2.8 IU/ML
DSDNA IGG SER QL IA: 3.3 IU/ML
GLIADIN IGA SER IA-ACNC: 1.1 U/ML
GLIADIN IGG SER IA-ACNC: <0.4 U/ML
HAV AB SERPL IA-ACNC: REACTIVE
HBV CORE AB SER QL: NONREACTIVE
HCV AB SERPL QL IA: NONREACTIVE
IGA SERPL-MCNC: 149 MG/DL (ref 70–400)
NUCLEAR IGG SER IA-RTO: 0.2 U/ML
NUCLEAR IGG SER IA-RTO: 0.2 U/ML
P E INTERPRETATION, U: NORMAL
PATHOLOGIST: NORMAL
SPECIMEN TYPE: NORMAL
TTG IGA SER IA-ACNC: 0.4 U/ML
URINE TOTAL PROTEIN: 8 MG/DL

## 2024-10-01 NOTE — RESULT ENCOUNTER NOTE
Please notify patient: There is small amount of blood in the urine, not present before  No lupus  Kidney function slightly decreased, stage II kidney disease which has been stable for the past 4 months, absolutely avoid ibuprofen, naproxen, Aleve, Motrin, dehydration, to drink 64 ounce water daily  Recent ultrasound of the kidneys normal  Liver tests normal now    Otherwise labs within normal limits  continue current treatment    Future Appointments  10/2/2024  1:00 PM    Lynette Roberts, PhD  [facility] PSYC             [facility]  11/21/2024 11:00 AM   [facility] PAT RM 5              [facility] PAT            [facility]  12/10/2024 10:00 AM   Jv Godinez [facility] OB[facility]        [facility]  12/11/2024 3:00 PM    Ella Vasquez MD           [facility]           [facility]  12/17/2024 2:30 PM    May Krishnamurthy MD    [facility] ECC DEP  1/8/2025   3:00 PM    Gianni Ferreira DO AFL TCC [facility]        [facility]  4/25/2025  1:00 PM    May Krishnamurthy MD    [facility] DEP

## 2024-10-02 LAB
MITOCHONDRIA M2 IGG SER-ACNC: 1.6 U/ML (ref 0–4)
SMOOTH MUSCLE ANTIBODY: 3 UNITS (ref 0–19)

## 2024-10-03 LAB
ALPHA-1 ANTITRYPSIN PHENOTYPE: NORMAL
ALPHA-1 ANTITRYPSIN: 132 MG/DL (ref 90–200)

## 2024-10-09 ENCOUNTER — HOSPITAL ENCOUNTER (OUTPATIENT)
Age: 79
Setting detail: SPECIMEN
Discharge: HOME | End: 2024-10-09
Payer: MEDICARE

## 2024-10-09 ENCOUNTER — OFFICE VISIT (OUTPATIENT)
Dept: BEHAVIORAL/MENTAL HEALTH CLINIC | Age: 79
End: 2024-10-09
Payer: MEDICARE

## 2024-10-09 DIAGNOSIS — F34.1 PERSISTENT DEPRESSIVE DISORDER, MILD: ICD-10-CM

## 2024-10-09 DIAGNOSIS — F41.1 GENERALIZED ANXIETY DISORDER: Primary | ICD-10-CM

## 2024-10-09 DIAGNOSIS — R19.7 DIARRHEA, UNSPECIFIED TYPE: ICD-10-CM

## 2024-10-09 DIAGNOSIS — R14.0 ABDOMINAL BLOATING: ICD-10-CM

## 2024-10-09 PROCEDURE — 82653 EL-1 FECAL QUANTITATIVE: CPT

## 2024-10-09 PROCEDURE — 1036F TOBACCO NON-USER: CPT | Performed by: PSYCHOLOGIST

## 2024-10-09 PROCEDURE — 87506 IADNA-DNA/RNA PROBE TQ 6-11: CPT

## 2024-10-09 PROCEDURE — 90837 PSYTX W PT 60 MINUTES: CPT | Performed by: PSYCHOLOGIST

## 2024-10-09 PROCEDURE — 1123F ACP DISCUSS/DSCN MKR DOCD: CPT | Performed by: PSYCHOLOGIST

## 2024-10-09 ASSESSMENT — PATIENT HEALTH QUESTIONNAIRE - PHQ9
5. POOR APPETITE OR OVEREATING: SEVERAL DAYS
3. TROUBLE FALLING OR STAYING ASLEEP: SEVERAL DAYS
6. FEELING BAD ABOUT YOURSELF - OR THAT YOU ARE A FAILURE OR HAVE LET YOURSELF OR YOUR FAMILY DOWN: SEVERAL DAYS
7. TROUBLE CONCENTRATING ON THINGS, SUCH AS READING THE NEWSPAPER OR WATCHING TELEVISION: NOT AT ALL
SUM OF ALL RESPONSES TO PHQ QUESTIONS 1-9: 7
SUM OF ALL RESPONSES TO PHQ9 QUESTIONS 1 & 2: 2
10. IF YOU CHECKED OFF ANY PROBLEMS, HOW DIFFICULT HAVE THESE PROBLEMS MADE IT FOR YOU TO DO YOUR WORK, TAKE CARE OF THINGS AT HOME, OR GET ALONG WITH OTHER PEOPLE: NOT DIFFICULT AT ALL
1. LITTLE INTEREST OR PLEASURE IN DOING THINGS: SEVERAL DAYS
2. FEELING DOWN, DEPRESSED OR HOPELESS: SEVERAL DAYS
SUM OF ALL RESPONSES TO PHQ QUESTIONS 1-9: 7
8. MOVING OR SPEAKING SO SLOWLY THAT OTHER PEOPLE COULD HAVE NOTICED. OR THE OPPOSITE, BEING SO FIGETY OR RESTLESS THAT YOU HAVE BEEN MOVING AROUND A LOT MORE THAN USUAL: NOT AT ALL
SUM OF ALL RESPONSES TO PHQ QUESTIONS 1-9: 7
9. THOUGHTS THAT YOU WOULD BE BETTER OFF DEAD, OR OF HURTING YOURSELF: NOT AT ALL
SUM OF ALL RESPONSES TO PHQ QUESTIONS 1-9: 7
4. FEELING TIRED OR HAVING LITTLE ENERGY: MORE THAN HALF THE DAYS

## 2024-10-09 NOTE — PROGRESS NOTES
with her friends and they had a \"wonderful\" time.  However, she stated that she did trip and fall on the first night of their trip.  Although she had some minor injuries, she reported that she was still able to enjoy the rest of the trip and made a lot of baskets.  She reported that she has been feeling more unsteady and unbalanced when walking, but she was recently referred to start physical therapy for her hip so she is hoping that this will help her improve the steadiness in her gait.       Previous Recommendations:   1)  Pt will continue to work on practicing better self-care, including engaging in enjoyable activities, accessing her support system, and allowing herself time to grieve.    2)  Pt will work on practicing more effective self-compassionate and balanced coping talk/thinking.    3)  Pt will return for a follow-up appointment with the [facility] in 6 weeks on 10/2/24 at 1PM.         MENTAL STATUS EXAM  Mood was anxious and dysthymic with calm affect.   Suicidal ideation was denied.   Homicidal ideation was denied.   Hygiene was good .  Dress was appropriate.   Behavior was Within Normal Limits with observation and self-report of some difficulties ambulating (using a cane).   Attitude was Cooperative.  Eye-contact was good.  Speech: rate - WNL, rhythm - WNL, volume - WNL.  Verbalizations were goal directed and coherent.  Thought processes were intact and goal-oriented without evidence of delusions, hallucinations, obsessions, or ethan; with no cognitive distortions.   Associations were characterized by intact cognitive processes.  Pt was orientated oriented to person, place, time, and general circumstances;  recent:  good.  Insight and judgment were estimated to be good, AEB, a good  understanding of cyclical maladaptive patterns, and the ability to use insight to inform behavior change.   Attention span and concentration appear within functional limits  Language use and knowledge base appear within functional

## 2024-10-10 LAB
CAMPYLOBACTER DNA SPEC NAA+PROBE: NORMAL
ETEC ELTA+ESTB GENES STL QL NAA+PROBE: NORMAL
P SHIGELLOIDES DNA STL QL NAA+PROBE: NORMAL
SALMONELLA DNA SPEC QL NAA+PROBE: NORMAL
SHIGA TOXIN STX GENE SPEC NAA+PROBE: NORMAL
SHIGELLA DNA SPEC QL NAA+PROBE: NORMAL
SPECIMEN DESCRIPTION: NORMAL
V CHOL+PARA RFBL+TRKH+TNAA STL QL NAA+PR: NORMAL
Y ENTERO RECN STL QL NAA+PROBE: NORMAL

## 2024-10-11 LAB — FECAL PANCREATIC ELASTASE-1: 287 UG/G

## 2024-10-18 ENCOUNTER — TELEPHONE (OUTPATIENT)
Dept: INFUSION THERAPY | Age: 79
End: 2024-10-18

## 2024-10-18 ENCOUNTER — HOSPITAL ENCOUNTER (OUTPATIENT)
Dept: CT IMAGING | Age: 79
Discharge: HOME | End: 2024-10-20
Attending: INTERNAL MEDICINE
Payer: MEDICARE

## 2024-10-18 DIAGNOSIS — K76.0 FATTY LIVER: ICD-10-CM

## 2024-10-18 DIAGNOSIS — R14.0 ABDOMINAL BLOATING: ICD-10-CM

## 2024-10-18 DIAGNOSIS — R19.7 DIARRHEA, UNSPECIFIED TYPE: ICD-10-CM

## 2024-10-18 PROCEDURE — 6360000004 HC RX CONTRAST MEDICATION: Performed by: INTERNAL MEDICINE

## 2024-10-18 PROCEDURE — 2500000003 HC RX 250 WO HCPCS: Performed by: INTERNAL MEDICINE

## 2024-10-18 PROCEDURE — 2580000003 HC RX 258: Performed by: INTERNAL MEDICINE

## 2024-10-18 PROCEDURE — 74177 CT ABD & PELVIS W/CONTRAST: CPT

## 2024-10-18 RX ORDER — IOPAMIDOL 755 MG/ML
75 INJECTION, SOLUTION INTRAVASCULAR
Status: COMPLETED | OUTPATIENT
Start: 2024-10-18 | End: 2024-10-18

## 2024-10-18 RX ORDER — 0.9 % SODIUM CHLORIDE 0.9 %
100 INTRAVENOUS SOLUTION INTRAVENOUS ONCE
Status: COMPLETED | OUTPATIENT
Start: 2024-10-18 | End: 2024-10-18

## 2024-10-18 RX ORDER — SODIUM CHLORIDE 0.9 % (FLUSH) 0.9 %
10 SYRINGE (ML) INJECTION PRN
Status: DISCONTINUED | OUTPATIENT
Start: 2024-10-18 | End: 2024-10-21 | Stop reason: HOSPADM

## 2024-10-18 RX ADMIN — BARIUM SULFATE 450 ML: 20 SUSPENSION ORAL at 11:20

## 2024-10-18 RX ADMIN — IOPAMIDOL 75 ML: 755 INJECTION, SOLUTION INTRAVENOUS at 11:20

## 2024-10-18 RX ADMIN — SODIUM CHLORIDE, PRESERVATIVE FREE 10 ML: 5 INJECTION INTRAVENOUS at 11:20

## 2024-10-18 RX ADMIN — SODIUM CHLORIDE 100 ML: 9 INJECTION, SOLUTION INTRAVENOUS at 11:20

## 2024-10-18 NOTE — TELEPHONE ENCOUNTER
Office Received order for PT to have an Infusion     Called PT to schedule     LVM     Electronically signed by Elvie Borrego on 10/18/2024 at 9:26 AM

## 2024-11-04 ENCOUNTER — TELEPHONE (OUTPATIENT)
Dept: GASTROENTEROLOGY | Age: 79
End: 2024-11-04

## 2024-11-04 DIAGNOSIS — E78.5 HYPERLIPIDEMIA WITH TARGET LDL LESS THAN 100: ICD-10-CM

## 2024-11-04 DIAGNOSIS — E79.0 HYPERURICEMIA: ICD-10-CM

## 2024-11-04 RX ORDER — ROSUVASTATIN CALCIUM 10 MG/1
10 TABLET, COATED ORAL
Qty: 90 TABLET | Refills: 3 | Status: SHIPPED | OUTPATIENT
Start: 2024-11-04

## 2024-11-04 RX ORDER — ALLOPURINOL 100 MG/1
100 TABLET ORAL DAILY
Qty: 90 TABLET | Refills: 3 | Status: SHIPPED | OUTPATIENT
Start: 2024-11-04

## 2024-11-04 RX ORDER — POLYETHYLENE GLYCOL 3350 17 G/17G
POWDER, FOR SOLUTION ORAL
Qty: 238 G | Refills: 0 | Status: SHIPPED | OUTPATIENT
Start: 2024-11-04

## 2024-11-04 NOTE — TELEPHONE ENCOUNTER
Patient called requesting results of CT scan on 10/18. Please review. Patient would like a call back once reviewed.

## 2024-11-04 NOTE — TELEPHONE ENCOUNTER
Please Approve or Refuse.  Send to Pharmacy per Pt's Request:      Next Visit Date:  12/17/2024   Last Visit Date: 8/20/2024    Hemoglobin A1C (%)   Date Value   08/02/2024 6.0   11/02/2023 5.6   02/18/2022 5.3             ( goal A1C is < 7)   BP Readings from Last 3 Encounters:   09/30/24 118/74   09/04/24 (!) 153/72   08/20/24 112/70          (goal 120/80)  BUN   Date Value Ref Range Status   09/30/2024 9 8 - 23 mg/dL Final     Creatinine   Date Value Ref Range Status   09/30/2024 0.7 0.5 - 0.9 mg/dL Final     Potassium   Date Value Ref Range Status   09/30/2024 4.4 3.7 - 5.3 mmol/L Final

## 2024-11-20 ENCOUNTER — OFFICE VISIT (OUTPATIENT)
Dept: BEHAVIORAL/MENTAL HEALTH CLINIC | Age: 79
End: 2024-11-20
Payer: MEDICARE

## 2024-11-20 DIAGNOSIS — F34.1 PERSISTENT DEPRESSIVE DISORDER, MILD: ICD-10-CM

## 2024-11-20 DIAGNOSIS — F41.1 GENERALIZED ANXIETY DISORDER: Primary | ICD-10-CM

## 2024-11-20 PROCEDURE — 1036F TOBACCO NON-USER: CPT | Performed by: PSYCHOLOGIST

## 2024-11-20 PROCEDURE — 90837 PSYTX W PT 60 MINUTES: CPT | Performed by: PSYCHOLOGIST

## 2024-11-20 PROCEDURE — 1123F ACP DISCUSS/DSCN MKR DOCD: CPT | Performed by: PSYCHOLOGIST

## 2024-11-20 ASSESSMENT — PATIENT HEALTH QUESTIONNAIRE - PHQ9
SUM OF ALL RESPONSES TO PHQ QUESTIONS 1-9: 13
SUM OF ALL RESPONSES TO PHQ QUESTIONS 1-9: 13
4. FEELING TIRED OR HAVING LITTLE ENERGY: MORE THAN HALF THE DAYS
SUM OF ALL RESPONSES TO PHQ9 QUESTIONS 1 & 2: 4
7. TROUBLE CONCENTRATING ON THINGS, SUCH AS READING THE NEWSPAPER OR WATCHING TELEVISION: SEVERAL DAYS
9. THOUGHTS THAT YOU WOULD BE BETTER OFF DEAD, OR OF HURTING YOURSELF: NOT AT ALL
8. MOVING OR SPEAKING SO SLOWLY THAT OTHER PEOPLE COULD HAVE NOTICED. OR THE OPPOSITE, BEING SO FIGETY OR RESTLESS THAT YOU HAVE BEEN MOVING AROUND A LOT MORE THAN USUAL: NOT AT ALL
SUM OF ALL RESPONSES TO PHQ QUESTIONS 1-9: 13
3. TROUBLE FALLING OR STAYING ASLEEP: MORE THAN HALF THE DAYS
SUM OF ALL RESPONSES TO PHQ QUESTIONS 1-9: 13
6. FEELING BAD ABOUT YOURSELF - OR THAT YOU ARE A FAILURE OR HAVE LET YOURSELF OR YOUR FAMILY DOWN: MORE THAN HALF THE DAYS
10. IF YOU CHECKED OFF ANY PROBLEMS, HOW DIFFICULT HAVE THESE PROBLEMS MADE IT FOR YOU TO DO YOUR WORK, TAKE CARE OF THINGS AT HOME, OR GET ALONG WITH OTHER PEOPLE: VERY DIFFICULT
2. FEELING DOWN, DEPRESSED OR HOPELESS: MORE THAN HALF THE DAYS
5. POOR APPETITE OR OVEREATING: MORE THAN HALF THE DAYS
1. LITTLE INTEREST OR PLEASURE IN DOING THINGS: MORE THAN HALF THE DAYS

## 2024-11-20 NOTE — PROGRESS NOTES
as if questioning why she was there, particularly her  's ex-wife.  She reported that there was an incident during the party during which her feelings were hurt and she felt upset.  However, she noted that she mostly did enjoy the party and getting to see some of her 's family.        Previous Recommendations:   1)  Pt will continue to work on practicing better self-care, including engaging in enjoyable activities, accessing her support system, and allowing herself time to grieve.    2)  Pt will work on practicing more effective self-compassionate and balanced coping talk/thinking.    3)  Pt will return for a follow-up appointment with the [facility] in 6 weeks on 24 at 11AM.         MENTAL STATUS EXAM  Mood was anxious and dysthymic with calm affect.   Suicidal ideation was denied.   Homicidal ideation was denied.   Hygiene was good .  Dress was appropriate.   Behavior was Within Normal Limits with observation and self-report of some difficulties ambulating.   Attitude was Cooperative.  Eye-contact was good.  Speech: rate - WNL, rhythm - WNL, volume - WNL.  Verbalizations were goal directed and coherent.  Thought processes were intact and goal-oriented without evidence of delusions, hallucinations, obsessions, or ethan; with no cognitive distortions.   Associations were characterized by intact cognitive processes.  Pt was orientated oriented to person, place, time, and general circumstances;  recent:  good.  Insight and judgment were estimated to be good, AEB, a good  understanding of cyclical maladaptive patterns, and the ability to use insight to inform behavior change.   Attention span and concentration appear within functional limits  Language use and knowledge base appear within functional limits    ASSESSMENT  Deana AKUA Krystle presented to the appointment today for evaluation and treatment of symptoms of anxiety and depression.  She is currently deemed no risk to herself or others and meets

## 2024-11-21 ENCOUNTER — HOSPITAL ENCOUNTER (OUTPATIENT)
Dept: PREADMISSION TESTING | Age: 79
Discharge: HOME | End: 2024-11-25

## 2024-11-21 VITALS — HEIGHT: 65 IN | WEIGHT: 187 LBS | BODY MASS INDEX: 31.16 KG/M2

## 2024-11-21 NOTE — PROGRESS NOTES
Pre-op Instructions For Out-Patient Endoscopy Surgery    Medication Instructions:  Please stop herbs and any supplements now (includes vitamins and minerals).    For these medications:  Dulaglutide (Trulicity), Exenatide (Byetta and Bydureon, Liraglutide (Victoza), Lixisenatide (Adlyxin), Semaglutide (Ozempic and Rybelsus), Tirzepatide (Mounjaro)- Stop 1 week prior if taking weekly or 1 day prior if taking every 12 hours or daily.     Please contact your surgeon and prescribing physician for pre-op instructions for any blood thinners. ASPIRIN    If you have inhalers/aerosol treatments at home, please use them the morning of your surgery and bring the inhalers with you to the hospital.    Please take the following medications the morning of your surgery with a sip of water:    none    Surgery Instructions:  After midnight before surgery:  Do not eat or drink anything, including water, mints, gum, and hard candy.  You may brush your teeth without swallowing.  No smoking, chewing tobacco, or street drugs.    Please shower or bathe before surgery.       Please do not wear any cologne, lotion, powder, jewelry, piercings, perfume, makeup, nail polish, hair accessories, or hair spray on the day of surgery.  Wear loose comfortable clothing.    Leave your valuables at home but bring a payment source for any after-surgery prescriptions you plan to fill at [facility].  Bring a storage case for any glasses/contacts.    An adult who is responsible for you MUST drive you home and should be with you for the first 24 hours after surgery.     The Day of Surgery:  Arrive at [facility] Surgery Entrance at the time directed by your surgeon and check in at the desk.     If you have a living will or healthcare power of , please bring a copy.    You will be taken to the pre-op holding area where you will be prepared for surgery.  A physical assessment will be performed by a nurse practitioner or house

## 2024-12-03 ENCOUNTER — HOSPITAL ENCOUNTER (OUTPATIENT)
Dept: INFUSION THERAPY | Age: 79
Setting detail: INFUSION SERIES
Discharge: HOME | End: 2024-12-03
Payer: MEDICARE

## 2024-12-03 VITALS
DIASTOLIC BLOOD PRESSURE: 80 MMHG | SYSTOLIC BLOOD PRESSURE: 139 MMHG | TEMPERATURE: 97 F | HEART RATE: 73 BPM | RESPIRATION RATE: 17 BRPM | OXYGEN SATURATION: 95 %

## 2024-12-03 DIAGNOSIS — M81.0 AGE-RELATED OSTEOPOROSIS WITHOUT CURRENT PATHOLOGICAL FRACTURE: Primary | ICD-10-CM

## 2024-12-03 LAB
ALBUMIN SERPL-MCNC: 4.5 G/DL (ref 3.5–5.2)
ALP SERPL-CCNC: 58 U/L (ref 35–104)
ALT SERPL-CCNC: 17 U/L (ref 10–35)
ANION GAP SERPL CALCULATED.3IONS-SCNC: 12 MMOL/L (ref 9–16)
AST SERPL-CCNC: 18 U/L (ref 10–35)
BILIRUB SERPL-MCNC: 0.4 MG/DL (ref 0–1.2)
BUN SERPL-MCNC: 9 MG/DL (ref 8–23)
CALCIUM SERPL-MCNC: 9.5 MG/DL (ref 8.6–10.4)
CHLORIDE SERPL-SCNC: 107 MMOL/L (ref 98–107)
CO2 SERPL-SCNC: 23 MMOL/L (ref 20–31)
CREAT SERPL-MCNC: 0.7 MG/DL (ref 0.7–1.2)
GFR, ESTIMATED: 88 ML/MIN/1.73M2
GLUCOSE SERPL-MCNC: 124 MG/DL (ref 74–99)
POTASSIUM SERPL-SCNC: 4.2 MMOL/L (ref 3.7–5.3)
PROT SERPL-MCNC: 6.8 G/DL (ref 6.6–8.7)
SODIUM SERPL-SCNC: 142 MMOL/L (ref 136–145)

## 2024-12-03 PROCEDURE — 96372 THER/PROPH/DIAG INJ SC/IM: CPT

## 2024-12-03 PROCEDURE — 80053 COMPREHEN METABOLIC PANEL: CPT

## 2024-12-03 PROCEDURE — 36415 COLL VENOUS BLD VENIPUNCTURE: CPT

## 2024-12-03 PROCEDURE — 6360000002 HC RX W HCPCS: Performed by: FAMILY MEDICINE

## 2024-12-03 RX ORDER — EPINEPHRINE 1 MG/ML
0.3 INJECTION, SOLUTION, CONCENTRATE INTRAVENOUS PRN
OUTPATIENT
Start: 2025-05-11

## 2024-12-03 RX ORDER — SODIUM CHLORIDE 9 MG/ML
INJECTION, SOLUTION INTRAVENOUS CONTINUOUS
OUTPATIENT
Start: 2025-05-11

## 2024-12-03 RX ORDER — ALBUTEROL SULFATE 90 UG/1
4 INHALANT RESPIRATORY (INHALATION) PRN
OUTPATIENT
Start: 2025-05-11

## 2024-12-03 RX ORDER — DIPHENHYDRAMINE HYDROCHLORIDE 50 MG/ML
50 INJECTION INTRAMUSCULAR; INTRAVENOUS
OUTPATIENT
Start: 2025-05-11

## 2024-12-03 RX ORDER — ONDANSETRON 2 MG/ML
8 INJECTION INTRAMUSCULAR; INTRAVENOUS
OUTPATIENT
Start: 2025-05-11

## 2024-12-03 RX ORDER — ACETAMINOPHEN 325 MG/1
650 TABLET ORAL
OUTPATIENT
Start: 2025-05-11

## 2024-12-03 RX ORDER — HYDROCORTISONE SODIUM SUCCINATE 100 MG/2ML
100 INJECTION INTRAMUSCULAR; INTRAVENOUS
OUTPATIENT
Start: 2025-05-11

## 2024-12-03 RX ADMIN — DENOSUMAB 60 MG: 60 INJECTION SUBCUTANEOUS at 13:39

## 2024-12-03 NOTE — RESULT ENCOUNTER NOTE
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, avoid dehydration, to drink 64 ounce water daily, absolutely avoid ibuprofen, naproxen, Aleve, Motrin  Otherwise labs within normal limits  continue current treatment      Lab Results       Component                Value               Date                       LABA1C                   6.0                 08/02/2024                 LABA1C                   5.6                 11/02/2023                 LABA1C                   5.3                 02/18/2022                Future Appointments  12/9/2024  2:00 PM    Aristeo Boykin,     [facility]        TOLP  12/10/2024 10:00 AM   Jv Godinez [facility] OB/Gyn        TOLP  12/11/2024 3:00 PM    Ella Vasquez MD           [facility]           TOLP  12/17/2024 2:30 PM    May Krishnamurthy MD    [facility] DEP  12/18/2024 3:00 PM    Lynette Roberts, PhD  [facility]  1/8/2025   3:00 PM    Gianni Ferreira DO             [facility]        [facility]  4/25/2025  1:00 PM    May Krishnamurthy MD    [facility] DEP

## 2024-12-03 NOTE — PROGRESS NOTES
No answer, left message ?             YES                Unable to leave message ?    When were you told to arrive at hospital ?    0800/1000  Do you have a  ?    Are you on any blood thinners ?                     If yes when did you stop taking ?    Do you have your prep Rx filled and instruction ?      Nothing to eat the day before , only clear liquids.    Are you experiencing any covid symptoms ?     Do you have any infections or rash we should be aware of ?      Do you have the Hibiclens soap to use the night before and the morning of surgery ?    Nothing to eat or drink after midnight, only a sip of water to take any medication instructed to take the night before.  Wear comfortable clothing, leave any valuables at home, remove any jewelry and body piercing .

## 2024-12-04 ENCOUNTER — ANESTHESIA EVENT (OUTPATIENT)
Dept: ENDOSCOPY | Age: 79
End: 2024-12-04
Payer: MEDICARE

## 2024-12-05 ENCOUNTER — ANESTHESIA (OUTPATIENT)
Dept: ENDOSCOPY | Age: 79
End: 2024-12-05
Payer: MEDICARE

## 2024-12-05 ENCOUNTER — HOSPITAL ENCOUNTER (OUTPATIENT)
Age: 79
Setting detail: OUTPATIENT SURGERY
Discharge: HOME | End: 2024-12-05
Attending: INTERNAL MEDICINE | Admitting: INTERNAL MEDICINE
Payer: MEDICARE

## 2024-12-05 VITALS
DIASTOLIC BLOOD PRESSURE: 65 MMHG | OXYGEN SATURATION: 96 % | BODY MASS INDEX: 31.16 KG/M2 | SYSTOLIC BLOOD PRESSURE: 137 MMHG | HEIGHT: 65 IN | HEART RATE: 69 BPM | RESPIRATION RATE: 14 BRPM | TEMPERATURE: 97 F | WEIGHT: 187 LBS

## 2024-12-05 DIAGNOSIS — K57.30 DIVERTICULOSIS OF LARGE INTESTINE WITHOUT HEMORRHAGE: ICD-10-CM

## 2024-12-05 DIAGNOSIS — R19.7 DIARRHEA, UNSPECIFIED TYPE: ICD-10-CM

## 2024-12-05 PROCEDURE — 6360000002 HC RX W HCPCS: Performed by: NURSE ANESTHETIST, CERTIFIED REGISTERED

## 2024-12-05 PROCEDURE — 45380 COLONOSCOPY AND BIOPSY: CPT | Performed by: INTERNAL MEDICINE

## 2024-12-05 PROCEDURE — 2580000003 HC RX 258: Performed by: ANESTHESIOLOGY

## 2024-12-05 PROCEDURE — 88305 TISSUE EXAM BY PATHOLOGIST: CPT

## 2024-12-05 PROCEDURE — 6370000000 HC RX 637 (ALT 250 FOR IP): Performed by: ANESTHESIOLOGY

## 2024-12-05 PROCEDURE — 3700000001 HC ADD 15 MINUTES (ANESTHESIA): Performed by: INTERNAL MEDICINE

## 2024-12-05 PROCEDURE — 3700000000 HC ANESTHESIA ATTENDED CARE: Performed by: INTERNAL MEDICINE

## 2024-12-05 PROCEDURE — 7100000011 HC PHASE II RECOVERY - ADDTL 15 MIN: Performed by: INTERNAL MEDICINE

## 2024-12-05 PROCEDURE — 3609010300 HC COLONOSCOPY W/BIOPSY SINGLE/MULTIPLE: Performed by: INTERNAL MEDICINE

## 2024-12-05 PROCEDURE — 7100000010 HC PHASE II RECOVERY - FIRST 15 MIN: Performed by: INTERNAL MEDICINE

## 2024-12-05 PROCEDURE — 2709999900 HC NON-CHARGEABLE SUPPLY: Performed by: INTERNAL MEDICINE

## 2024-12-05 RX ORDER — ACETAMINOPHEN 325 MG/1
650 TABLET ORAL ONCE
Status: COMPLETED | OUTPATIENT
Start: 2024-12-05 | End: 2024-12-05

## 2024-12-05 RX ORDER — SODIUM CHLORIDE 9 MG/ML
INJECTION, SOLUTION INTRAVENOUS PRN
Status: DISCONTINUED | OUTPATIENT
Start: 2024-12-05 | End: 2024-12-05 | Stop reason: HOSPADM

## 2024-12-05 RX ORDER — PROPOFOL 10 MG/ML
INJECTION, EMULSION INTRAVENOUS
Status: DISCONTINUED | OUTPATIENT
Start: 2024-12-05 | End: 2024-12-05 | Stop reason: SDUPTHER

## 2024-12-05 RX ORDER — SODIUM CHLORIDE 0.9 % (FLUSH) 0.9 %
5-40 SYRINGE (ML) INJECTION EVERY 12 HOURS SCHEDULED
Status: DISCONTINUED | OUTPATIENT
Start: 2024-12-05 | End: 2024-12-05 | Stop reason: HOSPADM

## 2024-12-05 RX ORDER — SODIUM CHLORIDE 0.9 % (FLUSH) 0.9 %
5-40 SYRINGE (ML) INJECTION PRN
Status: DISCONTINUED | OUTPATIENT
Start: 2024-12-05 | End: 2024-12-05 | Stop reason: HOSPADM

## 2024-12-05 RX ORDER — LIDOCAINE HYDROCHLORIDE 10 MG/ML
1 INJECTION, SOLUTION EPIDURAL; INFILTRATION; INTRACAUDAL; PERINEURAL
Status: DISCONTINUED | OUTPATIENT
Start: 2024-12-05 | End: 2024-12-05 | Stop reason: HOSPADM

## 2024-12-05 RX ORDER — LIDOCAINE HYDROCHLORIDE 20 MG/ML
INJECTION, SOLUTION EPIDURAL; INFILTRATION; INTRACAUDAL; PERINEURAL
Status: DISCONTINUED | OUTPATIENT
Start: 2024-12-05 | End: 2024-12-05 | Stop reason: SDUPTHER

## 2024-12-05 RX ORDER — SODIUM CHLORIDE 9 MG/ML
INJECTION, SOLUTION INTRAVENOUS CONTINUOUS
Status: DISCONTINUED | OUTPATIENT
Start: 2024-12-05 | End: 2024-12-05 | Stop reason: HOSPADM

## 2024-12-05 RX ORDER — MENTHOL 1.4 %
ADHESIVE PATCH, MEDICATED TOPICAL 3 TIMES DAILY PRN
COMMUNITY

## 2024-12-05 RX ADMIN — SODIUM CHLORIDE: 9 INJECTION, SOLUTION INTRAVENOUS at 09:45

## 2024-12-05 RX ADMIN — SODIUM CHLORIDE: 9 INJECTION, SOLUTION INTRAVENOUS at 08:59

## 2024-12-05 RX ADMIN — LIDOCAINE HYDROCHLORIDE 100 MG: 20 INJECTION, SOLUTION EPIDURAL; INFILTRATION; INTRACAUDAL; PERINEURAL at 09:58

## 2024-12-05 RX ADMIN — PROPOFOL 50 MG: 10 INJECTION, EMULSION INTRAVENOUS at 09:58

## 2024-12-05 RX ADMIN — PROPOFOL 140 MCG/KG/MIN: 10 INJECTION, EMULSION INTRAVENOUS at 09:58

## 2024-12-05 RX ADMIN — ACETAMINOPHEN 650 MG: 325 TABLET ORAL at 09:46

## 2024-12-05 ASSESSMENT — ENCOUNTER SYMPTOMS
SHORTNESS OF BREATH: 0
NAUSEA: 0
SINUS PRESSURE: 0
ABDOMINAL PAIN: 0

## 2024-12-05 ASSESSMENT — PAIN SCALES - GENERAL: PAINLEVEL_OUTOF10: 8

## 2024-12-05 ASSESSMENT — PAIN DESCRIPTION - DESCRIPTORS: DESCRIPTORS: ACHING

## 2024-12-05 ASSESSMENT — PAIN - FUNCTIONAL ASSESSMENT
PAIN_FUNCTIONAL_ASSESSMENT: 0-10
PAIN_FUNCTIONAL_ASSESSMENT: NONE - DENIES PAIN

## 2024-12-05 ASSESSMENT — PAIN DESCRIPTION - LOCATION: LOCATION: HEAD

## 2024-12-05 NOTE — ANESTHESIA PRE PROCEDURE
Department of Anesthesiology  Preprocedure Note       Name:  Deana Dalton   Age:  78 y.o.  :  1945                                          MRN:  [id number]         Date:  2024      Surgeon: Surgeon(s):  Ella Vasquez MD    Procedure: Procedure(s):  COLONOSCOPY DIAGNOSTIC    Medications prior to admission:   Prior to Admission medications    Medication Sig Start Date End Date Taking? Authorizing Provider   rosuvastatin (CRESTOR) 10 MG tablet Take 1 tablet by mouth Daily with supper For high cholesterol. **Stop simvastatin** 24   May Krishnamurthy MD   allopurinol (ZYLOPRIM) 100 MG tablet Take 1 tablet by mouth daily For high uric acid, to prevent gout and kidney stones 24   May Krishnamurthy MD   polyethylene glycol (GLYCOLAX) 17 GM/SCOOP powder Take as directed by physician office for bowel prep 24   Virginia Jarrett PA   bisacodyl 5 MG EC tablet Take as directed by physician office for bowel prep 24   Ella Vasquez MD   losartan (COZAAR) 50 MG tablet Take 1 tablet by mouth daily 24   May Krishnamurthy MD   escitalopram (LEXAPRO) 10 MG tablet Take 1 tablet by mouth daily Dose decreased 2024   May Krishnamurthy MD   Collagen-Boron-Hyaluronic Acid (MOVE FREE [facility]) 40-5-3.3 MG TABS Take 1 tablet by mouth daily 24   May Krishnamurthy MD   busPIRone (BUSPAR) 10 MG tablet take 1 tablet by mouth three times a day 24   May Krishnamurthy MD   cetirizine (ZYRTEC) 10 MG tablet Take 1 tablet by mouth daily    Provider, MD Edi   diclofenac sodium (VOLTAREN) 1 % GEL Apply 4 g topically 4 times daily as needed for Pain 24   Viral Lane MD   vitamin D (CHOLECALCIFEROL) 25 MCG (1000 UT) TABS tablet Take 2 tablets by mouth daily Dose increased 2023   May Krishnamurthy MD   acetaminophen (TYLENOL) 500 MG tablet Take 1 tablet by mouth every 6 hours as needed for Pain 23   May Krishnamurthy MD

## 2024-12-05 NOTE — OP NOTE
PROCEDURE NOTE    DATE OF PROCEDURE: 12/5/2024    SURGEON: Ella Vasquez MD  Facility : \"[facility]  ASSISTANT: None  Anesthesia: MAC  PREOPERATIVE DIAGNOSIS:   Diarrhea  Abdominal bloating        POSTOPERATIVE DIAGNOSIS: as described below    OPERATION: Total colonoscopy     ANESTHESIA: Moderate Sedation    ESTIMATED BLOOD LOSS: less than 50     COMPLICATIONS: None.     SPECIMENS:  Was Obtained:       HISTORY: The patient is a 78 y.o. year old female with history of above preop diagnosis.  I recommended colonoscopy with possible biopsy or polypectomy and I explained the risk, benefits, expected outcome, and alternatives to the procedure.  Risks included but are not limited to bleeding, infection, respiratory distress, hypotension, and perforation of the colon and possibility of missing a lesion.  The patient understands and is in agreement.        The patient was counseled at length about the risks of perry Covid-19 during their perioperative period and any recovery window from their procedure.  The patient was made aware that perry Covid-19  may worsen their prognosis for recovering from their procedure  and lend to a higher morbidity and/or mortality risk.  All material risks, benefits, and reasonable alternatives including postponing the procedure were discussed. The patient does wish to proceed with the procedure at this time.       PROCEDURE: The patient was given IV conscious sedation.  The patient's SPO2 remained above 90% throughout the procedure.     The colonoscope was inserted per rectum and advanced under direct vision to the cecum without difficulty.      Post sedation note :The patient's SPO2 remained above 90% throughout the procedure.the vital signs remained stable , and no immediate complication form the procedure noted, patient will be ready for d/c when criteria is met .        The prep was good.      Findings:  Terminal ileum: normal    Cecum/Ascending colon:

## 2024-12-05 NOTE — DISCHARGE INSTRUCTIONS
to.  Follow-up care is a key part of your treatment and safety. Be sure to make and go to all appointments, and call your doctor if you are having problems. It's also a good idea to know your test results and keep a list of the medicines you take.    Call your Doctor if you have any of the following:             Passing blood rectally or vomiting blood (it may be red or black).      Persistent nausea or vomiting.      Severe abdominal or chest pain, not relieved by passing gas.      Fever of 100 or more, chills or excessive sweating.      Redness or swelling at the IV site.     If you experience shortness of breath or severe chest pain, call 911.           Where can you learn more?   Go to [email or web address] and sign in to your [employer] account. Enter E264 in the Search Health Information box to learn more about “Colonoscopy: What to Expect at Home.”    If you do not have an account, please click on the “Sign Up Now” link.     © 5764-5786 [employer]. Care instructions adapted under license by [employer]. This care instruction is for use with your licensed healthcare professional. If you have questions about a medical condition or this instruction, always ask your healthcare professional. [employer] disclaims any warranty or liability for your use of this information.  Content Version: 9.9.139454; Last Revised: February 20, 2013  Sedation or General Anesthesia, Adult  Care After  Refer to this sheet in the next 24 hours. These instructions provide you with information on caring for yourself after your procedure. Your caregiver may also give you more specific instructions. Your treatment has been planned according to current medical practices, but problems sometimes occur. Call your caregiver if you have any problems or questions after your procedure.   HOME CARE INSTRUCTIONS   Do not participate in any activities that require you to be alert or  coordinated. Do not:  Drive.  Swim.  Ride a bicycle.  Operate heavy machinery.  Cook.  Use power tools.  Climb ladders.  Work at heights.  Take a bath.  Do not drink alcohol.  Do not make any important decisions or sign legal documents.  Stay with an adult.  The first meal following your procedure should be light and small. Avoid solid foods if you feel sick to your stomach (nauseous) or if you throw up (vomit).  Drink enough fluids to keep your urine clear or pale yellow.  Only take your usual medicines or new medicines if your caregiver approves them.  Only take over-the-counter or prescription medicines for pain, discomfort, or fever as directed by your caregiver.  Keep all follow-up appointments as directed by your caregiver.  SEEK IMMEDIATE MEDICAL CARE IF:   You are not feeling normal or behaving normally after 24 hours.  You have persistent nausea and vomiting.  You are unable to drink fluids or eat food.  You have difficulty urinating.  You have difficulty breathing or speaking.  You have blue or gray skin.  There is difficulty waking or you cannot be woken up.  You have heavy bleeding, redness, or a lot of swelling where the sedative or anesthesia entered your skin (intravenous site).  You have a rash.  MAKE SURE YOU:  Understand these instructions.  Will watch your condition.  Will get help right away if you are not doing well or get worse.  Document Released: 12/18/2006 Document Revised: 06/18/2013 Document Reviewed: 04/17/2013  ExitCare® Patient Information ©2013 [employer].

## 2024-12-05 NOTE — ANESTHESIA POSTPROCEDURE EVALUATION
Department of Anesthesiology  Postprocedure Note    Patient: Deana Dalton  MRN: [id number]  YOB: 1945  Date of evaluation: 12/5/2024    Procedure Summary       Date: 12/05/24 Room / Location: [place] [address] / [facility]    Anesthesia Start: 0953 Anesthesia Stop: 1033    Procedure: COLONOSCOPY BIOPSY Diagnosis:       Diverticulosis of large intestine without hemorrhage      Diarrhea, unspecified type      (Diverticulosis of large intestine without hemorrhage [K57.30])      (Diarrhea, unspecified type [R19.7])    Surgeons: Ella Vasquez MD Responsible Provider: Brandon Luis MD    Anesthesia Type: general, TIVA ASA Status: 2            Anesthesia Type: No value filed.    Shayan Phase I:      Shayan Phase II: Shayan Score: 10    Anesthesia Post Evaluation    Patient location during evaluation: PACU  Patient participation: complete - patient participated  Level of consciousness: awake and alert  Airway patency: patent  Nausea & Vomiting: no vomiting  Cardiovascular status: hemodynamically stable  Respiratory status: acceptable  Hydration status: euvolemic  Comments: POST- ANESTHESIA EVALUATION       Pt Name: Deana Dalton  MRN: [id number]  YOB: 1945  Date of evaluation: 12/5/2024  Time:  11:42 AM      /65   Pulse 69   Temp 97 °F (36.1 °C)   Resp 14   Ht 1.651 m (5' 5\")   Wt 84.8 kg (187 lb)   LMP  (LMP Unknown)   SpO2 96%   BMI 31.12 kg/m²      Consciousness Level  Awake  Cardiopulmonary Status  Stable  Pain Adequately Treated YES  Nausea / Vomiting  NO  Adequate Hydration  YES  Anesthesia Related Complications NONE      Electronically signed by Brandon Luis MD on 12/5/2024 at 11:42 AM         Pain management: satisfactory to patient    No notable events documented.

## 2024-12-05 NOTE — H&P
HISTORY and PHYSICAL  [facility]       NAME:  Deana Dalton  MRN: [id number]   YOB: 1945   Date: 12/5/2024   Age: 78 y.o.  Gender: female       COMPLAINT AND PRESENT HISTORY:       Deana Dalton is 78 y.o.,   female, having a Diagnostic Colonoscopy, for hx of: Pre-Op Diagnosis Codes:      * Diverticulosis of large intestine without hemorrhage     * Diarrhea, unspecified type     Prior Colonoscopy was done last in 2013. Patient  has hx of Diverticulosis.     Denies abdominal pain including bloating/gas.   Pt admits to some changes in bowel habits. Pt states that she will have some sporadic episodes of explosive diarrhea. Onset Off and on for the last 6 mos.    No diarrhea, constipation, blood in stools, black tarry stools.      No changes in appetite and unintended weight loss. Denies any nausea or vomiting.   No  heartburn, indigestion or acid reflux.     Pt denies Family Hx of colon cancer.    Medical history reviewed:   Patient voices feeling well today. Denies any recent fever or chills, chest pain/pressure.  Pt reports no  SOB.     Patient has been NPO since midnight. No blood thinners in the past  5-days. (Aspirin)     Patient states  has completed and followed prescribed prep with clear outcome.      Patient denies any personal or family problems with anesthesia.      RECENT LABS, IMAGING AND TESTING     Lab Results   Component Value Date    WBC 8.5 08/02/2024    RBC 4.65 08/02/2024    HGB 15.2 08/02/2024    HCT 45.9 08/02/2024    MCV 98.6 08/02/2024    MCH 32.6 08/02/2024    MCHC 33.1 08/02/2024    RDW 14.0 08/02/2024     08/02/2024    MPV 9.1 08/02/2024        Lab Results   Component Value Date     12/03/2024    K 4.2 12/03/2024     12/03/2024    CO2 23 12/03/2024    BUN 9 12/03/2024    CREATININE 0.7 12/03/2024    GLUCOSE 124 (H) 12/03/2024    CALCIUM 9.5 12/03/2024    BILITOT 0.4 12/03/2024    ALKPHOS 58 12/03/2024    AST 18 12/03/2024    ALT 17

## 2024-12-09 ENCOUNTER — OFFICE VISIT (OUTPATIENT)
Dept: ORTHOPEDIC SURGERY | Age: 79
End: 2024-12-09
Payer: MEDICARE

## 2024-12-09 VITALS — HEIGHT: 65 IN | WEIGHT: 187 LBS | BODY MASS INDEX: 31.16 KG/M2 | RESPIRATION RATE: 14 BRPM

## 2024-12-09 DIAGNOSIS — M70.61 GREATER TROCHANTERIC BURSITIS OF RIGHT HIP: ICD-10-CM

## 2024-12-09 DIAGNOSIS — S32.89XD CLOSED FRACTURE OF OTHER PART OF PELVIS WITH ROUTINE HEALING, SUBSEQUENT ENCOUNTER: Primary | ICD-10-CM

## 2024-12-09 LAB — SURGICAL PATHOLOGY REPORT: NORMAL

## 2024-12-09 PROCEDURE — 1090F PRES/ABSN URINE INCON ASSESS: CPT | Performed by: STUDENT IN AN ORGANIZED HEALTH CARE EDUCATION/TRAINING PROGRAM

## 2024-12-09 PROCEDURE — G8484 FLU IMMUNIZE NO ADMIN: HCPCS | Performed by: STUDENT IN AN ORGANIZED HEALTH CARE EDUCATION/TRAINING PROGRAM

## 2024-12-09 PROCEDURE — 1125F AMNT PAIN NOTED PAIN PRSNT: CPT | Performed by: STUDENT IN AN ORGANIZED HEALTH CARE EDUCATION/TRAINING PROGRAM

## 2024-12-09 PROCEDURE — G8417 CALC BMI ABV UP PARAM F/U: HCPCS | Performed by: STUDENT IN AN ORGANIZED HEALTH CARE EDUCATION/TRAINING PROGRAM

## 2024-12-09 PROCEDURE — 99213 OFFICE O/P EST LOW 20 MIN: CPT | Performed by: STUDENT IN AN ORGANIZED HEALTH CARE EDUCATION/TRAINING PROGRAM

## 2024-12-09 PROCEDURE — 1036F TOBACCO NON-USER: CPT | Performed by: STUDENT IN AN ORGANIZED HEALTH CARE EDUCATION/TRAINING PROGRAM

## 2024-12-09 PROCEDURE — 1123F ACP DISCUSS/DSCN MKR DOCD: CPT | Performed by: STUDENT IN AN ORGANIZED HEALTH CARE EDUCATION/TRAINING PROGRAM

## 2024-12-09 PROCEDURE — G8399 PT W/DXA RESULTS DOCUMENT: HCPCS | Performed by: STUDENT IN AN ORGANIZED HEALTH CARE EDUCATION/TRAINING PROGRAM

## 2024-12-09 PROCEDURE — 1159F MED LIST DOCD IN RCRD: CPT | Performed by: STUDENT IN AN ORGANIZED HEALTH CARE EDUCATION/TRAINING PROGRAM

## 2024-12-09 PROCEDURE — G8427 DOCREV CUR MEDS BY ELIG CLIN: HCPCS | Performed by: STUDENT IN AN ORGANIZED HEALTH CARE EDUCATION/TRAINING PROGRAM

## 2024-12-09 PROCEDURE — 20610 DRAIN/INJ JOINT/BURSA W/O US: CPT | Performed by: STUDENT IN AN ORGANIZED HEALTH CARE EDUCATION/TRAINING PROGRAM

## 2024-12-09 RX ORDER — BUPIVACAINE HYDROCHLORIDE 2.5 MG/ML
2 INJECTION, SOLUTION INFILTRATION; PERINEURAL ONCE
Status: COMPLETED | OUTPATIENT
Start: 2024-12-09 | End: 2024-12-09

## 2024-12-09 RX ORDER — METHYLPREDNISOLONE ACETATE 80 MG/ML
80 INJECTION, SUSPENSION INTRA-ARTICULAR; INTRALESIONAL; INTRAMUSCULAR; SOFT TISSUE ONCE
Status: COMPLETED | OUTPATIENT
Start: 2024-12-09 | End: 2024-12-09

## 2024-12-09 RX ADMIN — BUPIVACAINE HYDROCHLORIDE 5 MG: 2.5 INJECTION, SOLUTION INFILTRATION; PERINEURAL at 15:00

## 2024-12-09 RX ADMIN — METHYLPREDNISOLONE ACETATE 80 MG: 80 INJECTION, SUSPENSION INTRA-ARTICULAR; INTRALESIONAL; INTRAMUSCULAR; SOFT TISSUE at 15:00

## 2024-12-09 NOTE — RESULT ENCOUNTER NOTE
Management per GI, normal biopsy, no colitis    Future Appointments  12/9/2024  2:00 PM    Aristeo Boykin, DO    [facility]        TOLP  12/10/2024 10:00 AM   Jv Godinez, [facility] OB/Gyn        TOLP  12/11/2024 3:00 PM    Ella Vasquez MD           [facility]           TOLP  12/17/2024 2:30 PM    May Krishnamurthy MD    [facility] ECC DEP  12/18/2024 3:00 PM    Lynette Roberts, PhD  [facility]             TOLPP  1/8/2025   3:00 PM    Gianni Ferreira,              [facility]        [facility] C  4/25/2025  1:00 PM    May Krishnamurthy MD    [facility] ECC DEP

## 2024-12-09 NOTE — PROGRESS NOTES
[facility]  [address]  [place] [address]  Dept Phone: [phone]  Dept Fax: [phone]      [facility] Clinic Follow Up      Subjective:   Date of Surgery: 9/14/2020 (Ariana)    Deana Dalton is a 78 y.o. year old female who presents to the clinic today for follow up status post intramedullary nail fixation of Right femur.  Patient overall doing well.  Patient has been Amling with a cane, and states she does have some groin/lateral proximal thigh pain.  She did have a prior history of right greater trochanteric injection, which did provide her substantial relief.  Denies any new injuries or falls.  Denies any numbness or tingling.  Patient states \"her arthritis is acting up\"    Review of Systems  Gen: no fever, chills, malaise  CV: no chest pain or palpitations  Resp: no cough or shortness of breath  GI: no nausea, vomiting, diarrhea, or constipation  Neuro: no seizures, vertigo, or headache  Msk: Per HPI  10 remaining systems reviewed and negative    Objective :     Vitals:    12/09/24 1421   Resp: 14   Body mass index is 31.12 kg/m².  General: No acute distress, resting comfortably in the clinic  Neuro: alert. oriented  Eyes: Extra-ocular muscles intact  Pulm: Respirations unlabored and regular.  Skin: warm, well perfused  Psych:   Patient has good fund of knowledge and displays understanding of exam, diagnosis, and plan.  Right Lower Extremity: mature scar noted to extremity from prior intervention. Compartments soft/compressible. Extremity warm/well perfused. EHL/FHL/TA/GSC motor intact. Patient expresses normal sensation L3-S1 distribution.  Positive logroll.  Tenderness palpation directly over greater trochanter.      Radiology:  Imaging studies from today were independently reviewed and read as listed below. Any relevant images obtained prior to today's visit were also independently interpreted.      History: 78 y.o. year old female status post

## 2024-12-10 ENCOUNTER — OFFICE VISIT (OUTPATIENT)
Dept: OBGYN CLINIC | Age: 79
End: 2024-12-10
Payer: MEDICARE

## 2024-12-10 VITALS
SYSTOLIC BLOOD PRESSURE: 134 MMHG | HEIGHT: 65 IN | HEART RATE: 86 BPM | BODY MASS INDEX: 31.16 KG/M2 | DIASTOLIC BLOOD PRESSURE: 82 MMHG | RESPIRATION RATE: 18 BRPM | WEIGHT: 187 LBS

## 2024-12-10 DIAGNOSIS — Z46.89 ENCOUNTER FOR PESSARY MAINTENANCE: Primary | ICD-10-CM

## 2024-12-10 DIAGNOSIS — Z96.0 PRESENCE OF PESSARY: ICD-10-CM

## 2024-12-10 DIAGNOSIS — Z90.710 H/O TOTAL VAGINAL HYSTERECTOMY: ICD-10-CM

## 2024-12-10 DIAGNOSIS — N81.11 MIDLINE CYSTOCELE: ICD-10-CM

## 2024-12-10 PROCEDURE — 1123F ACP DISCUSS/DSCN MKR DOCD: CPT | Performed by: OBSTETRICS & GYNECOLOGY

## 2024-12-10 PROCEDURE — 1036F TOBACCO NON-USER: CPT | Performed by: OBSTETRICS & GYNECOLOGY

## 2024-12-10 PROCEDURE — G8417 CALC BMI ABV UP PARAM F/U: HCPCS | Performed by: OBSTETRICS & GYNECOLOGY

## 2024-12-10 PROCEDURE — 1159F MED LIST DOCD IN RCRD: CPT | Performed by: OBSTETRICS & GYNECOLOGY

## 2024-12-10 PROCEDURE — 1090F PRES/ABSN URINE INCON ASSESS: CPT | Performed by: OBSTETRICS & GYNECOLOGY

## 2024-12-10 PROCEDURE — G8484 FLU IMMUNIZE NO ADMIN: HCPCS | Performed by: OBSTETRICS & GYNECOLOGY

## 2024-12-10 PROCEDURE — G8399 PT W/DXA RESULTS DOCUMENT: HCPCS | Performed by: OBSTETRICS & GYNECOLOGY

## 2024-12-10 PROCEDURE — 99213 OFFICE O/P EST LOW 20 MIN: CPT | Performed by: OBSTETRICS & GYNECOLOGY

## 2024-12-10 PROCEDURE — G8427 DOCREV CUR MEDS BY ELIG CLIN: HCPCS | Performed by: OBSTETRICS & GYNECOLOGY

## 2024-12-10 NOTE — PROGRESS NOTES
Deana Dalton  12/10/2024  10:23 AM          Deana Dalton is a 78 y.o. female       The patient was seen. She has no chief complaints today. She has been fitted for a # 4; ring with support type pessary.  She states all of her symptoms that she had prior to the pessary have been relieved with its use.  She denies any vaginal bleeding.  She denies to any vaginal discharge or odor. Her bowels are regular and her voiding pattern is normal.     Blood pressure 134/82, pulse 86, resp. rate 18, height 1.651 m (5' 5\"), weight 84.8 kg (187 lb), not currently breastfeeding.    Chaperone for Intimate Exam  Chaperone was offered and accepted as part of the rooming process.  Chaperone: Kristie        Abdomen: Soft and non-tender; good bowel sounds; no guarding, rebound or rigidity; no CVA tenderness bilaterally.    Extremities: No calf tenderness bilaterally. DTR 2/4 bilaterally. No edema.    Perineum/Speculum: There is not any signs of infection; The vaginal vault is without any signs of erythema or erosion. There is no vaginal discharge or odor appreciated.    The pessary was cleansed and replaced without any problems and the patient tolerated the procedure well. T.O.S. Ointment was placed with the pessary to decrease discharge/odor.    Assessment:   Diagnosis Orders   1. Encounter for pessary maintenance        2. Midline cystocele        3. H/O total vaginal hysterectomy        4. Presence of pessary-#4 Ring with support          Chief Complaint   Patient presents with    Follow-up     Pessary cleaning     Patient Active Problem List    Diagnosis Date Noted    Cochlear implant in place 2022     Priority: Medium    H/O total vaginal hysterectomy 2021     Priority: Medium    H/O bilateral salpingo-oophorectomy S/P vag hyst (6 weeks) 2021     Priority: Medium    Presence of pessary-#4 Ring with support-REFITTED 2024 #3 Ring with support 2021     Priority: Medium    Age-related osteoporosis

## 2024-12-11 ENCOUNTER — OFFICE VISIT (OUTPATIENT)
Dept: GASTROENTEROLOGY | Age: 79
End: 2024-12-11
Payer: MEDICARE

## 2024-12-11 VITALS
BODY MASS INDEX: 30.95 KG/M2 | TEMPERATURE: 97.3 F | WEIGHT: 186 LBS | RESPIRATION RATE: 16 BRPM | SYSTOLIC BLOOD PRESSURE: 135 MMHG | DIASTOLIC BLOOD PRESSURE: 77 MMHG | HEART RATE: 62 BPM | OXYGEN SATURATION: 93 %

## 2024-12-11 DIAGNOSIS — K76.0 FATTY LIVER: Primary | ICD-10-CM

## 2024-12-11 DIAGNOSIS — R14.0 ABDOMINAL BLOATING: ICD-10-CM

## 2024-12-11 DIAGNOSIS — K57.30 DIVERTICULOSIS OF LARGE INTESTINE WITHOUT HEMORRHAGE: ICD-10-CM

## 2024-12-11 DIAGNOSIS — R19.7 DIARRHEA, UNSPECIFIED TYPE: ICD-10-CM

## 2024-12-11 PROCEDURE — G8417 CALC BMI ABV UP PARAM F/U: HCPCS | Performed by: INTERNAL MEDICINE

## 2024-12-11 PROCEDURE — 1159F MED LIST DOCD IN RCRD: CPT | Performed by: INTERNAL MEDICINE

## 2024-12-11 PROCEDURE — G8484 FLU IMMUNIZE NO ADMIN: HCPCS | Performed by: INTERNAL MEDICINE

## 2024-12-11 PROCEDURE — 99214 OFFICE O/P EST MOD 30 MIN: CPT | Performed by: INTERNAL MEDICINE

## 2024-12-11 PROCEDURE — 1036F TOBACCO NON-USER: CPT | Performed by: INTERNAL MEDICINE

## 2024-12-11 PROCEDURE — 1090F PRES/ABSN URINE INCON ASSESS: CPT | Performed by: INTERNAL MEDICINE

## 2024-12-11 PROCEDURE — 1126F AMNT PAIN NOTED NONE PRSNT: CPT | Performed by: INTERNAL MEDICINE

## 2024-12-11 PROCEDURE — G8399 PT W/DXA RESULTS DOCUMENT: HCPCS | Performed by: INTERNAL MEDICINE

## 2024-12-11 PROCEDURE — G8427 DOCREV CUR MEDS BY ELIG CLIN: HCPCS | Performed by: INTERNAL MEDICINE

## 2024-12-11 PROCEDURE — 1123F ACP DISCUSS/DSCN MKR DOCD: CPT | Performed by: INTERNAL MEDICINE

## 2024-12-11 ASSESSMENT — ENCOUNTER SYMPTOMS
VOMITING: 0
COUGH: 0
WHEEZING: 0
BLOOD IN STOOL: 0
NAUSEA: 0
DIARRHEA: 1
TROUBLE SWALLOWING: 0
CONSTIPATION: 0
SORE THROAT: 0
ABDOMINAL PAIN: 0
VOICE CHANGE: 0
CHOKING: 0
RECTAL PAIN: 0
ANAL BLEEDING: 0
ABDOMINAL DISTENTION: 0

## 2024-12-11 NOTE — PROGRESS NOTES
GI CLINIC FOLLOW UP    INTERVAL HISTORY:   No referring provider defined for this encounter.    Chief Complaint   Patient presents with    Follow Up After Procedure     Colonoscopy follow up with pathology done 12-5-24           HISTORY OF PRESENT ILLNESS: Ms.Carol AKUA Dalton is a 78 y.o. female , referred for evaluation of fatty liver, diverticulosis.     Here for f/u  Last visit    Labs /ct /colonsocpy    Colon with bxs  as below \  Labs : negative liver dx w/u    Negative celiac antibodies   Did have diarrhea; stool panel and fecal elastase normal   Diarrhea , resolved   Repeat LFTS are all normal now 12/  Cbc normal   PROCEDURE NOTE     DATE OF PROCEDURE: 12/5/2024     SURGEON: Ella Vasquez MD  Facility : \"[facility]  ASSISTANT: None  Anesthesia: MAC  PREOPERATIVE DIAGNOSIS:   Diarrhea  Abdominal bloating           POSTOPERATIVE DIAGNOSIS: as described below     OPERATION: Total colonoscopy      ANESTHESIA: Moderate Sedation     ESTIMATED BLOOD LOSS: less than 50      COMPLICATIONS: None.      SPECIMENS:  Was Obtained:         HISTORY: The patient is a 78 y.o. year old female with history of above preop diagnosis.  I recommended colonoscopy with possible biopsy or polypectomy and I explained the risk, benefits, expected outcome, and alternatives to the procedure.  Risks included but are not limited to bleeding, infection, respiratory distress, hypotension, and perforation of the colon and possibility of missing a lesion.  The patient understands and is in agreement.         The patient was counseled at length about the risks of perry Covid-19 during their perioperative period and any recovery window from their procedure.  The patient was made aware that perry Covid-19  may worsen their prognosis for recovering from their procedure  and lend to a higher morbidity and/or mortality risk.  All material risks, benefits, and reasonable alternatives including postponing the procedure were

## 2024-12-17 ENCOUNTER — OFFICE VISIT (OUTPATIENT)
Dept: FAMILY MEDICINE CLINIC | Age: 79
End: 2024-12-17

## 2024-12-17 ENCOUNTER — HOSPITAL ENCOUNTER (OUTPATIENT)
Age: 79
Setting detail: SPECIMEN
Discharge: HOME | End: 2024-12-17

## 2024-12-17 VITALS
HEIGHT: 65 IN | TEMPERATURE: 98.4 F | WEIGHT: 186.8 LBS | SYSTOLIC BLOOD PRESSURE: 132 MMHG | DIASTOLIC BLOOD PRESSURE: 64 MMHG | OXYGEN SATURATION: 96 % | BODY MASS INDEX: 31.12 KG/M2 | HEART RATE: 55 BPM

## 2024-12-17 DIAGNOSIS — M19.041 PRIMARY OSTEOARTHRITIS OF BOTH HANDS: ICD-10-CM

## 2024-12-17 DIAGNOSIS — F33.0 MILD EPISODE OF RECURRENT MAJOR DEPRESSIVE DISORDER (HCC): ICD-10-CM

## 2024-12-17 DIAGNOSIS — K52.9 CHRONIC DIARRHEA: ICD-10-CM

## 2024-12-17 DIAGNOSIS — R42 DIZZINESS: ICD-10-CM

## 2024-12-17 DIAGNOSIS — Z23 NEED FOR INFLUENZA VACCINATION: ICD-10-CM

## 2024-12-17 DIAGNOSIS — I12.9 BENIGN HYPERTENSION WITH CKD (CHRONIC KIDNEY DISEASE), STAGE II: Primary | ICD-10-CM

## 2024-12-17 DIAGNOSIS — N18.2 BENIGN HYPERTENSION WITH CKD (CHRONIC KIDNEY DISEASE), STAGE II: Primary | ICD-10-CM

## 2024-12-17 DIAGNOSIS — R82.90 ABNORMAL URINALYSIS: ICD-10-CM

## 2024-12-17 DIAGNOSIS — M81.0 AGE-RELATED OSTEOPOROSIS WITHOUT CURRENT PATHOLOGICAL FRACTURE: ICD-10-CM

## 2024-12-17 DIAGNOSIS — Z12.31 ENCOUNTER FOR SCREENING MAMMOGRAM FOR BREAST CANCER: ICD-10-CM

## 2024-12-17 DIAGNOSIS — E78.5 HYPERLIPIDEMIA WITH TARGET LDL LESS THAN 100: ICD-10-CM

## 2024-12-17 DIAGNOSIS — F41.9 ANXIETY: ICD-10-CM

## 2024-12-17 DIAGNOSIS — M19.042 PRIMARY OSTEOARTHRITIS OF BOTH HANDS: ICD-10-CM

## 2024-12-17 LAB
BACTERIA URINE, POC: ABNORMAL
BILIRUBIN URINE: ABNORMAL
BLOOD, URINE: NEGATIVE
CASTS URINE, POC: ABNORMAL
CLARITY, UA: CLEAR
COLOR, UA: YELLOW
CRYSTALS URINE, POC: ABNORMAL
EPI CELLS URINE, POC: ABNORMAL
GLUCOSE URINE: NEGATIVE
KETONES, URINE: NEGATIVE
LEUKOCYTE EST, POC: ABNORMAL
NITRITE, URINE: NEGATIVE
PH UA: 6 (ref 4.5–8)
PROTEIN UA: NEGATIVE
RBC URINE, POC: ABNORMAL
SPECIFIC GRAVITY UA: 1.02 (ref 1–1.03)
UROBILINOGEN, URINE: NORMAL
WBC URINE, POC: ABNORMAL
YEAST URINE, POC: ABNORMAL

## 2024-12-17 RX ORDER — TETANUS AND DIPHTHERIA TOXOIDS ADSORBED 2; 2 [LF]/.5ML; [LF]/.5ML
0.5 INJECTION INTRAMUSCULAR ONCE
Qty: 0.5 ML | Refills: 0 | Status: CANCELLED | OUTPATIENT
Start: 2024-12-17 | End: 2024-12-17

## 2024-12-17 RX ORDER — CAMPHOR, MENTHOL, METHYL SALICYLATE 3.1; 10; 15 G/100G; G/100G; G/100G
GEL PERCUTANEOUS; TOPICAL; TRANSDERMAL
Qty: 78 G | Refills: 1 | Status: SHIPPED | OUTPATIENT
Start: 2024-12-17

## 2024-12-17 RX ORDER — L. ACIDOPHILUS/L.BULGARICUS 1MM CELL
1 TABLET ORAL DAILY
Qty: 90 TABLET | Refills: 0
Start: 2024-12-17

## 2024-12-17 RX ORDER — BUSPIRONE HYDROCHLORIDE 10 MG/1
10 TABLET ORAL 3 TIMES DAILY
Qty: 90 TABLET | Refills: 5 | Status: SHIPPED | OUTPATIENT
Start: 2024-12-17

## 2024-12-17 SDOH — ECONOMIC STABILITY: INCOME INSECURITY: HOW HARD IS IT FOR YOU TO PAY FOR THE VERY BASICS LIKE FOOD, HOUSING, MEDICAL CARE, AND HEATING?: NOT HARD AT ALL

## 2024-12-17 SDOH — ECONOMIC STABILITY: FOOD INSECURITY: WITHIN THE PAST 12 MONTHS, YOU WORRIED THAT YOUR FOOD WOULD RUN OUT BEFORE YOU GOT MONEY TO BUY MORE.: NEVER TRUE

## 2024-12-17 SDOH — ECONOMIC STABILITY: FOOD INSECURITY: WITHIN THE PAST 12 MONTHS, THE FOOD YOU BOUGHT JUST DIDN'T LAST AND YOU DIDN'T HAVE MONEY TO GET MORE.: NEVER TRUE

## 2024-12-17 ASSESSMENT — PATIENT HEALTH QUESTIONNAIRE - PHQ9
SUM OF ALL RESPONSES TO PHQ QUESTIONS 1-9: 9
6. FEELING BAD ABOUT YOURSELF - OR THAT YOU ARE A FAILURE OR HAVE LET YOURSELF OR YOUR FAMILY DOWN: SEVERAL DAYS
1. LITTLE INTEREST OR PLEASURE IN DOING THINGS: NOT AT ALL
3. TROUBLE FALLING OR STAYING ASLEEP: NOT AT ALL
5. POOR APPETITE OR OVEREATING: NEARLY EVERY DAY
2. FEELING DOWN, DEPRESSED OR HOPELESS: NEARLY EVERY DAY
SUM OF ALL RESPONSES TO PHQ QUESTIONS 1-9: 9
SUM OF ALL RESPONSES TO PHQ QUESTIONS 1-9: 9
7. TROUBLE CONCENTRATING ON THINGS, SUCH AS READING THE NEWSPAPER OR WATCHING TELEVISION: SEVERAL DAYS
4. FEELING TIRED OR HAVING LITTLE ENERGY: SEVERAL DAYS
10. IF YOU CHECKED OFF ANY PROBLEMS, HOW DIFFICULT HAVE THESE PROBLEMS MADE IT FOR YOU TO DO YOUR WORK, TAKE CARE OF THINGS AT HOME, OR GET ALONG WITH OTHER PEOPLE: SOMEWHAT DIFFICULT
9. THOUGHTS THAT YOU WOULD BE BETTER OFF DEAD, OR OF HURTING YOURSELF: NOT AT ALL
SUM OF ALL RESPONSES TO PHQ9 QUESTIONS 1 & 2: 3
SUM OF ALL RESPONSES TO PHQ QUESTIONS 1-9: 9
8. MOVING OR SPEAKING SO SLOWLY THAT OTHER PEOPLE COULD HAVE NOTICED. OR THE OPPOSITE, BEING SO FIGETY OR RESTLESS THAT YOU HAVE BEEN MOVING AROUND A LOT MORE THAN USUAL: NOT AT ALL

## 2024-12-17 NOTE — ASSESSMENT & PLAN NOTE
Chronic, worsening, due to wear-and-tear nature of the disease  She says Voltaren gel does not help  To start Salonpas gel, to continue collagen supplement from over-the-counter, Tylenol as needed, avoid NSAIDs due to kidney disease stage II  Orders:    Camphor-Menthol-Methyl Sal (SALONPAS DEEP RELIEVING) 3.1-10-15 % GEL; TID as needed for back pain

## 2024-12-17 NOTE — ASSESSMENT & PLAN NOTE
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    Cardio-respiratory status:  Forced vital capacity: 107 % of predicted (9/2021)    Height/Weight: 5'8\" / 134lbs    Living environment:  Apartment    Living environment barriers:  11 stairs to enter (1 railing present)- 6 steps up then 7 steps down  Extra stairs up to get mail (every other day)     Current assistance/living environment:  Lives alone      Current mobility equipment:  Front wheeled walker - indoor mobility  4 wheeled walker with seat- outdoor mobility and work  Standard cane(s)- uses on stairs  Manual wheelchair- never uses  Scooter- never been used     Current ADL equipment:  Wall grab bar  Tub bench- does not use yet    Technology used: Computer, phone    Patient concerns/goals: Working 40 hours/week- no excessive fatigue after work, but some slippery parts in the store. Still able to do activities around the house. Driving- no issues. Falls- 2x/month; knee buckling and foot catching. Started doing some exercises for low back and shoulder injury    Evaluation   Interview completed.   Pain assessment:  Pain denied   Range of motion: Tightness in B hamstrings (mild) and gastrocs (moderate)    Manual muscle testing:  B ankle DF 3-/5, B knee extension 3+/5, B hip flexion 4-/5   Gait:  Patient ambulates with 4WW " Patient had a recent colonoscopy, biopsy was benign, no chronic colitis findings on biopsy    Date of last Colonoscopy: 12/5/2024  To start probiotics    Orders:    Lactobacillus 0.05-0.05 MG TABS; Take 1 tablet by mouth Daily From over-the-counter     modified independent- B knee hyperextension, sway back posture. Excessive hip sway and decreased eccentric control of foot placement   Cognition:  Intact   [facility]ah Ambulatory Assessment: 3/34    Stand up from chair: 1    Lifts head: 2    Fall Risk Screen:   Has the patient fallen 2 or more times in the last year? Yes      Has the patient fallen and had an injury in the past year? No       Timed Up and Go Score: >13.5 seconds    Is the patient a fall risk? Yes, department fall risk interventions implemented     Impairments:  Fatigue  Muscle atrophy  Coordination  Balance  Pain  Range of motion     Treatment diagnosis:  Impaired mobility  Impaired activities of daily living    Clinical Presentation: Evolving/Changing  Clinical Presentation Rationale: personal factors, body systems involved, progressive nature of MD  Clinical Decision Making (Complexity): Moderate complexity     Recommendations/Plan of care:  1 session evaluation & treatment.  Equipment recommended: B AFOs     Goals:   Target date: 9/11/2023  Patient, family and/or caregiver will verbalize understanding of evaluation results and implications for functional performance.  Patient, family and/or caregiver will verbalize/demonstrate understanding of compensatory methods /equipment to enhance functional independence and safety.  Patient, family and/or caregiver will verbalize/demonstrate understanding of home program.  Patient, family and/or caregiver will verbalize/demonstrate understanding of positioning techniques/equipment.  Patient, family and/or caregiver will verbalize energy management techniques appropriate for status and setting.    Educational assessment/barriers to learning:   No barriers noted     Treatment provided this date:   Therapeutic Exercise-25 minutes  -Educated patient on energy conservation techniques including pacing of activities, frequent rest breaks, use of assistive device and monitoring signs of fatigue (increased muscle  soreness, weakness, cramps, fasciculations) for safe functional mobility and performance of daily tasks  -Collaborated on recommended fitting and use of bilateral AFOs due to ankle dorsiflexion weakness on both sides.  Collaborated with patient on differences between foot up brace and carbon fiber AFO with recommendations to be fitted for carbon fiber AFOs and see if this improves his walking, as well as provide knee extension support.  Will request orders and have them faxed to place closer to his house  -Collaborated on importance of stretching daily and other exercise recommendations. Issued HEP (see PTRx) and patient return demonstrated double knee to chest, supine hip roll, piriformis stretch, hamstring stretch and standing gastroc stretch with 4WW. Patient also performed seated L shoulder flexion/abduction and elbow flexion. Encouraged patient to assess how his muscles feel during and after exercise and to not push through fatigue  -Collaborated on appropriate shoewear for increased stability and tread to decrease falls. Recommended patient purchase new shoes due to abnormal tread medially (R>L)  -Encouraged patient to really focus on safety techniques to decrease fall risk: energy conservation, equipment use, stair negotiation sideways vs forward, rest breaks. Patient verbalizes understanding    Response to treatment/recommendations: Patient verbalizes understanding/agreement    Goal attainment:  All goals met     Risks and benefits of evaluation/treatment have been explained.  Patient, family and/or caregiver are in agreement with Plan of Care.     Timed Code Treatment Minutes: 25  Total Treatment Time (sum of timed and untimed services): 35    Signature: Sonya Marte, PT   Date: 9/11/2023

## 2024-12-17 NOTE — ASSESSMENT & PLAN NOTE
Chronic, on Prolia injections every 6 months, tolerated well, denies side effects  Continue vitamin D supplementation  Recent vitamin D level was borderline low  Lab Results   Component Value Date    VITD25 39.2 08/02/2024

## 2024-12-17 NOTE — ASSESSMENT & PLAN NOTE
Chronic, improved with medications and counseling  Continue Lexapro 10 Mg daily, and cognitive behavioral therapy

## 2024-12-17 NOTE — ASSESSMENT & PLAN NOTE
Chronic, improved with medication, continue BuSpar 10 Mg 3 times a day, Lexapro 10 Mg daily and counseling    Orders:    busPIRone (BUSPAR) 10 MG tablet; Take 1 tablet by mouth 3 times daily

## 2024-12-17 NOTE — PROGRESS NOTES
06/30/2025    Lipids  08/02/2025    Depression Monitoring  11/20/2025    Colorectal Cancer Screen  12/05/2029    Annual Wellness Visit (Medicare Advantage)  Completed    Pneumococcal 65+ years Vaccine  Completed    Hepatitis C screen  Completed    Hepatitis A vaccine  Aged Out    Hepatitis B vaccine  Aged Out    Hib vaccine  Aged Out    Polio vaccine  Aged Out    Meningococcal (ACWY) vaccine  Aged Out    A1C test (Diabetic or Prediabetic)  Discontinued     
Date    TRIG 152 (H) 08/02/2024    TRIG 297 (H) 12/27/2019    TRIG 124 12/10/2018     Lab Results   Component Value Date    HDL 52 08/02/2024    HDL 46 11/02/2023    HDL 49 05/23/2022     Lab Results   Component Value Date    LDL 97 08/02/2024       Lab Results   Component Value Date    CHOLHDLRATIO 3.4 08/02/2024    CHOLHDLRATIO 4.7 11/02/2023    CHOLHDLRATIO 4.3 05/23/2022       Lab Results   Component Value Date    ERPOCEDF32 535 08/02/2024     Lab Results   Component Value Date    FOLATE >20.0 08/02/2024     Lab Results   Component Value Date    VITD25 39.2 08/02/2024     Lab Results   Component Value Date    LABA1C 6.0 08/02/2024    LABA1C 5.6 11/02/2023    LABA1C 5.3 02/18/2022           On this date 12/17/2024 I have spent 38 minutes reviewing previous notes, test results and face to face with the patient discussing the diagnosis and importance of compliance with the treatment plan as well as documenting on the day of the visit.     This note was completed by using the assistance of a speech-recognition program. However, inadvertent computerized transcription errors may be present. Although every effort was made to ensure accuracy, no guarantees can be provided that every mistake has been identified and corrected by editing.    An electronic signature was used to authenticate this note.  Electronically signed by May Krishnamurthy MD on 12/25/2024 at 10:36 PM

## 2024-12-17 NOTE — ASSESSMENT & PLAN NOTE
Chronic, improved with medications  Triglycerides still a bit High  Lab Results   Component Value Date    LDL 97 08/02/2024     Continue Crestor 10 Mg daily, low-carb diet, low-fat diet, exercise and attempt to lose weight

## 2024-12-17 NOTE — RESULT ENCOUNTER NOTE
Please notify patient urinalysis slightly abnormal due to small amount of leukocytes, we did send the urine for urine culture, it would take 2 days for the culture to come back      Future Appointments  12/18/2024 3:00 PM    Lynette Roberts, PhD  [facility]             [facility]  1/8/2025   3:00 PM    Gianni Ferreira DO             [facility]        AFL [facility]  3/4/2025   10:00 AM   Gabriela Alexander APRN - GISELL  [facility]        T[facility]  4/25/2025  1:00 PM    May Krishnamutrhy MD     sc               BS ECC DEP  6/11/2025  3:30 PM    Ella Vasquez MD           [facility]

## 2024-12-17 NOTE — ASSESSMENT & PLAN NOTE
Chronic hypertension, well-controlled, at goal  Chronic kidney disease stage II stable, GFR 88, avoid dehydration, to drink 64 ounce water daily, to avoid NSAIDs  Orders:    POCT Urine with Microscopic  Discussed low salt diet and BP and pulse monitoring.  Continue losartan 50 Mg daily  Recent labs reviewed with patient, GFR 88 on 12/03/2024  Ultrasound of the kidneys 9/6/2024, simple right kidney cyst

## 2024-12-18 ENCOUNTER — OFFICE VISIT (OUTPATIENT)
Dept: BEHAVIORAL/MENTAL HEALTH CLINIC | Age: 79
End: 2024-12-18
Payer: MEDICARE

## 2024-12-18 DIAGNOSIS — F41.1 GENERALIZED ANXIETY DISORDER: Primary | ICD-10-CM

## 2024-12-18 DIAGNOSIS — F34.1 PERSISTENT DEPRESSIVE DISORDER, MILD: ICD-10-CM

## 2024-12-18 DIAGNOSIS — R82.90 ABNORMAL URINALYSIS: ICD-10-CM

## 2024-12-18 PROCEDURE — 1123F ACP DISCUSS/DSCN MKR DOCD: CPT | Performed by: PSYCHOLOGIST

## 2024-12-18 PROCEDURE — 1036F TOBACCO NON-USER: CPT | Performed by: PSYCHOLOGIST

## 2024-12-18 PROCEDURE — 90837 PSYTX W PT 60 MINUTES: CPT | Performed by: PSYCHOLOGIST

## 2024-12-18 ASSESSMENT — ANXIETY QUESTIONNAIRES
6. BECOMING EASILY ANNOYED OR IRRITABLE: 1-SEVERAL DAYS
4. TROUBLE RELAXING: 2-OVER HALF THE DAYS
3. WORRYING TOO MUCH ABOUT DIFFERENT THINGS: 2-OVER HALF THE DAYS
2. NOT BEING ABLE TO STOP OR CONTROL WORRYING: 2-OVER HALF THE DAYS
7. FEELING AFRAID AS IF SOMETHING AWFUL MIGHT HAPPEN: 1-SEVERAL DAYS
1. FEELING NERVOUS, ANXIOUS, OR ON EDGE: MORE THAN HALF THE DAYS
5. BEING SO RESTLESS THAT IT IS HARD TO SIT STILL: 1-SEVERAL DAYS
GAD7 TOTAL SCORE: 11

## 2024-12-18 ASSESSMENT — PATIENT HEALTH QUESTIONNAIRE - PHQ9
5. POOR APPETITE OR OVEREATING: MORE THAN HALF THE DAYS
10. IF YOU CHECKED OFF ANY PROBLEMS, HOW DIFFICULT HAVE THESE PROBLEMS MADE IT FOR YOU TO DO YOUR WORK, TAKE CARE OF THINGS AT HOME, OR GET ALONG WITH OTHER PEOPLE: SOMEWHAT DIFFICULT
9. THOUGHTS THAT YOU WOULD BE BETTER OFF DEAD, OR OF HURTING YOURSELF: NOT AT ALL
SUM OF ALL RESPONSES TO PHQ QUESTIONS 1-9: 15
SUM OF ALL RESPONSES TO PHQ QUESTIONS 1-9: 15
SUM OF ALL RESPONSES TO PHQ9 QUESTIONS 1 & 2: 4
4. FEELING TIRED OR HAVING LITTLE ENERGY: MORE THAN HALF THE DAYS
2. FEELING DOWN, DEPRESSED OR HOPELESS: MORE THAN HALF THE DAYS
7. TROUBLE CONCENTRATING ON THINGS, SUCH AS READING THE NEWSPAPER OR WATCHING TELEVISION: MORE THAN HALF THE DAYS
SUM OF ALL RESPONSES TO PHQ QUESTIONS 1-9: 15
1. LITTLE INTEREST OR PLEASURE IN DOING THINGS: MORE THAN HALF THE DAYS
3. TROUBLE FALLING OR STAYING ASLEEP: MORE THAN HALF THE DAYS
6. FEELING BAD ABOUT YOURSELF - OR THAT YOU ARE A FAILURE OR HAVE LET YOURSELF OR YOUR FAMILY DOWN: MORE THAN HALF THE DAYS
8. MOVING OR SPEAKING SO SLOWLY THAT OTHER PEOPLE COULD HAVE NOTICED. OR THE OPPOSITE, BEING SO FIGETY OR RESTLESS THAT YOU HAVE BEEN MOVING AROUND A LOT MORE THAN USUAL: SEVERAL DAYS
SUM OF ALL RESPONSES TO PHQ QUESTIONS 1-9: 15

## 2024-12-18 NOTE — PROGRESS NOTES
depression, and ongoing grief issues.  Deana was in agreement with recommendations.        12/18/2024     3:13 PM 12/17/2024     2:15 PM 11/20/2024    11:30 AM 10/9/2024    11:10 AM 8/21/2024     1:05 PM 8/20/2024     1:07 PM 7/10/2024     1:20 PM   PHQ Scores   PHQ2 Score 4 3 4 2 2 1 2   PHQ9 Score 15 9 13 7 8 5 7     Interpretation of Total Score Depression Severity: 1-4 = Minimal depression, 5-9 = Mild depression, 10-14 = Moderate depression, 15-19 = Moderately severe depression, 20-27 = Severe depression    How often pt has had thoughts of death or hurting self (if PHQ positive for depression):  Not at all        12/18/2024     3:00 PM 4/3/2024     1:00 PM 6/6/2023     2:08 PM 3/9/2021     7:00 PM   ANALI 7 SCORE   ANALI-7 Total Score   3    ANALI-7 Total Score 11 3  2     Interpretation of ANALI-7 score: 5-9 = mild anxiety, 10-14 = moderate anxiety, 15+ = severe anxiety. Recommend referral to behavioral health for scores 10 or greater.      DIAGNOSIS  Deana was seen today for depression and anxiety.    Diagnoses and all orders for this visit:    Generalized anxiety disorder    Persistent depressive disorder, mild          INTERVENTION  Trained in strategies for increasing balanced thinking, Discussed and set plan for behavioral activation, Discussed self-care (sleep, nutrition, rewarding activities, social support, exercise), Supportive techniques, Emphasized self-care as important for managing overall health, CBT to target anxiety and depression, and Identified maladaptive thoughts      PLAN  1)  Pt will continue to work on practicing better self-care, including engaging in enjoyable activities, accessing her support system, and allowing herself time to grieve.    2)  Pt will work on practicing more effective self-compassionate and balanced coping talk/thinking.    3)  Pt will return for a follow-up appointment with the [facility] in 4 weeks on 1/15/25 at 3PM.       INTERACTIVE COMPLEXITY  Is interactive complexity present?

## 2024-12-19 LAB
MICROORGANISM SPEC CULT: NO GROWTH
SERVICE CMNT-IMP: NORMAL
SPECIMEN DESCRIPTION: NORMAL

## 2024-12-19 NOTE — RESULT ENCOUNTER NOTE
Please notify patient: No urinary tract infection please let her know t    Future Appointments  1/8/2025   3:00 PM    Gianni Ferreira,              [facility]        [facility] C  1/15/2025  3:00 PM    [facility]  3/4/2025   10:00 AM   Gabriela Alexander APRN - GISELL  [facility]        [facility]  4/25/2025  1:00 PM    May Krishnamurthy MD    [facility]  6/11/2025  3:30 PM    Ella Vasquez MD           [facility]

## 2024-12-25 PROBLEM — R42 DIZZINESS: Status: ACTIVE | Noted: 2024-12-25

## 2024-12-25 ASSESSMENT — ENCOUNTER SYMPTOMS
DIARRHEA: 1
BLOOD IN STOOL: 0

## 2024-12-26 NOTE — ASSESSMENT & PLAN NOTE
Chronic, ongoing, intermittent  Advised to discuss with Dr. Oropeza for vestibular therapy, she is currently starting physical therapy for right hip bursitis, insurance would not cover another therapy for her  Falls precautions discussed  To absolutely avoid turning around too fast

## 2025-02-17 DIAGNOSIS — F33.0 MILD EPISODE OF RECURRENT MAJOR DEPRESSIVE DISORDER: ICD-10-CM

## 2025-02-17 RX ORDER — ESCITALOPRAM OXALATE 10 MG/1
10 TABLET ORAL DAILY
Qty: 90 TABLET | Refills: 0 | Status: SHIPPED | OUTPATIENT
Start: 2025-02-17

## 2025-02-17 NOTE — TELEPHONE ENCOUNTER
Please Approve or Refuse.  Send to Pharmacy per Pt's Request: [facility]     Next Visit Date:  4/25/2025   Last Visit Date: 12/17/2024    Hemoglobin A1C (%)   Date Value   08/02/2024 6.0   11/02/2023 5.6   02/18/2022 5.3             ( goal A1C is < 7)   BP Readings from Last 3 Encounters:   12/17/24 132/64   12/11/24 135/77   12/10/24 134/82          (goal 120/80)  BUN   Date Value Ref Range Status   12/03/2024 9 8 - 23 mg/dL Final     Creatinine   Date Value Ref Range Status   12/03/2024 0.7 0.7 - 1.2 mg/dL Final     Potassium   Date Value Ref Range Status   12/03/2024 4.2 3.7 - 5.3 mmol/L Final

## 2025-02-19 DIAGNOSIS — I10 ESSENTIAL HYPERTENSION: ICD-10-CM

## 2025-02-19 RX ORDER — LOSARTAN POTASSIUM 50 MG/1
50 TABLET ORAL DAILY
Qty: 90 TABLET | Refills: 3 | Status: SHIPPED | OUTPATIENT
Start: 2025-02-19

## 2025-03-04 ENCOUNTER — OFFICE VISIT (OUTPATIENT)
Dept: OBGYN CLINIC | Age: 80
End: 2025-03-04
Payer: MEDICARE

## 2025-03-04 ENCOUNTER — HOSPITAL ENCOUNTER (OUTPATIENT)
Age: 80
Setting detail: SPECIMEN
Discharge: HOME | End: 2025-03-04

## 2025-03-04 VITALS
HEART RATE: 86 BPM | DIASTOLIC BLOOD PRESSURE: 80 MMHG | WEIGHT: 187 LBS | SYSTOLIC BLOOD PRESSURE: 138 MMHG | HEIGHT: 65 IN | RESPIRATION RATE: 18 BRPM | BODY MASS INDEX: 31.16 KG/M2

## 2025-03-04 DIAGNOSIS — Z12.31 ENCOUNTER FOR SCREENING MAMMOGRAM FOR MALIGNANT NEOPLASM OF BREAST: ICD-10-CM

## 2025-03-04 DIAGNOSIS — Z46.89 ENCOUNTER FOR PESSARY MAINTENANCE: ICD-10-CM

## 2025-03-04 DIAGNOSIS — N81.11 MIDLINE CYSTOCELE: ICD-10-CM

## 2025-03-04 DIAGNOSIS — L53.9 ERYTHEMA: ICD-10-CM

## 2025-03-04 DIAGNOSIS — N89.8 VAGINAL ODOR: ICD-10-CM

## 2025-03-04 DIAGNOSIS — Z01.419 WELL FEMALE EXAM WITH ROUTINE GYNECOLOGICAL EXAM: Primary | ICD-10-CM

## 2025-03-04 DIAGNOSIS — Z78.0 POSTMENOPAUSAL: ICD-10-CM

## 2025-03-04 DIAGNOSIS — Z13.820 SCREENING FOR OSTEOPOROSIS: ICD-10-CM

## 2025-03-04 PROCEDURE — G8427 DOCREV CUR MEDS BY ELIG CLIN: HCPCS | Performed by: NURSE PRACTITIONER

## 2025-03-04 PROCEDURE — 1036F TOBACCO NON-USER: CPT | Performed by: NURSE PRACTITIONER

## 2025-03-04 PROCEDURE — 1159F MED LIST DOCD IN RCRD: CPT | Performed by: NURSE PRACTITIONER

## 2025-03-04 PROCEDURE — 99213 OFFICE O/P EST LOW 20 MIN: CPT | Performed by: NURSE PRACTITIONER

## 2025-03-04 PROCEDURE — 99397 PER PM REEVAL EST PAT 65+ YR: CPT | Performed by: NURSE PRACTITIONER

## 2025-03-04 PROCEDURE — G8417 CALC BMI ABV UP PARAM F/U: HCPCS | Performed by: NURSE PRACTITIONER

## 2025-03-04 PROCEDURE — G8399 PT W/DXA RESULTS DOCUMENT: HCPCS | Performed by: NURSE PRACTITIONER

## 2025-03-04 PROCEDURE — 1090F PRES/ABSN URINE INCON ASSESS: CPT | Performed by: NURSE PRACTITIONER

## 2025-03-04 RX ORDER — CLOTRIMAZOLE AND BETAMETHASONE DIPROPIONATE 10; .64 MG/G; MG/G
CREAM TOPICAL
Qty: 45 G | Refills: 1 | Status: SHIPPED | OUTPATIENT
Start: 2025-03-04

## 2025-03-04 RX ORDER — NYSTATIN 100000 [USP'U]/G
POWDER TOPICAL
Qty: 1 EACH | Refills: 1 | Status: SHIPPED | OUTPATIENT
Start: 2025-03-04

## 2025-03-04 NOTE — PROGRESS NOTES
History and Physical  [facility]  [facility] [address]  [place], [place]  [address] [phone]   Fax [phone]  Deana Dalton  3/4/2025              79 y.o.  Chief Complaint   Patient presents with    Annual Exam       No LMP recorded (lmp unknown). Patient has had a hysterectomy.             Primary Care Physician: May Krishnamurthy MD    The patient was seen and examined. She has no chief complaint today and is here for her annual exam.  Her bowels are regular. There are no voiding complaints. She denies any bloating.  She denies vaginal discharge and was counseled on STD's and the need for barrier contraception.     HPI : Deana Dalton is a 79 y.o. female     Annual exam with pessary cleaning.  Complaining of waking up with vaginal odor. Has noticed this the last couple of months. Using vagisil cream to perineum with little relief.  Denies vaginal discharge or postmenopausal bleeding.  Hx of total hysterectomy for prolapsed uterus. Denies hx of abnormal pap smears.   Complaining of redness on occasion under both breasts.    ________________________________________________________________________  OB History    Para Term  AB Living   4 4 3 1 0 3   SAB IAB Ectopic Molar Multiple Live Births   0 0 0 0 0 0      # Outcome Date GA Lbr Richard/2nd Weight Sex Type Anes PTL Lv   4 Term 1973    M Vag-Spont      3 Term 1971    F Vag-Spont      2  1969    M Vag-Spont      1 Term              Past Medical History:   Diagnosis Date    ACP (advance care planning) 2021    Acute cystitis with hematuria 2024    Acute urinary retention 2020    Adjustment disorder with mixed anxiety and depressed mood 2020    Anxiety and depression 2019    Aortic valve calcification 05/15/2024    Closed fracture of left pubis (HCC)     Closed fracture of right hip (HCC) 2020    Closed fracture of single pubic ramus of pelvis, left, initial encounter

## 2025-03-07 LAB
MICROORGANISM SPEC CULT: NORMAL
SERVICE CMNT-IMP: NORMAL
SPECIMEN DESCRIPTION: NORMAL

## 2025-03-20 ENCOUNTER — HOSPITAL ENCOUNTER (OUTPATIENT)
Dept: WOMENS IMAGING | Age: 80
Discharge: HOME | End: 2025-03-22
Payer: MEDICARE

## 2025-03-20 ENCOUNTER — RESULTS FOLLOW-UP (OUTPATIENT)
Dept: WOMENS IMAGING | Age: 80
End: 2025-03-20

## 2025-03-20 VITALS — WEIGHT: 180 LBS | BODY MASS INDEX: 28.93 KG/M2 | HEIGHT: 66 IN

## 2025-03-20 DIAGNOSIS — Z12.31 ENCOUNTER FOR SCREENING MAMMOGRAM FOR MALIGNANT NEOPLASM OF BREAST: ICD-10-CM

## 2025-03-20 PROCEDURE — 77063 BREAST TOMOSYNTHESIS BI: CPT

## 2025-04-25 ENCOUNTER — OFFICE VISIT (OUTPATIENT)
Dept: FAMILY MEDICINE CLINIC | Age: 80
End: 2025-04-25

## 2025-04-25 VITALS
OXYGEN SATURATION: 96 % | WEIGHT: 188 LBS | HEART RATE: 71 BPM | BODY MASS INDEX: 30.22 KG/M2 | DIASTOLIC BLOOD PRESSURE: 90 MMHG | SYSTOLIC BLOOD PRESSURE: 142 MMHG | HEIGHT: 66 IN | TEMPERATURE: 97.6 F

## 2025-04-25 DIAGNOSIS — K57.30 DIVERTICULOSIS OF COLON: ICD-10-CM

## 2025-04-25 DIAGNOSIS — M81.0 AGE-RELATED OSTEOPOROSIS WITHOUT CURRENT PATHOLOGICAL FRACTURE: ICD-10-CM

## 2025-04-25 DIAGNOSIS — E78.2 MIXED HYPERLIPIDEMIA: ICD-10-CM

## 2025-04-25 DIAGNOSIS — I12.9 BENIGN HYPERTENSION WITH CKD (CHRONIC KIDNEY DISEASE), STAGE II: ICD-10-CM

## 2025-04-25 DIAGNOSIS — Z00.00 MEDICARE ANNUAL WELLNESS VISIT, SUBSEQUENT: Primary | ICD-10-CM

## 2025-04-25 DIAGNOSIS — N18.2 BENIGN HYPERTENSION WITH CKD (CHRONIC KIDNEY DISEASE), STAGE II: ICD-10-CM

## 2025-04-25 DIAGNOSIS — Z96.21 COCHLEAR IMPLANT IN PLACE: ICD-10-CM

## 2025-04-25 DIAGNOSIS — Z97.4 DOES USE HEARING AID: ICD-10-CM

## 2025-04-25 DIAGNOSIS — R73.03 PREDIABETES: ICD-10-CM

## 2025-04-25 PROBLEM — G89.29 CHRONIC PAIN OF RIGHT HIP: Status: RESOLVED | Noted: 2023-12-21 | Resolved: 2025-04-25

## 2025-04-25 PROBLEM — M25.551 CHRONIC PAIN OF RIGHT HIP: Status: RESOLVED | Noted: 2023-12-21 | Resolved: 2025-04-25

## 2025-04-25 PROBLEM — K52.9 CHRONIC DIARRHEA: Status: RESOLVED | Noted: 2024-12-17 | Resolved: 2025-04-25

## 2025-04-25 RX ORDER — LOSARTAN POTASSIUM 50 MG/1
75 TABLET ORAL DAILY
Qty: 135 TABLET | Refills: 3 | Status: SHIPPED | OUTPATIENT
Start: 2025-04-25

## 2025-04-25 RX ORDER — PSYLLIUM HUSK/CALCIUM CARB 1 G-60 MG
1-2 CAPSULE ORAL DAILY
Qty: 180 CAPSULE | Refills: 0
Start: 2025-04-25

## 2025-04-25 SDOH — ECONOMIC STABILITY: FOOD INSECURITY: WITHIN THE PAST 12 MONTHS, THE FOOD YOU BOUGHT JUST DIDN'T LAST AND YOU DIDN'T HAVE MONEY TO GET MORE.: NEVER TRUE

## 2025-04-25 SDOH — ECONOMIC STABILITY: FOOD INSECURITY: WITHIN THE PAST 12 MONTHS, YOU WORRIED THAT YOUR FOOD WOULD RUN OUT BEFORE YOU GOT MONEY TO BUY MORE.: NEVER TRUE

## 2025-04-25 ASSESSMENT — PATIENT HEALTH QUESTIONNAIRE - PHQ9
4. FEELING TIRED OR HAVING LITTLE ENERGY: MORE THAN HALF THE DAYS
2. FEELING DOWN, DEPRESSED OR HOPELESS: SEVERAL DAYS
SUM OF ALL RESPONSES TO PHQ QUESTIONS 1-9: 11
SUM OF ALL RESPONSES TO PHQ QUESTIONS 1-9: 11
10. IF YOU CHECKED OFF ANY PROBLEMS, HOW DIFFICULT HAVE THESE PROBLEMS MADE IT FOR YOU TO DO YOUR WORK, TAKE CARE OF THINGS AT HOME, OR GET ALONG WITH OTHER PEOPLE: NOT DIFFICULT AT ALL
3. TROUBLE FALLING OR STAYING ASLEEP: NOT AT ALL
5. POOR APPETITE OR OVEREATING: NEARLY EVERY DAY
1. LITTLE INTEREST OR PLEASURE IN DOING THINGS: SEVERAL DAYS
6. FEELING BAD ABOUT YOURSELF - OR THAT YOU ARE A FAILURE OR HAVE LET YOURSELF OR YOUR FAMILY DOWN: MORE THAN HALF THE DAYS
8. MOVING OR SPEAKING SO SLOWLY THAT OTHER PEOPLE COULD HAVE NOTICED. OR THE OPPOSITE, BEING SO FIGETY OR RESTLESS THAT YOU HAVE BEEN MOVING AROUND A LOT MORE THAN USUAL: NOT AT ALL
SUM OF ALL RESPONSES TO PHQ QUESTIONS 1-9: 11
7. TROUBLE CONCENTRATING ON THINGS, SUCH AS READING THE NEWSPAPER OR WATCHING TELEVISION: MORE THAN HALF THE DAYS
SUM OF ALL RESPONSES TO PHQ QUESTIONS 1-9: 11

## 2025-04-25 ASSESSMENT — ENCOUNTER SYMPTOMS
ABDOMINAL DISTENTION: 0
COUGH: 0
SHORTNESS OF BREATH: 0
CONSTIPATION: 0
WHEEZING: 0
NAUSEA: 0
ABDOMINAL PAIN: 0
VOMITING: 0
DIARRHEA: 0
CHEST TIGHTNESS: 0

## 2025-04-25 ASSESSMENT — VISUAL ACUITY
OS_CC: 20/50
OD_CC: 20/40

## 2025-04-25 NOTE — PROGRESS NOTES
Visit Information    Have you changed or started any medications since your last visit including any over-the-counter medicines, vitamins, or herbal medicines? yes -    Have you stopped taking any of your medications? Is so, why? -  no  Are you having any side effects from any of your medications? - no    Have you seen any other physician or provider since your last visit?  yes -    Have you had any other diagnostic tests since your last visit?  yes -    Have you been seen in the emergency room and/or had an admission in a hospital since we last saw you?  no   Have you had your routine dental cleaning in the past 6 months?  no     Do you have an active MyChart account? If no, what is the barrier?  Yes    Patient Care Team:  May Krishnamurthy MD as PCP - General (Family Medicine)  May Krishnamurthy MD as PCP - Empaneled Provider  Ella Vasquez MD as Consulting Physician (Gastroenterology)  Laila Godinez DO as Consulting Physician (Obstetrics & Gynecology)  Jv Godinez DO as Consulting Physician (Obstetrics & Gynecology)  Kimberly Blount APRN - CNP (Otolaryngology)  Lynette Roberts, PhD (Psychology)  Ronald Ayala MD as Consulting Physician (Orthopedic Surgery)  Debbie Fitzgerald MD as Consulting Physician (Otolaryngology)  Aristeo Boykin DO (Orthopedic Surgery)  Gianni Ferreira DO as Consulting Physician (Cardiology)  Emani Polanco PA (Dermatology)  Gabriela Alexander APRN - CNP as Nurse Practitioner (Gynecology)    Medical History Review  Past Medical, Family, and Social History reviewed and does contribute to the patient presenting condition    Health Maintenance   Topic Date Due    DTaP/Tdap/Td vaccine (1 - Tdap) Never done    Shingles vaccine (1 of 2) Never done    Respiratory Syncytial Virus (RSV) Pregnant or age 60 yrs+ (1 - 1-dose 75+ series) Never done    COVID-19 Vaccine (5 - 2024-25 season) 09/01/2024    Annual Wellness Visit (Medicare Advantage)  01/01/2025    CINDY

## 2025-04-25 NOTE — PROGRESS NOTES
Medicare Annual Wellness Visit    Deana Dalton is here for Medicare AWV, Discuss Medications (TYLENOL DIRECTIONS), Hypertension, and Hyperglycemia    Assessment & Plan   Medicare annual wellness visit, subsequent  Benign hypertension with CKD (chronic kidney disease), stage II  Assessment & Plan:       Orders:    ESTABLISHED, MOD MDM, 30-39 MIN [[id number]]    CBC with Auto Differential; Future    Comprehensive Metabolic Panel; Future    Uric Acid; Future    TSH; Future    Magnesium; Future    Phosphorus; Future    losartan (COZAAR) 50 MG tablet; Take 1.5 tablets by mouth daily Dose increased 4/25/2025    Orders:  -     ESTABLISHED, MOD MDM, 30-39 MIN [[id number]]  -     CBC with Auto Differential; Future  -     Comprehensive Metabolic Panel; Future  -     Uric Acid; Future  -     TSH; Future  -     Magnesium; Future  -     Phosphorus; Future  -     losartan (COZAAR) 50 MG tablet; Take 1.5 tablets by mouth daily Dose increased 4/25/2025, Disp-135 tablet, R-3Normal  Diverticulosis of colon  Assessment & Plan:       Orders:    ESTABLISHED, MOD MDM, 30-39 MIN [[id number]]    Psyllium-Calcium (METAMUCIL PLUS CALCIUM) CAPS; Take 1-2 capsules by mouth daily    Orders:  -     ESTABLISHED, MOD MDM, 30-39 MIN [[id number]]  -     Psyllium-Calcium (METAMUCIL PLUS CALCIUM) CAPS; Take 1-2 capsules by mouth daily, Disp-180 capsule, R-0NO PRINT  Age-related osteoporosis without current pathological fracture  Assessment & Plan:       Orders:    ESTABLISHED, MOD MDM, 30-39 MIN [[id number]]    Vitamin D 25 Hydroxy; Future    Orders:  -     ESTABLISHED, MOD MDM, 30-39 MIN [[id number]]  -     Vitamin D 25 Hydroxy; Future  Cochlear implant in place LEFT  Assessment & Plan:       Orders:    ESTABLISHED, MOD MDM, 30-39 MIN [[id number]]    Arturo Hayward MD, OtolaryngologyTess    Orders:  -     ESTABLISHED, MOD MDM, 30-39 MIN [[id number]]  -     Arturo Hayward MD, OtolaryngologyTess  Does use hearing aid, RIGHT  -     ESTABLISHED, MOD MDM, 30-39 MIN [[id number]]  -

## 2025-04-25 NOTE — ASSESSMENT & PLAN NOTE
Prediabetes worsening based on the last A1c    Blood glucose levels were slightly elevated at 124 on 12/03/2024, indicating prediabetes.  - Vitamin B12 levels were within the normal range.  - Advised to abstain from alcohol consumption and limit intake of sweets and ice cream.  Lab Results   Component Value Date    LABA1C 6.0 08/02/2024    LABA1C 5.6 11/02/2023    LABA1C 5.3 02/18/2022       Orders:    ESTABLISHED, MOD MDM, 30-39 MIN [[id number]]    Hemoglobin A1C; Future

## 2025-04-25 NOTE — ASSESSMENT & PLAN NOTE
Chronic and worsening likely due to aging   Advised to take Metamucil 1 to 2 capsules daily to prevent constipation.  - Recommended incorporating probiotics or low-fat yogurt into the diet.  - Colonoscopy results indicate significant diverticulosis and hemorrhoids; repeat colonoscopy recommended in 5 years.  - Counseling provided on the nature and management of diverticulosis.  Orders:    ESTABLISHED, MOD MDM, 30-39 MIN [[id number]]    Psyllium-Calcium (METAMUCIL PLUS CALCIUM) CAPS; Take 1-2 capsules by mouth daily

## 2025-04-25 NOTE — ASSESSMENT & PLAN NOTE
Chronic hypertension, not well-controlled, will increase dosage of losartan from 50 mg to 75 mg  Chronic kidney disease stage II stable, will continue to monitor, avoid NSAIDs, and dehydration   Blood pressure remains elevated at 142/96.  - Dosage of losartan will be increased from 50 mg to 75 mg.  - Follow-up appointment with the nurse for a blood pressure check scheduled for one week from now.  - Discussed the potential need to cut the losartan pill to achieve the correct dosage.  Orders:    ESTABLISHED, MOD MDM, 30-39 MIN [[id number]]    CBC with Auto Differential; Future    Comprehensive Metabolic Panel; Future    Uric Acid; Future    TSH; Future    Magnesium; Future    Phosphorus; Future    losartan (COZAAR) 50 MG tablet; Take 1.5 tablets by mouth daily Dose increased 4/25/2025

## 2025-04-25 NOTE — ASSESSMENT & PLAN NOTE
Chronic, likely improving, continue Prolia every 6 months  Check vitamin D level  Will recheck DEXA scan at 2 years interval  Orders:    ESTABLISHED, MOD MDM, 30-39 MIN [[id number]]    Vitamin D 25 Hydroxy; Future

## 2025-04-25 NOTE — ASSESSMENT & PLAN NOTE
Chronic, she needs management of her cochlear implant, she reports she often needs to change the batteries, she has history of left ear surgeries x 5    Orders:    ESTABLISHED, MOD MDM, 30-39 MIN [[id number]]    Arturo Hayward MD, Otolaryngology, [place]

## 2025-04-29 ENCOUNTER — OFFICE VISIT (OUTPATIENT)
Dept: OBGYN CLINIC | Age: 80
End: 2025-04-29
Payer: MEDICARE

## 2025-04-29 VITALS
SYSTOLIC BLOOD PRESSURE: 132 MMHG | WEIGHT: 185 LBS | HEIGHT: 66 IN | BODY MASS INDEX: 29.73 KG/M2 | DIASTOLIC BLOOD PRESSURE: 84 MMHG

## 2025-04-29 DIAGNOSIS — Z90.710 H/O TOTAL VAGINAL HYSTERECTOMY: ICD-10-CM

## 2025-04-29 DIAGNOSIS — Z96.0 PRESENCE OF PESSARY: ICD-10-CM

## 2025-04-29 DIAGNOSIS — Z46.89 ENCOUNTER FOR PESSARY MAINTENANCE: Primary | ICD-10-CM

## 2025-04-29 DIAGNOSIS — N81.11 MIDLINE CYSTOCELE: ICD-10-CM

## 2025-04-29 PROCEDURE — G8399 PT W/DXA RESULTS DOCUMENT: HCPCS | Performed by: NURSE PRACTITIONER

## 2025-04-29 PROCEDURE — G8417 CALC BMI ABV UP PARAM F/U: HCPCS | Performed by: NURSE PRACTITIONER

## 2025-04-29 PROCEDURE — 99213 OFFICE O/P EST LOW 20 MIN: CPT | Performed by: NURSE PRACTITIONER

## 2025-04-29 PROCEDURE — 1159F MED LIST DOCD IN RCRD: CPT | Performed by: NURSE PRACTITIONER

## 2025-04-29 PROCEDURE — 1036F TOBACCO NON-USER: CPT | Performed by: NURSE PRACTITIONER

## 2025-04-29 PROCEDURE — 1090F PRES/ABSN URINE INCON ASSESS: CPT | Performed by: NURSE PRACTITIONER

## 2025-04-29 PROCEDURE — 1123F ACP DISCUSS/DSCN MKR DOCD: CPT | Performed by: NURSE PRACTITIONER

## 2025-04-29 PROCEDURE — G8427 DOCREV CUR MEDS BY ELIG CLIN: HCPCS | Performed by: NURSE PRACTITIONER

## 2025-04-29 NOTE — PROGRESS NOTES
Deana Dalton  2025  11:45 AM          Deana Dalton is a 79 y.o. female       The patient was seen. She has no chief complaints today. She has been fitted for a # 4; ring with support type pessary.  She states all of her symptoms that she had prior to the pessary have been relieved with its use.  She denies any vaginal bleeding.  She denies to any vaginal discharge or odor. Her bowels are regular and her voiding pattern is normal.     Blood pressure 132/84, height 1.676 m (5' 6\"), weight 83.9 kg (185 lb), not currently breastfeeding.    Chaperone for Intimate Exam  Chaperone was offered and accepted as part of the rooming process.  Chaperone: carlos        Abdomen: Soft and non-tender; good bowel sounds; no guarding, rebound or rigidity; no CVA tenderness bilaterally.    Extremities: No calf tenderness bilaterally. DTR 2/4 bilaterally. No edema.    Perineum/Speculum: There is not any signs of infection; The vaginal vault is without any signs of erythema or erosion. There is no vaginal discharge or odor appreciated.    The pessary was cleansed and replaced without any problems and the patient tolerated the procedure well. T.O.S. Ointment was placed with the pessary to decrease discharge/odor.    Assessment:   Diagnosis Orders   1. Encounter for pessary maintenance        2. Presence of pessary-#4 Ring with support        3. H/O total vaginal hysterectomy        4. Midline cystocele          Chief Complaint   Patient presents with    pessary maintenance     Patient Active Problem List    Diagnosis Date Noted    Cochlear implant in place 2022     Priority: Medium    H/O total vaginal hysterectomy 2021     Priority: Medium    H/O bilateral salpingo-oophorectomy S/P vag hyst (6 weeks) 2021     Priority: Medium    Presence of pessary-#4 Ring with support-REFITTED 2024 #3 Ring with support 2021     Priority: Medium    Dizziness 2024    Primary osteoarthritis of both hands

## 2025-05-02 ENCOUNTER — CLINICAL SUPPORT (OUTPATIENT)
Dept: FAMILY MEDICINE CLINIC | Age: 80
End: 2025-05-02
Payer: MEDICARE

## 2025-05-02 VITALS — SYSTOLIC BLOOD PRESSURE: 130 MMHG | HEART RATE: 77 BPM | DIASTOLIC BLOOD PRESSURE: 84 MMHG | OXYGEN SATURATION: 95 %

## 2025-05-02 DIAGNOSIS — N18.2 BENIGN HYPERTENSION WITH CKD (CHRONIC KIDNEY DISEASE), STAGE II: Primary | ICD-10-CM

## 2025-05-02 DIAGNOSIS — I12.9 BENIGN HYPERTENSION WITH CKD (CHRONIC KIDNEY DISEASE), STAGE II: Primary | ICD-10-CM

## 2025-05-02 PROCEDURE — 99211 OFF/OP EST MAY X REQ PHY/QHP: CPT | Performed by: FAMILY MEDICINE

## 2025-05-02 NOTE — ASSESSMENT & PLAN NOTE
Chronic, at goal (stable), continue current treatment plan  Increase dosage of losartan to 75 Mg on 4/25/2025

## 2025-05-02 NOTE — PROGRESS NOTES
Chief Complaint   Patient presents with    Hypertension    Blood Pressure Check        Patient is here for blood pressure recheck.      Assessment & Plan  Benign hypertension with CKD (chronic kidney disease), stage II   Chronic, at goal (stable), continue current treatment plan  Increase dosage of losartan to 75 Mg on 4/25/2025          BP Readings from Last 3 Encounters:   05/02/25 130/84   04/29/25 132/84   04/25/25 (!) 142/90       Pulse Readings from Last 3 Encounters:   05/02/25 77   04/25/25 71   03/04/25 86       Future Appointments   Date Time Provider Department Center   6/11/2025  3:30 PM Ella Vasquez MD [facility]   6/24/2025 11:30 AM Gabriela Alexander APRN - CNP [facility]   10/29/2025  2:00 PM May Krishnamurthy MD [facility]   4/29/2026  1:30 PM May Krishnamurthy MD fp [facility]       Electronically signed by May Krishnamurthy MD on 5/2/25 at 1:06 PM EDT

## 2025-05-02 NOTE — PROGRESS NOTES
Chief Complaint   Patient presents with    Hypertension    Blood Pressure Check      Deana Dalton is here for BP check    BP Readings from Last 3 Encounters:   05/02/25 130/84   04/29/25 132/84   04/25/25 (!) 142/90       BP is 130/84      Future Appointments   Date Time Provider Department Center   5/2/2025  1:00 PM SCHEDULE, [facility]   6/11/2025  3:30 PM Ella Vasquez MD [facility]   6/24/2025 11:30 AM Gabriela Alexander APRN - CNP [facility]   10/29/2025  2:00 PM May Krishnamurthy MD fp [facility] DE[facility]   4/29/2026  1:30 PM May Krishnamurthy MD fp [facility] DEP

## 2025-05-14 ENCOUNTER — HOSPITAL ENCOUNTER (OUTPATIENT)
Age: 80
Discharge: HOME | End: 2025-05-14
Payer: MEDICARE

## 2025-05-14 ENCOUNTER — RESULTS FOLLOW-UP (OUTPATIENT)
Dept: FAMILY MEDICINE CLINIC | Age: 80
End: 2025-05-14

## 2025-05-14 DIAGNOSIS — E78.2 MIXED HYPERLIPIDEMIA: ICD-10-CM

## 2025-05-14 DIAGNOSIS — M81.0 AGE-RELATED OSTEOPOROSIS WITHOUT CURRENT PATHOLOGICAL FRACTURE: ICD-10-CM

## 2025-05-14 DIAGNOSIS — N18.2 BENIGN HYPERTENSION WITH CKD (CHRONIC KIDNEY DISEASE), STAGE II: ICD-10-CM

## 2025-05-14 DIAGNOSIS — I12.9 BENIGN HYPERTENSION WITH CKD (CHRONIC KIDNEY DISEASE), STAGE II: ICD-10-CM

## 2025-05-14 DIAGNOSIS — R73.03 PREDIABETES: ICD-10-CM

## 2025-05-14 LAB
25(OH)D3 SERPL-MCNC: 62.7 NG/ML (ref 30–100)
ALBUMIN SERPL-MCNC: 4.2 G/DL (ref 3.5–5.2)
ALP SERPL-CCNC: 61 U/L (ref 35–104)
ALT SERPL-CCNC: 18 U/L (ref 10–35)
ANION GAP SERPL CALCULATED.3IONS-SCNC: 12 MMOL/L (ref 9–16)
AST SERPL-CCNC: 23 U/L (ref 10–35)
BASOPHILS # BLD: 0 K/UL (ref 0–0.2)
BASOPHILS NFR BLD: 1 % (ref 0–2)
BILIRUB SERPL-MCNC: 0.6 MG/DL (ref 0–1.2)
BUN SERPL-MCNC: 5 MG/DL (ref 8–23)
CALCIUM SERPL-MCNC: 9.3 MG/DL (ref 8.6–10.4)
CHLORIDE SERPL-SCNC: 106 MMOL/L (ref 98–107)
CHOLEST SERPL-MCNC: 160 MG/DL (ref 0–199)
CHOLESTEROL/HDL RATIO: 3.9
CO2 SERPL-SCNC: 24 MMOL/L (ref 20–31)
CREAT SERPL-MCNC: 0.8 MG/DL (ref 0.7–1.2)
EOSINOPHIL # BLD: 0.1 K/UL (ref 0–0.4)
EOSINOPHILS RELATIVE PERCENT: 1 % (ref 0–4)
ERYTHROCYTE [DISTWIDTH] IN BLOOD BY AUTOMATED COUNT: 13.5 % (ref 11.5–14.9)
EST. AVERAGE GLUCOSE BLD GHB EST-MCNC: 123 MG/DL
GFR, ESTIMATED: 75 ML/MIN/1.73M2
GLUCOSE SERPL-MCNC: 101 MG/DL (ref 74–99)
HBA1C MFR BLD: 5.9 % (ref 4–6)
HCT VFR BLD AUTO: 45.3 % (ref 36–46)
HDLC SERPL-MCNC: 41 MG/DL
HGB BLD-MCNC: 14.7 G/DL (ref 12–16)
LDLC SERPL CALC-MCNC: 75 MG/DL (ref 0–100)
LYMPHOCYTES NFR BLD: 1.7 K/UL (ref 1–4.8)
LYMPHOCYTES RELATIVE PERCENT: 22 % (ref 24–44)
MAGNESIUM SERPL-MCNC: 2.2 MG/DL (ref 1.6–2.4)
MCH RBC QN AUTO: 31.5 PG (ref 26–34)
MCHC RBC AUTO-ENTMCNC: 32.4 G/DL (ref 31–37)
MCV RBC AUTO: 97.2 FL (ref 80–100)
MONOCYTES NFR BLD: 0.7 K/UL (ref 0.1–1.3)
MONOCYTES NFR BLD: 8 % (ref 1–7)
NEUTROPHILS NFR BLD: 68 % (ref 36–66)
NEUTS SEG NFR BLD: 5.5 K/UL (ref 1.3–9.1)
PHOSPHATE SERPL-MCNC: 3.4 MG/DL (ref 2.5–4.5)
PLATELET # BLD AUTO: 206 K/UL (ref 150–450)
PMV BLD AUTO: 9 FL (ref 6–12)
POTASSIUM SERPL-SCNC: 4.4 MMOL/L (ref 3.7–5.3)
PROT SERPL-MCNC: 6.7 G/DL (ref 6.6–8.7)
RBC # BLD AUTO: 4.67 M/UL (ref 4–5.2)
SODIUM SERPL-SCNC: 142 MMOL/L (ref 136–145)
TRIGL SERPL-MCNC: 219 MG/DL (ref 0–149)
TSH SERPL DL<=0.05 MIU/L-ACNC: 2.17 UIU/ML (ref 0.27–4.2)
URATE SERPL-MCNC: 4.9 MG/DL (ref 2.4–5.7)
WBC OTHER # BLD: 7.9 K/UL (ref 3.5–11)

## 2025-05-14 PROCEDURE — 83036 HEMOGLOBIN GLYCOSYLATED A1C: CPT

## 2025-05-14 PROCEDURE — 36415 COLL VENOUS BLD VENIPUNCTURE: CPT

## 2025-05-14 PROCEDURE — 83735 ASSAY OF MAGNESIUM: CPT

## 2025-05-14 PROCEDURE — 84550 ASSAY OF BLOOD/URIC ACID: CPT

## 2025-05-14 PROCEDURE — 85025 COMPLETE CBC W/AUTO DIFF WBC: CPT

## 2025-05-14 PROCEDURE — 84100 ASSAY OF PHOSPHORUS: CPT

## 2025-05-14 PROCEDURE — 80061 LIPID PANEL: CPT

## 2025-05-14 PROCEDURE — 84443 ASSAY THYROID STIM HORMONE: CPT

## 2025-05-14 PROCEDURE — 82306 VITAMIN D 25 HYDROXY: CPT

## 2025-05-14 PROCEDURE — 80053 COMPREHEN METABOLIC PANEL: CPT

## 2025-05-19 DIAGNOSIS — F33.0 MILD EPISODE OF RECURRENT MAJOR DEPRESSIVE DISORDER: ICD-10-CM

## 2025-05-19 RX ORDER — ESCITALOPRAM OXALATE 10 MG/1
10 TABLET ORAL DAILY
Qty: 90 TABLET | Refills: 3 | Status: SHIPPED | OUTPATIENT
Start: 2025-05-19

## 2025-05-19 NOTE — TELEPHONE ENCOUNTER
Please Approve or Refuse.  Send to Pharmacy per Pt's Request:      Next Visit Date:  10/29/2025   Last Visit Date: 5/2/2025    Hemoglobin A1C (%)   Date Value   05/14/2025 5.9   08/02/2024 6.0   11/02/2023 5.6             ( goal A1C is < 7)   BP Readings from Last 3 Encounters:   05/02/25 130/84   04/29/25 132/84   04/25/25 (!) 142/90          (goal 120/80)  BUN   Date Value Ref Range Status   05/14/2025 5 (L) 8 - 23 mg/dL Final     Creatinine   Date Value Ref Range Status   05/14/2025 0.8 0.7 - 1.2 mg/dL Final     Potassium   Date Value Ref Range Status   05/14/2025 4.4 3.7 - 5.3 mmol/L Final

## 2025-06-11 ENCOUNTER — OFFICE VISIT (OUTPATIENT)
Dept: GASTROENTEROLOGY | Age: 80
End: 2025-06-11
Payer: MEDICARE

## 2025-06-11 VITALS — HEART RATE: 70 BPM | SYSTOLIC BLOOD PRESSURE: 127 MMHG | DIASTOLIC BLOOD PRESSURE: 68 MMHG

## 2025-06-11 DIAGNOSIS — R14.0 ABDOMINAL BLOATING: ICD-10-CM

## 2025-06-11 DIAGNOSIS — K76.0 FATTY LIVER: ICD-10-CM

## 2025-06-11 DIAGNOSIS — K57.30 DIVERTICULOSIS OF LARGE INTESTINE WITHOUT HEMORRHAGE: Primary | ICD-10-CM

## 2025-06-11 PROCEDURE — G8427 DOCREV CUR MEDS BY ELIG CLIN: HCPCS | Performed by: INTERNAL MEDICINE

## 2025-06-11 PROCEDURE — 1126F AMNT PAIN NOTED NONE PRSNT: CPT | Performed by: INTERNAL MEDICINE

## 2025-06-11 PROCEDURE — 1090F PRES/ABSN URINE INCON ASSESS: CPT | Performed by: INTERNAL MEDICINE

## 2025-06-11 PROCEDURE — 1036F TOBACCO NON-USER: CPT | Performed by: INTERNAL MEDICINE

## 2025-06-11 PROCEDURE — G8417 CALC BMI ABV UP PARAM F/U: HCPCS | Performed by: INTERNAL MEDICINE

## 2025-06-11 PROCEDURE — 99213 OFFICE O/P EST LOW 20 MIN: CPT | Performed by: INTERNAL MEDICINE

## 2025-06-11 PROCEDURE — G8399 PT W/DXA RESULTS DOCUMENT: HCPCS | Performed by: INTERNAL MEDICINE

## 2025-06-11 PROCEDURE — 1123F ACP DISCUSS/DSCN MKR DOCD: CPT | Performed by: INTERNAL MEDICINE

## 2025-06-11 PROCEDURE — 1159F MED LIST DOCD IN RCRD: CPT | Performed by: INTERNAL MEDICINE

## 2025-06-11 ASSESSMENT — ENCOUNTER SYMPTOMS
VOMITING: 0
ABDOMINAL DISTENTION: 0
ABDOMINAL PAIN: 0
CONSTIPATION: 0
DIARRHEA: 0
NAUSEA: 0
RECTAL PAIN: 0
BLOOD IN STOOL: 0
ANAL BLEEDING: 0

## 2025-06-11 NOTE — PROGRESS NOTES
12/5/24 1019   Surgical Pathology Report Path Number: [id number]    -- Diagnosis --    COLON, RANDOM BIOPSIES:  -NO PATHOLOGIC DIAGNOSIS  -THE FEATURES OF LYMPHOCYTIC AND/OR COLLAGENOUS COLITIS ARE NOT  IDENTIFIED      Cristhian Kumar D.O.  **Electronically Signed Out**        [facility]/12/9/2024     Clinical Information  Pre-Op Diagnosis:  DIVERTICULITIS OF LARGE INTESTINE WITHOUT  HEMORRHAGE;  DIARRHEA, UNSPECIFIED TYPE  Operative Findings:  RANDOM COLON BIOPSY  Operation Performed:  COLONOSCOPY BIOPSY  se        Source of Specimen  A: RANDOM COLON BX      Gross Description  \"YE BISHOP, RANDOM COLON BIOPSY\" Received in formalin are multiple  tan-white tissue fragments from 0.1 to 0.5 cm and are 1.5 x 0.7 x 0.2  cm in aggregate.  Entirely 1cs.  harshj tm    Timothy Palacios M.D./se:12/5/2024  Microscopic Description  Microscopic examination performed.    Processing Lab:  [address] [address]  Interpretation Performed at [facility] [address] [place] [address]    SURGICAL PATHOLOGY CONSULTATION       Patient Name: YE BISHOP  [facility] Rec: [id number]  [facility]  [facility]  ANATOMIC PATHOLOGY  [address] [place] [address].  [place], [place] [address] [phone]  Fax: [phone]   Resulting Agency [facility]             Specimen Collected: 12/05/24 10:19 EST Last Resulted: 12/09/24 12:29 EST

## 2025-06-24 ENCOUNTER — OFFICE VISIT (OUTPATIENT)
Dept: OBGYN CLINIC | Age: 80
End: 2025-06-24
Payer: MEDICARE

## 2025-06-24 VITALS
SYSTOLIC BLOOD PRESSURE: 130 MMHG | DIASTOLIC BLOOD PRESSURE: 68 MMHG | WEIGHT: 186 LBS | HEIGHT: 66 IN | BODY MASS INDEX: 29.89 KG/M2

## 2025-06-24 DIAGNOSIS — N81.11 MIDLINE CYSTOCELE: ICD-10-CM

## 2025-06-24 DIAGNOSIS — Z46.89 ENCOUNTER FOR PESSARY MAINTENANCE: Primary | ICD-10-CM

## 2025-06-24 PROCEDURE — 1123F ACP DISCUSS/DSCN MKR DOCD: CPT | Performed by: NURSE PRACTITIONER

## 2025-06-24 PROCEDURE — G8417 CALC BMI ABV UP PARAM F/U: HCPCS | Performed by: NURSE PRACTITIONER

## 2025-06-24 PROCEDURE — G8427 DOCREV CUR MEDS BY ELIG CLIN: HCPCS | Performed by: NURSE PRACTITIONER

## 2025-06-24 PROCEDURE — 99213 OFFICE O/P EST LOW 20 MIN: CPT | Performed by: NURSE PRACTITIONER

## 2025-06-24 PROCEDURE — G8399 PT W/DXA RESULTS DOCUMENT: HCPCS | Performed by: NURSE PRACTITIONER

## 2025-06-24 PROCEDURE — 1036F TOBACCO NON-USER: CPT | Performed by: NURSE PRACTITIONER

## 2025-06-24 PROCEDURE — 1159F MED LIST DOCD IN RCRD: CPT | Performed by: NURSE PRACTITIONER

## 2025-06-24 PROCEDURE — 1090F PRES/ABSN URINE INCON ASSESS: CPT | Performed by: NURSE PRACTITIONER

## 2025-06-24 NOTE — PROGRESS NOTES
Deana Dalton  2025  1:19 PM          Deana Dalton is a 79 y.o. female       The patient was seen. She has no chief complaints today. She has been fitted for a # 4; ring with support type pessary.  She states all of her symptoms that she had prior to the pessary have been relieved with its use.  She denies any vaginal bleeding.  She denies to any vaginal discharge or odor. Her bowels are regular and her voiding pattern is normal.     Blood pressure 130/68, height 1.676 m (5' 6\"), weight 84.4 kg (186 lb), not currently breastfeeding.    Chaperone for Intimate Exam  Chaperone was offered and accepted as part of the rooming process.  Chaperone: Kiera        Abdomen: Soft and non-tender; good bowel sounds; no guarding, rebound or rigidity; no CVA tenderness bilaterally.    Extremities: No calf tenderness bilaterally. DTR 2/4 bilaterally. No edema.    Perineum/Speculum: There is not any signs of infection; The vaginal vault is without any signs of erythema or erosion. There is no vaginal discharge or odor appreciated.    The pessary was cleansed and replaced without any problems and the patient tolerated the procedure well. T.O.S. Ointment was placed with the pessary to decrease discharge/odor.    Assessment:   Diagnosis Orders   1. Encounter for pessary maintenance        2. Midline cystocele          Chief Complaint   Patient presents with    pessary maintenance     Patient Active Problem List    Diagnosis Date Noted    Cochlear implant in place 2022     Priority: Medium    H/O total vaginal hysterectomy 2021     Priority: Medium    H/O bilateral salpingo-oophorectomy S/P vag hyst (6 weeks) 2021     Priority: Medium    Presence of pessary-#4 Ring with support-REFITTED 2024 #3 Ring with support 2021     Priority: Medium    Dizziness 2024    Primary osteoarthritis of both hands 2024    Benign hypertension with CKD (chronic kidney disease), stage II 2024

## 2025-07-28 ENCOUNTER — TELEPHONE (OUTPATIENT)
Dept: FAMILY MEDICINE CLINIC | Age: 80
End: 2025-07-28

## 2025-07-28 DIAGNOSIS — Z96.21 COCHLEAR IMPLANT IN PLACE: Primary | ICD-10-CM

## 2025-07-28 DIAGNOSIS — Z97.4 DOES USE HEARING AID: ICD-10-CM

## 2025-07-28 NOTE — TELEPHONE ENCOUNTER
Patient called and said the ENT she was referred to is no longer taking adult patients so she needed to get a referral somewhere else.

## 2025-07-28 NOTE — TELEPHONE ENCOUNTER
New referral placed, please let her know     Diagnosis Orders   1. Cochlear implant in place  Debbie Zuniga MD, OtolaryngologyTess      2. Does use hearing aid  Debbie Zuniga MD, OtolaryngologyTess           Future Appointments   Date Time Provider Department Center   8/12/2025  9:30 AM Jv Godinez [facility]   10/29/2025  2:00 PM May Krishnamurthy MD fp [facility] DEP   4/29/2026  1:30 PM May Krishnamurthy MD fp [facility] DEP

## 2025-08-07 ENCOUNTER — TELEPHONE (OUTPATIENT)
Dept: FAMILY MEDICINE CLINIC | Age: 80
End: 2025-08-07

## 2025-08-07 DIAGNOSIS — M81.0 AGE-RELATED OSTEOPOROSIS WITHOUT CURRENT PATHOLOGICAL FRACTURE: Primary | ICD-10-CM

## 2025-08-07 RX ORDER — SODIUM CHLORIDE 9 MG/ML
INJECTION, SOLUTION INTRAVENOUS CONTINUOUS
OUTPATIENT
Start: 2025-08-07

## 2025-08-07 RX ORDER — ACETAMINOPHEN 325 MG/1
650 TABLET ORAL
OUTPATIENT
Start: 2025-08-07

## 2025-08-07 RX ORDER — EPINEPHRINE 1 MG/ML
0.3 INJECTION, SOLUTION, CONCENTRATE INTRAVENOUS PRN
OUTPATIENT
Start: 2025-08-07

## 2025-08-07 RX ORDER — HYDROCORTISONE SODIUM SUCCINATE 100 MG/2ML
100 INJECTION INTRAMUSCULAR; INTRAVENOUS
OUTPATIENT
Start: 2025-08-07

## 2025-08-07 RX ORDER — DIPHENHYDRAMINE HYDROCHLORIDE 50 MG/ML
50 INJECTION, SOLUTION INTRAMUSCULAR; INTRAVENOUS
OUTPATIENT
Start: 2025-08-07

## 2025-08-07 RX ORDER — ONDANSETRON 2 MG/ML
8 INJECTION INTRAMUSCULAR; INTRAVENOUS
OUTPATIENT
Start: 2025-08-07

## 2025-08-07 RX ORDER — ALBUTEROL SULFATE 90 UG/1
4 INHALANT RESPIRATORY (INHALATION) PRN
OUTPATIENT
Start: 2025-08-07

## 2025-08-07 RX ORDER — FAMOTIDINE 10 MG/ML
20 INJECTION, SOLUTION INTRAVENOUS
OUTPATIENT
Start: 2025-08-07

## 2025-08-12 ENCOUNTER — HOSPITAL ENCOUNTER (OUTPATIENT)
Dept: INFUSION THERAPY | Age: 80
Setting detail: INFUSION SERIES
Discharge: HOME | End: 2025-08-12
Payer: MEDICARE

## 2025-08-12 ENCOUNTER — OFFICE VISIT (OUTPATIENT)
Dept: OBGYN CLINIC | Age: 80
End: 2025-08-12

## 2025-08-12 VITALS
SYSTOLIC BLOOD PRESSURE: 132 MMHG | RESPIRATION RATE: 16 BRPM | TEMPERATURE: 96.8 F | DIASTOLIC BLOOD PRESSURE: 62 MMHG | OXYGEN SATURATION: 92 % | HEART RATE: 72 BPM

## 2025-08-12 VITALS
BODY MASS INDEX: 29.57 KG/M2 | SYSTOLIC BLOOD PRESSURE: 116 MMHG | HEIGHT: 66 IN | DIASTOLIC BLOOD PRESSURE: 80 MMHG | WEIGHT: 184 LBS

## 2025-08-12 DIAGNOSIS — Z96.0 PRESENCE OF PESSARY: ICD-10-CM

## 2025-08-12 DIAGNOSIS — Z46.89 ENCOUNTER FOR PESSARY MAINTENANCE: Primary | ICD-10-CM

## 2025-08-12 DIAGNOSIS — N81.11 MIDLINE CYSTOCELE: ICD-10-CM

## 2025-08-12 DIAGNOSIS — M81.0 AGE-RELATED OSTEOPOROSIS WITHOUT CURRENT PATHOLOGICAL FRACTURE: Primary | ICD-10-CM

## 2025-08-12 DIAGNOSIS — Z90.710 H/O TOTAL VAGINAL HYSTERECTOMY: ICD-10-CM

## 2025-08-12 LAB
CALCIUM SERPL-MCNC: 9.7 MG/DL (ref 8.6–10.4)
CREAT SERPL-MCNC: 0.8 MG/DL (ref 0.7–1.2)
GFR, ESTIMATED: 75 ML/MIN/1.73M2
MAGNESIUM SERPL-MCNC: 2.3 MG/DL (ref 1.6–2.4)
PHOSPHATE SERPL-MCNC: 3.7 MG/DL (ref 2.5–4.5)

## 2025-08-12 PROCEDURE — 83735 ASSAY OF MAGNESIUM: CPT

## 2025-08-12 PROCEDURE — 82310 ASSAY OF CALCIUM: CPT

## 2025-08-12 PROCEDURE — 84100 ASSAY OF PHOSPHORUS: CPT

## 2025-08-12 PROCEDURE — 82565 ASSAY OF CREATININE: CPT

## 2025-08-12 PROCEDURE — 96372 THER/PROPH/DIAG INJ SC/IM: CPT

## 2025-08-12 PROCEDURE — 6360000002 HC RX W HCPCS: Performed by: FAMILY MEDICINE

## 2025-08-12 PROCEDURE — 36415 COLL VENOUS BLD VENIPUNCTURE: CPT

## 2025-08-12 RX ORDER — HYDROCORTISONE SODIUM SUCCINATE 100 MG/2ML
100 INJECTION INTRAMUSCULAR; INTRAVENOUS
OUTPATIENT
Start: 2025-11-09

## 2025-08-12 RX ORDER — EPINEPHRINE 1 MG/ML
0.3 INJECTION, SOLUTION, CONCENTRATE INTRAVENOUS PRN
OUTPATIENT
Start: 2025-11-09

## 2025-08-12 RX ORDER — DIPHENHYDRAMINE HYDROCHLORIDE 50 MG/ML
50 INJECTION, SOLUTION INTRAMUSCULAR; INTRAVENOUS
OUTPATIENT
Start: 2025-11-09

## 2025-08-12 RX ORDER — ACETAMINOPHEN 325 MG/1
650 TABLET ORAL
OUTPATIENT
Start: 2025-11-09

## 2025-08-12 RX ORDER — ALBUTEROL SULFATE 90 UG/1
4 INHALANT RESPIRATORY (INHALATION) PRN
OUTPATIENT
Start: 2025-11-09

## 2025-08-12 RX ORDER — ONDANSETRON 2 MG/ML
8 INJECTION INTRAMUSCULAR; INTRAVENOUS
OUTPATIENT
Start: 2025-11-09

## 2025-08-12 RX ORDER — SODIUM CHLORIDE 9 MG/ML
INJECTION, SOLUTION INTRAVENOUS CONTINUOUS
OUTPATIENT
Start: 2025-11-09

## 2025-08-12 RX ADMIN — DENOSUMAB 60 MG: 60 INJECTION SUBCUTANEOUS at 11:27

## (undated) DEVICE — GOWN,AURORA,NONREINFORCED,LARGE: Brand: MEDLINE

## (undated) DEVICE — MARKER,SKIN,WI/RULER AND LABELS: Brand: MEDLINE

## (undated) DEVICE — TUBING, SUCTION, 9/32" X 12', STRAIGHT: Brand: MEDLINE INDUSTRIES, INC.

## (undated) DEVICE — ELECTRODE PT RET AD L9FT HI MOIST COND ADH HYDRGEL CORDED

## (undated) DEVICE — YANKAUER,SMOOTH HANDLE,HIGH CAPACITY: Brand: MEDLINE INDUSTRIES, INC.

## (undated) DEVICE — Device

## (undated) DEVICE — 6619 2 PTNT ISO SYS INCISE AREA&LT;(&GT;&&LT;)&GT;P: Brand: STERI-DRAPE™ IOBAN™ 2

## (undated) DEVICE — DRESSING,GAUZE,XEROFORM,CURAD,1"X8",ST: Brand: CURAD

## (undated) DEVICE — 3M™ STERI-DRAPE™ INCISE DRAPE 1050 (60CM X 45CM): Brand: STERI-DRAPE™

## (undated) DEVICE — REAMER SHAFT MODIFIED TRINKLE 8X510MM

## (undated) DEVICE — DEFENDO AIR WATER SUCTION AND BIOPSY VALVE KIT FOR  OLYMPUS: Brand: DEFENDO AIR/WATER/SUCTION AND BIOPSY VALVE

## (undated) DEVICE — GLOVE SURG SZ 75 L12IN FNGR THK79MIL GRN LTX FREE

## (undated) DEVICE — SUTURE VCRL + SZ 0 L27IN ABSRB WHT CT-2 1/2 CIR TAPERPOINT VCP270H

## (undated) DEVICE — COVER,MAYO STAND,STERILE: Brand: MEDLINE

## (undated) DEVICE — GUIDE WIRE, BALL-TIPPED, STERILE

## (undated) DEVICE — GLOVE SURG SZ 85 L12IN FNGR THK79MIL GRN LTX FREE

## (undated) DEVICE — ENDO KIT W/SYRINGE: Brand: MEDLINE INDUSTRIES, INC.

## (undated) DEVICE — DRESSING TRNSPAR W5XL4.5IN FLM SHT SEMIPERMEABLE WIND

## (undated) DEVICE — SOLUTION IV IRRIG POUR BRL 0.9% SODIUM CHL 2F7124

## (undated) DEVICE — COVER,MAYO STAND,XL,STERILE: Brand: MEDLINE

## (undated) DEVICE — COVER,C-ARM,41X74: Brand: MEDLINE

## (undated) DEVICE — SINGLE PORT MANIFOLD: Brand: NEPTUNE 2

## (undated) DEVICE — GAUZE,SPONGE,FLUFF,6"X6.75",STRL,5/TRAY: Brand: MEDLINE

## (undated) DEVICE — PENCIL ES L3M BTTN SWCH HOLSTER W/ BLDE ELECTRD EDGE

## (undated) DEVICE — BANDAGE,SELF ADHRNT,COFLEX,4"X5YD,STRL: Brand: COLABEL

## (undated) DEVICE — 3M™ STERI-DRAPE™ U-DRAPE 1015: Brand: STERI-DRAPE™

## (undated) DEVICE — C-ARMOR C-ARM EQUIPMENT COVERS CLEAR STERILE UNIVERSAL FIT 12 PER CASE: Brand: C-ARMOR

## (undated) DEVICE — FLEXIBLE YANKAUER,LARGE TIP, NO VACUUM CONTROL: Brand: ARGYLE

## (undated) DEVICE — BLANKET WRM W29.9XL79.1IN UP BODY FORC AIR MISTRAL-AIR

## (undated) DEVICE — TUBING, SUCTION, 3/16" X 10', STRAIGHT: Brand: MEDLINE

## (undated) DEVICE — FORCEPS BX L240CM JAW DIA2.4MM ORNG L CAP W/ NDL DISP RAD

## (undated) DEVICE — GLOVE SURG SZ 8 L12IN FNGR THK75MIL WHT LTX POLYMER BEAD